# Patient Record
Sex: FEMALE | Race: WHITE | NOT HISPANIC OR LATINO
[De-identification: names, ages, dates, MRNs, and addresses within clinical notes are randomized per-mention and may not be internally consistent; named-entity substitution may affect disease eponyms.]

---

## 2017-01-18 PROBLEM — Z00.00 ENCOUNTER FOR PREVENTIVE HEALTH EXAMINATION: Status: ACTIVE | Noted: 2017-01-18

## 2017-01-30 ENCOUNTER — APPOINTMENT (OUTPATIENT)
Dept: PULMONOLOGY | Facility: CLINIC | Age: 47
End: 2017-01-30

## 2017-01-30 VITALS
HEART RATE: 77 BPM | HEIGHT: 59 IN | BODY MASS INDEX: 55.34 KG/M2 | OXYGEN SATURATION: 95 % | SYSTOLIC BLOOD PRESSURE: 136 MMHG | DIASTOLIC BLOOD PRESSURE: 84 MMHG | WEIGHT: 274.5 LBS

## 2017-01-30 DIAGNOSIS — Z86.79 PERSONAL HISTORY OF OTHER DISEASES OF THE CIRCULATORY SYSTEM: ICD-10-CM

## 2017-02-08 ENCOUNTER — OUTPATIENT (OUTPATIENT)
Dept: OUTPATIENT SERVICES | Facility: HOSPITAL | Age: 47
LOS: 1 days | End: 2017-02-08
Payer: COMMERCIAL

## 2017-02-08 DIAGNOSIS — G47.33 OBSTRUCTIVE SLEEP APNEA (ADULT) (PEDIATRIC): ICD-10-CM

## 2017-02-08 PROCEDURE — 95806 SLEEP STUDY UNATT&RESP EFFT: CPT

## 2017-02-08 PROCEDURE — 95806 SLEEP STUDY UNATT&RESP EFFT: CPT | Mod: 26

## 2017-02-22 ENCOUNTER — APPOINTMENT (OUTPATIENT)
Dept: PULMONOLOGY | Facility: CLINIC | Age: 47
End: 2017-02-22

## 2017-02-22 VITALS
SYSTOLIC BLOOD PRESSURE: 122 MMHG | DIASTOLIC BLOOD PRESSURE: 84 MMHG | HEART RATE: 90 BPM | BODY MASS INDEX: 56.15 KG/M2 | WEIGHT: 278 LBS | RESPIRATION RATE: 16 BRPM | OXYGEN SATURATION: 97 %

## 2017-03-29 ENCOUNTER — APPOINTMENT (OUTPATIENT)
Dept: VASCULAR SURGERY | Facility: CLINIC | Age: 47
End: 2017-03-29

## 2017-03-29 DIAGNOSIS — F17.200 NICOTINE DEPENDENCE, UNSPECIFIED, UNCOMPLICATED: ICD-10-CM

## 2017-04-06 ENCOUNTER — APPOINTMENT (OUTPATIENT)
Dept: VASCULAR SURGERY | Facility: CLINIC | Age: 47
End: 2017-04-06

## 2017-04-06 VITALS
TEMPERATURE: 98.9 F | HEIGHT: 59 IN | RESPIRATION RATE: 16 BRPM | OXYGEN SATURATION: 98 % | BODY MASS INDEX: 57.05 KG/M2 | DIASTOLIC BLOOD PRESSURE: 73 MMHG | WEIGHT: 283 LBS | HEART RATE: 61 BPM | SYSTOLIC BLOOD PRESSURE: 115 MMHG

## 2017-04-06 DIAGNOSIS — Z86.69 PERSONAL HISTORY OF OTHER DISEASES OF THE NERVOUS SYSTEM AND SENSE ORGANS: ICD-10-CM

## 2017-04-06 DIAGNOSIS — Z83.3 FAMILY HISTORY OF DIABETES MELLITUS: ICD-10-CM

## 2017-04-06 DIAGNOSIS — Z82.49 FAMILY HISTORY OF ISCHEMIC HEART DISEASE AND OTHER DISEASES OF THE CIRCULATORY SYSTEM: ICD-10-CM

## 2017-04-06 DIAGNOSIS — Z82.0 FAMILY HISTORY OF EPILEPSY AND OTHER DISEASES OF THE NERVOUS SYSTEM: ICD-10-CM

## 2017-05-10 ENCOUNTER — APPOINTMENT (OUTPATIENT)
Dept: VASCULAR SURGERY | Facility: CLINIC | Age: 47
End: 2017-05-10

## 2018-10-29 ENCOUNTER — APPOINTMENT (OUTPATIENT)
Dept: NEPHROLOGY | Facility: CLINIC | Age: 48
End: 2018-10-29
Payer: MEDICAID

## 2018-10-29 VITALS
BODY MASS INDEX: 54.84 KG/M2 | SYSTOLIC BLOOD PRESSURE: 132 MMHG | WEIGHT: 272 LBS | HEIGHT: 59 IN | OXYGEN SATURATION: 97 % | DIASTOLIC BLOOD PRESSURE: 80 MMHG | HEART RATE: 89 BPM

## 2018-10-29 DIAGNOSIS — E55.9 VITAMIN D DEFICIENCY, UNSPECIFIED: ICD-10-CM

## 2018-10-29 PROCEDURE — 99205 OFFICE O/P NEW HI 60 MIN: CPT | Mod: 25

## 2018-10-29 PROCEDURE — 36415 COLL VENOUS BLD VENIPUNCTURE: CPT

## 2018-10-30 PROBLEM — E55.9 VITAMIN D DEFICIENCY: Status: ACTIVE | Noted: 2018-10-30

## 2018-10-30 LAB
25(OH)D3 SERPL-MCNC: 15.6 NG/ML
ALBUMIN SERPL ELPH-MCNC: 4.1 G/DL
ANION GAP SERPL CALC-SCNC: 13 MMOL/L
APPEARANCE: CLEAR
BACTERIA: NEGATIVE
BASOPHILS # BLD AUTO: 0.01 K/UL
BASOPHILS NFR BLD AUTO: 0.1 %
BILIRUBIN URINE: NEGATIVE
BLOOD URINE: NEGATIVE
BUN SERPL-MCNC: 9 MG/DL
CALCIUM SERPL-MCNC: 9.3 MG/DL
CALCIUM SERPL-MCNC: 9.3 MG/DL
CHLORIDE SERPL-SCNC: 101 MMOL/L
CO2 SERPL-SCNC: 26 MMOL/L
COLOR: YELLOW
CREAT SERPL-MCNC: 0.65 MG/DL
CREAT SPEC-SCNC: 69 MG/DL
CREAT/PROT UR: 0.1 RATIO
EOSINOPHIL # BLD AUTO: 0.17 K/UL
EOSINOPHIL NFR BLD AUTO: 2 %
GLUCOSE QUALITATIVE U: NEGATIVE MG/DL
GLUCOSE SERPL-MCNC: 229 MG/DL
HBA1C MFR BLD HPLC: 8.1 %
HCT VFR BLD CALC: 47.8 %
HGB BLD-MCNC: 14.7 G/DL
IMM GRANULOCYTES NFR BLD AUTO: 0.2 %
KETONES URINE: NEGATIVE
LEUKOCYTE ESTERASE URINE: NEGATIVE
LYMPHOCYTES # BLD AUTO: 2.07 K/UL
LYMPHOCYTES NFR BLD AUTO: 24.5 %
MAN DIFF?: NORMAL
MCHC RBC-ENTMCNC: 29.8 PG
MCHC RBC-ENTMCNC: 30.8 GM/DL
MCV RBC AUTO: 97 FL
MICROSCOPIC-UA: NORMAL
MONOCYTES # BLD AUTO: 0.32 K/UL
MONOCYTES NFR BLD AUTO: 3.8 %
NEUTROPHILS # BLD AUTO: 5.87 K/UL
NEUTROPHILS NFR BLD AUTO: 69.4 %
NITRITE URINE: NEGATIVE
PARATHYROID HORMONE INTACT: 35 PG/ML
PH URINE: 5.5
PHOSPHATE SERPL-MCNC: 3.4 MG/DL
PLATELET # BLD AUTO: 261 K/UL
POTASSIUM SERPL-SCNC: 4.2 MMOL/L
PROT UR-MCNC: 7 MG/DL
PROTEIN URINE: NEGATIVE MG/DL
RBC # BLD: 4.93 M/UL
RBC # FLD: 14.9 %
RED BLOOD CELLS URINE: 1 /HPF
SODIUM SERPL-SCNC: 140 MMOL/L
SPECIFIC GRAVITY URINE: 1.02
SQUAMOUS EPITHELIAL CELLS: 5 /HPF
UROBILINOGEN URINE: NEGATIVE MG/DL
WBC # FLD AUTO: 8.46 K/UL
WHITE BLOOD CELLS URINE: 1 /HPF

## 2018-11-26 ENCOUNTER — APPOINTMENT (OUTPATIENT)
Dept: NEPHROLOGY | Facility: CLINIC | Age: 48
End: 2018-11-26

## 2020-03-02 ENCOUNTER — APPOINTMENT (OUTPATIENT)
Dept: CARDIOLOGY | Facility: CLINIC | Age: 50
End: 2020-03-02

## 2020-03-02 RX ORDER — LANCETS 33 GAUGE
EACH MISCELLANEOUS
Qty: 100 | Refills: 0 | Status: ACTIVE | COMMUNITY
Start: 2020-01-23

## 2020-07-17 ENCOUNTER — APPOINTMENT (OUTPATIENT)
Dept: ORTHOPEDIC SURGERY | Facility: CLINIC | Age: 50
End: 2020-07-17
Payer: MEDICAID

## 2020-07-17 VITALS
BODY MASS INDEX: 53.01 KG/M2 | TEMPERATURE: 98 F | WEIGHT: 270 LBS | SYSTOLIC BLOOD PRESSURE: 156 MMHG | DIASTOLIC BLOOD PRESSURE: 83 MMHG | HEIGHT: 60 IN | HEART RATE: 78 BPM

## 2020-07-17 DIAGNOSIS — Z87.2 PERSONAL HISTORY OF DISEASES OF THE SKIN AND SUBCUTANEOUS TISSUE: ICD-10-CM

## 2020-07-17 DIAGNOSIS — Z78.9 OTHER SPECIFIED HEALTH STATUS: ICD-10-CM

## 2020-07-17 PROCEDURE — 99203 OFFICE O/P NEW LOW 30 MIN: CPT

## 2020-07-17 PROCEDURE — 73560 X-RAY EXAM OF KNEE 1 OR 2: CPT | Mod: 26,RT

## 2020-07-21 NOTE — HISTORY OF PRESENT ILLNESS
[10  (interferes completely)] : the ailment interference is10/10 (interferes completely) [] : No [de-identified] : Darshana comes in today for her right hip.  She states that she fell on her right side a few weeks ago.  She states she went to urgent care, where they took x-rays and there was no fracture. It should be noted that she is an obese female with significant health problems.   She states she  has been on crutches because she was having pain into her right hip/lower extremity.  It should be noted the patient has significant lower extremity cellulitis, which she states she has had history of.  The patient states the onset/injury occurred on 6/29/20.  This injury is not work related or due to an automobile accident.   The patient describes the pain as sharp.  The patient states walking and lying down make the symptoms worse.

## 2020-07-21 NOTE — DISCUSSION/SUMMARY
[de-identified] : The patient presents with right hip trochanteric bursitis.  The patient cannot get a cortisone injection secondary to being a diabetic as well as her cellulitis.  She is advised to go to the ER for her cellulitis for her lower extremities and return to the office for her right hip in a few weeks.  Shewas also advised to use the crutches as needed.

## 2020-07-21 NOTE — PHYSICAL EXAM
[de-identified] : Right hip:\par 	\par Range of motion of her hip is not assessed secondary to being unable to fully lay down on the table secondary to some pain and some sciatic pain as well and her lower extremities being so cellulitic.  No tenderness with internal or external rotation or axial load.  Point tenderness over the greater trochanter with pain with resisted abduction.  Negative Trendelenburg.  No weakness to flexion, extension, abduction or adduction.  No evidence of instability.  No motor or sensory deficits.  2+ DP and PT pulses.  Skin is intact.  No scars, rashes or lesions.  \par  \par Left hip:\par Hip: Range of Motion in Degrees:\par 	                                 Claimant:	   Normal:	\par Flexion (Active) 	                 120 	   120-degrees	\par Flexion (Passive)	                 120	   120-degrees	\par Extension (Active)	                 -30	   -30-degrees	\par Extension (Passive)	 -30	   -30-degrees	\par Abduction (Active)	                 45-50	   08-45-jbdzago	\par Abduction (Passive)	 45-50	   25-00-tanlcsp	\par Adduction (Active)  	 20-30	   94-72-ryqitea	\par Adduction (Passive)	 20-30	   57-33-xhwrjnq	\par Internal Rotation (Active) 	 35	   35-degrees	\par Internal Rotation (Passive)	 35	   35-degrees	\par External Rotation (Active)	 45	   45-degrees	\par External Rotation (Passive)	 45	   45-degrees	\par \par No tenderness with internal or external rotation or axial load.  No tenderness to palpation over the greater trochanter.  Negative Trendelenburg.  No tenderness with resisted abduction.  No weakness to flexion, extension, abduction or adduction.  No evidence of instability.  No motor or sensory deficits.  2+ DP and PT pulses.  Skin is intact.  No scars, rashes or lesions.  \par   [de-identified] : Gait: Ambulating with crutches.  Station: Normal [de-identified] : Appearance: Well-developed, well-nourished female  in no acute distress.  [de-identified] : X-ray examination, two to three view of her right hip, including pelvis, reveals no acute fractures or dislocations.

## 2020-07-21 NOTE — ADDENDUM
[FreeTextEntry1] : This note was written by Abbey Mattson on 07/21/2020 acting as scribe for Chantale Shepard, APRILR/TAYLOR, PA.\par

## 2020-07-29 ENCOUNTER — TRANSCRIPTION ENCOUNTER (OUTPATIENT)
Age: 50
End: 2020-07-29

## 2020-07-30 ENCOUNTER — APPOINTMENT (OUTPATIENT)
Dept: ORTHOPEDIC SURGERY | Facility: CLINIC | Age: 50
End: 2020-07-30
Payer: MEDICAID

## 2020-07-30 VITALS
BODY MASS INDEX: 52.61 KG/M2 | DIASTOLIC BLOOD PRESSURE: 80 MMHG | HEART RATE: 93 BPM | SYSTOLIC BLOOD PRESSURE: 129 MMHG | HEIGHT: 60 IN | WEIGHT: 268 LBS | TEMPERATURE: 98.9 F

## 2020-07-30 PROCEDURE — 99213 OFFICE O/P EST LOW 20 MIN: CPT

## 2020-08-06 NOTE — HISTORY OF PRESENT ILLNESS
[de-identified] : Darshana comes in today for her right hip.  She had her cellulitis taken care of.  She still has significant venous stasis, but she also has trochanteric bursitis.

## 2020-08-06 NOTE — DISCUSSION/SUMMARY
[de-identified] : The patient presents with trochanteric bursitis, right hip.  At this time I recommend she undergo a course of physical therapy.  She will be reassessed in four to six weeks.

## 2020-08-06 NOTE — ADDENDUM
[FreeTextEntry1] : This note was written by Abbey Mattson on 08/04/2020 acting as scribe for Phani Agarwal III, MD

## 2020-08-06 NOTE — PHYSICAL EXAM
[de-identified] : Gait: Slightly antalgic to antalgic gait.  Station: Normal  [de-identified] : Appearance: Well-developed, well-nourished female  in no acute distress.  [de-identified] : Right hip:\par Hip: Range of Motion in Degrees:\par 	                                 Claimant:	          Normal:	\par Flexion (Active) 	                 120 	          120-degrees	\par Flexion (Passive)	                 120	          120-degrees	\par Extension (Active)	                 -30	          -30-degrees	\par Extension (Passive)	 -30	          -30-degrees	\par Abduction (Active)	                45-50	          83-90-hptzwsz	\par Abduction (Passive)	45-50	          96-33-goydkbt	\par Adduction (Active)                	20-30	          58-88-umgsbgu	\par Adduction (Passive)	20-30	          00-76-jaymtvb	\par Internal Rotation (Active) 	35	          35-degrees	\par Internal Rotation (Passive)	35	          35-degrees	\par External Rotation (Active)	45	          45-degrees	\par External Rotation (Passive)	45	          45-degrees	\par \par No tenderness with internal or external rotation or axial load.  Point tenderness over the greater trochanter with pain with resisted abduction.  Negative Trendelenburg.  No weakness to flexion, extension, abduction or adduction.  No evidence of instability.  No motor or sensory deficits.  2+ DP and PT pulses.  Skin is intact.  No scars, rashes or lesions.  \par

## 2020-08-27 ENCOUNTER — APPOINTMENT (OUTPATIENT)
Dept: ORTHOPEDIC SURGERY | Facility: CLINIC | Age: 50
End: 2020-08-27

## 2020-12-27 ENCOUNTER — TRANSCRIPTION ENCOUNTER (OUTPATIENT)
Age: 50
End: 2020-12-27

## 2022-04-06 ENCOUNTER — INPATIENT (INPATIENT)
Facility: HOSPITAL | Age: 52
LOS: 5 days | Discharge: NURSING, REHAB OR LONG-TERM CARE | DRG: 535 | End: 2022-04-12
Attending: STUDENT IN AN ORGANIZED HEALTH CARE EDUCATION/TRAINING PROGRAM | Admitting: STUDENT IN AN ORGANIZED HEALTH CARE EDUCATION/TRAINING PROGRAM
Payer: COMMERCIAL

## 2022-04-06 VITALS
RESPIRATION RATE: 17 BRPM | HEART RATE: 92 BPM | WEIGHT: 250 LBS | SYSTOLIC BLOOD PRESSURE: 124 MMHG | DIASTOLIC BLOOD PRESSURE: 85 MMHG | TEMPERATURE: 98 F | OXYGEN SATURATION: 99 % | HEIGHT: 60 IN

## 2022-04-06 DIAGNOSIS — M25.552 PAIN IN LEFT HIP: ICD-10-CM

## 2022-04-06 LAB
ALBUMIN SERPL ELPH-MCNC: 3.8 G/DL — SIGNIFICANT CHANGE UP (ref 3.3–5.2)
ALP SERPL-CCNC: 113 U/L — SIGNIFICANT CHANGE UP (ref 40–120)
ALT FLD-CCNC: 26 U/L — SIGNIFICANT CHANGE UP
ANION GAP SERPL CALC-SCNC: 11 MMOL/L — SIGNIFICANT CHANGE UP (ref 5–17)
APTT BLD: 33.9 SEC — SIGNIFICANT CHANGE UP (ref 27.5–35.5)
AST SERPL-CCNC: 17 U/L — SIGNIFICANT CHANGE UP
BILIRUB SERPL-MCNC: 0.4 MG/DL — SIGNIFICANT CHANGE UP (ref 0.4–2)
BUN SERPL-MCNC: 8.9 MG/DL — SIGNIFICANT CHANGE UP (ref 8–20)
CALCIUM SERPL-MCNC: 8.6 MG/DL — SIGNIFICANT CHANGE UP (ref 8.6–10.2)
CHLORIDE SERPL-SCNC: 101 MMOL/L — SIGNIFICANT CHANGE UP (ref 98–107)
CO2 SERPL-SCNC: 28 MMOL/L — SIGNIFICANT CHANGE UP (ref 22–29)
CREAT SERPL-MCNC: 0.45 MG/DL — LOW (ref 0.5–1.3)
EGFR: 116 ML/MIN/1.73M2 — SIGNIFICANT CHANGE UP
GLUCOSE SERPL-MCNC: 176 MG/DL — HIGH (ref 70–99)
HCG SERPL-ACNC: <4 MIU/ML — SIGNIFICANT CHANGE UP
HCT VFR BLD CALC: 46.8 % — HIGH (ref 34.5–45)
HGB BLD-MCNC: 14.8 G/DL — SIGNIFICANT CHANGE UP (ref 11.5–15.5)
INR BLD: 1.19 RATIO — HIGH (ref 0.88–1.16)
MCHC RBC-ENTMCNC: 30.1 PG — SIGNIFICANT CHANGE UP (ref 27–34)
MCHC RBC-ENTMCNC: 31.6 GM/DL — LOW (ref 32–36)
MCV RBC AUTO: 95.3 FL — SIGNIFICANT CHANGE UP (ref 80–100)
PLATELET # BLD AUTO: 224 K/UL — SIGNIFICANT CHANGE UP (ref 150–400)
POTASSIUM SERPL-MCNC: 4.3 MMOL/L — SIGNIFICANT CHANGE UP (ref 3.5–5.3)
POTASSIUM SERPL-SCNC: 4.3 MMOL/L — SIGNIFICANT CHANGE UP (ref 3.5–5.3)
PROT SERPL-MCNC: 6.6 G/DL — SIGNIFICANT CHANGE UP (ref 6.6–8.7)
PROTHROM AB SERPL-ACNC: 13.8 SEC — HIGH (ref 10.5–13.4)
RBC # BLD: 4.91 M/UL — SIGNIFICANT CHANGE UP (ref 3.8–5.2)
RBC # FLD: 13.8 % — SIGNIFICANT CHANGE UP (ref 10.3–14.5)
SODIUM SERPL-SCNC: 140 MMOL/L — SIGNIFICANT CHANGE UP (ref 135–145)
WBC # BLD: 11.04 K/UL — HIGH (ref 3.8–10.5)
WBC # FLD AUTO: 11.04 K/UL — HIGH (ref 3.8–10.5)

## 2022-04-06 PROCEDURE — 73502 X-RAY EXAM HIP UNI 2-3 VIEWS: CPT | Mod: 26,LT

## 2022-04-06 PROCEDURE — 72192 CT PELVIS W/O DYE: CPT | Mod: 26,MA

## 2022-04-06 PROCEDURE — 99285 EMERGENCY DEPT VISIT HI MDM: CPT

## 2022-04-06 RX ORDER — OXYCODONE AND ACETAMINOPHEN 5; 325 MG/1; MG/1
2 TABLET ORAL ONCE
Refills: 0 | Status: DISCONTINUED | OUTPATIENT
Start: 2022-04-06 | End: 2022-04-06

## 2022-04-06 RX ORDER — MORPHINE SULFATE 50 MG/1
4 CAPSULE, EXTENDED RELEASE ORAL ONCE
Refills: 0 | Status: DISCONTINUED | OUTPATIENT
Start: 2022-04-06 | End: 2022-04-06

## 2022-04-06 RX ADMIN — OXYCODONE AND ACETAMINOPHEN 2 TABLET(S): 5; 325 TABLET ORAL at 13:17

## 2022-04-06 RX ADMIN — MORPHINE SULFATE 4 MILLIGRAM(S): 50 CAPSULE, EXTENDED RELEASE ORAL at 22:52

## 2022-04-06 RX ADMIN — OXYCODONE AND ACETAMINOPHEN 2 TABLET(S): 5; 325 TABLET ORAL at 12:57

## 2022-04-06 RX ADMIN — MORPHINE SULFATE 4 MILLIGRAM(S): 50 CAPSULE, EXTENDED RELEASE ORAL at 22:22

## 2022-04-06 NOTE — ED PROVIDER NOTE - OBJECTIVE STATEMENT
50 yo female s/p slipped on mud and fall onto left hip ; pt unable to ambulate after fall ; pt denies and head, neck , chest or back pain

## 2022-04-06 NOTE — ED PROVIDER NOTE - PHYSICAL EXAMINATION
Alert, lucid, and in no apparent distress. Pt is normocephalic, atraumatic.  Pupils are equal, round, no dysmetria. External ear without bleeding or mass, no nasal discharge or malodor, lips pink, moist mucous membranes, tongue midline. Neck supple.   Lungs clear to auscultation. Heart regular rate and rhythm, normal S1, S2, no murmurs, gallops, rubs.  Abdomen is soft, nontender, no pulsatile mass, no masses, no distension, no rebound. left hip  pain with rom; tender to  palp anteriorly  Non-focal sensory, 5 out of 5 motor strength, no dysmetria, fluent, goal directed speech.Skin without rash,   Normal mentation, does not appear agitated

## 2022-04-06 NOTE — ED ADULT TRIAGE NOTE - CHIEF COMPLAINT QUOTE
slip and fall in mud, injuring left him, denies hitting head, LOC or blood thinners.  unable to ambulate after fall

## 2022-04-06 NOTE — ED ADULT NURSE REASSESSMENT NOTE - NS ED NURSE REASSESS COMMENT FT1
Patient is alert,oriented x3 breathing non labored, no c/o pain at present. Needs attended. Call bell within reached. Safety maintained.

## 2022-04-06 NOTE — ED PROVIDER NOTE - PROGRESS NOTE DETAILS
pt with continue pain in left hip; xray of hip negative for fx;  will obtain ct of pelvis ; labs pending; Mason DEWITT: ortho consulted. Admitted to medicine.

## 2022-04-07 LAB
A1C WITH ESTIMATED AVERAGE GLUCOSE RESULT: 10.3 % — HIGH (ref 4–5.6)
ALBUMIN SERPL ELPH-MCNC: 3.7 G/DL — SIGNIFICANT CHANGE UP (ref 3.3–5.2)
ALP SERPL-CCNC: 116 U/L — SIGNIFICANT CHANGE UP (ref 40–120)
ALT FLD-CCNC: 25 U/L — SIGNIFICANT CHANGE UP
ANION GAP SERPL CALC-SCNC: 13 MMOL/L — SIGNIFICANT CHANGE UP (ref 5–17)
AST SERPL-CCNC: 15 U/L — SIGNIFICANT CHANGE UP
BASOPHILS # BLD AUTO: 0.03 K/UL — SIGNIFICANT CHANGE UP (ref 0–0.2)
BASOPHILS NFR BLD AUTO: 0.3 % — SIGNIFICANT CHANGE UP (ref 0–2)
BILIRUB SERPL-MCNC: 0.9 MG/DL — SIGNIFICANT CHANGE UP (ref 0.4–2)
BLD GP AB SCN SERPL QL: SIGNIFICANT CHANGE UP
BUN SERPL-MCNC: 8.4 MG/DL — SIGNIFICANT CHANGE UP (ref 8–20)
CALCIUM SERPL-MCNC: 8.8 MG/DL — SIGNIFICANT CHANGE UP (ref 8.6–10.2)
CHLORIDE SERPL-SCNC: 100 MMOL/L — SIGNIFICANT CHANGE UP (ref 98–107)
CHOLEST SERPL-MCNC: 152 MG/DL — SIGNIFICANT CHANGE UP
CO2 SERPL-SCNC: 26 MMOL/L — SIGNIFICANT CHANGE UP (ref 22–29)
CREAT SERPL-MCNC: 0.44 MG/DL — LOW (ref 0.5–1.3)
EGFR: 117 ML/MIN/1.73M2 — SIGNIFICANT CHANGE UP
EOSINOPHIL # BLD AUTO: 0.05 K/UL — SIGNIFICANT CHANGE UP (ref 0–0.5)
EOSINOPHIL NFR BLD AUTO: 0.5 % — SIGNIFICANT CHANGE UP (ref 0–6)
ESTIMATED AVERAGE GLUCOSE: 249 MG/DL — HIGH (ref 68–114)
GLUCOSE BLDC GLUCOMTR-MCNC: 180 MG/DL — HIGH (ref 70–99)
GLUCOSE BLDC GLUCOMTR-MCNC: 190 MG/DL — HIGH (ref 70–99)
GLUCOSE BLDC GLUCOMTR-MCNC: 196 MG/DL — HIGH (ref 70–99)
GLUCOSE BLDC GLUCOMTR-MCNC: 216 MG/DL — HIGH (ref 70–99)
GLUCOSE BLDC GLUCOMTR-MCNC: 229 MG/DL — HIGH (ref 70–99)
GLUCOSE BLDC GLUCOMTR-MCNC: 244 MG/DL — HIGH (ref 70–99)
GLUCOSE SERPL-MCNC: 216 MG/DL — HIGH (ref 70–99)
HCT VFR BLD CALC: 51.3 % — HIGH (ref 34.5–45)
HDLC SERPL-MCNC: 37 MG/DL — LOW
HGB BLD-MCNC: 16 G/DL — HIGH (ref 11.5–15.5)
IMM GRANULOCYTES NFR BLD AUTO: 0.3 % — SIGNIFICANT CHANGE UP (ref 0–1.5)
INR BLD: 1.19 RATIO — HIGH (ref 0.88–1.16)
LIPID PNL WITH DIRECT LDL SERPL: 99 MG/DL — SIGNIFICANT CHANGE UP
LYMPHOCYTES # BLD AUTO: 1.45 K/UL — SIGNIFICANT CHANGE UP (ref 1–3.3)
LYMPHOCYTES # BLD AUTO: 14.4 % — SIGNIFICANT CHANGE UP (ref 13–44)
MCHC RBC-ENTMCNC: 29.8 PG — SIGNIFICANT CHANGE UP (ref 27–34)
MCHC RBC-ENTMCNC: 31.2 GM/DL — LOW (ref 32–36)
MCV RBC AUTO: 95.5 FL — SIGNIFICANT CHANGE UP (ref 80–100)
MONOCYTES # BLD AUTO: 0.67 K/UL — SIGNIFICANT CHANGE UP (ref 0–0.9)
MONOCYTES NFR BLD AUTO: 6.6 % — SIGNIFICANT CHANGE UP (ref 2–14)
NEUTROPHILS # BLD AUTO: 7.87 K/UL — HIGH (ref 1.8–7.4)
NEUTROPHILS NFR BLD AUTO: 77.9 % — HIGH (ref 43–77)
NON HDL CHOLESTEROL: 115 MG/DL — SIGNIFICANT CHANGE UP
PLATELET # BLD AUTO: 209 K/UL — SIGNIFICANT CHANGE UP (ref 150–400)
POTASSIUM SERPL-MCNC: 4.6 MMOL/L — SIGNIFICANT CHANGE UP (ref 3.5–5.3)
POTASSIUM SERPL-SCNC: 4.6 MMOL/L — SIGNIFICANT CHANGE UP (ref 3.5–5.3)
PROT SERPL-MCNC: 6.8 G/DL — SIGNIFICANT CHANGE UP (ref 6.6–8.7)
PROTHROM AB SERPL-ACNC: 13.8 SEC — HIGH (ref 10.5–13.4)
RAPID RVP RESULT: SIGNIFICANT CHANGE UP
RBC # BLD: 5.37 M/UL — HIGH (ref 3.8–5.2)
RBC # FLD: 13.8 % — SIGNIFICANT CHANGE UP (ref 10.3–14.5)
SARS-COV-2 RNA SPEC QL NAA+PROBE: SIGNIFICANT CHANGE UP
SODIUM SERPL-SCNC: 139 MMOL/L — SIGNIFICANT CHANGE UP (ref 135–145)
TRIGL SERPL-MCNC: 81 MG/DL — SIGNIFICANT CHANGE UP
WBC # BLD: 10.1 K/UL — SIGNIFICANT CHANGE UP (ref 3.8–10.5)
WBC # FLD AUTO: 10.1 K/UL — SIGNIFICANT CHANGE UP (ref 3.8–10.5)

## 2022-04-07 PROCEDURE — 99223 1ST HOSP IP/OBS HIGH 75: CPT

## 2022-04-07 RX ORDER — MORPHINE SULFATE 50 MG/1
2 CAPSULE, EXTENDED RELEASE ORAL ONCE
Refills: 0 | Status: DISCONTINUED | OUTPATIENT
Start: 2022-04-07 | End: 2022-04-07

## 2022-04-07 RX ORDER — INSULIN LISPRO 100/ML
3 VIAL (ML) SUBCUTANEOUS
Refills: 0 | Status: DISCONTINUED | OUTPATIENT
Start: 2022-04-07 | End: 2022-04-08

## 2022-04-07 RX ORDER — ENOXAPARIN SODIUM 100 MG/ML
40 INJECTION SUBCUTANEOUS EVERY 24 HOURS
Refills: 0 | Status: DISCONTINUED | OUTPATIENT
Start: 2022-04-07 | End: 2022-04-08

## 2022-04-07 RX ORDER — ONDANSETRON 8 MG/1
4 TABLET, FILM COATED ORAL EVERY 8 HOURS
Refills: 0 | Status: DISCONTINUED | OUTPATIENT
Start: 2022-04-07 | End: 2022-04-12

## 2022-04-07 RX ORDER — LOSARTAN POTASSIUM 100 MG/1
50 TABLET, FILM COATED ORAL DAILY
Refills: 0 | Status: DISCONTINUED | OUTPATIENT
Start: 2022-04-07 | End: 2022-04-09

## 2022-04-07 RX ORDER — SODIUM CHLORIDE 9 MG/ML
1000 INJECTION, SOLUTION INTRAVENOUS
Refills: 0 | Status: DISCONTINUED | OUTPATIENT
Start: 2022-04-07 | End: 2022-04-12

## 2022-04-07 RX ORDER — INSULIN LISPRO 100/ML
VIAL (ML) SUBCUTANEOUS
Refills: 0 | Status: DISCONTINUED | OUTPATIENT
Start: 2022-04-07 | End: 2022-04-12

## 2022-04-07 RX ORDER — DEXTROSE 50 % IN WATER 50 %
25 SYRINGE (ML) INTRAVENOUS ONCE
Refills: 0 | Status: DISCONTINUED | OUTPATIENT
Start: 2022-04-07 | End: 2022-04-12

## 2022-04-07 RX ORDER — GLUCAGON INJECTION, SOLUTION 0.5 MG/.1ML
1 INJECTION, SOLUTION SUBCUTANEOUS ONCE
Refills: 0 | Status: DISCONTINUED | OUTPATIENT
Start: 2022-04-07 | End: 2022-04-12

## 2022-04-07 RX ORDER — ACETAMINOPHEN 500 MG
650 TABLET ORAL EVERY 6 HOURS
Refills: 0 | Status: DISCONTINUED | OUTPATIENT
Start: 2022-04-07 | End: 2022-04-12

## 2022-04-07 RX ORDER — INSULIN GLARGINE 100 [IU]/ML
9 INJECTION, SOLUTION SUBCUTANEOUS EVERY MORNING
Refills: 0 | Status: DISCONTINUED | OUTPATIENT
Start: 2022-04-07 | End: 2022-04-08

## 2022-04-07 RX ORDER — DEXTROSE 50 % IN WATER 50 %
12.5 SYRINGE (ML) INTRAVENOUS ONCE
Refills: 0 | Status: DISCONTINUED | OUTPATIENT
Start: 2022-04-07 | End: 2022-04-12

## 2022-04-07 RX ORDER — DEXTROSE 50 % IN WATER 50 %
15 SYRINGE (ML) INTRAVENOUS ONCE
Refills: 0 | Status: DISCONTINUED | OUTPATIENT
Start: 2022-04-07 | End: 2022-04-12

## 2022-04-07 RX ORDER — NICOTINE POLACRILEX 2 MG
1 GUM BUCCAL DAILY
Refills: 0 | Status: DISCONTINUED | OUTPATIENT
Start: 2022-04-07 | End: 2022-04-12

## 2022-04-07 RX ORDER — OXYCODONE AND ACETAMINOPHEN 5; 325 MG/1; MG/1
1 TABLET ORAL EVERY 6 HOURS
Refills: 0 | Status: DISCONTINUED | OUTPATIENT
Start: 2022-04-07 | End: 2022-04-12

## 2022-04-07 RX ADMIN — OXYCODONE AND ACETAMINOPHEN 1 TABLET(S): 5; 325 TABLET ORAL at 23:33

## 2022-04-07 RX ADMIN — LOSARTAN POTASSIUM 50 MILLIGRAM(S): 100 TABLET, FILM COATED ORAL at 05:17

## 2022-04-07 RX ADMIN — OXYCODONE AND ACETAMINOPHEN 1 TABLET(S): 5; 325 TABLET ORAL at 05:17

## 2022-04-07 RX ADMIN — Medication 3 UNIT(S): at 12:41

## 2022-04-07 RX ADMIN — Medication 1: at 12:41

## 2022-04-07 RX ADMIN — Medication 1 PATCH: at 13:27

## 2022-04-07 RX ADMIN — MORPHINE SULFATE 2 MILLIGRAM(S): 50 CAPSULE, EXTENDED RELEASE ORAL at 13:46

## 2022-04-07 RX ADMIN — Medication 2: at 18:00

## 2022-04-07 RX ADMIN — MORPHINE SULFATE 2 MILLIGRAM(S): 50 CAPSULE, EXTENDED RELEASE ORAL at 13:27

## 2022-04-07 RX ADMIN — INSULIN GLARGINE 9 UNIT(S): 100 INJECTION, SOLUTION SUBCUTANEOUS at 10:14

## 2022-04-07 RX ADMIN — Medication 3 UNIT(S): at 18:01

## 2022-04-07 RX ADMIN — Medication 2: at 08:56

## 2022-04-07 RX ADMIN — Medication 3 UNIT(S): at 08:57

## 2022-04-07 RX ADMIN — OXYCODONE AND ACETAMINOPHEN 1 TABLET(S): 5; 325 TABLET ORAL at 06:19

## 2022-04-07 NOTE — H&P ADULT - NSHPPHYSICALEXAM_GEN_ALL_CORE
Vital Signs Last 24 Hrs  T(C): 36.6 (06 Apr 2022 22:19), Max: 36.8 (06 Apr 2022 16:08)  T(F): 97.8 (06 Apr 2022 22:19), Max: 98.2 (06 Apr 2022 16:08)  HR: 80 (06 Apr 2022 22:19) (80 - 92)  BP: 145/79 (06 Apr 2022 22:19) (122/83 - 145/79)  BP(mean): 106 (06 Apr 2022 22:19) (106 - 106)  RR: 17 (06 Apr 2022 22:19) (17 - 18)  SpO2: 99% (06 Apr 2022 22:19) (99% - 99%)      PHYSICAL EXAM:  GENERAL: NAD, well-developed  HEAD:  Atraumatic, Normocephalic  EYES: EOMI, PERRLA, conjunctiva and sclera clear  NECK: Supple, No JVD  CHEST/LUNG: Clear to auscultation bilaterally; No wheeze  HEART: Regular rate and rhythm; No murmurs, rubs, or gallops  ABDOMEN: Soft, Nontender, Nondistended; Bowel sounds present  EXTREMITIES:  2+ Peripheral Pulses, No clubbing, cyanosis, or edema  LEft hip: ROM restricted, tender to palpation, neurovascular intact  PSYCH: AAOx3  NEUROLOGY: non-focal  SKIN: No rashes or lesions

## 2022-04-07 NOTE — H&P ADULT - HISTORY OF PRESENT ILLNESS
patient is a 52 yo female with PMH  patient is a 50 yo female with PMH DM, HTN, presented to the ED after mechanical fall followed by Left hip pain. She came out of her residence to get door dash delivery but she slipped in the mud and fell on left hip. since then she is having pain in left hip, difficulty ambulating. she is unable to bear weight on left side.

## 2022-04-07 NOTE — H&P ADULT - NSICDXPASTMEDICALHX_GEN_ALL_CORE_FT
Physician Progress Note      Roxie Delcid  CSN #:                  [id number]  :                       1954  ADMIT DATE:       2021 2:22 PM  [address] [place] DATE:  RESPONDING  PROVIDER #:        David Covington MD          QUERY TEXT:    Dear Attending,    Pt admitted with CVA, initial concern for seizure, and has encephalopathy documented. If possible, please document in progress notes and discharge summary further specificity regarding the type of encephalopathy:    The medical record reflects the following:  Risk Factors: CVA  Clinical Indicators: transfer to ED from [facility] for eval of poss seizure  - H&P with documentation of concern for acute cva vs seizure, loaded with keppra, and consult for Neuro  -  Neuro consult initially suspected seizure over stroke with MRI pending  - MRI- Evolution of now subacute left pontine infarction, with a few small new areas of acute infarction in the left ventral aaron and right central aaron. -  PN confirmed acute on subacute pontine CVA  -  PN: Likely acute encephalopathy from CVA, initially was started on Depakote for concern of seizure, now neurology is weaning the medication, to stop tomorrow  Treatment: MRI, Neuro consult, EEG,  mg daily, Plavix 75 mg daily, neuro checks, monitoring      Thank you,    Lay Cespedes, RN  [facility]  [phone]  Options provided:  -- Metabolic encephalopathy  -- Encephalopathy- unspecified  -- Other - I will add my own diagnosis  -- Disagree - Not applicable / Not valid  -- Disagree - Clinically unable to determine / Unknown  -- Refer to Clinical Documentation Reviewer    PROVIDER RESPONSE TEXT:    This patient has metabolic encephalopathy.     Query created by: Ubaldo San on 10/1/2021 9:58 AM      Electronically signed by:  David Covington MD 10/1/2021 1:35 PM PAST MEDICAL HISTORY:  Borderline systolic HTN     Obstructive sleep apnea

## 2022-04-07 NOTE — H&P ADULT - ASSESSMENT
patient is a 50 yo female with PMH DM, HTN, presented to the ED after mechanical fall followed by Left hip pain. She came out of her residence to get door dash delivery but she slipped in the mud and fell on left hip. since then she is having pain in left hip, difficulty ambulating. she is unable to bear weight on left side.    Impression:    # Left greater trochanter fracture     < from: CT Pelvis Bony Only No Cont (04.06.22 @ 20:42) >  Acute, minimally displaced fracture of the left greater trochanter.   Question trace left hip joint effusion/hemarthrosis. Follow-up MRI would   be helpful to assess for possible intra-articular extension.    bedrest  pain control  dvt ppx  resume diet until final plan for OR in made  MRI left hip ordered to rule out fracture/hemarthrosis extension intraarticular  orthopedic following    HTN  resume home meds except hold hctz for now    DM:  CC diet  insulin lantus 9  lispro 3/3/3    Active smoker:  counseling provided    Pre Op clearance:    Patient with >4 MET capacity  Denies any cardiopulmonary condition  EKG with LVH  RCRI class II risk, denies chest pain on exertion or rest or SOB  patient is planned for OR tomorrow, she is optimized for moderate risk procedure      DVT ppx

## 2022-04-07 NOTE — H&P ADULT - NSHPREVIEWOFSYSTEMS_GEN_ALL_CORE
Patient is a 51y Female presenting to the emergency department with a chief complaint of left hip pain s/p slip and fall. Patient reports that she is unable to bear weight on left LE due to pain. Denies acute sensory/motor changes. no other ortho complaints at this time.

## 2022-04-08 LAB
A1C WITH ESTIMATED AVERAGE GLUCOSE RESULT: 10.2 % — HIGH (ref 4–5.6)
ANION GAP SERPL CALC-SCNC: 13 MMOL/L — SIGNIFICANT CHANGE UP (ref 5–17)
BUN SERPL-MCNC: 8.1 MG/DL — SIGNIFICANT CHANGE UP (ref 8–20)
CALCIUM SERPL-MCNC: 8.6 MG/DL — SIGNIFICANT CHANGE UP (ref 8.6–10.2)
CHLORIDE SERPL-SCNC: 100 MMOL/L — SIGNIFICANT CHANGE UP (ref 98–107)
CO2 SERPL-SCNC: 25 MMOL/L — SIGNIFICANT CHANGE UP (ref 22–29)
CREAT SERPL-MCNC: 0.4 MG/DL — LOW (ref 0.5–1.3)
EGFR: 120 ML/MIN/1.73M2 — SIGNIFICANT CHANGE UP
ESTIMATED AVERAGE GLUCOSE: 246 MG/DL — HIGH (ref 68–114)
GLUCOSE BLDC GLUCOMTR-MCNC: 199 MG/DL — HIGH (ref 70–99)
GLUCOSE BLDC GLUCOMTR-MCNC: 212 MG/DL — HIGH (ref 70–99)
GLUCOSE BLDC GLUCOMTR-MCNC: 224 MG/DL — HIGH (ref 70–99)
GLUCOSE SERPL-MCNC: 228 MG/DL — HIGH (ref 70–99)
HCT VFR BLD CALC: 48.3 % — HIGH (ref 34.5–45)
HGB BLD-MCNC: 15.2 G/DL — SIGNIFICANT CHANGE UP (ref 11.5–15.5)
MAGNESIUM SERPL-MCNC: 1.9 MG/DL — SIGNIFICANT CHANGE UP (ref 1.6–2.6)
MCHC RBC-ENTMCNC: 30 PG — SIGNIFICANT CHANGE UP (ref 27–34)
MCHC RBC-ENTMCNC: 31.5 GM/DL — LOW (ref 32–36)
MCV RBC AUTO: 95.5 FL — SIGNIFICANT CHANGE UP (ref 80–100)
PHOSPHATE SERPL-MCNC: 3.8 MG/DL — SIGNIFICANT CHANGE UP (ref 2.4–4.7)
PLATELET # BLD AUTO: 190 K/UL — SIGNIFICANT CHANGE UP (ref 150–400)
POTASSIUM SERPL-MCNC: 4 MMOL/L — SIGNIFICANT CHANGE UP (ref 3.5–5.3)
POTASSIUM SERPL-SCNC: 4 MMOL/L — SIGNIFICANT CHANGE UP (ref 3.5–5.3)
RBC # BLD: 5.06 M/UL — SIGNIFICANT CHANGE UP (ref 3.8–5.2)
RBC # FLD: 13.7 % — SIGNIFICANT CHANGE UP (ref 10.3–14.5)
SODIUM SERPL-SCNC: 138 MMOL/L — SIGNIFICANT CHANGE UP (ref 135–145)
WBC # BLD: 8.83 K/UL — SIGNIFICANT CHANGE UP (ref 3.8–10.5)
WBC # FLD AUTO: 8.83 K/UL — SIGNIFICANT CHANGE UP (ref 3.8–10.5)

## 2022-04-08 PROCEDURE — 99233 SBSQ HOSP IP/OBS HIGH 50: CPT

## 2022-04-08 PROCEDURE — 73630 X-RAY EXAM OF FOOT: CPT | Mod: 26,LT

## 2022-04-08 PROCEDURE — 73610 X-RAY EXAM OF ANKLE: CPT | Mod: 26,LT

## 2022-04-08 RX ORDER — INSULIN LISPRO 100/ML
4 VIAL (ML) SUBCUTANEOUS
Refills: 0 | Status: DISCONTINUED | OUTPATIENT
Start: 2022-04-08 | End: 2022-04-12

## 2022-04-08 RX ORDER — HYDROCHLOROTHIAZIDE 25 MG
25 TABLET ORAL DAILY
Refills: 0 | Status: DISCONTINUED | OUTPATIENT
Start: 2022-04-08 | End: 2022-04-09

## 2022-04-08 RX ORDER — INSULIN GLARGINE 100 [IU]/ML
10 INJECTION, SOLUTION SUBCUTANEOUS EVERY MORNING
Refills: 0 | Status: DISCONTINUED | OUTPATIENT
Start: 2022-04-08 | End: 2022-04-12

## 2022-04-08 RX ADMIN — Medication 2: at 17:09

## 2022-04-08 RX ADMIN — ENOXAPARIN SODIUM 40 MILLIGRAM(S): 100 INJECTION SUBCUTANEOUS at 12:37

## 2022-04-08 RX ADMIN — Medication 1 PATCH: at 08:00

## 2022-04-08 RX ADMIN — OXYCODONE AND ACETAMINOPHEN 1 TABLET(S): 5; 325 TABLET ORAL at 06:32

## 2022-04-08 RX ADMIN — Medication 4 UNIT(S): at 17:09

## 2022-04-08 RX ADMIN — LOSARTAN POTASSIUM 50 MILLIGRAM(S): 100 TABLET, FILM COATED ORAL at 05:38

## 2022-04-08 RX ADMIN — Medication 2: at 12:37

## 2022-04-08 RX ADMIN — Medication 25 MILLIGRAM(S): at 12:37

## 2022-04-08 RX ADMIN — Medication 1 PATCH: at 12:37

## 2022-04-08 RX ADMIN — INSULIN GLARGINE 9 UNIT(S): 100 INJECTION, SOLUTION SUBCUTANEOUS at 10:10

## 2022-04-08 RX ADMIN — Medication 4 UNIT(S): at 12:36

## 2022-04-08 RX ADMIN — Medication 1 PATCH: at 11:00

## 2022-04-08 RX ADMIN — Medication 1: at 08:49

## 2022-04-08 RX ADMIN — OXYCODONE AND ACETAMINOPHEN 1 TABLET(S): 5; 325 TABLET ORAL at 05:38

## 2022-04-08 NOTE — PATIENT PROFILE ADULT - NSTOBACCOQUITATTEMPT_GEN_A_CORE_SD
TeleMedicine Patient Consent    This visit was performed as a virtual video visit using a synchronous, two-way, audio-video telehealth technology platform. Patient identification was verified at the start of the visit, including the patient's telephone number and physical location. I discussed with the patient the nature of our telehealth visits, that:     1. Due to the nature of an audio- video modality, the only components of a physical exam that could be done are the elements supported by direct observation. 2. I would evaluate the patient and recommend diagnostics and treatments based on my assessment. 3. If it was felt that the patient should be evaluated in clinic or an emergency room setting, then they would be directed there. 4. Our sessions are not being recorded and that personal health information is protected. 5. Our team would provide follow up care in person if/when the patient needs it. Patient does agree to proceed with telemedicine consultation. Patient's location: home address in [facility]  Physician  location other address in [facility] other people involved in call mother    HPI:  Patient comes intoday for   Chief Complaint   Patient presents with    Nausea     nausea and upset stomach   . Complains of nausea since having GB surgery last month. States when she takes the zofran it helps with the nausea. Has had chills/sweats at times. No known sick contacts    Prior to Visit Medications    Medication Sig Taking?  Authorizing Provider   lamoTRIgine (LAMICTAL) 25 MG tablet TAKE 1 TABLET BY MOUTH TWICE A DAY Yes TWIN Baires CNP   ondansetron (ZOFRAN-ODT) 4 MG disintegrating tablet Take 1 tablet by mouth every 8 hours as needed for Nausea or Vomiting Yes TWIN Baires CNP   famotidine (PEPCID) 20 MG tablet Take 1 tablet by mouth 2 times daily Yes TWIN Baires CNP   albuterol sulfate  (90 Base) MCG/ACT inhaler Inhale 2 puffs into the lungs every 6 hours as needed for Wheezing or Shortness of Breath Yes TWIN Lynn CNP   cetirizine (ZYRTEC) 10 MG tablet TAKE ONE TABLET BY MOUTH DAILY Yes TWIN Lynn CNP   levETIRAcetam (KEPPRA) 750 MG tablet Take 1 tablet by mouth 2 times daily Yes TWIN Rowland   vitamin D (CHOLECALCIFEROL) 01561 UNIT CAPS Take 1 capsule weekly Yes Doreen Rod MD   calcitRIOL (ROCALTROL) 0.5 MCG capsule Take 1 capsule by mouth 2 times daily Yes Doreen Rod MD   calcium carbonate (CALCIUM 600) 600 MG TABS tablet Take 2 tablet three times a day Yes Doreen Rod MD   sertraline (ZOLOFT) 100 MG tablet TAKE 1 & 1/2 TABLETS BY MOUTH EVERY DAY Yes TWIN Lynn CNP   Guselkumab (TREMFYA SC) Inject into the skin Yes Historical Provider, MD   busPIRone (BUSPAR) 5 MG tablet TAKE ONE TABLET BY MOUTH TWO TIMES A DAY Yes TWIN Weiss CNP   ibuprofen (ADVIL;MOTRIN) 800 MG tablet TAKE ONE TABLET BY MOUTH EVERY 8 HOURS AS NEEDED FOR PAIN. Yes Jacob Iniguez,    clobetasol (TEMOVATE) 0.05 % cream Apply topically 2 times daily Apply topically 2 times daily. Yes TWIN Lynn CNP   Ciclopirox 1 % SHAM USE EVERY OTHER DAY AS A SHAMPOO Yes TWIN Lynn CNP   Clobetasol Propionate Emulsion 0.05 % FOAM APPLY TO RASH ON SCALP TWICE A DAY AS NEEDED Yes Historical Provider, MD         Allergies   Allergen Reactions    Aspirin Other (See Comments)         Review of Systems  Review of Systems   Constitutional: Positive for chills and diaphoresis. Negative for fever. Respiratory: Negative for cough, shortness of breath and wheezing. Cardiovascular: Negative for chest pain and palpitations. Gastrointestinal: Positive for diarrhea and nausea. Negative for abdominal distention, abdominal pain, constipation and vomiting. Neurological: Negative for weakness, light-headedness and headaches. VS:  There were no vitals taken for this visit.     Physical none

## 2022-04-08 NOTE — CHART NOTE - NSCHARTNOTEFT_GEN_A_CORE
Diabetes Note: Aware consult for diabetes education.  Pt with hgba1c 10.2%.  Pt reports good appetite/po intake prior to admission, but states not following a meal plan for diabetes.  Discussed cons cho meal plan using the plate method with emphasis on appropriate CHO/protein portions.  Also reviewed importance of meal/snack timing to maintain euglycemia while on insulin and discussed appropriate beverage choices.  Pt very receptive and verbalized understanding.  Nutrition literature provided.  RD CDCES to remain available.
Examined at bedside. No concerns.     VITALS:   T(C): 36.4 (04-07-22 @ 07:48), Max: 36.8 (04-06-22 @ 16:08)  HR: 93 (04-07-22 @ 07:48) (80 - 95)  BP: 143/88 (04-07-22 @ 07:48) (122/83 - 145/79)  RR: 18 (04-07-22 @ 07:48) (17 - 20)  SpO2: 92% (04-07-22 @ 07:48) (91% - 99%)    GENERAL: NAD, lying in bed comfortably, obese female  HEAD:  Atraumatic, Normocephalic  EYES: EOMI, PERRLA, conjunctiva and sclera clear  ENT: Moist mucous membranes  NECK: Supple, No JVD  CHEST/LUNG: Clear to auscultation bilaterally; No rales, rhonchi, wheezing, or rubs. Unlabored respirations  HEART: Regular rate and rhythm; No murmurs, rubs, or gallops  ABDOMEN: BSx4; Soft, nontender, nondistended  EXTREMITIES:  2+ Peripheral Pulses, brisk capillary refill. No clubbing, cyanosis, or edema  NERVOUS SYSTEM:  A&Ox3, no focal deficits   SKIN: No rashes or lesions  PSYCH: Normal affect, euthymic mood      A&P:  #Hip fracture  -pending MRI pending  -ortho recs

## 2022-04-08 NOTE — ADVANCED PRACTICE NURSE CONSULT - ASSESSMENT
went to see pt in afternoon, pt is a+ox3 co 0 pain. pt states she has diabetes for many years , she was on pills and was switched to insulin about a year ago. she has a glucose meter and she checks bg but is not consistent w it. she would like to get a sensor. pt was advised to discuss it w pcp. basaglar 30 units qhs and short acting she could not recall the name 20 units before meals. she states she is consistent w taking her insulin and does not miss a dose. she is looking for an endocrinologist dr lea's office number was provided. pt was invited to [facility] Diabetes club on april `24, 2022, yearly calendar provided

## 2022-04-08 NOTE — PROGRESS NOTE ADULT - ASSESSMENT
patient is a 52 yo female with PMH DM, HTN, presented to the ED after mechanical fall followed by Left hip pain. She came out of her residence to get door dash delivery but she slipped in the mud and fell on left hip. since then she is having pain in left hip, difficulty ambulating. she is unable to bear weight on left side.    # Left greater trochanter fracture   -CT Pelvis Bony - Acute, minimally displaced fracture of the left greater trochanter. Question trace left hip joint effusion/hemarthrosis. Follow-up MRI would be helpful to assess for possible intra-articular extension.  -skeletal survey to eval for left foot metal plate for MRI compatibility  -MRI pelvis pending  -pain control  -orthopedic following, OR after MRI  -PT/OT    #HTN  -resume home losartan and hctz    #DM:  -A1C 10.2  -diabetes education   -insulin lantus 10 and lispro 4/4/4    #Active smoker:  -counseling provided  -nicotine patch    DVT ppx: Lovenox  Diet: consistent carb  Dispo: pending clinical course patient is a 50 yo female with PMH DM, HTN, presented to the ED after mechanical fall followed by Left hip pain. She came out of her residence to get door dash delivery but she slipped in the mud and fell on left hip. since then she is having pain in left hip, difficulty ambulating. she is unable to bear weight on left side.    # Left greater trochanter fracture   -CT Pelvis Bony - Acute, minimally displaced fracture of the left greater trochanter. Question trace left hip joint effusion/hemarthrosis. Follow-up MRI would be helpful to assess for possible intra-articular extension.  -xray foot/ankle to eval for left foot metal plate for MRI compatibility  -MRI pelvis pending  -pain control  -orthopedic following, OR after MRI  -PT/OT    #HTN  -resume home losartan and hctz    #DM:  -A1C 10.2  -diabetes education   -insulin lantus 10 and lispro 4/4/4    #Active smoker:  -counseling provided  -nicotine patch    DVT ppx: Lovenox  Diet: consistent carb  Dispo: pending clinical course

## 2022-04-08 NOTE — PATIENT PROFILE ADULT - FALL HARM RISK - HARM RISK INTERVENTIONS
Assistance with ambulation/Assistance OOB with selected safe patient handling equipment/Communicate Risk of Fall with Harm to all staff/Discuss with provider need for PT consult/Monitor gait and stability/Reinforce activity limits and safety measures with patient and family/Tailored Fall Risk Interventions/Visual Cue: Yellow wristband and red socks/Bed in lowest position, wheels locked, appropriate side rails in place/Call bell, personal items and telephone in reach/Instruct patient to call for assistance before getting out of bed or chair/Non-slip footwear when patient is out of bed/[place] to call system/Physically safe environment - no spills, clutter or unnecessary equipment/Purposeful Proactive Rounding/Room/bathroom lighting operational, light cord in reach

## 2022-04-08 NOTE — ADVANCED PRACTICE NURSE CONSULT - RECOMMEDATIONS
continue diabetes self maangement education  pls consider increase lantus to 20 units qhs and admelog 10 units before meals

## 2022-04-09 DIAGNOSIS — I50.21 ACUTE SYSTOLIC (CONGESTIVE) HEART FAILURE: ICD-10-CM

## 2022-04-09 LAB
ANION GAP SERPL CALC-SCNC: 12 MMOL/L — SIGNIFICANT CHANGE UP (ref 5–17)
BLD GP AB SCN SERPL QL: SIGNIFICANT CHANGE UP
BUN SERPL-MCNC: 10 MG/DL — SIGNIFICANT CHANGE UP (ref 8–20)
CALCIUM SERPL-MCNC: 8.8 MG/DL — SIGNIFICANT CHANGE UP (ref 8.6–10.2)
CHLORIDE SERPL-SCNC: 95 MMOL/L — LOW (ref 98–107)
CO2 SERPL-SCNC: 26 MMOL/L — SIGNIFICANT CHANGE UP (ref 22–29)
CREAT SERPL-MCNC: 0.49 MG/DL — LOW (ref 0.5–1.3)
EGFR: 114 ML/MIN/1.73M2 — SIGNIFICANT CHANGE UP
GLUCOSE BLDC GLUCOMTR-MCNC: 197 MG/DL — HIGH (ref 70–99)
GLUCOSE BLDC GLUCOMTR-MCNC: 219 MG/DL — HIGH (ref 70–99)
GLUCOSE BLDC GLUCOMTR-MCNC: 238 MG/DL — HIGH (ref 70–99)
GLUCOSE BLDC GLUCOMTR-MCNC: 252 MG/DL — HIGH (ref 70–99)
GLUCOSE SERPL-MCNC: 237 MG/DL — HIGH (ref 70–99)
HCT VFR BLD CALC: 48.1 % — HIGH (ref 34.5–45)
HGB BLD-MCNC: 15.4 G/DL — SIGNIFICANT CHANGE UP (ref 11.5–15.5)
HIV 1 & 2 AB SERPL IA.RAPID: SIGNIFICANT CHANGE UP
MAGNESIUM SERPL-MCNC: 2 MG/DL — SIGNIFICANT CHANGE UP (ref 1.6–2.6)
MCHC RBC-ENTMCNC: 29.9 PG — SIGNIFICANT CHANGE UP (ref 27–34)
MCHC RBC-ENTMCNC: 32 GM/DL — SIGNIFICANT CHANGE UP (ref 32–36)
MCV RBC AUTO: 93.4 FL — SIGNIFICANT CHANGE UP (ref 80–100)
NT-PROBNP SERPL-SCNC: 920 PG/ML — HIGH (ref 0–300)
PHOSPHATE SERPL-MCNC: 4.3 MG/DL — SIGNIFICANT CHANGE UP (ref 2.4–4.7)
PLATELET # BLD AUTO: 181 K/UL — SIGNIFICANT CHANGE UP (ref 150–400)
POTASSIUM SERPL-MCNC: 3.8 MMOL/L — SIGNIFICANT CHANGE UP (ref 3.5–5.3)
POTASSIUM SERPL-SCNC: 3.8 MMOL/L — SIGNIFICANT CHANGE UP (ref 3.5–5.3)
RBC # BLD: 5.15 M/UL — SIGNIFICANT CHANGE UP (ref 3.8–5.2)
RBC # FLD: 13.3 % — SIGNIFICANT CHANGE UP (ref 10.3–14.5)
SODIUM SERPL-SCNC: 133 MMOL/L — LOW (ref 135–145)
WBC # BLD: 9.64 K/UL — SIGNIFICANT CHANGE UP (ref 3.8–10.5)
WBC # FLD AUTO: 9.64 K/UL — SIGNIFICANT CHANGE UP (ref 3.8–10.5)

## 2022-04-09 PROCEDURE — 93306 TTE W/DOPPLER COMPLETE: CPT | Mod: 26

## 2022-04-09 PROCEDURE — 93010 ELECTROCARDIOGRAM REPORT: CPT

## 2022-04-09 PROCEDURE — 99233 SBSQ HOSP IP/OBS HIGH 50: CPT

## 2022-04-09 PROCEDURE — 99231 SBSQ HOSP IP/OBS SF/LOW 25: CPT

## 2022-04-09 PROCEDURE — 99223 1ST HOSP IP/OBS HIGH 75: CPT

## 2022-04-09 PROCEDURE — 71045 X-RAY EXAM CHEST 1 VIEW: CPT | Mod: 26

## 2022-04-09 RX ORDER — FUROSEMIDE 40 MG
40 TABLET ORAL ONCE
Refills: 0 | Status: COMPLETED | OUTPATIENT
Start: 2022-04-09 | End: 2022-04-09

## 2022-04-09 RX ORDER — LOSARTAN POTASSIUM 100 MG/1
100 TABLET, FILM COATED ORAL DAILY
Refills: 0 | Status: DISCONTINUED | OUTPATIENT
Start: 2022-04-09 | End: 2022-04-11

## 2022-04-09 RX ORDER — ENOXAPARIN SODIUM 100 MG/ML
40 INJECTION SUBCUTANEOUS EVERY 24 HOURS
Refills: 0 | Status: DISCONTINUED | OUTPATIENT
Start: 2022-04-09 | End: 2022-04-12

## 2022-04-09 RX ORDER — FUROSEMIDE 40 MG
40 TABLET ORAL DAILY
Refills: 0 | Status: DISCONTINUED | OUTPATIENT
Start: 2022-04-10 | End: 2022-04-11

## 2022-04-09 RX ORDER — CARVEDILOL PHOSPHATE 80 MG/1
3.12 CAPSULE, EXTENDED RELEASE ORAL EVERY 12 HOURS
Refills: 0 | Status: DISCONTINUED | OUTPATIENT
Start: 2022-04-09 | End: 2022-04-12

## 2022-04-09 RX ADMIN — Medication 1: at 12:18

## 2022-04-09 RX ADMIN — Medication 1 PATCH: at 07:20

## 2022-04-09 RX ADMIN — Medication 40 MILLIGRAM(S): at 13:26

## 2022-04-09 RX ADMIN — Medication 2: at 16:52

## 2022-04-09 RX ADMIN — LOSARTAN POTASSIUM 50 MILLIGRAM(S): 100 TABLET, FILM COATED ORAL at 05:06

## 2022-04-09 RX ADMIN — Medication 1 PATCH: at 13:05

## 2022-04-09 RX ADMIN — Medication 2: at 08:30

## 2022-04-09 RX ADMIN — Medication 1 PATCH: at 12:17

## 2022-04-09 RX ADMIN — CARVEDILOL PHOSPHATE 3.12 MILLIGRAM(S): 80 CAPSULE, EXTENDED RELEASE ORAL at 20:28

## 2022-04-09 RX ADMIN — ENOXAPARIN SODIUM 40 MILLIGRAM(S): 100 INJECTION SUBCUTANEOUS at 12:18

## 2022-04-09 RX ADMIN — Medication 25 MILLIGRAM(S): at 05:06

## 2022-04-09 RX ADMIN — Medication 4 UNIT(S): at 12:18

## 2022-04-09 RX ADMIN — Medication 4 UNIT(S): at 16:51

## 2022-04-09 NOTE — PROGRESS NOTE ADULT - ASSESSMENT
patient is a 50 yo female with PMH DM, HTN, presented to the ED after mechanical fall followed by Left hip pain. She came out of her residence to get door dash delivery but she slipped in the mud and fell on left hip. since then she is having pain in left hip, difficulty ambulating. she is unable to bear weight on left side.    # Left greater trochanter fracture   -CT Pelvis Bony - Acute, minimally displaced fracture of the left greater trochanter. Question trace left hip joint effusion/hemarthrosis. Follow-up MRI would be helpful to assess for possible intra-articular extension.  -xray foot/ankle for left foot metal plate for MRI compatibility, pending read  -MRI pelvis once respiratory status improves  -pain control  -orthopedic following, OR after MRI  -PT/OT    #Dypnea  -reports chronic dyspnea, given obesity and DM history will check   -BNP elevated up > 900, CXR read pending  -will trial Lasix pending CXR result   -EKG with some T wave inversion in I and aVL, f/u TTE  -OR clearance pending above test     #HTN  -resume home losartan and hctz    #DM:  -A1C 10.2  -diabetes education   -insulin lantus 10 and lispro 4/4/4    #Active smoker:  -counseling provided  -nicotine patch    DVT ppx: Lovenox  Diet: consistent carb  Dispo: pending clinical course patient is a 50 yo female with PMH DM, HTN, presented to the ED after mechanical fall followed by Left hip pain. She came out of her residence to get door dash delivery but she slipped in the mud and fell on left hip. since then she is having pain in left hip, difficulty ambulating. she is unable to bear weight on left side.    # Left greater trochanter fracture   -CT Pelvis Bony - Acute, minimally displaced fracture of the left greater trochanter. Question trace left hip joint effusion/hemarthrosis. Follow-up MRI would be helpful to assess for possible intra-articular extension.  -xray foot/ankle for left foot metal plate for MRI compatibility, pending read  -MRI pelvis once respiratory status improves  -pain control  -orthopedic following, OR after MRI  -PT/OT    #Dypnea  -reports chronic dyspnea, given obesity and DM history will check   -BNP elevated up > 900, CXR read pending  -will trial Lasix pending CXR result   -EKG with some T wave inversion in I and aVL, f/u TTE  -OR clearance pending above test     #HTN  -increase Losartan 100mg QD  -HCTZ on hold due to hyponatremia     #DM:  -A1C 10.2  -diabetes education   -insulin lantus 10 and lispro 4/4/4    #Active smoker:  -counseling provided  -nicotine patch    DVT ppx: Lovenox  Diet: consistent carb  Dispo: pending clinical course patient is a 52 yo female with PMH DM, HTN, presented to the ED after mechanical fall followed by Left hip pain. She came out of her residence to get door dash delivery but she slipped in the mud and fell on left hip. since then she is having pain in left hip, difficulty ambulating. she is unable to bear weight on left side.    # Left greater trochanter fracture   -CT Pelvis Bony - Acute, minimally displaced fracture of the left greater trochanter. Question trace left hip joint effusion/hemarthrosis. Follow-up MRI would be helpful to assess for possible intra-articular extension.  -xray foot/ankle for left foot metal plate for MRI compatibility, pending read  -MRI pelvis once respiratory status improves  -pain control  -orthopedic following, OR after MRI  -PT/OT    #Dypnea  -reports chronic dyspnea, given obesity and DM history will check   -BNP elevated up > 900, CXR read pending  -Lasix 40mg x1   -EKG with some T wave inversion in I and aVL, f/u TTE  -OR clearance pending above test     #HTN  -increase Losartan 100mg QD  -HCTZ on hold due to hyponatremia     #DM:  -A1C 10.2  -diabetes education   -insulin lantus 10 and lispro 4/4/4    #Active smoker:  -counseling provided  -nicotine patch    DVT ppx: Lovenox  Diet: consistent carb  Dispo: pending clinical course patient is a 52 yo female with PMH DM, HTN, presented to the ED after mechanical fall followed by Left hip pain. She came out of her residence to get door dash delivery but she slipped in the mud and fell on left hip. since then she is having pain in left hip, difficulty ambulating. she is unable to bear weight on left side.    # Left greater trochanter fracture   -CT Pelvis Bony - Acute, minimally displaced fracture of the left greater trochanter. Question trace left hip joint effusion/hemarthrosis. Follow-up MRI would be helpful to assess for possible intra-articular extension.  -xray foot/ankle for left foot metal plate for MRI compatibility, pending read  -MRI pelvis once respiratory status improves  -pain control  -orthopedic following, OR after MRI  -PT/OT    #Dypnea  -reports chronic dyspnea, given obesity and DM history will check   -BNP elevated up > 900, CXR read pending  -Lasix 40mg x1   -EKG with some T wave inversion in I and aVL  -TTE with new cardiomyopathy, cards consulted, red recs    #HTN  -increase Losartan 100mg QD  -HCTZ on hold due to hyponatremia     #DM:  -A1C 10.2  -diabetes education   -insulin lantus 10 and lispro 4/4/4    #Active smoker:  -counseling provided  -nicotine patch    DVT ppx: Lovenox  Diet: consistent carb  Dispo: pending clinical course patient is a 52 yo female with PMH DM, HTN, presented to the ED after mechanical fall followed by Left hip pain. She came out of her residence to get door dash delivery but she slipped in the mud and fell on left hip. since then she is having pain in left hip, difficulty ambulating. she is unable to bear weight on left side.    # Left greater trochanter fracture   -CT Pelvis Bony - Acute, minimally displaced fracture of the left greater trochanter. Question trace left hip joint effusion/hemarthrosis. Follow-up MRI would be helpful to assess for possible intra-articular extension.  -xray foot/ankle for left foot metal plate for MRI compatibility, pending read  -d/w ortho Dr. Cote, will attempt MRI with ativan  -if not tolerate MRI, will opt for conservative management, NWB of left hip with outpt followup  -pain control  -orthopedic following, red recs  -PT/OT    #Dyspnea  -reports chronic dyspnea, given obesity and DM history will check   -BNP elevated up > 900, CXR read pending  -EKG with some T wave inversion in I and aVL  -TTE with new cardiomyopathy, cards consulted, red recs  -Start Lasix 40mg QD to better volume optimization, will need cardiac cath prior to OR clearance, d/w cards   -At this time, patient remains at extremely high risk of cardiac decompensation given underlying cardiomyopathy, if risk of morbidity / mortality outweighs cardiac risk at this time, will defer OR to orthopedics     #HTN  -increase Losartan 100mg QD  -HCTZ on hold due to hyponatremia     #DM:  -A1C 10.2  -diabetes education   -insulin lantus 10 and lispro 4/4/4    #Active smoker:  -counseling provided  -nicotine patch    DVT ppx: Lovenox  Diet: consistent carb  Dispo: pending clinical course patient is a 50 yo female with PMH DM, HTN, presented to the ED after mechanical fall followed by Left hip pain. She came out of her residence to get door dash delivery but she slipped in the mud and fell on left hip. since then she is having pain in left hip, difficulty ambulating. she is unable to bear weight on left side.    # Left greater trochanter fracture   -CT Pelvis Bony - Acute, minimally displaced fracture of the left greater trochanter. Question trace left hip joint effusion/hemarthrosis. Follow-up MRI would be helpful to assess for possible intra-articular extension.  -xray foot/ankle for left foot metal plate for MRI compatibility, pending read  -d/w ortho Dr. Cote, will attempt MRI with ativan  -management pending MRI - surgical vs. conservative  -orthopedic following, red recs  -PT/OT    #Dyspnea  -reports chronic dyspnea, given obesity and DM history will check   -BNP elevated up > 900, CXR read pending  -EKG with some T wave inversion in I and aVL  -TTE with new cardiomyopathy, cards consulted, red recs  -Start Lasix 40mg QD to better volume optimization, will need cardiac cath prior to OR clearance, d/w cards   -At this time, patient remains at extremely high risk of cardiac decompensation given underlying cardiomyopathy, if risk of morbidity / mortality outweighs cardiac risk at this time, will defer OR to orthopedics     #HTN  -increase Losartan 100mg QD  -HCTZ on hold due to hyponatremia     #DM:  -A1C 10.2  -diabetes education   -insulin lantus 10 and lispro 4/4/4    #Active smoker:  -counseling provided  -nicotine patch    DVT ppx: Lovenox  Diet: consistent carb  Dispo: pending clinical course patient is a 50 yo female with PMH DM, HTN, presented to the ED after mechanical fall followed by Left hip pain. She came out of her residence to get door dash delivery but she slipped in the mud and fell on left hip. since then she is having pain in left hip, difficulty ambulating. she is unable to bear weight on left side.    # Left greater trochanter fracture   -CT Pelvis Bony - Acute, minimally displaced fracture of the left greater trochanter. Question trace left hip joint effusion/hemarthrosis. Follow-up MRI would be helpful to assess for possible intra-articular extension.  -xray foot/ankle for left foot metal plate for MRI compatibility, pending read  -attempt MRI with ativan  -management pending MRI - surgical vs. conservative  -orthopedic following, red recs  -PT/OT    #Dyspnea  -reports chronic dyspnea, given obesity and DM history will check   -BNP elevated up > 900, CXR read pending  -EKG with some T wave inversion in I and aVL  -TTE with new cardiomyopathy, cards consulted, red recs  -Start Lasix 40mg QD to better volume optimization, will need cardiac cath prior to OR clearance, d/w cards   -At this time, patient remains at extremely high risk of cardiac decompensation given underlying cardiomyopathy, if risk of morbidity / mortality outweighs cardiac risk at this time, will defer OR to orthopedics     #HTN  -increase Losartan 100mg QD  -HCTZ on hold due to hyponatremia     #DM:  -A1C 10.2  -diabetes education   -insulin lantus 10 and lispro 4/4/4    #Active smoker:  -counseling provided  -nicotine patch    DVT ppx: Lovenox  Diet: consistent carb  Dispo: pending clinical course

## 2022-04-09 NOTE — CONSULT NOTE ADULT - SUBJECTIVE AND OBJECTIVE BOX
Pt Name: ADIEL CURRAN  MRN: [id number]    Patient is a 51y Female presenting to the emergency department with a chief complaint of left hip pain s/p slip and fall. Patient reports that she is unable to bear weight on left LE due to pain. Denies acute sensory/motor changes. no other ortho complaints at this time.       REVIEW OF SYSTEMS  General: Alert, responsive, in NAD  Skin/Breast: No rashes, no pruritis 	  Ophthalmologic: No visual changes. No redness. 	  ENMT:	No discharge. No swelling.  Respiratory and Thorax: No difficulty breathing. No cough.	   Cardiovascular:	No chest pain. No palpitations.  Gastrointestinal:	 No abdominal pain. No diarrhea.   Genitourinary: No dysuria. No bleeding.  Musculoskeletal: SEE HPI.  Neurological: No sensory or motor changes.   ROS is otherwise negative.    PAST MEDICAL & SURGICAL HISTORY:  PAST MEDICAL & SURGICAL HISTORY:  Obstructive sleep apnea    Borderline systolic HTN    Ankle fracture, left  ORIF - 2003    H/O hand surgery  LEFT - 1981    Allergies: No Known Allergies    Medications:     FAMILY HISTORY:  : non-contributory    Social History:     Ambulation: Walking independently [ ] With Cane [ ] With Walker [ ]  Bedbound [ ]                           14.8   11.04 )-----------( 224      ( 06 Apr 2022 17:58 )             46.8       04-06    140  |  101  |  8.9  ----------------------------<  176<H>  4.3   |  28.0  |  0.45<L>    Ca    8.6      06 Apr 2022 17:58    TPro  6.6  /  Alb  3.8  /  TBili  0.4  /  DBili  x   /  AST  17  /  ALT  26  /  AlkPhos  113  04-06      Vital Signs Last 24 Hrs  T(C): 36.6 (06 Apr 2022 22:19), Max: 36.8 (06 Apr 2022 16:08)  T(F): 97.8 (06 Apr 2022 22:19), Max: 98.2 (06 Apr 2022 16:08)  HR: 80 (06 Apr 2022 22:19) (80 - 92)  BP: 145/79 (06 Apr 2022 22:19) (122/83 - 145/79)  BP(mean): 106 (06 Apr 2022 22:19) (106 - 106)  RR: 17 (06 Apr 2022 22:19) (17 - 18)  SpO2: 99% (06 Apr 2022 22:19) (99% - 99%)    Daily Height in cm: 152.4 (06 Apr 2022 12:27)    Daily     PHYSICAL EXAM:  Appearance: Alert, responsive, in no acute distress.    LLE:  Skin: no rash on visible skin. Skin is clean, dry and intact. No bleeding. No abrasions. No ulcerations.  Neurological: Sensation is grossly intact to light touch. +DF/PF. No focal deficits or weaknesses found.  Vascular: 2+ distal pulses. Cap refill < 2 sec. No signs of venous insufficiency or stasis. No extremity ulcerations. No cyanosis.    Imaging Studies:    ACC: [id number] EXAM:  CT PELVIS BONY ONLY                          PROCEDURE DATE:  04/06/2022          INTERPRETATION:  CLINICAL INDICATION: left hip pain.    TECHNIQUE: CT axial images of the pelvis were obtained without   intravenous contrast. Coronal and sagittal reformatted images were also   obtained. 3-D images were obtained from a separate workstation.    CONTRAST/COMPLICATIONS:  IV Contrast: NONE  Complications: None reported at time of study completion    COMPARISON: XR: 4/6/2022 and 6/30/2020. CT: None. MR: None.    FINDINGS: Evaluation of soft tissue is limited without intravenous   contrast.    Bones: Osteopenia. Acute, minimally displaced fracture of the left   greater trochanter. No dislocation.    Bilateral hip osteoarthrosis. Degenerative changes of the visualized   lower lumbar spine, pubic symphysis and sacroiliac joints. Grade 1   anterolisthesis of L4 on L5 and L5 on S1.    Soft tissue: Nonspecific stranding along the anterior pelvic wall soft   tissue. Mild stranding in the left hip soft tissue adjacent to the   fracture. Small fat-containing umbilical hernia. Nonspecific small   bilateral groin soft tissue lymph nodes measuring up to 1.2 cm in short   axis.    Diffuse muscle bulk loss with fatty replacement. Question trace left hip   joint effusion/hemarthrosis.    Additional: Nonspecific bilateral perinephric stranding. Colon   diverticulosis. Atherosclerosis.    IMPRESSION:    Acute, minimally displaced fracture of the left greater trochanter.   Question trace left hip joint effusion/hemarthrosis. Follow-up MRI would   be helpful to assess for possible intra-articular extension.    --- End of Report ---    YOHAN ZAPATA MD; Attending Radiologist  This document has been electronically signed. Apr 6 2022  9:14PM        A/P:  Pt is a  51y Female with left Gt fx with possible IT extension.     PLAN:   * Pain control  * MRI left hip ordered  * bed rest  * case d/w Dr. Cote  * Plan to be finalized pending imaging
                                             [facility]                                              CARDIOLOGY AT [address], [place] [address]                                             Telephone: [phone]. Fax:[phone]                                                       CARDIOLOGY CONSULTATION NOTE                                                                                             History obtained by: Patient and medical record  Community Cardiologist: none   obtained: Yes [  ] No [ x ]  Reason for Consultation: HFrEF   Available out pt records reviewed: Yes [  ] No [ x ]    Chief complaint:    Patient is a 51y old  Female who presents with a chief complaint of Left Hip Fracture (09 Apr 2022 11:52)      HPI:  patient is a 52 yo female with PMH DM, HTN, presented to the ED after mechanical fall followed by Left hip pain. She came out of her residence to get door dash delivery but she slipped in the mud and fell on left hip. since then she is having pain in left hip, difficulty ambulating. she is unable to bear weight on left side.  (07 Apr 2022 03:23)      CARDIAC TESTING   ECHO:  < from: TTE Echo Complete w/ Contrast w/ Doppler (04.09.22 @ 14:04) >  Summary:   1. Technically difficult study.   2. Endocardial visualization was enhanced with intravenous echo contrast.   3. Left ventricular ejection fraction, by visual estimation, is 25 to   30%.   4. Severely decreased global left ventricular systolic function.   5. There is mild concentric left ventricular hypertrophy.   6. Spectral Doppler shows pseudonormal pattern of left ventricular   myocardial filling (Grade II diastolic dysfunction).   7. Trace mitral valve regurgitation.   8. Peak transaortic gradient equals 13.3 mmHg, mean transaortic gradient   equals 6.1 mmHg, the calculated aortic valve area equals 1.56 cm² by the   continuity equation consistent with mild aortic stenosis.   9. Results d/w Dr. Uma Monroy,    < end of copied text >    STRESS:    CATH:     ELECTROPHYSIOLOGY:     PAST MEDICAL HISTORY  Obstructive sleep apnea    Borderline systolic HTN        PAST SURGICAL HISTORY  Ankle fracture, left    H/O hand surgery        SOCIAL HISTORY:  Denies smoking/alcohol/drugs  CIGARETTES:     ALCOHOL:  DRUGS:    FAMILY HISTORY:  FH: leukemia (Aunt)      Family History of Cardiovascular Disease:  Yes [ x ] No [  ]  Coronary Artery Disease in first degree relative: Yes [  ] No [ x ]  Sudden Cardiac Death in First degree relative: Yes [  ] No [ x ]    HOME MEDICATIONS:  ADMELOG SOLOSTAR 100U/ML PEN:  (07 Apr 2022 03:20)  BASAGLAR KWIKPEN 100U/ML PEN:  (07 Apr 2022 03:20)  HYDROCHLOROTHIAZIDE 25MG TAB:  (07 Apr 2022 03:20)  LOSARTAN POTASSIUM 50MG TAB:  (07 Apr 2022 03:20)      CURRENT CARDIAC MEDICATIONS:  carvedilol 3.125 milliGRAM(s) Oral every 12 hours  losartan 100 milliGRAM(s) Oral daily      CURRENT OTHER MEDICATIONS:  acetaminophen     Tablet .. 650 milliGRAM(s) Oral every 6 hours PRN Temp greater or equal to 38C (100.4F), Mild Pain (1 - 3)  LORazepam     Tablet 1 milliGRAM(s) Oral once, Stop order after: 1 Doses  ondansetron Injectable 4 milliGRAM(s) IV Push every 8 hours PRN Nausea and/or Vomiting  oxycodone    5 mG/acetaminophen 325 mG 1 Tablet(s) Oral every 6 hours PRN Moderate Pain (4 - 6)  dextrose 5%. 1000 milliLiter(s) (50 mL/Hr) IV Continuous <Continuous>  dextrose 5%. 1000 milliLiter(s) (100 mL/Hr) IV Continuous <Continuous>  dextrose 50% Injectable 25 Gram(s) IV Push once, Stop order after: 1 Doses  dextrose 50% Injectable 12.5 Gram(s) IV Push once, Stop order after: 1 Doses  dextrose 50% Injectable 25 Gram(s) IV Push once, Stop order after: 1 Doses  dextrose Oral Gel 15 Gram(s) Oral once, Stop order after: 1 Doses PRN Blood Glucose LESS THAN 70 milliGRAM(s)/deciliter  enoxaparin Injectable 40 milliGRAM(s) SubCutaneous every 24 hours  glucagon  Injectable 1 milliGRAM(s) IntraMuscular once, Stop order after: 1 Doses  insulin glargine Injectable (LANTUS) 10 Unit(s) SubCutaneous every morning  insulin lispro (ADMELOG) corrective regimen sliding scale   SubCutaneous three times a day before meals  insulin lispro Injectable (ADMELOG) 4 Unit(s) SubCutaneous three times a day before meals  nicotine -   7 mG/24Hr(s) Patch 1 patch Transdermal daily      ALLERGIES:   No Known Allergies      REVIEW OF SYMPTOMS:   CONSTITUTIONAL: No fever, no chills, no weight loss, no weight gain, no fatigue   ENMT:  No vertigo; No sinus or throat pain  NECK: No pain or stiffness  CARDIOVASCULAR: No chest pain, no dyspnea, no syncope/presyncope, no palpitations, no dizziness, no Orthopnea, no Paroxsymal nocturnal dyspnea  RESPIRATORY: no Shortness of breath, no cough, no wheezing  : No dysuria, no hematuria   GI: No dark color stool, no nausea, no diarrhea, no constipation, no abdominal pain   NEURO: No headache, no slurred speech   MUSCULOSKELETAL: No joint pain or swelling; No muscle, back, or extremity pain  PSYCH: No agitation, no anxiety.    ALL OTHER REVIEW OF SYSTEMS ARE NEGATIVE.    VITAL SIGNS:  T(C): 36.3 (04-09-22 @ 15:52), Max: 36.9 (04-08-22 @ 19:51)  T(F): 97.4 (04-09-22 @ 15:52), Max: 98.5 (04-08-22 @ 19:51)  HR: 69 (04-09-22 @ 15:52) (69 - 89)  BP: 125/75 (04-09-22 @ 15:52) (123/83 - 150/93)  RR: 18 (04-09-22 @ 15:52) (18 - 18)  SpO2: 96% (04-09-22 @ 15:52) (95% - 98%)    INTAKE AND OUTPUT:     04-08 @ 07:01  -  04-09 @ 07:00  --------------------------------------------------------  IN: 0 mL / OUT: 1700 mL / NET: -1700 mL        PHYSICAL EXAM:  Constitutional: Comfortable . No acute distress.   HEENT: Atraumatic and normocephalic , neck is supple . no JVD. No carotid bruit.  CNS: A&Ox3. No focal deficits.   Respiratory: CTAB, unlabored   Cardiovascular: RRR normal s1 s2. No murmur. No rubs or gallop.  Gastrointestinal: Soft, non-tender. +Bowel sounds.   Extremities: 2+ Peripheral Pulses, No clubbing, cyanosis, or edema  Psychiatric: Calm . no agitation.   Skin: Warm and dry, no ulcers on extremities     LABS:  ( 09 Apr 2022 04:06 )  Troponin T  X    ,  CPK  X    , CKMB  X    , <H>                              15.4   9.64  )-----------( 181      ( 09 Apr 2022 04:06 )             48.1     04-09    133<L>  |  95<L>  |  10.0  ----------------------------<  237<H>  3.8   |  26.0  |  0.49<L>    Ca    8.8      09 Apr 2022 04:06  Phos  4.3     04-09  Mg     2.0     04-09      INTERPRETATION OF TELEMETRY: No telemetry     ECG:     < from: 12 Lead ECG (04.09.22 @ 08:58) >    Ventricular Rate 71 BPM    Atrial Rate 71 BPM    P-R Interval 168 ms    QRS Duration 94 ms    Q-T Interval 442 ms    QTC Calculation(Bazett) 480 ms    P Axis 23 degrees    R Axis 2 degrees    T Axis 88 degrees    Diagnosis Line Normal sinus rhythm  Anterior infarct , age undetermined    < end of copied text >  Prior ECG: Yes [  ] No [ x ]    RADIOLOGY & ADDITIONAL STUDIES:    X-ray:    < from: Xray Chest 1 View- PORTABLE-Urgent (Xray Chest 1 View- PORTABLE-Urgent .) (04.09.22 @ 10:30) >  IMPRESSION: No acute cardiopulmonary disease process. Cardiomegaly.    < end of copied text >

## 2022-04-09 NOTE — CONSULT NOTE ADULT - PROBLEM SELECTOR RECOMMENDATION 9
- high suspicion for ischemic cardiomyopathy   - pt smokes 1ppd x 35 years  - morbidly obese, DM, HTN  - c/o angina/anginal equivalents consistent w/ CCS3 stable angina   - now w/ newly reduced EF 25-30%, grade II DD, trace MR, mild AS  - GDMT for HFrEF   - start coreg, increase as tolerated, monitor on telemetry   - continue losartan  - check lipid panel and start lipitor  - volume overloaded continue Lasix IV  - start ASA 81mg daily   - EKG, trops, and heparin infusion if pt develops chest pain  - Diet/lifestyle modifications and medication compliance heavily reinforced   - smoking cessation heavily reinforced   - plan for LHC 4/11/22  - cannot clear patient for ortho surgery given circumstances, discussed w/ ortho, pt needs MRI before surgery and cannot lay flat. Will diurese and optimize with GDMT and do LHC before ortho procedures.

## 2022-04-09 NOTE — CONSULT NOTE ADULT - NS ATTEND AMEND GEN_ALL_CORE FT
Agree with PA note and plan as written, await MRI for definitive management.
Acute HFrEF (heart failure with reduced ejection fraction).   Needs Ischemia SPENCE.   now w/ newly reduced EF 25-30%, grade II DD, trace MR, mild AS  GDMT for HFrEF   start coreg, increase as tolerated, monitor on telemetry   continue losartan  volume overloaded continue Lasix IV  start ASA 81mg daily   EKG, trops, and heparin infusion if pt develops chest pain  plan for LHC 4/11/22  cannot clear patient for ortho surgery given circumstances, discussed w/ ortho, pt needs MRI before surgery and cannot lay flat. Will diurese and optimize with GDMT and do LHC before ortho procedures.

## 2022-04-09 NOTE — CONSULT NOTE ADULT - ASSESSMENT
patient is a 50 yo female with PMH DM, HTN, presented to the ED after mechanical fall followed by Left hip pain. She came out of her residence to get door dash delivery but she slipped in the mud and fell on left hip. since then she is having pain in left hip, difficulty ambulating. she is unable to bear weight on left side. Cardiology consulted for HFrEF seen on echo.

## 2022-04-10 ENCOUNTER — TRANSCRIPTION ENCOUNTER (OUTPATIENT)
Age: 52
End: 2022-04-10

## 2022-04-10 DIAGNOSIS — I50.9 HEART FAILURE, UNSPECIFIED: ICD-10-CM

## 2022-04-10 DIAGNOSIS — Z98.890 OTHER SPECIFIED POSTPROCEDURAL STATES: ICD-10-CM

## 2022-04-10 LAB
ANION GAP SERPL CALC-SCNC: 14 MMOL/L — SIGNIFICANT CHANGE UP (ref 5–17)
BUN SERPL-MCNC: 10.8 MG/DL — SIGNIFICANT CHANGE UP (ref 8–20)
CALCIUM SERPL-MCNC: 9 MG/DL — SIGNIFICANT CHANGE UP (ref 8.6–10.2)
CHLORIDE SERPL-SCNC: 95 MMOL/L — LOW (ref 98–107)
CHOLEST SERPL-MCNC: 159 MG/DL — SIGNIFICANT CHANGE UP
CO2 SERPL-SCNC: 28 MMOL/L — SIGNIFICANT CHANGE UP (ref 22–29)
CREAT SERPL-MCNC: 0.49 MG/DL — LOW (ref 0.5–1.3)
EGFR: 114 ML/MIN/1.73M2 — SIGNIFICANT CHANGE UP
GLUCOSE BLDC GLUCOMTR-MCNC: 210 MG/DL — HIGH (ref 70–99)
GLUCOSE BLDC GLUCOMTR-MCNC: 222 MG/DL — HIGH (ref 70–99)
GLUCOSE BLDC GLUCOMTR-MCNC: 238 MG/DL — HIGH (ref 70–99)
GLUCOSE BLDC GLUCOMTR-MCNC: 246 MG/DL — HIGH (ref 70–99)
GLUCOSE BLDC GLUCOMTR-MCNC: 285 MG/DL — HIGH (ref 70–99)
GLUCOSE SERPL-MCNC: 221 MG/DL — HIGH (ref 70–99)
HCT VFR BLD CALC: 51.2 % — HIGH (ref 34.5–45)
HDLC SERPL-MCNC: 28 MG/DL — LOW
HGB BLD-MCNC: 16.5 G/DL — HIGH (ref 11.5–15.5)
LIPID PNL WITH DIRECT LDL SERPL: 112 MG/DL — HIGH
MAGNESIUM SERPL-MCNC: 1.9 MG/DL — SIGNIFICANT CHANGE UP (ref 1.6–2.6)
MCHC RBC-ENTMCNC: 29.9 PG — SIGNIFICANT CHANGE UP (ref 27–34)
MCHC RBC-ENTMCNC: 32.2 GM/DL — SIGNIFICANT CHANGE UP (ref 32–36)
MCV RBC AUTO: 92.8 FL — SIGNIFICANT CHANGE UP (ref 80–100)
NON HDL CHOLESTEROL: 131 MG/DL — HIGH
PHOSPHATE SERPL-MCNC: 5.4 MG/DL — HIGH (ref 2.4–4.7)
PLATELET # BLD AUTO: 209 K/UL — SIGNIFICANT CHANGE UP (ref 150–400)
POTASSIUM SERPL-MCNC: 4.3 MMOL/L — SIGNIFICANT CHANGE UP (ref 3.5–5.3)
POTASSIUM SERPL-SCNC: 4.3 MMOL/L — SIGNIFICANT CHANGE UP (ref 3.5–5.3)
RBC # BLD: 5.52 M/UL — HIGH (ref 3.8–5.2)
RBC # FLD: 13.4 % — SIGNIFICANT CHANGE UP (ref 10.3–14.5)
SARS-COV-2 RNA SPEC QL NAA+PROBE: SIGNIFICANT CHANGE UP
SODIUM SERPL-SCNC: 137 MMOL/L — SIGNIFICANT CHANGE UP (ref 135–145)
TRIGL SERPL-MCNC: 96 MG/DL — SIGNIFICANT CHANGE UP
WBC # BLD: 8.89 K/UL — SIGNIFICANT CHANGE UP (ref 3.8–10.5)
WBC # FLD AUTO: 8.89 K/UL — SIGNIFICANT CHANGE UP (ref 3.8–10.5)

## 2022-04-10 PROCEDURE — 72195 MRI PELVIS W/O DYE: CPT | Mod: 26

## 2022-04-10 PROCEDURE — 99233 SBSQ HOSP IP/OBS HIGH 50: CPT

## 2022-04-10 RX ORDER — ASPIRIN/CALCIUM CARB/MAGNESIUM 324 MG
81 TABLET ORAL DAILY
Refills: 0 | Status: DISCONTINUED | OUTPATIENT
Start: 2022-04-10 | End: 2022-04-12

## 2022-04-10 RX ADMIN — Medication 4 UNIT(S): at 17:06

## 2022-04-10 RX ADMIN — Medication 1 PATCH: at 19:32

## 2022-04-10 RX ADMIN — Medication 1 PATCH: at 11:46

## 2022-04-10 RX ADMIN — Medication 4 UNIT(S): at 08:54

## 2022-04-10 RX ADMIN — Medication 1 PATCH: at 11:32

## 2022-04-10 RX ADMIN — Medication 81 MILLIGRAM(S): at 11:46

## 2022-04-10 RX ADMIN — ENOXAPARIN SODIUM 40 MILLIGRAM(S): 100 INJECTION SUBCUTANEOUS at 11:46

## 2022-04-10 RX ADMIN — Medication 1 MILLIGRAM(S): at 13:34

## 2022-04-10 RX ADMIN — CARVEDILOL PHOSPHATE 3.12 MILLIGRAM(S): 80 CAPSULE, EXTENDED RELEASE ORAL at 06:01

## 2022-04-10 RX ADMIN — Medication 2: at 17:06

## 2022-04-10 RX ADMIN — INSULIN GLARGINE 10 UNIT(S): 100 INJECTION, SOLUTION SUBCUTANEOUS at 08:53

## 2022-04-10 RX ADMIN — LOSARTAN POTASSIUM 100 MILLIGRAM(S): 100 TABLET, FILM COATED ORAL at 06:02

## 2022-04-10 RX ADMIN — Medication 2: at 08:54

## 2022-04-10 RX ADMIN — Medication 1 PATCH: at 08:00

## 2022-04-10 RX ADMIN — Medication 40 MILLIGRAM(S): at 05:58

## 2022-04-10 RX ADMIN — Medication 2: at 13:10

## 2022-04-10 RX ADMIN — CARVEDILOL PHOSPHATE 3.12 MILLIGRAM(S): 80 CAPSULE, EXTENDED RELEASE ORAL at 17:07

## 2022-04-10 RX ADMIN — Medication 4 UNIT(S): at 13:09

## 2022-04-10 NOTE — DISCHARGE NOTE PROVIDER - CARE PROVIDER_API CALL
Chalino Owens (DO)  Orthopaedic Surgery  [address] [place] [address]  Phone: [phone]  Fax: [phone]  Follow Up Time:    Chlaino Owens (DO)  Orthopaedic Surgery  [address] [place] [address]  Phone: [phone]  Fax: [phone]  Follow Up Time:     Sai Younger)  Cardiovascular Disease; Internal Medicine  [address] [facility], [facility] [address]  [place] [address]  Phone: [phone]  Fax: [phone]  Follow Up Time: 2 weeks

## 2022-04-10 NOTE — DISCHARGE NOTE PROVIDER - NSDCMRMEDTOKEN_GEN_ALL_CORE_FT
ADMELOG SOLOSTAR 100U/ML PEN:   BASAGLAR KWIKPEN 100U/ML PEN:   HYDROCHLOROTHIAZIDE 25MG TAB:   LOSARTAN POTASSIUM 50MG TAB:    acetaminophen 325 mg oral tablet: 2 tab(s) orally every 6 hours, As needed, Temp greater or equal to 38C (100.4F), Mild Pain (1 - 3)  ADMELOG SOLOSTAR 100U/ML PEN:   aspirin 81 mg oral tablet, chewable: 1 tab(s) orally once a day  atorvastatin 40 mg oral tablet: 1 tab(s) orally once a day (at bedtime)  BASAGLAR KWIKPEN 100U/ML PEN:   carvedilol 3.125 mg oral tablet: 1 tab(s) orally every 12 hours  furosemide 40 mg oral tablet: 1 tab(s) orally once a day  nicotine 7 mg/24 hr transdermal film, extended release: 1 patch transdermal once a day  oxycodone-acetaminophen 5 mg-325 mg oral tablet: 1 tab(s) orally every 6 hours, As needed, Moderate Pain (4 - 6) MDD:4  Rolling walker : 1 application buccal once a day   sacubitril-valsartan 24 mg-26 mg oral tablet: 1 tab(s) orally 2 times a day

## 2022-04-10 NOTE — DISCHARGE NOTE PROVIDER - NSDCFUADDINST_GEN_ALL_CORE_FT
Restricted use with no heavy lifting of affected arm for 48 hours.  No submerging the arm in water for 48 hours.  You may start showering today.  Call your doctor for any bleeding, swelling, loss of sensation in the hand or fingers, or fingers turning blue.  If heavy bleeding or large lumps form, hold pressure at the spot and come to the Emergency Room.     [facility] Quits Hotline [phone] please call for complimentary smoking cessation adjuncts

## 2022-04-10 NOTE — PROGRESS NOTE ADULT - ASSESSMENT
patient is a 52 yo female with PMH DM, HTN, presented to the ED after mechanical fall followed by Left hip pain. She came out of her residence to get door dash delivery but she slipped in the mud and fell on left hip. A/w great trochanter fracture, c/b new cardiomyopathy.     # Left greater trochanter fracture   -CT Pelvis Bony - Acute, minimally displaced fracture of the left greater trochanter. Question trace left hip joint effusion/hemarthrosis. Follow-up MRI would be helpful to assess for possible intra-articular extension.  -xray foot/ankle for left foot metal plate for MRI compatibility, pending read  -attempt MRI with ativan  -surgical vs. conservative management pending MRI result  -orthopedic following, red recs  -PT/OT    #Dyspnea  -reports chronic dyspnea, given obesity and DM history will check   -BNP elevated up > 900, CXR read pending  -EKG with some T wave inversion in I and aVL  -TTE with new cardiomyopathy, cards consulted, red recs  -Start Lasix 40mg QD to better volume optimization, will need cardiac cath (plan for 4/44) prior to OR   -At this time, patient remains at extremely high risk of cardiac decompensation given underlying cardiomyopathy, if risk of morbidity / mortality outweighs cardiac risk at this time, will defer OR to orthopedics     #HTN  -increase Losartan 100mg QD  -HCTZ on hold due to hyponatremia     #DM:  -A1C 10.2  -diabetes education   -insulin lantus 10 and lispro 4/4/4    #Active smoker:  -counseling provided  -nicotine patch    DVT ppx: Lovenox  Diet: consistent carb - NPO m n for cardiac cath  Dispo: pending clinical course

## 2022-04-10 NOTE — PROGRESS NOTE ADULT - ASSESSMENT
50 y/o female with PMH DM, HTN, presented to the ED after mechanical fall followed by Left hip pain. She came out of her residence to get door dash delivery but she slipped in the mud and fell on left hip. since then she is having pain in left hip, difficulty ambulating. she is unable to bear weight on left side. Echo with EF 30% DD Mild aortic stenosis

## 2022-04-10 NOTE — DISCHARGE NOTE PROVIDER - NSDCCPTREATMENT_GEN_ALL_CORE_FT
PRINCIPAL PROCEDURE  Procedure: Left heart catheterization  Findings and Treatment: Restricted use with no heavy lifting of affected arm for 48 hours.  No submerging the arm in water for 48 hours.  You may start showering today.  Call your doctor for any bleeding, swelling, loss of sensation in the hand or fingers, or fingers turning blue.  If heavy bleeding or large lumps form, hold pressure at the spot and come to the Emergency Room.

## 2022-04-10 NOTE — DISCHARGE NOTE PROVIDER - NSDCCPCAREPLAN_GEN_ALL_CORE_FT
PRINCIPAL DISCHARGE DIAGNOSIS  Diagnosis: Left hip pain  Assessment and Plan of Treatment: Patient may WBAT with walker for asst. No orthopedic surgery necessary at this time. [facility] xrays and reeval.       PRINCIPAL DISCHARGE DIAGNOSIS  Diagnosis: Left hip pain  Assessment and Plan of Treatment: Patient may WBAT with walker for asst. No orthopedic surgery necessary at this time. Patient will follow up with Dr Mcknight ([facility]) in 3 weeks for xrays and reeval.      SECONDARY DISCHARGE DIAGNOSES  Diagnosis: Acute HFrEF (heart failure with reduced ejection fraction)  Assessment and Plan of Treatment: s/p C NICM, no obstructive CAD.   GDMT: entresto, ASA/BB, high intensity statin/lasix  follow up with Dr. Diaz in 2 weeks   smoking cessation, weight reduction, diabetic management    Diagnosis: Edema due to congestive heart failure  Assessment and Plan of Treatment:      PRINCIPAL DISCHARGE DIAGNOSIS  Diagnosis: Left hip pain  Assessment and Plan of Treatment: Patient may WBAT with walker for asst. No orthopedic surgery necessary at this time. [facility] for xrays and reeval.      SECONDARY DISCHARGE DIAGNOSES  Diagnosis: Edema due to congestive heart failure  Assessment and Plan of Treatment:     Diagnosis: Acute HFrEF (heart failure with reduced ejection fraction)  Assessment and Plan of Treatment: s/p [facility] NICM, no obstructive CAD.   GDMT: entresto, ASA/BB, high intensity statin/lasix    Diagnosis: CHF with cardiomyopathy  Assessment and Plan of Treatment: s/p [facility] NICM, no obstructive CAD.   GDMT: entresto, ASA/BB, high intensity statin/lasix       Diagnosis: Acute HFrEF (heart failure with reduced ejection fraction)  Assessment and Plan of Treatment: s/p [facility] NICM, no obstructive CAD.   GDMT: entresto, ASA/BB, high intensity statin/lasix  follow up with Dr. Diaz in 2 weeks   smoking cessation, weight reduction, diabetic management

## 2022-04-10 NOTE — DISCHARGE NOTE PROVIDER - NSDCFUADDAPPT_GEN_ALL_CORE_FT
Patient may WBAT with walker for asst. No orthopedic surgery necessary at this time. Patient will follow up with Dr Mcknight ([facility]) in 3 weeks for xrays and reeval.

## 2022-04-10 NOTE — DISCHARGE NOTE PROVIDER - CARE PROVIDERS DIRECT ADDRESSES
,DirectAddress_Unknown ,DirectAddress_Unknown,[email or web address]

## 2022-04-10 NOTE — DISCHARGE NOTE PROVIDER - HOSPITAL COURSE
patient is a 50 yo female with PMH DM, HTN, presented to the ED after mechanical fall followed by Left hip pain. She came out of her residence to get door dash delivery but she slipped in the mud and fell on left hip. A/w great trochanter fracture, c/b new cardiomyopathy.   Seen by ortho team and cardiology. NO surgical interventions for trochanter fracture. Recommended PT at home.   Had cardiac evaluation including TTE and LHC. Found to have EF 25-30 and non obstructing cardiomyopathy. Started on medical management for cardiomyopathy.   Smoking cessation counseling provided. Nicotine patch ordered during hospitalization.   Pt is also with uncontrolled type 2 DM. Medications adjusted.   She is stable for discharge home and to follow with ortho and cardiology as outpatient.            1.  Left greater trochanter fracture   MRI done and results reviewed   Seen by ortho, no surgical intervention, recommended to follow up as outpatient   Seen by PT: PT home       2. Acute heart failure with reduced ejection fracture Dyspnea  TTE : EF 25-30%   c/w Furosemide 40 mg IPO daily and carvedilol 3.25 mg po twice daily and Entresto started on 4/12     s/p  LHC: non obstructive cardiomyopathy        3. HTN - BP controlled   c/w Losartan 10o mg po daily       4. Uncontrolled type 2 MD   -A1C 10.2, BG controlled   -diabetes education   -insulin lantus 10 and lispro 4/4/4    5. Active smoker:  -counseling provided  c/w nicotine patch

## 2022-04-10 NOTE — DISCHARGE NOTE PROVIDER - PROVIDER TOKENS
PROVIDER:[TOKEN:[[id number]]] PROVIDER:[TOKEN:[[id number]]],PROVIDER:[TOKEN:[[id number]],FOLLOWUP:[2 weeks]]

## 2022-04-11 LAB
ANION GAP SERPL CALC-SCNC: 13 MMOL/L — SIGNIFICANT CHANGE UP (ref 5–17)
BUN SERPL-MCNC: 15.4 MG/DL — SIGNIFICANT CHANGE UP (ref 8–20)
CALCIUM SERPL-MCNC: 9.2 MG/DL — SIGNIFICANT CHANGE UP (ref 8.6–10.2)
CHLORIDE SERPL-SCNC: 97 MMOL/L — LOW (ref 98–107)
CO2 SERPL-SCNC: 28 MMOL/L — SIGNIFICANT CHANGE UP (ref 22–29)
CREAT SERPL-MCNC: 0.47 MG/DL — LOW (ref 0.5–1.3)
EGFR: 115 ML/MIN/1.73M2 — SIGNIFICANT CHANGE UP
GLUCOSE BLDC GLUCOMTR-MCNC: 178 MG/DL — HIGH (ref 70–99)
GLUCOSE BLDC GLUCOMTR-MCNC: 192 MG/DL — HIGH (ref 70–99)
GLUCOSE BLDC GLUCOMTR-MCNC: 200 MG/DL — HIGH (ref 70–99)
GLUCOSE BLDC GLUCOMTR-MCNC: 260 MG/DL — HIGH (ref 70–99)
GLUCOSE SERPL-MCNC: 219 MG/DL — HIGH (ref 70–99)
HCT VFR BLD CALC: 52 % — HIGH (ref 34.5–45)
HGB BLD-MCNC: 16.4 G/DL — HIGH (ref 11.5–15.5)
MAGNESIUM SERPL-MCNC: 1.9 MG/DL — SIGNIFICANT CHANGE UP (ref 1.8–2.6)
MCHC RBC-ENTMCNC: 30 PG — SIGNIFICANT CHANGE UP (ref 27–34)
MCHC RBC-ENTMCNC: 31.5 GM/DL — LOW (ref 32–36)
MCV RBC AUTO: 95.1 FL — SIGNIFICANT CHANGE UP (ref 80–100)
PHOSPHATE SERPL-MCNC: 4.9 MG/DL — HIGH (ref 2.4–4.7)
PLATELET # BLD AUTO: 222 K/UL — SIGNIFICANT CHANGE UP (ref 150–400)
POTASSIUM SERPL-MCNC: 4.3 MMOL/L — SIGNIFICANT CHANGE UP (ref 3.5–5.3)
POTASSIUM SERPL-SCNC: 4.3 MMOL/L — SIGNIFICANT CHANGE UP (ref 3.5–5.3)
RBC # BLD: 5.47 M/UL — HIGH (ref 3.8–5.2)
RBC # FLD: 13.4 % — SIGNIFICANT CHANGE UP (ref 10.3–14.5)
SODIUM SERPL-SCNC: 138 MMOL/L — SIGNIFICANT CHANGE UP (ref 135–145)
WBC # BLD: 9.84 K/UL — SIGNIFICANT CHANGE UP (ref 3.8–10.5)
WBC # FLD AUTO: 9.84 K/UL — SIGNIFICANT CHANGE UP (ref 3.8–10.5)

## 2022-04-11 PROCEDURE — 99233 SBSQ HOSP IP/OBS HIGH 50: CPT

## 2022-04-11 PROCEDURE — 99232 SBSQ HOSP IP/OBS MODERATE 35: CPT

## 2022-04-11 PROCEDURE — 93458 L HRT ARTERY/VENTRICLE ANGIO: CPT | Mod: 26

## 2022-04-11 PROCEDURE — 99152 MOD SED SAME PHYS/QHP 5/>YRS: CPT

## 2022-04-11 RX ORDER — ATORVASTATIN CALCIUM 80 MG/1
40 TABLET, FILM COATED ORAL AT BEDTIME
Refills: 0 | Status: DISCONTINUED | OUTPATIENT
Start: 2022-04-11 | End: 2022-04-12

## 2022-04-11 RX ORDER — FUROSEMIDE 40 MG
40 TABLET ORAL DAILY
Refills: 0 | Status: DISCONTINUED | OUTPATIENT
Start: 2022-04-12 | End: 2022-04-12

## 2022-04-11 RX ORDER — SACUBITRIL AND VALSARTAN 24; 26 MG/1; MG/1
1 TABLET, FILM COATED ORAL
Refills: 0 | Status: DISCONTINUED | OUTPATIENT
Start: 2022-04-11 | End: 2022-04-12

## 2022-04-11 RX ADMIN — Medication 1 PATCH: at 11:35

## 2022-04-11 RX ADMIN — INSULIN GLARGINE 10 UNIT(S): 100 INJECTION, SOLUTION SUBCUTANEOUS at 07:29

## 2022-04-11 RX ADMIN — LOSARTAN POTASSIUM 100 MILLIGRAM(S): 100 TABLET, FILM COATED ORAL at 07:27

## 2022-04-11 RX ADMIN — ENOXAPARIN SODIUM 40 MILLIGRAM(S): 100 INJECTION SUBCUTANEOUS at 22:07

## 2022-04-11 RX ADMIN — Medication 1: at 07:35

## 2022-04-11 RX ADMIN — Medication 1: at 17:50

## 2022-04-11 RX ADMIN — OXYCODONE AND ACETAMINOPHEN 1 TABLET(S): 5; 325 TABLET ORAL at 18:11

## 2022-04-11 RX ADMIN — Medication 1 PATCH: at 19:00

## 2022-04-11 RX ADMIN — ATORVASTATIN CALCIUM 40 MILLIGRAM(S): 80 TABLET, FILM COATED ORAL at 22:07

## 2022-04-11 RX ADMIN — CARVEDILOL PHOSPHATE 3.12 MILLIGRAM(S): 80 CAPSULE, EXTENDED RELEASE ORAL at 07:27

## 2022-04-11 RX ADMIN — CARVEDILOL PHOSPHATE 3.12 MILLIGRAM(S): 80 CAPSULE, EXTENDED RELEASE ORAL at 18:03

## 2022-04-11 RX ADMIN — Medication 4 UNIT(S): at 17:50

## 2022-04-11 RX ADMIN — Medication 1: at 11:36

## 2022-04-11 RX ADMIN — Medication 81 MILLIGRAM(S): at 11:35

## 2022-04-11 RX ADMIN — SACUBITRIL AND VALSARTAN 1 TABLET(S): 24; 26 TABLET, FILM COATED ORAL at 22:07

## 2022-04-11 RX ADMIN — Medication 40 MILLIGRAM(S): at 07:27

## 2022-04-11 NOTE — PROGRESS NOTE ADULT - NS ATTEND AMEND GEN_ALL_CORE FT
s/p LHC , NO obstructive CAD, NICM  discontinue Losartan, start Entresto 24/26mg BID titrate as BP allows, first dose 2100  continue ASA 81mg daily  continue coreg 3.125mg BID   started Lipitor 40mg QHS  switch to PO Lasix
Awaiting MRI to decide on surgery or not.  CHF with cardiomyopathy: Appeared volume overloaded  Left heart cath tomorrow  discussed with patient   Discussed low na diet  off diuretics  c/w arb and coreg

## 2022-04-11 NOTE — PHYSICAL THERAPY INITIAL EVALUATION ADULT - ACTIVE RANGE OF MOTION EXAMINATION, REHAB EVAL
Gorssly 3/5 in bilateral LE/bilateral upper extremity Active ROM was WFL (within functional limits)/bilateral  lower extremity Active ROM was WFL (within functional limits)

## 2022-04-11 NOTE — PHYSICAL THERAPY INITIAL EVALUATION ADULT - GAIT TRAINING, PT EVAL
Time Frame: 2-3    days   Goal: Modified Independent with RW x 250 feet / Stairs: pt will navigate 3 stairs Independently

## 2022-04-11 NOTE — PHYSICAL THERAPY INITIAL EVALUATION ADULT - ADDITIONAL COMMENTS
Pt lives in a house with  3 steps to enter with 2  rails and 0 stairs inside with no rails.  Pt owns medical equipment: none   Pt lives with: Daughter and Father in law  Someone is always available to provide assist.

## 2022-04-11 NOTE — PROGRESS NOTE ADULT - ASSESSMENT
patient is a 50 yo female with PMH DM, HTN, presented to the ED after mechanical fall followed by Left hip pain. She came out of her residence to get door dash delivery but she slipped in the mud and fell on left hip. A/w great trochanter fracture, c/b new cardiomyopathy.     1.  Left greater trochanter fracture   MRI done and results reviewed   Seen by ortho, no surgical intervention, recommended to follow up as outpatient   Seen by PT: PT home       2. Acute heart failure with reduced ejection fracture Dyspnea  TTE : EF 25-30%   c/w Furosemide 40 mg IV daily and carvedilol 3.25 mg po twice daily   Monitor daily weight and I & O   Plan for [facility] today        3. HTN - BP controlled   c/w Losartan 10o mg po daily       4. Uncontrolled type 2 MD   -A1C 10.2, BG controlled   -diabetes education   -insulin lantus 10 and lispro 4/4/4    5. Active smoker:  -counseling provided  c/w nicotine patch    DVT ppx: Lovenox  Diet: consistent carb - NPO m n for cardiac cath  Dispo: pending clinical course

## 2022-04-11 NOTE — PROGRESS NOTE ADULT - ASSESSMENT
52 yo female with PMH DM, HTN, presented to the ED after mechanical fall followed by Left hip pain. She came out of her residence to get door dash delivery but she slipped in the mud and fell on left hip. A/w great trochanter fracture, c/b new HFrEF 25-30% with multiple risk factors for ICM including active smoker, uncontrolled DM, hypertension, hld, and obesity.    1. BHANDARI/Acute HFrEF highly suspicious for ICM given multiple risk factors  TTE : EF 25-30%   -continue ASA/ARB/BB, start Lipitor 40mg QHS  -I/O's appears euvolemic, would transition to PO Lasix pending LVEDP  -defer pre procedure IVF given reduced EF   Plan for LHC today      2.  Left greater trochanter fracture   MRI done and results reviewed   -Seen by ortho, no surgical intervention, recommended to follow up as outpatient    3. HTN - BP controlled   -continue BB/ARB   -would follow up creatinine post cath, transition to Entresto prior to discharge    4. Uncontrolled type 2 MD   -A1C 10.2  -diabetic education, ? endo referral, weight loss, and dietary discretion     5. Active smoker:  -smoking cessation  -[facility] Quits hotline info provided     Will discuss with Dr. Milner post cath findings   52 yo female with PMH DM, HTN, presented to the ED after mechanical fall followed by Left hip pain. She came out of her residence to get door dash delivery but she slipped in the mud and fell on left hip. A/w great trochanter fracture, c/b new HFrEF 25-30% with multiple risk factors for ICM including active smoker, uncontrolled DM, hypertension, hld, and obesity.    1. BHANDARI/Acute HFrEF highly suspicious for ICM given multiple risk factors  TTE : EF 25-30%   -continue ASA/ARB/BB, start Lipitor 40mg QHS  -I/O's appears euvolemic, would transition to PO Lasix pending LVEDP  -defer pre procedure IVF given reduced EF   -LHC today  ASA IV  Mall II  Creat 0.47  BRA 2.0%     2.  Left greater trochanter fracture   MRI done and results reviewed   -Seen by ortho, no surgical intervention, recommended to follow up as outpatient    3. HTN - BP controlled   -continue BB/ARB   -would follow up creatinine post cath, transition to Entresto prior to discharge    4. Uncontrolled type 2 MD   -A1C 10.2  -diabetic education, ? endo referral, weight loss, and dietary discretion     5. Active smoker:  -smoking cessation  -[place] Quits hotline info provided     Will discuss with Dr. Milner post cath findings   52 yo female with PMH DM, HTN, presented to the ED after mechanical fall followed by Left hip pain. She came out of her residence to get door dash delivery but she slipped in the mud and fell on left hip. A/w great trochanter fracture, c/b new HFrEF 25-30% with multiple risk factors for ICM including active smoker, uncontrolled DM, hypertension, hld, and obesity.    1. BHANDARI/Acute HFrEF multiple risk factors for ICM given multiple risk factors  TTE : EF 25-30%   -continue ASA/ARB/BB, start Lipitor 40mg QHS  -I/O's appears euvolemic, would transition to PO Lasix pending LVEDP  -defer pre procedure IVF given reduced EF   -[facility] today  ASA IV  Mall II  Creat 0.47  BRA 2.0%     2.  Left greater trochanter fracture   MRI done and results reviewed   -Seen by ortho, no surgical intervention, recommended to follow up as outpatient    3. HTN - BP controlled   -continue BB/ARB   -would follow up creatinine post cath, transition to Entresto prior to discharge    4. Uncontrolled type 2 MD   -A1C 10.2  -diabetic education, ? endo referral, weight loss, and dietary discretion     5. Active smoker:  -smoking cessation  -[facility] Quits hotline info provided                                               POST CATH ADDENDUM   Now s/p LHC via RRA with Dr. Rand, tolerated procedure well. Pt arrived to recovery in NAD and HDS, RRA access site stable, no bleed/hematoma, distal pulse +, neurovascular intact    Intraprocedurally: Pt rec'd IV sedation, Heparin 3,000 units IV, and Omnipaque 52ml  Findings: Diagnostic, NO obstructive CAD, NICM    Plan:  -Formal cath report pending  -Post procedure management/monitoring per protocol  -Access site precautions  -Radial compression band removal at 1715, OOB 1815, then may return to IP room   -Bedrest x 2 hours post procedure  -discontinue Losartan, start Entresto 24/26mg BID titrate as BP allows, first dose 2100  -continue ASA 81mg daily  -continue coreg 3.125mg BID   -started Lipitor 40mg QHS  -switch to PO Lasix   -Educated regarding post procedure management and care  -Discussed the importance of lifestyle modifications including dietary discretions, medication adherence, weight loss, glucose control, and smoking cessation  -Follow up OP with cardiologist: Dr. Sai Diaz     Above plan discussed with patient, RN, Dr. Rand/Dr. Diaz/Dr. Rogel

## 2022-04-12 ENCOUNTER — TRANSCRIPTION ENCOUNTER (OUTPATIENT)
Age: 52
End: 2022-04-12

## 2022-04-12 VITALS
DIASTOLIC BLOOD PRESSURE: 62 MMHG | RESPIRATION RATE: 18 BRPM | TEMPERATURE: 98 F | OXYGEN SATURATION: 94 % | SYSTOLIC BLOOD PRESSURE: 116 MMHG | HEART RATE: 74 BPM

## 2022-04-12 DIAGNOSIS — I42.8 OTHER CARDIOMYOPATHIES: ICD-10-CM

## 2022-04-12 LAB
ANION GAP SERPL CALC-SCNC: 14 MMOL/L — SIGNIFICANT CHANGE UP (ref 5–17)
BASOPHILS # BLD AUTO: 0.02 K/UL — SIGNIFICANT CHANGE UP (ref 0–0.2)
BASOPHILS NFR BLD AUTO: 0.2 % — SIGNIFICANT CHANGE UP (ref 0–2)
BUN SERPL-MCNC: 17.3 MG/DL — SIGNIFICANT CHANGE UP (ref 8–20)
CALCIUM SERPL-MCNC: 8.9 MG/DL — SIGNIFICANT CHANGE UP (ref 8.6–10.2)
CHLORIDE SERPL-SCNC: 96 MMOL/L — LOW (ref 98–107)
CO2 SERPL-SCNC: 27 MMOL/L — SIGNIFICANT CHANGE UP (ref 22–29)
CREAT SERPL-MCNC: 0.38 MG/DL — LOW (ref 0.5–1.3)
EGFR: 121 ML/MIN/1.73M2 — SIGNIFICANT CHANGE UP
EOSINOPHIL # BLD AUTO: 0.13 K/UL — SIGNIFICANT CHANGE UP (ref 0–0.5)
EOSINOPHIL NFR BLD AUTO: 1.4 % — SIGNIFICANT CHANGE UP (ref 0–6)
GLUCOSE BLDC GLUCOMTR-MCNC: 219 MG/DL — HIGH (ref 70–99)
GLUCOSE BLDC GLUCOMTR-MCNC: 223 MG/DL — HIGH (ref 70–99)
GLUCOSE SERPL-MCNC: 240 MG/DL — HIGH (ref 70–99)
HCT VFR BLD CALC: 52 % — HIGH (ref 34.5–45)
HGB BLD-MCNC: 16.4 G/DL — HIGH (ref 11.5–15.5)
IMM GRANULOCYTES NFR BLD AUTO: 0.5 % — SIGNIFICANT CHANGE UP (ref 0–1.5)
LYMPHOCYTES # BLD AUTO: 2.7 K/UL — SIGNIFICANT CHANGE UP (ref 1–3.3)
LYMPHOCYTES # BLD AUTO: 28.1 % — SIGNIFICANT CHANGE UP (ref 13–44)
MAGNESIUM SERPL-MCNC: 2.1 MG/DL — SIGNIFICANT CHANGE UP (ref 1.6–2.6)
MCHC RBC-ENTMCNC: 29.7 PG — SIGNIFICANT CHANGE UP (ref 27–34)
MCHC RBC-ENTMCNC: 31.5 GM/DL — LOW (ref 32–36)
MCV RBC AUTO: 94.2 FL — SIGNIFICANT CHANGE UP (ref 80–100)
MONOCYTES # BLD AUTO: 0.76 K/UL — SIGNIFICANT CHANGE UP (ref 0–0.9)
MONOCYTES NFR BLD AUTO: 7.9 % — SIGNIFICANT CHANGE UP (ref 2–14)
NEUTROPHILS # BLD AUTO: 5.94 K/UL — SIGNIFICANT CHANGE UP (ref 1.8–7.4)
NEUTROPHILS NFR BLD AUTO: 61.9 % — SIGNIFICANT CHANGE UP (ref 43–77)
PLATELET # BLD AUTO: 227 K/UL — SIGNIFICANT CHANGE UP (ref 150–400)
POTASSIUM SERPL-MCNC: 4.2 MMOL/L — SIGNIFICANT CHANGE UP (ref 3.5–5.3)
POTASSIUM SERPL-SCNC: 4.2 MMOL/L — SIGNIFICANT CHANGE UP (ref 3.5–5.3)
RBC # BLD: 5.52 M/UL — HIGH (ref 3.8–5.2)
RBC # FLD: 13.4 % — SIGNIFICANT CHANGE UP (ref 10.3–14.5)
SODIUM SERPL-SCNC: 137 MMOL/L — SIGNIFICANT CHANGE UP (ref 135–145)
WBC # BLD: 9.6 K/UL — SIGNIFICANT CHANGE UP (ref 3.8–10.5)
WBC # FLD AUTO: 9.6 K/UL — SIGNIFICANT CHANGE UP (ref 3.8–10.5)

## 2022-04-12 PROCEDURE — 84702 CHORIONIC GONADOTROPIN TEST: CPT

## 2022-04-12 PROCEDURE — 99285 EMERGENCY DEPT VISIT HI MDM: CPT | Mod: 25

## 2022-04-12 PROCEDURE — 99239 HOSP IP/OBS DSCHRG MGMT >30: CPT

## 2022-04-12 PROCEDURE — U0003: CPT

## 2022-04-12 PROCEDURE — 0225U NFCT DS DNA&RNA 21 SARSCOV2: CPT

## 2022-04-12 PROCEDURE — 83036 HEMOGLOBIN GLYCOSYLATED A1C: CPT

## 2022-04-12 PROCEDURE — 85027 COMPLETE CBC AUTOMATED: CPT

## 2022-04-12 PROCEDURE — 82962 GLUCOSE BLOOD TEST: CPT

## 2022-04-12 PROCEDURE — 86850 RBC ANTIBODY SCREEN: CPT

## 2022-04-12 PROCEDURE — C1769: CPT

## 2022-04-12 PROCEDURE — 71045 X-RAY EXAM CHEST 1 VIEW: CPT

## 2022-04-12 PROCEDURE — C1894: CPT

## 2022-04-12 PROCEDURE — 80048 BASIC METABOLIC PNL TOTAL CA: CPT

## 2022-04-12 PROCEDURE — 85025 COMPLETE CBC W/AUTO DIFF WBC: CPT

## 2022-04-12 PROCEDURE — 86900 BLOOD TYPING SEROLOGIC ABO: CPT

## 2022-04-12 PROCEDURE — 83880 ASSAY OF NATRIURETIC PEPTIDE: CPT

## 2022-04-12 PROCEDURE — 72192 CT PELVIS W/O DYE: CPT | Mod: MA

## 2022-04-12 PROCEDURE — 36415 COLL VENOUS BLD VENIPUNCTURE: CPT

## 2022-04-12 PROCEDURE — 83735 ASSAY OF MAGNESIUM: CPT

## 2022-04-12 PROCEDURE — 93458 L HRT ARTERY/VENTRICLE ANGIO: CPT

## 2022-04-12 PROCEDURE — 73610 X-RAY EXAM OF ANKLE: CPT

## 2022-04-12 PROCEDURE — U0005: CPT

## 2022-04-12 PROCEDURE — 80061 LIPID PANEL: CPT

## 2022-04-12 PROCEDURE — 85730 THROMBOPLASTIN TIME PARTIAL: CPT

## 2022-04-12 PROCEDURE — 73502 X-RAY EXAM HIP UNI 2-3 VIEWS: CPT

## 2022-04-12 PROCEDURE — C1887: CPT

## 2022-04-12 PROCEDURE — 99153 MOD SED SAME PHYS/QHP EA: CPT

## 2022-04-12 PROCEDURE — 84100 ASSAY OF PHOSPHORUS: CPT

## 2022-04-12 PROCEDURE — 72195 MRI PELVIS W/O DYE: CPT

## 2022-04-12 PROCEDURE — 80053 COMPREHEN METABOLIC PANEL: CPT

## 2022-04-12 PROCEDURE — 72170 X-RAY EXAM OF PELVIS: CPT

## 2022-04-12 PROCEDURE — 86901 BLOOD TYPING SEROLOGIC RH(D): CPT

## 2022-04-12 PROCEDURE — 73630 X-RAY EXAM OF FOOT: CPT

## 2022-04-12 PROCEDURE — 93005 ELECTROCARDIOGRAM TRACING: CPT

## 2022-04-12 PROCEDURE — 99152 MOD SED SAME PHYS/QHP 5/>YRS: CPT

## 2022-04-12 PROCEDURE — 86703 HIV-1/HIV-2 1 RESULT ANTBDY: CPT

## 2022-04-12 PROCEDURE — 85610 PROTHROMBIN TIME: CPT

## 2022-04-12 PROCEDURE — C8929: CPT

## 2022-04-12 RX ORDER — SACUBITRIL AND VALSARTAN 24; 26 MG/1; MG/1
1 TABLET, FILM COATED ORAL
Qty: 60 | Refills: 0
Start: 2022-04-12 | End: 2022-05-11

## 2022-04-12 RX ORDER — CARVEDILOL PHOSPHATE 80 MG/1
1 CAPSULE, EXTENDED RELEASE ORAL
Qty: 60 | Refills: 0
Start: 2022-04-12 | End: 2022-05-11

## 2022-04-12 RX ORDER — NICOTINE POLACRILEX 2 MG
1 GUM BUCCAL
Qty: 30 | Refills: 0
Start: 2022-04-12 | End: 2022-05-11

## 2022-04-12 RX ORDER — LOSARTAN POTASSIUM 100 MG/1
0 TABLET, FILM COATED ORAL
Qty: 0 | Refills: 0 | DISCHARGE

## 2022-04-12 RX ORDER — FUROSEMIDE 40 MG
1 TABLET ORAL
Qty: 0 | Refills: 0 | DISCHARGE
Start: 2022-04-12 | End: 2022-05-11

## 2022-04-12 RX ORDER — HYDROCHLOROTHIAZIDE 25 MG
0 TABLET ORAL
Qty: 0 | Refills: 0 | DISCHARGE

## 2022-04-12 RX ORDER — ATORVASTATIN CALCIUM 80 MG/1
1 TABLET, FILM COATED ORAL
Qty: 30 | Refills: 0
Start: 2022-04-12 | End: 2022-05-11

## 2022-04-12 RX ORDER — OXYCODONE AND ACETAMINOPHEN 5; 325 MG/1; MG/1
1 TABLET ORAL
Qty: 12 | Refills: 0
Start: 2022-04-12 | End: 2022-04-14

## 2022-04-12 RX ORDER — FUROSEMIDE 40 MG
1 TABLET ORAL
Qty: 30 | Refills: 0
Start: 2022-04-12 | End: 2022-05-11

## 2022-04-12 RX ORDER — ACETAMINOPHEN 500 MG
2 TABLET ORAL
Qty: 0 | Refills: 0 | DISCHARGE
Start: 2022-04-12

## 2022-04-12 RX ORDER — ASPIRIN/CALCIUM CARB/MAGNESIUM 324 MG
1 TABLET ORAL
Qty: 30 | Refills: 0
Start: 2022-04-12 | End: 2022-05-11

## 2022-04-12 RX ADMIN — Medication 2: at 07:46

## 2022-04-12 RX ADMIN — INSULIN GLARGINE 10 UNIT(S): 100 INJECTION, SOLUTION SUBCUTANEOUS at 07:57

## 2022-04-12 RX ADMIN — Medication 2: at 11:35

## 2022-04-12 RX ADMIN — SACUBITRIL AND VALSARTAN 1 TABLET(S): 24; 26 TABLET, FILM COATED ORAL at 11:34

## 2022-04-12 RX ADMIN — Medication 40 MILLIGRAM(S): at 06:04

## 2022-04-12 RX ADMIN — CARVEDILOL PHOSPHATE 3.12 MILLIGRAM(S): 80 CAPSULE, EXTENDED RELEASE ORAL at 06:05

## 2022-04-12 RX ADMIN — Medication 4 UNIT(S): at 07:46

## 2022-04-12 RX ADMIN — Medication 81 MILLIGRAM(S): at 11:34

## 2022-04-12 RX ADMIN — ENOXAPARIN SODIUM 40 MILLIGRAM(S): 100 INJECTION SUBCUTANEOUS at 11:34

## 2022-04-12 RX ADMIN — Medication 4 UNIT(S): at 11:35

## 2022-04-12 RX ADMIN — Medication 1 PATCH: at 11:34

## 2022-04-12 NOTE — PROGRESS NOTE ADULT - PROBLEM SELECTOR PLAN 1
- s/p LHC showing non obstructive CAD, NICM   - pt had death in her family 1 week before discharge, likely stress induced  - cannot r/o amyloidosis, outpatient workup   - GDMT continue ASA, coreg, lipitor, entresto, PO lasix  - follow up echo outpatient  - patient not a candidate for cardiac rehab secondary to: no intervention  - Diet/lifestyle modifications and medication compliance heavily reinforced   - smoking cessation reinforced  - Patient educated about wrist site care, signs and symptoms of complication post procedure, and plan of care moving forward. Patient verbalized understanding with teach-back.   -  Patient to follow up with heart failure specialist and Dr. Diaz within 1-2 weeks
Awaiting MRI to decide on surgery or not

## 2022-04-12 NOTE — DISCHARGE NOTE NURSING/CASE MANAGEMENT/SOCIAL WORK - NSDCPEFALRISK_GEN_ALL_CORE
For information on Fall & Injury Prevention, visit: [email or web address] OR  [email or web address] OR  https://www.cdc.gov/steadi/patient.html

## 2022-04-12 NOTE — PROGRESS NOTE ADULT - ASSESSMENT
50 yo female with PMH DM, HTN, presented to the ED after mechanical fall followed by Left hip pain. She came out of her residence to get door dash delivery but she slipped in the mud and fell on left hip. A/w great trochanter fracture, c/b new HFrEF 25-30% with multiple risk factors for ICM including active smoker, uncontrolled DM, hypertension, hld, and obesity.    1. BHANDARI/Acute HFrEF multiple risk factors for ICM given multiple risk factors  - TTE : EF 25-30%   -continue ASA/ARB/BB, start Lipitor 40mg QHS  -I/O's appears euvolemic, would transition to PO Lasix pending LVEDP  -defer pre procedure IVF given reduced EF   -[facility] today  ASA IV  Mall II  Creat 0.47  BRA 2.0%     2.  Left greater trochanter fracture   MRI done and results reviewed   -Seen by ortho, no surgical intervention, recommended to follow up as outpatient    3. HTN - BP controlled   -continue BB/ARB   -would follow up creatinine post cath, transition to Entresto prior to discharge    4. Uncontrolled type 2 MD   -A1C 10.2  -diabetic education, ? endo referral, weight loss, and dietary discretion     5. Active smoker:  -smoking cessation  -[facility] Quits hotline info provided                                               POST CATH ADDENDUM   Now s/p LHC via RRA with Dr. Rand, tolerated procedure well. Pt arrived to recovery in NAD and HDS, RRA access site stable, no bleed/hematoma, distal pulse +, neurovascular intact    Intraprocedurally: Pt rec'd IV sedation, Heparin 3,000 units IV, and Omnipaque 52ml  Findings: Diagnostic, NO obstructive CAD, NICM    Plan:  -Formal cath report pending  -Post procedure management/monitoring per protocol  -Access site precautions  -Radial compression band removal at 1715, OOB 1815, then may return to IP room   -Bedrest x 2 hours post procedure  -discontinue Losartan, start Entresto 24/26mg BID titrate as BP allows, first dose 2100  -continue ASA 81mg daily  -continue coreg 3.125mg BID   -started Lipitor 40mg QHS  -switch to PO Lasix   -Educated regarding post procedure management and care  -Discussed the importance of lifestyle modifications including dietary discretions, medication adherence, weight loss, glucose control, and smoking cessation  -Follow up OP with cardiologist: Dr. Sai Diaz     Above plan discussed with patient, RN, Dr. Rand/Dr. Diaz/Dr. Rogel  50 yo female with PMH DM, HTN, presented to the ED after mechanical fall followed by Left hip pain. She came out of her residence to get door dash delivery but she slipped in the mud and fell on left hip. A/w great trochanter fracture, c/b new HFrEF 25-30% with multiple risk factors for ICM including active smoker, uncontrolled DM, hypertension, hld, and obesity.

## 2022-04-12 NOTE — DISCHARGE NOTE NURSING/CASE MANAGEMENT/SOCIAL WORK - NSDCFUADDAPPT_GEN_ALL_CORE_FT
Patient may WBAT with walker for asst. No orthopedic surgery necessary at this time. Patient will follow up with Dr Mcknight ([facility]) in 3 weeks for xrays and reeval.

## 2022-04-12 NOTE — PROGRESS NOTE ADULT - PROVIDER SPECIALTY LIST ADULT
Hospitalist
Orthopedics
Hospitalist
Hospitalist
Orthopedics
Orthopedics
Cardiology
Hospitalist
Orthopedics
Cardiology
Cardiology

## 2022-04-12 NOTE — PROGRESS NOTE ADULT - SUBJECTIVE AND OBJECTIVE BOX
[place] [facility]                                                               Office: [address] [address]                                                              Telephone: [phone]. Fax:[phone]                                                                             PROGRESS NOTE  Reason for follow up: new HFrEF 25-30%  Overnight: No new events.   Update: for [facility] today     Review of symptoms:   Cardiac:  No chest pain. No dyspnea. No palpitations.  Respiratory:no cough. No dyspnea  Gastrointestinal: No diarrhea. No abdominal pain. No bleeding.   Neuro: No focal neuro complaints.      Vitals:  T(C): 36.3 (22 @ 14:16), Max: 36.6 (22 @ 04:09)  HR: 72 (22 @ 14:16) (69 - 76)  BP: 110/59 (22 @ 14:16) (105/69 - 131/84)  RR: 22 (22 @ 14:16) (16 - 22)  SpO2: 95% (22 @ 14:16) (92% - 95%)    I&O's Summary    10 Apr 2022 07:01  -  2022 07:00  --------------------------------------------------------  IN: 0 mL / OUT: 350 mL / NET: -350 mL        PHYSICAL EXAM:  Appearance: Comfortable. No acute distress  HEENT:  Atraumatic. Normocephalic.  Normal oral mucosa, PERRL, Neck is supple. No carotid bruit.   Neurologic: A & O x 3, no focal deficits. EOMI.  Cardiovascular: Normal S1 S2, No murmur, rubs/gallops. No JVD, No edema  Respiratory: Lungs clear to auscultation, unlabored  Gastrointestinal:  Obese, Soft, Non-tender, + BS  Lower Extremities: non pitting edema, jae discoloration of B/L LE   Psychiatry: mildly anxious. Mood & affect appropriate  Skin: No rashes/ ecchymoses/cyanosis/ulcers visualized on the face, hands or feet.      CURRENT MEDICATIONS:  carvedilol 3.125 milliGRAM(s) Oral every 12 hours  furosemide   Injectable 40 milliGRAM(s) IV Push daily  losartan 100 milliGRAM(s) Oral daily    dextrose 50% Injectable  dextrose 50% Injectable  dextrose 50% Injectable  glucagon  Injectable  insulin glargine Injectable (LANTUS)  insulin lispro (ADMELOG) corrective regimen sliding scale  insulin lispro Injectable (ADMELOG)  aspirin  chewable  dextrose 5%.  dextrose 5%.  enoxaparin Injectable      DIAGNOSTIC TESTING:  [ x] Echocardiogram:   < from: TTE Echo Complete w/ Contrast w/ Doppler (22 @ 14:04) >  ACC: [id number] EXAM:  ECHO TTE WITH CON COMP W DOPP                          PROCEDURE DATE:  2022          INTERPRETATION:  TRANSTHORACIC ECHOCARDIOGRAM REPORT        Patient Name:   ADIEL CURRAN Patient Location: Inpatient  MedicalRec #:  [id number]               Accession #:      [id number]  Account #:                             Height:           59.8 in 152.0 cm  YOB: 1970             Weight:           249.1 lb 113.00   kg  Patient Age:    51 years BSA:              2.05 m²  Patient Gender: F                      BP:               138/84 mmHg      Date of Exam:        2022 2:04:15 PM  Sonographer:         Nicholas Muniz Jr  Referring Physician: Uma Monroy    Procedure:     2D Echo/Doppler/Color Doppler Complete.  Indications:   Dyspnea, unspecified - R06.00  Diagnosis:     Dyspnea, unspecified - R06.00; Cardiomyopathy, unspecified   -                 I42.9  Study Details: Technically difficult study. Study quality was adversely   affected                 due to body habitus.        2D AND M-MODE MEASUREMENTS (normal ranges within parentheses):  Left                 Normal   Aorta/Left            Normal  Ventricle:                    Atrium:  IVSd (2D):    1.19  (0.7-1.1) Aortic Root    2.05  (2.4-3.7)                 cm             (2D):           cm  LVPWd (2D):   1.29  (0.7-1.1) Left Atrium    4.81  (1.9-4.0)                 cm             (2D):           cm  LVIDd (2D):   5.78  (3.4-5.7) LA Volume      42.0                 cm             Index         ml/m²  LVIDs (2D):   5.21            Right Ventricle:                 cm             TAPSE:           2.30 cm  LV FS (2D):   9.9 %  (>25%)  Relative Wall 0.45   (<0.42)  Thickness    LV DIASTOLIC FUNCTION:  MV Peak E: 0.85m/s E/e' Ratio: 19.90  MV Peak A: 0.67 m/s Decel Time: 123 msec  E/A Ratio: 1.27    SPECTRAL DOPPLER ANALYSIS (where applicable):  Mitral Valve:  MV P1/2 Time: 35.67 msec  MV Area, PHT: 6.17 cm²    Aortic Valve: AoV Max Eb: 1.82 m/s AoV Peak P.3 mmHg AoV Mean P.1 mmHg    LVOT Vmax: 0.77 m/s LVOT VTI: 0.137 m LVOT Diameter: 2.10 cm    AoV Area, Vmax: 1.47 cm² AoV Area, VTI: 1.56 cm² AoV Area, Vmn: 1.36 cm²  Ao VTI: 0.304  Tricuspid Valve and PA/RV Systolic Pressure: TR Max Velocity: 2.74 m/s RA   Pressure: 3 mmHg RVSP/PASP: 33.0 mmHg      PHYSICIAN INTERPRETATION:  Left Ventricle: Endocardial visualization was enhanced with intravenous   echo contrast. The left ventricular internal cavity size is mildly   increased.  Global LV systolic function was severely decreased. Left ventricular   ejection fraction, by visual estimation, is 25 to 30%. Spectral Doppler   shows pseudonormal pattern of left ventricular myocardial filling (Grade   II diastolic dysfunction).  Right Ventricle: Normal right ventricular size and function. TV S' 0.1   m/s.  Left Atrium: Moderately enlarged left atrium.  Right Atrium: Mildly enlarged right atrium.  Pericardium: There is no evidence of pericardial effusion.  Mitral Valve: Thickening and calcification of the anterior and posterior   mitral valve leaflets. Trace mitral valve regurgitation is seen.  Tricuspid Valve: Trivial tricuspid regurgitation is visualized.  Aortic Valve: Peak transaortic gradient equals 13.3 mmHg, mean   transaortic gradient equals 6.1 mmHg, the calculated aortic valve area   equals 1.56 cm² by the continuity equation consistent with mild aortic   stenosis. No evidence of aortic valve regurgitation is seen.  Pulmonic Valve: No indication of pulmonic valve regurgitation.  Aorta: The aortic root is normal in size and structure.  Pulmonary Artery: The pulmonary artery is not well seen.  Venous: The inferior vena cava was normal sized, with respiratory size   variation greater than 50%.      Summary:   1. Technically difficult study.   2. Endocardial visualization was enhanced with intravenous echo contrast.   3. Left ventricular ejection fraction, by visual estimation, is 25 to   30%.   4. Severely decreased global left ventricular systolic function.   5. There is mild concentric left ventricular hypertrophy.   6. Spectral Doppler shows pseudonormal pattern of left ventricular   myocardial filling (Grade II diastolic dysfunction).   7. Trace mitral valve regurgitation.   8. Peak transaortic gradient equals 13.3 mmHg, mean transaortic gradient   equals 6.1 mmHg, the calculated aortic valve area equals 1.56 cm² by the   continuity equation consistent with mild aortic stenosis.   9. Results d/w Charissa Hall MD Electronically signed on 2022 at 3:42:01 PM      OTHER: 	      LABS:	 	  CARDIAC MARKERS ( 2022 04:06 )  x     / x     / x     / x     / x      p-BNP 2022 04:06: 920 pg/mL                          16.4   9.84  )-----------( 222      ( 2022 05:52 )             52.0     04-11    138  |  97<L>  |  15.4  ----------------------------<  219<H>  4.3   |  28.0  |  0.47<L>    Ca    9.2      2022 05:52  Phos  4.9     04-11  Mg     1.9     -11      proBNP: Serum Pro-Brain Natriuretic Peptide: 920 pg/mL ( @ 04:06)    Lipid Profile: Date: 04-10 @ 06:43  Total cholesterol 159; Direct LDL: --; HDL: 28; Triglycerides:96  Date:  @ 04:21  Total cholesterol 152; Direct LDL: --; HDL: 37; Triglycerides:81    HgA1c: 10.2        TELEMETRY: SR  EC2022 ST Left axis/LVH       
Patient seen and eval at bedside. Patient currently walking from commode to chair with walker placing all her weight as she states on her right LE. Dr. Monroy in room as well. Discussed patient is to go for MRI with ativan today to further eval left hip fracture.    PE: NAD, alert awake  Left LE: +log roll, unable to SLR, skin intact no open wounds  +venous insufficiency darkening lower extremity  EHL/TA/GS/FHL intact, Gross SILT s/s/DP/SP/tib distrib  DP pulse 1+, calf soft. NT              A/P: 50 yo F with left hip GT fx with poss extension into IT region  ·	pending MRI - recommend pain medication and anxiolytic  ·	Poss OR dependent on MRI results  ·	Will need med clearance and cardiac clearance if + MRI  ·	Pain control  ·	DVT propx  ·	Cont care as per primary team   
Patient is a 51y old  Female who presents with a chief complaint of Left Hip Fracture (10 Apr 2022 16:50)      INTERVAL HPI/OVERNIGHT EVENTS: seen and examined. NO complains. Awaiting to have [facility].   LE edema improving     MEDICATIONS  (STANDING):  aspirin  chewable 81 milliGRAM(s) Oral daily  carvedilol 3.125 milliGRAM(s) Oral every 12 hours  dextrose 5%. 1000 milliLiter(s) (100 mL/Hr) IV Continuous <Continuous>  dextrose 5%. 1000 milliLiter(s) (50 mL/Hr) IV Continuous <Continuous>  dextrose 50% Injectable 25 Gram(s) IV Push once  dextrose 50% Injectable 12.5 Gram(s) IV Push once  dextrose 50% Injectable 25 Gram(s) IV Push once  enoxaparin Injectable 40 milliGRAM(s) SubCutaneous every 24 hours  furosemide   Injectable 40 milliGRAM(s) IV Push daily  glucagon  Injectable 1 milliGRAM(s) IntraMuscular once  insulin glargine Injectable (LANTUS) 10 Unit(s) SubCutaneous every morning  insulin lispro (ADMELOG) corrective regimen sliding scale   SubCutaneous three times a day before meals  insulin lispro Injectable (ADMELOG) 4 Unit(s) SubCutaneous three times a day before meals  losartan 100 milliGRAM(s) Oral daily  nicotine -   7 mG/24Hr(s) Patch 1 patch Transdermal daily    MEDICATIONS  (PRN):  acetaminophen     Tablet .. 650 milliGRAM(s) Oral every 6 hours PRN Temp greater or equal to 38C (100.4F), Mild Pain (1 - 3)  dextrose Oral Gel 15 Gram(s) Oral once PRN Blood Glucose LESS THAN 70 milliGRAM(s)/deciliter  ondansetron Injectable 4 milliGRAM(s) IV Push every 8 hours PRN Nausea and/or Vomiting  oxycodone    5 mG/acetaminophen 325 mG 1 Tablet(s) Oral every 6 hours PRN Moderate Pain (4 - 6)      Allergies    No Known Allergies    Intolerances        REVIEW OF SYSTEMS:  CONSTITUTIONAL: No fever, weight loss, or fatigue  RESPIRATORY: No cough, wheezing, chills or hemoptysis; No shortness of breath  CARDIOVASCULAR: No chest pain, palpitations, dizziness, or leg swelling  GASTROINTESTINAL: No abdominal or epigastric pain. No nausea, vomiting, or hematemesis; No diarrhea or constipation. No melena or hematochezia.  NEUROLOGICAL: No headaches, memory loss, loss of strength, numbness, or tremors  MUSCULOSKELETAL: No joint pain or swelling; No muscle, back, or extremity pain      Vital Signs Last 24 Hrs  T(C): 36.6 (11 Apr 2022 10:02), Max: 36.6 (11 Apr 2022 04:09)  T(F): 97.9 (11 Apr 2022 10:02), Max: 97.9 (11 Apr 2022 10:02)  HR: 69 (11 Apr 2022 10:02) (69 - 76)  BP: 105/69 (11 Apr 2022 10:02) (105/69 - 131/84)  BP(mean): --  RR: 16 (11 Apr 2022 10:02) (16 - 18)  SpO2: 94% (11 Apr 2022 10:02) (92% - 94%)    PHYSICAL EXAM:  GENERAL: NAD, obese lady, sitting on the bed.   HEAD:  Atraumatic, Normocephalic  EYES: EOMI, PERRLA, conjunctiva and sclera clear  NECK: Supple, No JVD, Normal thyroid  NERVOUS SYSTEM:  Alert & Oriented X3, No gross focal deficits  CHEST/LUNG: Clear to percussion bilaterally; No rales, rhonchi, wheezing, or rubs  HEART: Regular rate and rhythm; No murmurs, rubs, or gallops  ABDOMEN: Soft, Nontender, Nondistended; Bowel sounds present, obese abdomen   EXTREMITIES:  b/l Lymphedema with superficial small blisters   SKIN: No rashes or lesions    LABS:                        16.4   9.84  )-----------( 222      ( 11 Apr 2022 05:52 )             52.0     04-11    138  |  97<L>  |  15.4  ----------------------------<  219<H>  4.3   |  28.0  |  0.47<L>    Ca    9.2      11 Apr 2022 05:52  Phos  4.9     04-11  Mg     1.9     04-11          CAPILLARY BLOOD GLUCOSE      POCT Blood Glucose.: 192 mg/dL (11 Apr 2022 11:33)  POCT Blood Glucose.: 200 mg/dL (11 Apr 2022 07:26)  POCT Blood Glucose.: 238 mg/dL (10 Apr 2022 22:17)  POCT Blood Glucose.: 210 mg/dL (10 Apr 2022 17:04)  POCT Blood Glucose.: 246 mg/dL (10 Apr 2022 13:08)      RADIOLOGY & ADDITIONAL TESTS:    Imaging Personally Reviewed:  [ ] YES  [ ] NO    Consultant(s) Notes Reviewed:  [ ] YES  [ ] NO    Care Discussed with Consultants/Other Providers [ ] YES  [ ] NO    Plan of Care discussed with Housestaff [ ]YES [ ] NO
                                                         [facility]                                                         CARDIOLOGY AT [facility]                                                                  [address] [address]                                                         Telephone: [phone]. Fax:[phone]                                                                             PROGRESS NOTE    Reason for follow up: Follow up on Abnormal echo  Update: Awaiting MRI of hip      Review of symptoms:   Cardiac:  No chest pain. No dyspnea. No palpitations.  Respiratory: no cough. No dyspnea  Gastrointestinal: No diarrhea. No abdominal pain. No bleeding.   Neuro: No focal neuro complaints.      Vitals:  T(C): 36.7 (04-10-22 @ 04:14), Max: 36.7 (04-09-22 @ 23:12)  HR: 69 (04-10-22 @ 04:14) (62 - 83)  BP: 126/80 (04-10-22 @ 04:14) (122/78 - 129/70)  RR: 18 (04-10-22 @ 04:14) (18 - 18)  SpO2: 94% (04-10-22 @ 04:14) (90% - 96%)  Wt(kg): --  I&O's Summary    09 Apr 2022 07:01  -  10 Apr 2022 07:00  --------------------------------------------------------  IN: 0 mL / OUT: 800 mL / NET: -800 mL  Weight (kg): 113.4 (04-06 @ 12:27)      PHYSICAL EXAM:  Appearance: Comfortable. No acute distress  HEENT:  Atraumatic. Normocephalic.  Normal oral mucosa  Neurologic: A & O x 3, no gross focal deficits.  Cardiovascular: RRR S1 S2 regular   Respiratory: Lungs clear to auscultation, unlabored   Gastrointestinal:  Soft, Non-tender, + BS  Lower Extremities: No edema  No calf tenderness  Psychiatry: Patient is calm. No agitation  Skin: warm and dry.      CURRENT MEDICATIONS:  MEDICATIONS  (STANDING):  aspirin  chewable 81 milliGRAM(s) Oral daily  carvedilol 3.125 milliGRAM(s) Oral every 12 hours  enoxaparin Injectable 40 milliGRAM(s) SubCutaneous every 24 hours  furosemide   Injectable 40 milliGRAM(s) IV Push daily  glucagon  Injectable 1 milliGRAM(s) IntraMuscular once  insulin glargine Injectable (LANTUS) 10 Unit(s) SubCutaneous every morning  insulin lispro (ADMELOG) corrective regimen sliding scale   SubCutaneous three times a day before meals  insulin lispro Injectable (ADMELOG) 4 Unit(s) SubCutaneous three times a day before meals  LORazepam     Tablet 1 milliGRAM(s) Oral once  losartan 100 milliGRAM(s) Oral daily  nicotine -   7 mG/24Hr(s) Patch 1 patch Transdermal daily    LABS:	 	  CARDIAC MARKERS ( 09 Apr 2022 04:06 )  x     / x     / x     / x     / x      p-BNP 09 Apr 2022 04:06: 920 pg/mL                          16.5   8.89  )-----------( 209      ( 10 Apr 2022 06:43 )             51.2     04-10    137  |  95<L>  |  10.8  ----------------------------<  221<H>  4.3   |  28.0  |  0.49<L>    Ca    9.0      10 Apr 2022 06:43  Phos  5.4     04-10  Mg     1.9     04-10      proBNP: Serum Pro-Brain Natriuretic Peptide: 920 pg/mL (04-09 @ 04:06)    Lipid Profile: Date: 04-10 @ 06:43  Total cholesterol 159; Direct LDL: --; HDL: 28; Triglycerides:96  Date: 04-07 @ 04:21  Total cholesterol 152; Direct LDL: --; HDL: 37; Triglycerides:81    TELEMETRY: Nsr  sinus aditya      < from: TTE Echo Complete w/ Contrast w/ Doppler (04.09.22 @ 14:04) >   1. Technically difficult study.   2. Endocardial visualization was enhanced with intravenous echo contrast.   3. Left ventricular ejection fraction, by visual estimation, is 25 to       30%.   4. Severely decreased global left ventricular systolic function.   5. There is mild concentric left ventricular hypertrophy.   6. Spectral Doppler shows pseudonormal pattern of left ventricular   myocardial filling (Grade II diastolic dysfunction).   7. Trace mitral valve regurgitation.   8. Peak transaortic gradient equals 13.3 mmHg, mean transaortic gradient   equals 6.1 mmHg, the calculated aortic valve area equals 1.56 cm² by the   continuity equation consistent with mild aortic stenosis.   9. Results d/w Dr. Uma Monroy,        
Ortho PA note:    Reviewed MRI with Dr Mcknight; there is NO extension of fracture into the IT region, therefore NO orthopedic surgical intervention required.  Patient may WBAT with assist of walker  Follow up with Dr Mcknight as out patient in 3 weeks  Ortho will sing off. Please reconsult if needed 
Patient seen and eval at bedside. Patient states she was up and got to the chair and portapotty yesterday but was able to keep weight off her left leg. Patient states she has claustrophobia and pain which is why she was unable to tolerate the MRI yesterday.    Vital Signs Last 24 Hrs  T(C): 36.7 (09 Apr 2022 04:58), Max: 36.9 (08 Apr 2022 19:51)  T(F): 98.1 (09 Apr 2022 04:58), Max: 98.5 (08 Apr 2022 19:51)  HR: 87 (09 Apr 2022 04:58) (78 - 96)  BP: 150/93 (09 Apr 2022 04:58) (135/88 - 155/98)  RR: 18 (09 Apr 2022 04:58) (18 - 20)  SpO2: 98% (09 Apr 2022 04:58) (93% - 98%)    PE: NAD, alert awake  Left LE: +log roll, unable to SLR, skin intact no open wounds  +venous insufficiency darkening lower extremity  EHL/TA/GS/FHL intact, Gross SILT s/s/DP/SP/tib distrib  DP pulse 1+, calf soft. NT                          15.4   9.64  )-----------( 181      ( 09 Apr 2022 04:06 )             48.1     04-09    133<L>  |  95<L>  |  10.0  ----------------------------<  237<H>  3.8   |  26.0  |  0.49<L>    A/P: 52 yo F with left hip GT fx with poss extension into IT region  pending MRI - recommend pain medication and anxiolytic  Poss OR dependent on MRI results  Will need med clearance  Keep NPO for now  Pain control  DVT propx  Cont care as per primary team     
Patient was seen and examined at approximately 8:45am    ORTHO-TRAUMA SERVICE      Pt Name: ADIEL CURRAN    MRN: [id number]    Patient is a 51y Female being followed for left hip pain s/p slip and fall. Patient reports that she is unable to bear weight on left LE due to pain. Awaiting MRI.  Denies acute sensory/motor changes. no other ortho complaints at this time.         PAST MEDICAL & SURGICAL HISTORY:  PAST MEDICAL & SURGICAL HISTORY:  Obstructive sleep apnea    Borderline systolic HTN    Ankle fracture, left  ORIF - 2003    H/O hand surgery  LEFT - 1981        Allergies: No Known Allergies      Medications: acetaminophen     Tablet .. 650 milliGRAM(s) Oral every 6 hours PRN  dextrose 5%. 1000 milliLiter(s) IV Continuous <Continuous>  dextrose 5%. 1000 milliLiter(s) IV Continuous <Continuous>  dextrose 50% Injectable 25 Gram(s) IV Push once  dextrose 50% Injectable 12.5 Gram(s) IV Push once  dextrose 50% Injectable 25 Gram(s) IV Push once  dextrose Oral Gel 15 Gram(s) Oral once PRN  enoxaparin Injectable 40 milliGRAM(s) SubCutaneous every 24 hours  glucagon  Injectable 1 milliGRAM(s) IntraMuscular once  insulin glargine Injectable (LANTUS) 9 Unit(s) SubCutaneous every morning  insulin lispro (ADMELOG) corrective regimen sliding scale   SubCutaneous three times a day before meals  insulin lispro Injectable (ADMELOG) 3 Unit(s) SubCutaneous three times a day before meals  losartan 50 milliGRAM(s) Oral daily  morphine  - Injectable 2 milliGRAM(s) IV Push once  nicotine -   7 mG/24Hr(s) Patch 1 patch Transdermal daily  ondansetron Injectable 4 milliGRAM(s) IV Push every 8 hours PRN  oxycodone    5 mG/acetaminophen 325 mG 1 Tablet(s) Oral every 6 hours PRN                          16.0   10.10 )-----------( 209      ( 07 Apr 2022 04:21 )             51.3     04-07    139  |  100  |  8.4  ----------------------------<  216<H>  4.6   |  26.0  |  0.44<L>    Ca    8.8      07 Apr 2022 04:21    TPro  6.8  /  Alb  3.7  /  TBili  0.9  /  DBili  x   /  AST  15  /  ALT  25  /  AlkPhos  116  04-07      PHYSICAL EXAM:    Vital Signs Last 24 Hrs  T(C): 36.4 (07 Apr 2022 11:52), Max: 36.8 (06 Apr 2022 16:08)  T(F): 97.6 (07 Apr 2022 11:52), Max: 98.2 (06 Apr 2022 16:08)  HR: 94 (07 Apr 2022 11:52) (80 - 95)  BP: 163/92 (07 Apr 2022 11:52) (122/83 - 163/92)  BP(mean): 106 (06 Apr 2022 22:19) (106 - 106)  RR: 20 (07 Apr 2022 11:52) (17 - 20)  SpO2: 92% (07 Apr 2022 11:52) (91% - 99%)  Daily     Daily     Appearance: Alert, responsive, in no acute distress.    Neurological: Sensation is grossly intact to light touch.  No focal deficits or weaknesses found.    Skin: no rash on visible skin. Skin is clean, dry and intact. No bleeding. No abrasions. No ulcerations.    Vascular: 2+ distal pulses. Cap refill < 2 sec. No signs of venous   insufficiency   or stasis. No extremity ulcerations. No cyanosis.    Musculoskeletal:         Left Lower Extremity: +Dorsi plantar flexion, EHL ,FHL intact. Compartments soft and compressible         IMAGING:   < from: CT Pelvis Bony Only No Cont (04.06.22 @ 20:42) >  ACC: [id number] EXAM:  CT PELVIS BONY ONLY                          PROCEDURE DATE:  04/06/2022          INTERPRETATION:  CLINICAL INDICATION: left hip pain.    TECHNIQUE: CT axial images of the pelvis were obtained without   intravenous contrast. Coronal and sagittal reformatted images were also   obtained. 3-D images were obtained from a separate workstation.    CONTRAST/COMPLICATIONS:  IV Contrast: NONE  Complications: None reported at time of study completion    COMPARISON: XR: 4/6/2022 and 6/30/2020. CT: None. MR: None.    FINDINGS: Evaluation of soft tissue is limited without intravenous   contrast.    Bones: Osteopenia. Acute, minimally displaced fracture of the left   greater trochanter. No dislocation.    Bilateral hip osteoarthrosis. Degenerative changes of the visualized   lower lumbar spine, pubic symphysis and sacroiliac joints. Grade 1   anterolisthesis of L4 on L5 and L5 on S1.    Soft tissue: Nonspecific stranding along the anterior pelvic wall soft   tissue. Mild stranding in the left hip soft tissue adjacent to the   fracture. Small fat-containing umbilical hernia. Nonspecific small   bilateral groin soft tissue lymph nodes measuring up to 1.2 cm in short   axis.    Diffuse muscle bulk loss with fatty replacement. Question trace left hip   joint effusion/hemarthrosis.    Additional: Nonspecific bilateral perinephric stranding. Colon   diverticulosis. Atherosclerosis.    IMPRESSION:    Acute, minimally displaced fracture of the left greater trochanter.   Question trace left hip joint effusion/hemarthrosis. Follow-up MRI would   be helpful to assess for possible intra-articular extension.    --- End of Report ---      YOHAN ZAPATA MD; Attending Radiologist  This document has been electronically signed. Apr 6 2022  9:14PM       A/P:  Pt is a  51y Female with left Gt fx with possible IT extension.     PLAN:   * Pain control  * MRI left hip ordered  * bed rest  * Plan to be finalized pending imaging
Uma Monroy M.D.    Patient is a 51y old  Female who presents with a chief complaint of Left Hip Fracture (09 Apr 2022 08:29)      SUBJECTIVE / OVERNIGHT EVENTS: notified by ortho teams that patient did not tolerate MRI yesterday due to anxiety and claustrophobia and some SOB with lying flat.     reports chronic orthopnea which patient attributed to her obesity, able to walk 1 blocks and climb about 8-10 stairs before getting SOB. Active smoker.     MEDICATIONS  (STANDING):  dextrose 5%. 1000 milliLiter(s) (50 mL/Hr) IV Continuous <Continuous>  dextrose 5%. 1000 milliLiter(s) (100 mL/Hr) IV Continuous <Continuous>  dextrose 50% Injectable 25 Gram(s) IV Push once  dextrose 50% Injectable 12.5 Gram(s) IV Push once  dextrose 50% Injectable 25 Gram(s) IV Push once  enoxaparin Injectable 40 milliGRAM(s) SubCutaneous every 24 hours  glucagon  Injectable 1 milliGRAM(s) IntraMuscular once  insulin glargine Injectable (LANTUS) 10 Unit(s) SubCutaneous every morning  insulin lispro (ADMELOG) corrective regimen sliding scale   SubCutaneous three times a day before meals  insulin lispro Injectable (ADMELOG) 4 Unit(s) SubCutaneous three times a day before meals  LORazepam     Tablet 1 milliGRAM(s) Oral once  losartan 100 milliGRAM(s) Oral daily  nicotine -   7 mG/24Hr(s) Patch 1 patch Transdermal daily    MEDICATIONS  (PRN):  acetaminophen     Tablet .. 650 milliGRAM(s) Oral every 6 hours PRN Temp greater or equal to 38C (100.4F), Mild Pain (1 - 3)  dextrose Oral Gel 15 Gram(s) Oral once PRN Blood Glucose LESS THAN 70 milliGRAM(s)/deciliter  ondansetron Injectable 4 milliGRAM(s) IV Push every 8 hours PRN Nausea and/or Vomiting  oxycodone    5 mG/acetaminophen 325 mG 1 Tablet(s) Oral every 6 hours PRN Moderate Pain (4 - 6)      I&O's Summary    08 Apr 2022 07:01  -  09 Apr 2022 07:00  --------------------------------------------------------  IN: 0 mL / OUT: 1700 mL / NET: -1700 mL        PHYSICAL EXAM:  Vital Signs Last 24 Hrs  T(C): 36.4 (09 Apr 2022 10:37), Max: 36.9 (08 Apr 2022 19:51)  T(F): 97.6 (09 Apr 2022 10:37), Max: 98.5 (08 Apr 2022 19:51)  HR: 71 (09 Apr 2022 10:37) (71 - 89)  BP: 138/84 (09 Apr 2022 10:37) (135/88 - 150/93)  BP(mean): --  RR: 18 (09 Apr 2022 10:37) (18 - 19)  SpO2: 95% (09 Apr 2022 10:37) (93% - 98%)    GENERAL: NAD, lying in bed comfortably, obese female  HEAD:  Atraumatic, Normocephalic  EYES: EOMI, PERRLA, conjunctiva and sclera clear  ENT: Moist mucous membranes  NECK: Supple, No JVD  CHEST/LUNG: Clear to auscultation bilaterally; No rales, rhonchi, wheezing, or rubs. Unlabored respirations  HEART: Regular rate and rhythm; No murmurs, rubs, or gallops  ABDOMEN: BSx4; Soft, nontender, nondistended  EXTREMITIES:  2+ Peripheral Pulses, brisk capillary refill. No edema  NERVOUS SYSTEM:  A&Ox3, no focal deficits   SKIN: Chronic venous stasis changes   PSYCH: Normal affect, euthymic mood    LABS:                        15.4   9.64  )-----------( 181      ( 09 Apr 2022 04:06 )             48.1     04-09    133<L>  |  95<L>  |  10.0  ----------------------------<  237<H>  3.8   |  26.0  |  0.49<L>    Ca    8.8      09 Apr 2022 04:06  Phos  4.3     04-09  Mg     2.0     04-09                CAPILLARY BLOOD GLUCOSE      POCT Blood Glucose.: 219 mg/dL (09 Apr 2022 08:24)  POCT Blood Glucose.: 212 mg/dL (08 Apr 2022 17:09)  POCT Blood Glucose.: 224 mg/dL (08 Apr 2022 12:35)      RADIOLOGY & ADDITIONAL TESTS:  Results Reviewed:   Imaging Personally Reviewed:  Electrocardiogram Personally Reviewed:
Uma Monroy M.D.    Patient is a 51y old  Female who presents with a chief complaint of Left Hip Fracture (07 Apr 2022 13:02)      SUBJECTIVE / OVERNIGHT EVENTS: frustrated that the MRI is still not done.     Patient denies chest pain, SOB, abd pain, N/V, fever, chills, dysuria or any other complaints. All remainder ROS negative.     MEDICATIONS  (STANDING):  dextrose 5%. 1000 milliLiter(s) (50 mL/Hr) IV Continuous <Continuous>  dextrose 5%. 1000 milliLiter(s) (100 mL/Hr) IV Continuous <Continuous>  dextrose 50% Injectable 25 Gram(s) IV Push once  dextrose 50% Injectable 12.5 Gram(s) IV Push once  dextrose 50% Injectable 25 Gram(s) IV Push once  enoxaparin Injectable 40 milliGRAM(s) SubCutaneous every 24 hours  glucagon  Injectable 1 milliGRAM(s) IntraMuscular once  insulin glargine Injectable (LANTUS) 9 Unit(s) SubCutaneous every morning  insulin lispro (ADMELOG) corrective regimen sliding scale   SubCutaneous three times a day before meals  insulin lispro Injectable (ADMELOG) 3 Unit(s) SubCutaneous three times a day before meals  losartan 50 milliGRAM(s) Oral daily  nicotine -   7 mG/24Hr(s) Patch 1 patch Transdermal daily    MEDICATIONS  (PRN):  acetaminophen     Tablet .. 650 milliGRAM(s) Oral every 6 hours PRN Temp greater or equal to 38C (100.4F), Mild Pain (1 - 3)  dextrose Oral Gel 15 Gram(s) Oral once PRN Blood Glucose LESS THAN 70 milliGRAM(s)/deciliter  ondansetron Injectable 4 milliGRAM(s) IV Push every 8 hours PRN Nausea and/or Vomiting  oxycodone    5 mG/acetaminophen 325 mG 1 Tablet(s) Oral every 6 hours PRN Moderate Pain (4 - 6)      I&O's Summary    07 Apr 2022 07:01  -  08 Apr 2022 07:00  --------------------------------------------------------  IN: 0 mL / OUT: 500 mL / NET: -500 mL        PHYSICAL EXAM:  Vital Signs Last 24 Hrs  T(C): 36.5 (08 Apr 2022 04:41), Max: 36.6 (07 Apr 2022 15:47)  T(F): 97.7 (08 Apr 2022 04:41), Max: 97.8 (07 Apr 2022 15:47)  HR: 74 (08 Apr 2022 04:41) (74 - 94)  BP: 158/79 (08 Apr 2022 04:41) (154/92 - 171/89)  BP(mean): --  RR: 18 (08 Apr 2022 04:41) (18 - 20)  SpO2: 92% (08 Apr 2022 04:41) (90% - 98%)    GENERAL: NAD, lying in bed comfortably, obese female  HEAD:  Atraumatic, Normocephalic  EYES: EOMI, PERRLA, conjunctiva and sclera clear  ENT: Moist mucous membranes  NECK: Supple, No JVD  CHEST/LUNG: Clear to auscultation bilaterally; No rales, rhonchi, wheezing, or rubs. Unlabored respirations  HEART: Regular rate and rhythm; No murmurs, rubs, or gallops  ABDOMEN: BSx4; Soft, nontender, nondistended  EXTREMITIES:  2+ Peripheral Pulses, brisk capillary refill. No clubbing, cyanosis, or edema  NERVOUS SYSTEM:  A&Ox3, no focal deficits   SKIN: No rashes or lesions  PSYCH: Normal affect, euthymic mood    LABS:                        15.2   8.83  )-----------( 190      ( 08 Apr 2022 05:45 )             48.3     04-08    138  |  100  |  8.1  ----------------------------<  228<H>  4.0   |  25.0  |  0.40<L>    Ca    8.6      08 Apr 2022 05:45  Phos  3.8     04-08  Mg     1.9     04-08    TPro  6.8  /  Alb  3.7  /  TBili  0.9  /  DBili  x   /  AST  15  /  ALT  25  /  AlkPhos  116  04-07    PT/INR - ( 07 Apr 2022 04:21 )   PT: 13.8 sec;   INR: 1.19 ratio         PTT - ( 06 Apr 2022 22:41 )  PTT:33.9 sec          CAPILLARY BLOOD GLUCOSE      POCT Blood Glucose.: 199 mg/dL (08 Apr 2022 07:57)  POCT Blood Glucose.: 229 mg/dL (07 Apr 2022 22:20)  POCT Blood Glucose.: 244 mg/dL (07 Apr 2022 17:56)  POCT Blood Glucose.: 190 mg/dL (07 Apr 2022 16:43)  POCT Blood Glucose.: 180 mg/dL (07 Apr 2022 12:08)      RADIOLOGY & ADDITIONAL TESTS:  Results Reviewed:   Imaging Personally Reviewed:  Electrocardiogram Personally Reviewed:
Uma Monroy M.D.    Patient is a 51y old  Female who presents with a chief complaint of Left Hip Fracture (09 Apr 2022 17:36)      SUBJECTIVE / OVERNIGHT EVENTS: breathing improved.     Patient denies chest pain, SOB, abd pain, N/V, fever, chills, dysuria or any other complaints. All remainder ROS negative.     MEDICATIONS  (STANDING):  aspirin  chewable 81 milliGRAM(s) Oral daily  carvedilol 3.125 milliGRAM(s) Oral every 12 hours  dextrose 5%. 1000 milliLiter(s) (50 mL/Hr) IV Continuous <Continuous>  dextrose 5%. 1000 milliLiter(s) (100 mL/Hr) IV Continuous <Continuous>  dextrose 50% Injectable 25 Gram(s) IV Push once  dextrose 50% Injectable 12.5 Gram(s) IV Push once  dextrose 50% Injectable 25 Gram(s) IV Push once  enoxaparin Injectable 40 milliGRAM(s) SubCutaneous every 24 hours  furosemide   Injectable 40 milliGRAM(s) IV Push daily  glucagon  Injectable 1 milliGRAM(s) IntraMuscular once  insulin glargine Injectable (LANTUS) 10 Unit(s) SubCutaneous every morning  insulin lispro (ADMELOG) corrective regimen sliding scale   SubCutaneous three times a day before meals  insulin lispro Injectable (ADMELOG) 4 Unit(s) SubCutaneous three times a day before meals  LORazepam     Tablet 1 milliGRAM(s) Oral once  losartan 100 milliGRAM(s) Oral daily  nicotine -   7 mG/24Hr(s) Patch 1 patch Transdermal daily    MEDICATIONS  (PRN):  acetaminophen     Tablet .. 650 milliGRAM(s) Oral every 6 hours PRN Temp greater or equal to 38C (100.4F), Mild Pain (1 - 3)  dextrose Oral Gel 15 Gram(s) Oral once PRN Blood Glucose LESS THAN 70 milliGRAM(s)/deciliter  ondansetron Injectable 4 milliGRAM(s) IV Push every 8 hours PRN Nausea and/or Vomiting  oxycodone    5 mG/acetaminophen 325 mG 1 Tablet(s) Oral every 6 hours PRN Moderate Pain (4 - 6)      I&O's Summary    09 Apr 2022 07:01  -  10 Apr 2022 07:00  --------------------------------------------------------  IN: 0 mL / OUT: 800 mL / NET: -800 mL        PHYSICAL EXAM:  Vital Signs Last 24 Hrs  T(C): 36.7 (10 Apr 2022 04:14), Max: 36.7 (09 Apr 2022 23:12)  T(F): 98 (10 Apr 2022 04:14), Max: 98 (09 Apr 2022 23:12)  HR: 69 (10 Apr 2022 04:14) (62 - 83)  BP: 126/80 (10 Apr 2022 04:14) (122/78 - 138/84)  BP(mean): --  RR: 18 (10 Apr 2022 04:14) (18 - 18)  SpO2: 94% (10 Apr 2022 04:14) (90% - 96%)    GENERAL: NAD, obese female sitting up in chair  HEAD:  Atraumatic, Normocephalic  EYES: EOMI, PERRLA, conjunctiva and sclera clear  ENT: Moist mucous membranes  NECK: Supple, No JVD  CHEST/LUNG: Clear to auscultation bilaterally; No rales, rhonchi, wheezing, or rubs. Unlabored respirations  HEART: Regular rate and rhythm; No murmurs, rubs, or gallops  ABDOMEN: BSx4; Soft, nontender, nondistended  EXTREMITIES:  2+ Peripheral Pulses, brisk capillary refill. No edema  NERVOUS SYSTEM:  A&Ox3, no focal deficits   SKIN: Chronic venous stasis changes   PSYCH: Normal affect, euthymic mood    LABS:                        16.5   8.89  )-----------( 209      ( 10 Apr 2022 06:43 )             51.2     04-10    137  |  95<L>  |  10.8  ----------------------------<  221<H>  4.3   |  28.0  |  0.49<L>    Ca    9.0      10 Apr 2022 06:43  Phos  5.4     04-10  Mg     1.9     04-10                CAPILLARY BLOOD GLUCOSE      POCT Blood Glucose.: 252 mg/dL (09 Apr 2022 21:40)  POCT Blood Glucose.: 238 mg/dL (09 Apr 2022 16:38)  POCT Blood Glucose.: 197 mg/dL (09 Apr 2022 12:11)  POCT Blood Glucose.: 219 mg/dL (09 Apr 2022 08:24)      RADIOLOGY & ADDITIONAL TESTS:  Results Reviewed:   Imaging Personally Reviewed:  Electrocardiogram Personally Reviewed:
                                                         [facility]                                                         CARDIOLOGY AT [facility]                                                                  [address], [place] [address]                                                         Telephone: [phone]. Fax:[phone]                                                                             PROGRESS NOTE    Reason for follow up: post cath   Update: NICM       Review of symptoms:   Cardiac:  No chest pain. No dyspnea. No palpitations.  Respiratory: no cough. No dyspnea  Gastrointestinal: No diarrhea. No abdominal pain. No bleeding.   Neuro: No focal neuro complaints.    Vitals:  T(C): 36.4 (04-12-22 @ 10:10), Max: 36.8 (04-12-22 @ 04:47)  HR: 65 (04-12-22 @ 10:10) (61 - 77)  BP: 104/67 (04-12-22 @ 10:10) (104/67 - 147/91)  RR: 14 (04-12-22 @ 10:10) (14 - 25)  SpO2: 97% (04-12-22 @ 10:10) (91% - 97%)  Wt(kg): --  I&O's Summary        PHYSICAL EXAM:  Appearance: Comfortable. No acute distress  HEENT:  Atraumatic. Normocephalic.  Normal oral mucosa  Neurologic: A & O x 3, no gross focal deficits.  Cardiovascular: RRR S1 S2, No murmur, no rubs/gallops. No JVD  Respiratory: Lungs clear to auscultation, unlabored   Gastrointestinal:  Soft, Non-tender, + BS  Lower Extremities: 2+ Peripheral Pulses, No clubbing, cyanosis, or edema  Psychiatry: Patient is calm. No agitation.   Skin: warm and dry.  CATH SITE: Right wrist benign s/p cath.  No bleeding, no ecchymosis, no hematoma. Extremity Warm to touch, with palpable distal pulses, and brisk capillary refill.     CURRENT CARDIAC MEDICATIONS:  carvedilol 3.125 milliGRAM(s) Oral every 12 hours  furosemide    Tablet 40 milliGRAM(s) Oral daily  sacubitril 24 mG/valsartan 26 mG 1 Tablet(s) Oral two times a day      CURRENT OTHER MEDICATIONS:  acetaminophen     Tablet .. 650 milliGRAM(s) Oral every 6 hours PRN Temp greater or equal to 38C (100.4F), Mild Pain (1 - 3)  ondansetron Injectable 4 milliGRAM(s) IV Push every 8 hours PRN Nausea and/or Vomiting  oxycodone    5 mG/acetaminophen 325 mG 1 Tablet(s) Oral every 6 hours PRN Moderate Pain (4 - 6)  atorvastatin 40 milliGRAM(s) Oral at bedtime  dextrose 50% Injectable 25 Gram(s) IV Push once, Stop order after: 1 Doses  dextrose 50% Injectable 12.5 Gram(s) IV Push once, Stop order after: 1 Doses  dextrose 50% Injectable 25 Gram(s) IV Push once, Stop order after: 1 Doses  dextrose Oral Gel 15 Gram(s) Oral once, Stop order after: 1 Doses PRN Blood Glucose LESS THAN 70 milliGRAM(s)/deciliter  glucagon  Injectable 1 milliGRAM(s) IntraMuscular once, Stop order after: 1 Doses  insulin glargine Injectable (LANTUS) 10 Unit(s) SubCutaneous every morning  insulin lispro (ADMELOG) corrective regimen sliding scale   SubCutaneous three times a day before meals  insulin lispro Injectable (ADMELOG) 4 Unit(s) SubCutaneous three times a day before meals  aspirin  chewable 81 milliGRAM(s) Oral daily  dextrose 5%. 1000 milliLiter(s) (100 mL/Hr) IV Continuous <Continuous>  dextrose 5%. 1000 milliLiter(s) (50 mL/Hr) IV Continuous <Continuous>  enoxaparin Injectable 40 milliGRAM(s) SubCutaneous every 24 hours      LABS:	 	  ( 09 Apr 2022 04:06 )  Troponin T  X    ,  CPK  X    , CKMB  X    , <H>                              16.4   9.60  )-----------( 227      ( 12 Apr 2022 05:55 )             52.0     04-12    137  |  96<L>  |  17.3  ----------------------------<  240<H>  4.2   |  27.0  |  0.38<L>    Ca    8.9      12 Apr 2022 05:55  Phos  4.9     04-11  Mg     2.1     04-12      PT/INR/PTT ( 07 Apr 2022 04:21 )                       :                       :      13.8         :       X                     .        .                   .              .           .       1.19        .                                       Lipid Profile: Date: 04-10 @ 06:43  Total cholesterol 159; Direct LDL: --; HDL: 28; Triglycerides:96  Date: 04-07 @ 04:21  Total cholesterol 152; Direct LDL: --; HDL: 37; Triglycerides:81

## 2022-04-12 NOTE — DISCHARGE NOTE NURSING/CASE MANAGEMENT/SOCIAL WORK - PATIENT PORTAL LINK FT
You can access the FollowMyHealth Patient Portal offered by  by registering at the following website: [email or web address]. By joining [facility]’s FollowMyHealth portal, you will also be able to view your health information using other applications (apps) compatible with our system.

## 2022-04-12 NOTE — PROGRESS NOTE ADULT - REASON FOR ADMISSION
Left Hip Fracture

## 2022-04-12 NOTE — PROGRESS NOTE ADULT - NSPROGADDITIONALINFOA_GEN_ALL_CORE
- no further inpatient cardiac workup or recommendations  - will sign off.   - stable for DC from cardiology standpoint w/ close outpatient follow up.

## 2022-04-12 NOTE — PROGRESS NOTE ADULT - PROBLEM SELECTOR PLAN 2
Appeared volume overloaded  Left heart cath tomorrow  discussed with patient   Discussed low na diet  off diuretics  c/w arb and coreg  Discussed low na diet  c/w coreg, losartan and lasix        CARDIAC MEDS  aspirin  chewable 81 milliGRAM(s) Oral daily  carvedilol 3.125 milliGRAM(s) Oral every 12 hours  enoxaparin Injectable 40 milliGRAM(s) SubCutaneous every 24 hours  furosemide   Injectable 40 milliGRAM(s) IV Push daily  losartan 100 milliGRAM(s) Oral daily
- as above

## 2022-04-13 ENCOUNTER — NON-APPOINTMENT (OUTPATIENT)
Age: 52
End: 2022-04-13

## 2022-04-25 ENCOUNTER — APPOINTMENT (OUTPATIENT)
Dept: CARDIOLOGY | Facility: CLINIC | Age: 52
End: 2022-04-25
Payer: MEDICAID

## 2022-04-25 ENCOUNTER — NON-APPOINTMENT (OUTPATIENT)
Age: 52
End: 2022-04-25

## 2022-04-25 VITALS
WEIGHT: 240 LBS | HEART RATE: 59 BPM | TEMPERATURE: 97.9 F | SYSTOLIC BLOOD PRESSURE: 134 MMHG | DIASTOLIC BLOOD PRESSURE: 70 MMHG | HEIGHT: 60 IN | BODY MASS INDEX: 47.12 KG/M2 | OXYGEN SATURATION: 97 %

## 2022-04-25 PROCEDURE — 93000 ELECTROCARDIOGRAM COMPLETE: CPT

## 2022-04-25 PROCEDURE — 99214 OFFICE O/P EST MOD 30 MIN: CPT

## 2022-04-25 RX ORDER — LOSARTAN POTASSIUM 50 MG/1
50 TABLET, FILM COATED ORAL TWICE DAILY
Qty: 180 | Refills: 1 | Status: DISCONTINUED | COMMUNITY
End: 2022-04-25

## 2022-04-25 RX ORDER — METFORMIN HYDROCHLORIDE 850 MG/1
850 TABLET, COATED ORAL
Qty: 60 | Refills: 0 | Status: DISCONTINUED | COMMUNITY
Start: 2020-01-24 | End: 2022-04-25

## 2022-04-25 RX ORDER — HYDROCHLOROTHIAZIDE 25 MG/1
25 TABLET ORAL EVERY OTHER DAY
Refills: 0 | Status: DISCONTINUED | COMMUNITY
End: 2022-04-25

## 2022-04-25 RX ORDER — METFORMIN HYDROCHLORIDE 1000 MG/1
1000 TABLET, EXTENDED RELEASE ORAL
Refills: 0 | Status: DISCONTINUED | COMMUNITY
End: 2022-04-25

## 2022-04-25 NOTE — PHYSICAL EXAM
[Well Developed] : well developed [Well Nourished] : well nourished [No Acute Distress] : no acute distress [Normal Conjunctiva] : normal conjunctiva [Normal Venous Pressure] : normal venous pressure [No Carotid Bruit] : no carotid bruit [Normal S1, S2] : normal S1, S2 [No Murmur] : no murmur [No Rub] : no rub [No Gallop] : no gallop [Clear Lung Fields] : clear lung fields [Good Air Entry] : good air entry [No Respiratory Distress] : no respiratory distress  [Soft] : abdomen soft [Non Tender] : non-tender [No Masses/organomegaly] : no masses/organomegaly [Normal Bowel Sounds] : normal bowel sounds [Normal Gait] : normal gait [No Cyanosis] : no cyanosis [No Clubbing] : no clubbing [No Varicosities] : no varicosities [Edema ___] : edema [unfilled] [Venous stasis] : venous stasis [No Rash] : no rash [No Skin Lesions] : no skin lesions [Moves all extremities] : moves all extremities [No Focal Deficits] : no focal deficits [Normal Speech] : normal speech [Alert and Oriented] : alert and oriented [Normal memory] : normal memory

## 2022-04-25 NOTE — CARDIOLOGY SUMMARY
[de-identified] : 4/25/2022\par Sinus  Bradycardia HR 59 bpm\par Voltage criteria for LVH  (R(I)+S(III) exceeds 2.50 mV)  -Voltage criteria w/o ST/T abnormality may be normal. \par  -Poor R-wave progression -nonspecific -consider old anterior infarct. \par  -  Nonspecific T-abnormality. \par ABNORMAL \par

## 2022-04-25 NOTE — HISTORY OF PRESENT ILLNESS
[FreeTextEntry1] : HOSPITAL DISCHARGE FU\par NICM, CHRONIC SYSTOLIC HEART FAILURE\par \par [facility] Discharge Date	12-Apr-2022 \par [facility] Admission Date	06-Apr-2022 21:46 \par Reason for Admission	Left Hip Fracture \par \par 51F hx  DM, HTN, smoker presented to the [facility]  ED after mechanical fall followed by Left hip pain noted to have  great trochanter fracture. While in hospitl she had  Cardiology evaluation and SPENCE revealed new onset NICM with LVEF 25-30, Heart FAilure, LHC was done showed noremal coronaries. Orho evaluated and no surgical interventions for trochanter fracture done. Recommended PT at home. \par \par s/p LHC 4/11/022 subsequent to CM and NYHA Class II HF recently diagnosed\par LVEF 20% by TTE, Normal Coronaries, Elevated LVEDP\par TTE 4/9/2022: LV cavity size mildly enlarged LVID d 5.78 cm , LVEF 25-30%.  DD 2. Mild Aortic stenosis PG 13.3, MG 6.1 mmHg JULIAN 1.56 cm2. Mild cLVH. \par \par Currently walks with walker. Denied dyspnea, orthopnea, pnd., palpitation, syncope, near syncope.\par Admits BL LE Edema.\par Chronic stasis dermatitis,.\par Compliant with meds.\par \par CURRENT CARDIO MEDS\par aspirin 81 mg daily\par atorvastatin 40 mg odaily\par carvedilol 3.125 mg twice daily\par furosemide 40 mg daliy] \par nicotine 7 mg/24 hr transdermal film, extended release: 1 patch transdermal once a day \par sacubitril-valsartan 24 mg-26 mg twice daily\par \par \par FUNCTIONAL CAPACITY\par Est <4.0 METS. Uses walker to walk \par \par \par \par

## 2022-05-10 ENCOUNTER — APPOINTMENT (OUTPATIENT)
Dept: HEART FAILURE | Facility: CLINIC | Age: 52
End: 2022-05-10
Payer: MEDICAID

## 2022-05-10 VITALS
BODY MASS INDEX: 48.18 KG/M2 | OXYGEN SATURATION: 96 % | HEIGHT: 59 IN | DIASTOLIC BLOOD PRESSURE: 64 MMHG | HEART RATE: 73 BPM | TEMPERATURE: 97.8 F | WEIGHT: 239 LBS | SYSTOLIC BLOOD PRESSURE: 114 MMHG

## 2022-05-10 DIAGNOSIS — M70.61 TROCHANTERIC BURSITIS, RIGHT HIP: ICD-10-CM

## 2022-05-10 PROCEDURE — 99406 BEHAV CHNG SMOKING 3-10 MIN: CPT

## 2022-05-10 PROCEDURE — 99215 OFFICE O/P EST HI 40 MIN: CPT | Mod: 25

## 2022-05-10 RX ORDER — ERGOCALCIFEROL 1.25 MG/1
1.25 MG CAPSULE, LIQUID FILLED ORAL
Qty: 13 | Refills: 3 | Status: DISCONTINUED | COMMUNITY
Start: 2018-10-30 | End: 2022-05-10

## 2022-05-10 NOTE — COUNSELING
[Yes] : Risk of tobacco use and health benefits of smoking cessation discussed: Yes [Cessation strategies including cessation program discussed] : Cessation strategies including cessation program discussed [No] : Not willing to quit smoking [FreeTextEntry1] : 3

## 2022-05-10 NOTE — DISCUSSION/SUMMARY
[FreeTextEntry1] : \par 52 y/o with h/o recently diagnosed systolic HF ACC/AHA stage C, uncontrolled DM (A1c 10.2), HTN (dx’ed >10y ago), GERHARD not on CPAP, active tobacco use and obesity BMI 48.8 who was referred for HF care. She reports HF symptoms 1-2 weeks prior to her fall. The etiology of her CMP is unclear. She does have h/o HTN which wasn't controlled. Clinically looks hypervolemic, warm extremities. TTE revealed LVEF 25-30%, mild cLVH and diastolic dysfunction. LHC ruled out CAD. She needs diuresis and GDMT optimization as well as risk factors modification. \par Plan as above.

## 2022-05-10 NOTE — PHYSICAL EXAM
[Elevated JVD ____cm] : elevated JVD ~Vcm [Normal] : soft, non-tender, no masses/organomegaly, normal bowel sounds [Alert and Oriented] : alert and oriented [de-identified] : +1-2 Le edema. Chronic venous stasis changes.  [de-identified] : No rash

## 2022-05-10 NOTE — CARDIOLOGY SUMMARY
[de-identified] : 4/25: SR. SB. PRWP. LVH. NS T wave changes.  [de-identified] : 4/9/22 TTE LVEF 25-30% LVIDd 5.78cm LVOT VTI 13.7cm, grade II,  RVSP 33mHg (RAP 3mmHg) [de-identified] : 4/11/22 LHC: normal coronaries. LVEDP 26

## 2022-05-17 ENCOUNTER — APPOINTMENT (OUTPATIENT)
Dept: HEART FAILURE | Facility: CLINIC | Age: 52
End: 2022-05-17
Payer: MEDICAID

## 2022-05-17 VITALS
DIASTOLIC BLOOD PRESSURE: 68 MMHG | HEART RATE: 82 BPM | HEIGHT: 59 IN | BODY MASS INDEX: 48.18 KG/M2 | WEIGHT: 239 LBS | OXYGEN SATURATION: 95 % | SYSTOLIC BLOOD PRESSURE: 122 MMHG | TEMPERATURE: 97.9 F

## 2022-05-17 DIAGNOSIS — E11.21 TYPE 2 DIABETES MELLITUS WITH DIABETIC NEPHROPATHY: ICD-10-CM

## 2022-05-17 PROCEDURE — 99214 OFFICE O/P EST MOD 30 MIN: CPT

## 2022-05-17 RX ORDER — ASPIRIN 81 MG/1
81 TABLET, CHEWABLE ORAL DAILY
Refills: 0 | Status: DISCONTINUED | COMMUNITY
End: 2022-05-17

## 2022-05-17 RX ORDER — OXYCODONE HYDROCHLORIDE AND ACETAMINOPHEN 5; 325 MG/1; MG/1
5-325 TABLET ORAL
Refills: 0 | Status: DISCONTINUED | COMMUNITY
End: 2022-05-17

## 2022-05-17 NOTE — CARDIOLOGY SUMMARY
[de-identified] : \par 4/25/22: SR. SB. PRWP. LVH. NS T wave changes.  [de-identified] : \par 4/9/22 TTE LVEF 25-30% LVIDd 5.78cm LVOT VTI 13.7cm, grade II,  RVSP 33mHg (RAP 3mmHg) [de-identified] : \par 4/11/22 LHC: normal coronaries. LVEDP 26

## 2022-05-17 NOTE — PHYSICAL EXAM
[Well Developed] : well developed [No Acute Distress] : no acute distress [Obese] : obese [Normal Conjunctiva] : normal conjunctiva [Elevated JVD ____cm] : elevated JVD ~Vcm [Normal S1, S2] : normal S1, S2 [No Murmur] : no murmur [Clear Lung Fields] : clear lung fields [No Respiratory Distress] : no respiratory distress  [Soft] : abdomen soft [Moves all extremities] : moves all extremities [Alert and Oriented] : alert and oriented [de-identified] : EOMs intact [de-identified] : RRR [de-identified] : ambulates with crutch [de-identified] : +1 B/L LE edema. Chronic venous stasis changes.  [de-identified] : erythema lower extremities

## 2022-05-17 NOTE — COUNSELING
[Yes] : Risk of tobacco use and health benefits of smoking cessation discussed: Yes [Cessation strategies including cessation program discussed] : Cessation strategies including cessation program discussed [Use of nicotine replacement therapies and other medications discussed] : Use of nicotine replacement therapies and other medications discussed [No] : Not willing to quit smoking

## 2022-05-17 NOTE — ASSESSMENT
[FreeTextEntry1] : # Chronic systolic HF ACC/AHA stage C, NYHA II-III\par NICMP LVEF 25-30% - ?HTNve CMP\par Clinically hypervolemic, warm extremities\par Refused cMRI\par GDMT: Start Spironolactone 25mg daily. Continue Coreg 3.125mg BID and Entresto 24-26mg BID. Plan to add SGLT2i and increase ARNi as tolerated.\par Repeat BMP and pBNP one week after initiating MRA (prescription provided).\par Diuretics: Continue Lasix 40mg BID\par Device: Should reassess LVEF after 3 mo GDMT\par HF education provided including lifestyle changes (low Na diet, fluid restriction, increase physical activity), current clinical condition, natural progression and prognosis.\par C-rehab once cleared by ortho\par Needs risk factors modification\par Close follow up\par \par # HTN\par Controlled\par \par # DM2 (A1c 9.7%)\par PCP managing\par Will benefit from SGLT2i \par \par # GERHARD\par Untreated\par Discussed importance of treating CPAP\par \par # Obesity\par Healthy lifestyle changes encouraged as well as weight loss\par \par # Active tobacco use\par Reports smoking 0.5-1 PPD\par Discuss tobacco cessation program and risks associated to tobacco use

## 2022-05-17 NOTE — HISTORY OF PRESENT ILLNESS
[FreeTextEntry1] : 52 y/o with h/o recently diagnosed systolic HF ACC/AHA stage C, uncontrolled DM (A1c 10.2), HTN (dx’ed >10y ago), GERHARD not on CPAP, active tobacco use and obesity BMI 48.8 who was referred for HF care. \par \par Mrs. Reyes was recently admitted to [facility] from 4/16 through 4/12 after she presented s/p mechanical fall (slipped on mud). She was found to have a left trochanter fx. As part of her work up she underwent a TTE that showed newly diagnosed LV dysfunction. Upon further interrogation, she reports BHANDARI x 2 weeks. She also refers chronic LE edema which she attributed to standing up. She underwent LHC which revealed normal coronaries with elevated LVEDP.  She denied any h/o covid infection or recent cold like symptoms. She was started on GDMT and discharged home. \par \par Today, she reports her LE is somewhat improved however she has not lost any weight since increasing Lasix to 40mg BID last week. She does not check her weight regularly at home. She has been elevating her legs more often. She denies overt HF symptoms including CP, SOB, dizziness/LH and orthopnea. She is not very active due to her injury. Unfortunately, she is still smoking tobacco (0.5-1 PPD). \par

## 2022-05-17 NOTE — DISCUSSION/SUMMARY
[With Me] : with me [___ Month(s)] : in [unfilled] month(s) [Patient] : the patient [Risks] : risks [Benefits] : benefits [FreeTextEntry1] : \par 52 y/o with h/o recently diagnosed systolic HF ACC/AHA stage C, uncontrolled DM (A1c 9.7), HTN (dx’ed >10y ago), GERHARD not on CPAP, active tobacco use and obesity BMI 48.8 who was referred for HF care. \par \par TTE revealed LVEF 25-30%, mild cLVH and diastolic dysfunction. LHC ruled out CAD.\par \par She reports HF symptoms 1-2 weeks prior to her fall. The etiology of her CMP is unclear. She does have h/o HTN which wasn't controlled. Clinically appears mildly hypervolemic with warm extremities. She needs GDMT optimization as well as aggressive risk factors modification. \par Plan as above.

## 2022-05-17 NOTE — REASON FOR VISIT
[Cardiac Failure] : cardiac failure [Other: _____] : [unfilled] [FreeTextEntry3] : Sai Younger MD [FreeTextEntry1] : \par \par Cards: Sai Younger MD\par \par HF: Opal Villalta MD

## 2022-05-23 ENCOUNTER — APPOINTMENT (OUTPATIENT)
Dept: CARDIOLOGY | Facility: CLINIC | Age: 52
End: 2022-05-23
Payer: MEDICAID

## 2022-05-23 VITALS
DIASTOLIC BLOOD PRESSURE: 55 MMHG | TEMPERATURE: 98.1 F | HEART RATE: 89 BPM | WEIGHT: 241 LBS | BODY MASS INDEX: 48.59 KG/M2 | OXYGEN SATURATION: 97 % | SYSTOLIC BLOOD PRESSURE: 100 MMHG | HEIGHT: 59 IN

## 2022-05-23 PROCEDURE — 99214 OFFICE O/P EST MOD 30 MIN: CPT

## 2022-06-27 ENCOUNTER — APPOINTMENT (OUTPATIENT)
Dept: CARDIOLOGY | Facility: CLINIC | Age: 52
End: 2022-06-27

## 2022-06-27 VITALS
TEMPERATURE: 98.6 F | WEIGHT: 236 LBS | DIASTOLIC BLOOD PRESSURE: 74 MMHG | OXYGEN SATURATION: 96 % | HEART RATE: 102 BPM | BODY MASS INDEX: 47.58 KG/M2 | HEIGHT: 59 IN | SYSTOLIC BLOOD PRESSURE: 134 MMHG

## 2022-06-27 VITALS
BODY MASS INDEX: 47.58 KG/M2 | RESPIRATION RATE: 16 BRPM | HEART RATE: 102 BPM | SYSTOLIC BLOOD PRESSURE: 124 MMHG | OXYGEN SATURATION: 96 % | HEIGHT: 59 IN | WEIGHT: 236 LBS | TEMPERATURE: 98.6 F | DIASTOLIC BLOOD PRESSURE: 74 MMHG

## 2022-06-27 PROCEDURE — 99214 OFFICE O/P EST MOD 30 MIN: CPT

## 2022-06-27 NOTE — CARDIOLOGY SUMMARY
[de-identified] : 4/25/2022\par Sinus  Bradycardia HR 59 bpm\par Voltage criteria for LVH  (R(I)+S(III) exceeds 2.50 mV)  -Voltage criteria w/o ST/T abnormality may be normal. \par  -Poor R-wave progression -nonspecific -consider old anterior infarct. \par  -  Nonspecific T-abnormality. \par ABNORMAL \par

## 2022-06-27 NOTE — HISTORY OF PRESENT ILLNESS
[FreeTextEntry1] : HOSPITAL DISCHARGE FUNICM, CHRONIC SYSTOLIC HEART FAILURE\par \par New onset NICM with LVEF 25-30  Heart FAilure diagonsies 4/2022\par s/p LHC 4/11/022 subsequent to CM and NYHA Class II HF recently diagnosed\par LVEF 20% by TTE, Normal Coronaries, Elevated LVEDP\par TTE 4/9/2022: LV cavity size mildly enlarged LVID d 5.78 cm , LVEF 25-30%.  DD 2. Mild Aortic stenosis PG 13.3, MG 6.1 mmHg JULIAN 1.56 cm2. Mild cLVH. \par \par Pt agrees with Cardiac MRI but won't do it without sedation, discussed with [facility] radiology, will order cMRI with sedation, pt aware she will have to go through PAT's, and wait list is 6-8 weeks : pt would like to proceed\par Pt was  to have labs done this week, BMP/Mg were ordered \par \par GERHARD, untreated: pulm evaluation, pt was provided with the number to call for an appt at prior visit. This should be done prior to scheduling her CMRI.\par \par LE edema, continue Lasix, elevate legs, F/U with Vascular\par \par Active smoker, smoking cessation discussed, pt not ready tp quit , have the patch when ready\par \par Pt hasn;t done the CMRI, Pulmonary evaluation for GERHARD and the blood  tests. She states she is now in a shelter and she swill the the studies and follow ups. She also wants a form to be filled our for a "[facility] department of  Physcial assessment for determination of employability."\par \par \par CURRENT CARDIO MEDS\par aspirin 81 mg daily\par atorvastatin 40 mg odaily\par carvedilol 3.125 mg twice daily\par furosemide 40 mg twice daily\par sacubitril-valsartan 24 mg-26 mg twice daily\par \par \par \par \par FUNCTIONAL CAPACITY\par Est <4.0 METS. Uses walker to walk \par \par \par \par

## 2022-06-29 ENCOUNTER — APPOINTMENT (OUTPATIENT)
Dept: HEART FAILURE | Facility: CLINIC | Age: 52
End: 2022-06-29

## 2022-06-29 VITALS
WEIGHT: 235.2 LBS | HEART RATE: 80 BPM | HEIGHT: 59 IN | SYSTOLIC BLOOD PRESSURE: 136 MMHG | BODY MASS INDEX: 47.41 KG/M2 | TEMPERATURE: 98 F | OXYGEN SATURATION: 96 % | RESPIRATION RATE: 14 BRPM | DIASTOLIC BLOOD PRESSURE: 78 MMHG

## 2022-06-29 PROCEDURE — 99214 OFFICE O/P EST MOD 30 MIN: CPT

## 2022-06-29 RX ORDER — ACETAMINOPHEN 325 MG/1
325 TABLET ORAL
Refills: 0 | Status: DISCONTINUED | COMMUNITY
End: 2022-06-29

## 2022-06-30 NOTE — ASSESSMENT
[FreeTextEntry1] : \par # Chronic systolic HF \par -ACC/AHA stage C, NYHA II-III\par -NICMP LVEF 25-30% - ?HTNve CMP\par -Clinically close to euvolemia, lukewarm extremities\par -GDMT: -Continue Entresto 24-26 mg BID\par              -Increase Carvedilol to 6.25 mg BID\par              -Continue Spironolactone 25mg daily\par -Labs: Repeat labs today (prescription provided). Will call patients with results and up-titrate ARNi. Eventual SGLT2i\par -Diuretics: Continue furosemide 40mg BID\par -Device: Will repeat TTE 3 months after maximum tolerated GDMT. If LVEF remains </= 35% will refer to EP for primary prevention ICD\par -HF education provided including lifestyle changes (low Na diet, fluid restriction, increase physical activity), current clinical condition, natural progression and prognosis\par -Would benefit from cardiac rehab once cleared by ortho\par -Reiterated importance of lifestyle risk factor modifications such as diabetes management/glucose control, GERHARD management, and importance of smoking cessation\par \par # HTN\par -Appears controlled on above regimen\par -Follows with Dr. Younger\par \par # DM2 (A1c 9.7%)\par -PCP managing, remains on insulin\par -Will benefit from SGLT2i \par \par # GERHARD\par -Reports did not tolerate CPAP\par -Has f/u with Pulmonologist 7/27\par \par # Obesity\par -Healthy lifestyle changes encouraged as well as weight loss\par \par # Active tobacco use\par -Reports smoking 0.5-1 PPD\par -Discuss tobacco cessation program and risks associated to tobacco use\par \par Follow up in 2 weeks with Aida BURTON for further GDMT optimization.

## 2022-06-30 NOTE — HISTORY OF PRESENT ILLNESS
[FreeTextEntry1] : 50 y/o with h/o recently diagnosed systolic HF ACC/AHA stage C, uncontrolled DM (A1c 10.2), HTN (dx’ed >10y ago), GERHARD not on CPAP, active tobacco use and obesity BMI 48.8 who was referred for HF care. \par \par Mrs. Reyes was recently admitted to [facility] from 4/6 through 4/12 after she presented s/p mechanical fall (slipped on mud). She was found to have a left trochanter fx. As part of her work up she underwent a TTE that showed newly diagnosed LV dysfunction. Upon further interrogation, she reports BHANDARI x 2 weeks. She also refers chronic LE edema which she attributed to standing up. She underwent LHC which revealed normal coronaries with elevated LVEDP.  She denied any h/o covid infection or recent cold like symptoms. She was started on GDMT and discharged home. \par \par Today she reports feeling well overall and denies overt HF symptoms. She is still walking with a crutch and healing from her left trochanter fx. She recently moved and currently lives in a shelter. She reports her LE swelling has improved. She does not weigh herself daily. She endorses orthopnea that has been ongoing and she continues to use 3 pillows at night. She is getting blood work done today. Unfortunately, she is still smoking tobacco (0.5-1 PPD). She adheres to her medication regimen.\par \par She specifically denies CP, palpitations, SOB, dizziness/LH, presyncope/syncope. She is not very active due to her injury. She has not been hospitalized or to the ED in the interim.

## 2022-06-30 NOTE — DISCUSSION/SUMMARY
[Patient] : the patient [Risks] : risks [Benefits] : benefits [___ Week(s)] : in [unfilled] week(s) [With ___] : with [unfilled] [FreeTextEntry1] : \par 50 y/o with h/o recently diagnosed HFrEF (LVEF 25-30% and DD), NICMP, uncontrolled DM (A1c 9.7), HTN (dx’ed >10y ago), GERHARD not on CPAP, active tobacco use and obesity BMI 48.8. She is ACC/AHA Stage C, NYHA class II-III symptoms. The etiology of her CMP is unclear, possibly in the setting of uncontrolled HTN. She is close to euvolemia today with lukewarm extremities. She needs repeat blood work and further GDMT optimization. Additionally she was educated regarding the need for aggressive risk factors modifications such as DM control, GERHARD management and smoking cessation.\par Plan as above.

## 2022-06-30 NOTE — PHYSICAL EXAM
[No Acute Distress] : no acute distress [Obese] : obese [Normal Conjunctiva] : normal conjunctiva [Normal S1, S2] : normal S1, S2 [No Murmur] : no murmur [Clear Lung Fields] : clear lung fields [No Respiratory Distress] : no respiratory distress  [Soft] : abdomen soft [Moves all extremities] : moves all extremities [Alert and Oriented] : alert and oriented [Elevated JVD ____cm] : elevated JVD ~Vcm [Normal Radial B/L] : normal radial B/L [de-identified] : RRR [de-identified] : ambulates with crutch [de-identified] : trace bilateral LE swelling. Chronic venous stasis changes.  [de-identified] : erythema lower extremities

## 2022-06-30 NOTE — REASON FOR VISIT
[Cardiac Failure] : cardiac failure [Other: _____] : [unfilled] [FreeTextEntry3] : Sai Younger MD [FreeTextEntry1] : \par Cardiologist: Sai Younger MD\par \par HF Cardiologist: Opal Villalta MD\par \par PCP: Dr. Jasmine

## 2022-06-30 NOTE — CARDIOLOGY SUMMARY
[de-identified] : \par 4/25/22: SR. SB. PRWP. LVH. NS T wave changes.  [de-identified] : \par 4/9/22 TTE: LVEF 25-30% LVIDd 5.78cm LVOT VTI 13.7cm, grade II,  RVSP 33mHg (RAP 3mmHg) [de-identified] : \par 4/11/22 LHC: normal coronaries. LVEDP 26

## 2022-07-13 ENCOUNTER — APPOINTMENT (OUTPATIENT)
Dept: CARDIOLOGY | Facility: CLINIC | Age: 52
End: 2022-07-13

## 2022-07-13 VITALS
SYSTOLIC BLOOD PRESSURE: 100 MMHG | HEIGHT: 59 IN | TEMPERATURE: 99.1 F | OXYGEN SATURATION: 95 % | BODY MASS INDEX: 48.99 KG/M2 | WEIGHT: 243 LBS | DIASTOLIC BLOOD PRESSURE: 62 MMHG | HEART RATE: 93 BPM

## 2022-07-13 PROCEDURE — 99214 OFFICE O/P EST MOD 30 MIN: CPT

## 2022-07-13 NOTE — HISTORY OF PRESENT ILLNESS
[FreeTextEntry1] : 50 y/o with h/o recently diagnosed systolic HF ACC/AHA stage C, uncontrolled DM (A1c 10.2), HTN (dx’ed >10y ago), GERHARD not on CPAP, active tobacco use and obesity BMI 48.8 who was referred for HF care. \par \par Mrs. Reyes was recently admitted to [facility] from 4/6 through 4/12 after she presented s/p mechanical fall (slipped on mud). She was found to have a left trochanter fx. As part of her work up she underwent a TTE that showed newly diagnosed LV dysfunction. Upon further interrogation, she reported BHANDARI x 2 weeks. She also endroses chronic LE edema which she attributed to standing up. She underwent LHC which revealed normal coronaries with elevated LVEDP.  She denied any h/o covid infection or recent cold like symptoms. She was started on GDMT and discharged home. \par \par Today she reports for follow up. She reports improvement in energy and LE swelling. She denies overt HF symptoms. She has mild BHANDARI that she reports has improved. She endorses orthopnea that has been ongoing and she continues to use 3 pillows at night. She is still walking with a crutch and healing from her left trochanter fx. She recently moved and currently lives in a shelter. She does not weigh herself daily. Unfortunately, she is still smoking tobacco (0.5-1 PPD). She adheres to her medication regimen.\par \par She specifically denies CP, palpitations, SOB at rest, dizziness/LH, presyncope/syncope. She is not very active due to her injury. She has not been hospitalized or to the ED in the interim.

## 2022-07-13 NOTE — ASSESSMENT
[FreeTextEntry1] : \par # Chronic systolic HF \par -ACC/AHA stage C, NYHA II-III\par -NICMP LVEF 25-30% - ?HTNve CMP\par -Clinically close to euvolemia, lukewarm extremities\par -GDMT: -Continue Entresto 24-26 mg BID\par              -Continue Carvedilol to 6.25 mg BID\par              -Continue Spironolactone 25mg daily\par              -Start Jardiance 10mg daily\par -Labs: Pt recently obtained labs 7/8/22 and brought a copy of her results which are stable. Will scan into chart. \par -Diuretics: Decrease furosemide to 40mg daily in the setting of starting SLGT2i\par -Device: Will repeat TTE 3 months after maximum tolerated GDMT. If LVEF remains </= 35% will refer to EP for primary prevention ICD\par -HF education provided including lifestyle changes (low Na diet, fluid restriction, increase physical activity), current clinical condition, natural progression and prognosis\par -Would benefit from cardiac rehab once cleared by ortho\par -Reiterated importance of lifestyle risk factor modifications such as diabetes management/glucose control, GERHARD management, and importance of smoking cessation\par \par # HTN\par -Appears controlled on above regimen\par -Follows with Dr. Younger\par \par # DM2 (A1c 10.4% on 6/30/22)\par -PCP managing, remains on insulin\par -SGLT2i as above\par \par # GERHARD\par -Reports did not tolerate CPAP\par -Has f/u with Pulmonologist 7/27\par \par # Obesity\par -Healthy lifestyle changes encouraged as well as weight loss\par \par # Active tobacco use\par -Reports smoking 0.5-1 PPD\par -Discussed risks associated to tobacco use and the importance of absolute tobacco cessation\par \par Follow up in 2 weeks with Aida BURTON for further GDMT optimization.

## 2022-07-13 NOTE — DISCUSSION/SUMMARY
[Patient] : the patient [Risks] : risks [Benefits] : benefits [___ Week(s)] : in [unfilled] week(s) [With ___] : with [unfilled] [FreeTextEntry1] : \par 50 y/o with h/o recently diagnosed HFrEF (LVEF 25-30% and DD), NICMP, uncontrolled DM (A1c 9.7), HTN (dx’ed >10y ago), GERHARD not on CPAP, active tobacco use and obesity BMI 48.8. She is ACC/AHA Stage C, NYHA class II-III symptoms. The etiology of her CMP is unclear, possibly in the setting of uncontrolled HTN. She is close to euvolemia today with lukewarm extremities. We will optimize GDMT and start Jardiance 10mg daily. We will empirically reduce furosemide to 40mg daily w/ initiation of SGLT2i. Will require close follow up. If BP stable at next visit would benefit from up-titration of Entresto. Additionally she was educated regarding the need for aggressive risk factors modifications such as DM control, GERHARD management and smoking cessation.\par Plan as above.

## 2022-07-13 NOTE — PHYSICAL EXAM
[No Acute Distress] : no acute distress [Obese] : obese [Normal Conjunctiva] : normal conjunctiva [Normal S1, S2] : normal S1, S2 [No Murmur] : no murmur [Clear Lung Fields] : clear lung fields [No Respiratory Distress] : no respiratory distress  [Soft] : abdomen soft [Normal Radial B/L] : normal radial B/L [Moves all extremities] : moves all extremities [Alert and Oriented] : alert and oriented [No Focal Deficits] : no focal deficits [de-identified] : difficult to assess due to body habitus, JVP does not appear elevated [de-identified] : RRR [de-identified] : ambulates with crutch [de-identified] : no edema, chronic venous stasis changes.  [de-identified] : erythema lower extremities

## 2022-07-13 NOTE — CARDIOLOGY SUMMARY
[de-identified] : \par 4/25/22: SR. SB. PRWP. LVH. NS T wave changes.  [de-identified] : \par 4/9/22 TTE: LVEF 25-30% LVIDd 5.78cm LVOT VTI 13.7cm, grade II,  RVSP 33mHg (RAP 3mmHg) [de-identified] : \par 4/11/22 LHC: normal coronaries. LVEDP 26

## 2022-07-20 NOTE — ED ADULT TRIAGE NOTE - DIRECT TO ROOM CARE INITIATED:
06-Apr-2022 12:28 Fluconazole Pregnancy And Lactation Text: This medication is Pregnancy Category C and it isn't know if it is safe during pregnancy. It is also excreted in breast milk.

## 2022-07-25 ENCOUNTER — NON-APPOINTMENT (OUTPATIENT)
Age: 52
End: 2022-07-25

## 2022-07-27 ENCOUNTER — APPOINTMENT (OUTPATIENT)
Dept: PULMONOLOGY | Facility: CLINIC | Age: 52
End: 2022-07-27

## 2022-07-27 ENCOUNTER — APPOINTMENT (OUTPATIENT)
Dept: HEART FAILURE | Facility: CLINIC | Age: 52
End: 2022-07-27

## 2022-07-27 VITALS
OXYGEN SATURATION: 96 % | BODY MASS INDEX: 47.78 KG/M2 | HEART RATE: 81 BPM | DIASTOLIC BLOOD PRESSURE: 78 MMHG | TEMPERATURE: 98.1 F | HEIGHT: 59 IN | WEIGHT: 237 LBS | SYSTOLIC BLOOD PRESSURE: 138 MMHG

## 2022-07-27 VITALS
SYSTOLIC BLOOD PRESSURE: 146 MMHG | WEIGHT: 234 LBS | DIASTOLIC BLOOD PRESSURE: 60 MMHG | HEART RATE: 88 BPM | OXYGEN SATURATION: 96 % | HEIGHT: 59 IN | RESPIRATION RATE: 16 BRPM | BODY MASS INDEX: 47.17 KG/M2

## 2022-07-27 PROCEDURE — 99204 OFFICE O/P NEW MOD 45 MIN: CPT | Mod: 25

## 2022-07-27 PROCEDURE — G0296 VISIT TO DETERM LDCT ELIG: CPT

## 2022-07-27 PROCEDURE — 99214 OFFICE O/P EST MOD 30 MIN: CPT

## 2022-07-27 PROCEDURE — 99406 BEHAV CHNG SMOKING 3-10 MIN: CPT

## 2022-07-27 RX ORDER — DOXYCYCLINE HYCLATE 100 MG/1
100 CAPSULE ORAL
Qty: 20 | Refills: 0 | Status: DISCONTINUED | COMMUNITY
Start: 2022-02-17

## 2022-07-27 RX ORDER — ASPIRIN 81 MG/1
81 TABLET, COATED ORAL
Qty: 30 | Refills: 0 | Status: DISCONTINUED | COMMUNITY
Start: 2022-04-12

## 2022-07-27 RX ORDER — CARVEDILOL 3.12 MG/1
3.12 TABLET, FILM COATED ORAL
Qty: 60 | Refills: 0 | Status: DISCONTINUED | COMMUNITY
Start: 2022-06-27

## 2022-07-27 NOTE — PHYSICAL EXAM
[Normal Conjunctiva] : the conjunctiva exhibited no abnormalities [Low Lying Soft Palate] : low lying soft palate [Enlarged Base of the Tongue] : enlargement of the base of the tongue [II] : II [Neck Appearance] : the appearance of the neck was normal [Jugular Venous Distention Increased] : there was no jugular-venous distention [Thyroid Diffuse Enlargement] : the thyroid was not enlarged [Heart Sounds] : normal S1 and S2 [Arterial Pulses Normal] : the arterial pulses were normal [Edema] : no peripheral edema present [Respiration, Rhythm And Depth] : normal respiratory rhythm and effort [Auscultation Breath Sounds / Voice Sounds] : lungs were clear to auscultation bilaterally [Lungs Percussion] : the lungs were normal to percussion [Bowel Sounds] : normal bowel sounds [Abdomen Soft] : soft [Abdomen Tenderness] : non-tender [Abnormal Walk] : normal gait [Nail Clubbing] : no clubbing of the fingernails [Cyanosis, Localized] : no localized cyanosis [No Focal Deficits] : no focal deficits [Oriented To Time, Place, And Person] : oriented to person, place, and time [Impaired Insight] : insight and judgment were intact [Affect] : the affect was normal [Memory Recent] : recent memory was not impaired [Skin Turgor] : normal skin turgor [] : no rash [FreeTextEntry1] : obese

## 2022-07-27 NOTE — DISCUSSION/SUMMARY
[FreeTextEntry1] : Morbid obesity \par Known Severe GERHARD\par NICM - may now have Cheyne Baird Respiration / Central sleep apnea as well\par Nicotine Dependence / Lung cancer risk group\par Possible COPD\par \par Plan\par PSG then titration\par LDCT lung cancer screening\par FU with PFT\par Smoke cessation as able

## 2022-07-27 NOTE — HISTORY OF PRESENT ILLNESS
[Snoring] : snoring [Witnessed Apneas] : witnessed sleep apnea [Frequent Nocturnal Awakening] : frequent nocturnal awakening [Daytime Somnolence] : daytime somnolence [ESS: ___] : ESS score [unfilled] [Unintentional Sleep while Active] : unintentional sleep while active [Unintentional Sleep While Inactive] : unintentional sleep while inactive [Awakes Unrefreshed] : awakening unrefreshed [FreeTextEntry1] : 2/22/2017\par \par Morbid obesity\par Smoker\par Known sleep apnea\par Prior cpap failure\par \par 7/27/22\par As above\par HFrEF (EF=25-30%)\par Serum Bicarb mildly elevated \par >30 pack year smoker\par BHANDARI\par Obesity \par GERHARD - past CPAP failure  \par

## 2022-07-27 NOTE — CONSULT LETTER
[Dear  ___] : Dear  [unfilled], [Consult Letter:] : I had the pleasure of evaluating your patient, [unfilled]. [Please see my note below.] : Please see my note below. [Consult Closing:] : Thank you very much for allowing me to participate in the care of this patient.  If you have any questions, please do not hesitate to contact me. [Sincerely,] : Sincerely, [DrBeatriz  ___] : Dr. VAUGHN

## 2022-07-28 NOTE — ASSESSMENT
[FreeTextEntry1] : \par # Chronic systolic HF \par -ACC/AHA stage C, NYHA II-III\par -NICMP LVEF 25-30% - ?HTNve CMP\par -Clinically close to euvolemia, lukewarm extremities\par -GDMT: -Increase Entresto 49-51 mg BID\par              -Continue Carvedilol to 6.25 mg BID\par              -Continue Spironolactone 25mg daily\par              -Restart Jardiance 10mg daily (she finished the one week sample and is picking up prescription from pharmacy today)\par -Labs: Repeat labs in 7-10 days (prescription provided)\par -Diuretics: Decrease furosemide to 40mg daily in the setting of starting SLGT2i and increasing Entresto\par -Device: Will repeat TTE 3 months after maximum tolerated GDMT. If LVEF remains </= 35% will refer to EP for primary prevention ICD\par -HF education provided including lifestyle changes (low Na diet, fluid restriction, increase physical activity), current clinical condition, natural progression and prognosis\par -Would benefit from cardiac rehab once cleared by ortho\par -Reiterated importance of lifestyle risk factor modifications such as diabetes management/glucose control, GERHARD management, and importance of smoking cessation\par \par # HTN\par -Continue regimen as above\par -Follows with Dr. Younger\par \par # DM2 (A1c 10.4% on 6/30/22)\par -PCP managing, remains on insulin\par -SGLT2i as above\par \par # GERHARD\par -Reports did not tolerate CPAP\par -Had follow up with Dr. Gonzalez today with plans for repeat PFT, sleep study, and CT chest\par \par # Obesity\par -Healthy lifestyle changes encouraged as well as weight loss\par \par # Active tobacco use\par -Reports smoking 0.5-1 PPD\par -Discussed risks associated to tobacco use and the importance of absolute tobacco cessation\par \par Follow up in 2 months with Aida BURTON. Will alternate visits with Primary Cardiologist.

## 2022-07-28 NOTE — DISCUSSION/SUMMARY
[Patient] : the patient [Risks] : risks [Benefits] : benefits [FreeTextEntry1] : \par 50 y/o with h/o recently diagnosed HFrEF (LVEF 25-30% and DD), NICMP, uncontrolled DM (A1c 9.7), HTN (dx’ed >10y ago), GERHARD not on CPAP, active tobacco use and obesity BMI 48.8. She is ACC/AHA Stage C, NYHA class II-III symptoms. The etiology of her CMP is unclear, possibly in the setting of uncontrolled HTN. She is close to euvolemia today with lukewarm extremities. We will continue GDMT optimization as above. We will empirically reduce furosemide to 40mg daily w/ initiation of SGLT2i and increase of Entresto. Will require close follow up. Additionally she was educated regarding the need for aggressive risk factors modifications such as DM control, GERHARD management and smoking cessation.\par Plan as above.

## 2022-07-28 NOTE — REASON FOR VISIT
[Cardiac Failure] : cardiac failure [FreeTextEntry3] : Sai Younger MD [FreeTextEntry1] : \par Cardiologist: Sia Younger MD\par \par HF Cardiologist: Opal Villalta MD\par \par PCP: Dr. Jasmine

## 2022-07-28 NOTE — PHYSICAL EXAM
[No Acute Distress] : no acute distress [Obese] : obese [Normal Conjunctiva] : normal conjunctiva [Normal S1, S2] : normal S1, S2 [No Murmur] : no murmur [Clear Lung Fields] : clear lung fields [No Respiratory Distress] : no respiratory distress  [Soft] : abdomen soft [Normal Radial B/L] : normal radial B/L [Moves all extremities] : moves all extremities [No Focal Deficits] : no focal deficits [Alert and Oriented] : alert and oriented [de-identified] : difficult to assess due to body habitus, JVP does not appear elevated [de-identified] : RRR [de-identified] : ambulates with crutch [de-identified] : no edema, chronic venous stasis changes [de-identified] : erythema lower extremities, lukewarm

## 2022-07-28 NOTE — HISTORY OF PRESENT ILLNESS
[FreeTextEntry1] : 50 y/o with h/o recently diagnosed systolic HF ACC/AHA stage C, uncontrolled DM (A1c 10.2), HTN (dx’ed >10y ago), GERHARD not on CPAP, active tobacco use and obesity BMI 48.8 who was referred for HF care. \par \par Mrs. Reyes was admitted to [facility] from 4/6 through 4/12 after she presented s/p mechanical fall (slipped on mud). She was found to have a left trochanter fx. As part of her work up she underwent a TTE that showed newly diagnosed LV dysfunction. Upon further interrogation, she reported BHANDARI x 2 weeks. She also endorses chronic LE edema which she attributed to standing up. She underwent LHC which revealed normal coronaries with elevated LVEDP.  She denied any h/o covid infection or recent cold like symptoms. She was started on GDMT and discharged home. \par \par Today she reports for follow up. She reports improvement in LE swelling and continues to have mild BHANDARI. Her Jardiance was not approved until recently and she is going to  the prescription today. She is still walking with a crutch and healing from her left trochanter fx. She endorses orthopnea that has been ongoing and she continues to use 3 pillows at night. She recently moved and currently lives in a shelter. She does not weigh herself daily. Unfortunately, she is still smoking tobacco (0.5-1 PPD). She had a follow up with Pulmonary today.\par \par She specifically denies CP, palpitations, SOB at rest, dizziness/LH, presyncope/syncope. She is not very active due to her injury. She has not been hospitalized or to the ED in the interim.

## 2022-07-28 NOTE — CARDIOLOGY SUMMARY
[de-identified] : \par 4/25/22: SR. SB. PRWP. LVH. NS T wave changes.  [de-identified] : \par 4/9/22 TTE: LVEF 25-30% LVIDd 5.78cm LVOT VTI 13.7cm, grade II,  RVSP 33mHg (RAP 3mmHg) [de-identified] : \par 4/11/22 LHC: normal coronaries. LVEDP 26

## 2022-08-15 ENCOUNTER — APPOINTMENT (OUTPATIENT)
Dept: CARDIOLOGY | Facility: CLINIC | Age: 52
End: 2022-08-15

## 2022-08-15 ENCOUNTER — NON-APPOINTMENT (OUTPATIENT)
Age: 52
End: 2022-08-15

## 2022-08-15 VITALS
HEIGHT: 59 IN | TEMPERATURE: 98.9 F | HEART RATE: 69 BPM | WEIGHT: 230 LBS | OXYGEN SATURATION: 99 % | DIASTOLIC BLOOD PRESSURE: 60 MMHG | BODY MASS INDEX: 46.37 KG/M2 | SYSTOLIC BLOOD PRESSURE: 102 MMHG

## 2022-08-15 LAB
ANION GAP SERPL CALC-SCNC: 13 MMOL/L
BUN SERPL-MCNC: 8 MG/DL
CALCIUM SERPL-MCNC: 9.8 MG/DL
CHLORIDE SERPL-SCNC: 100 MMOL/L
CO2 SERPL-SCNC: 26 MMOL/L
CREAT SERPL-MCNC: 0.5 MG/DL
EGFR: 113 ML/MIN/1.73M2
GLUCOSE SERPL-MCNC: 168 MG/DL
POTASSIUM SERPL-SCNC: 4.9 MMOL/L
SODIUM SERPL-SCNC: 139 MMOL/L

## 2022-08-15 PROCEDURE — 93000 ELECTROCARDIOGRAM COMPLETE: CPT

## 2022-08-15 PROCEDURE — 99214 OFFICE O/P EST MOD 30 MIN: CPT | Mod: 25

## 2022-08-15 RX ORDER — PHENYLEPHRINE HCL 10 MG
7 TABLET ORAL
Refills: 0 | Status: DISCONTINUED | COMMUNITY
End: 2022-08-15

## 2022-08-25 ENCOUNTER — APPOINTMENT (OUTPATIENT)
Dept: PULMONOLOGY | Facility: CLINIC | Age: 52
End: 2022-08-25

## 2022-08-29 ENCOUNTER — APPOINTMENT (OUTPATIENT)
Dept: CARDIOLOGY | Facility: CLINIC | Age: 52
End: 2022-08-29

## 2022-08-29 PROCEDURE — 93970 EXTREMITY STUDY: CPT

## 2022-08-29 PROCEDURE — 93923 UPR/LXTR ART STDY 3+ LVLS: CPT

## 2022-08-30 ENCOUNTER — APPOINTMENT (OUTPATIENT)
Dept: CARDIOLOGY | Facility: CLINIC | Age: 52
End: 2022-08-30

## 2022-08-30 ENCOUNTER — NON-APPOINTMENT (OUTPATIENT)
Age: 52
End: 2022-08-30

## 2022-08-30 PROCEDURE — 93925 LOWER EXTREMITY STUDY: CPT

## 2022-08-30 PROCEDURE — 93306 TTE W/DOPPLER COMPLETE: CPT

## 2022-08-31 ENCOUNTER — NON-APPOINTMENT (OUTPATIENT)
Age: 52
End: 2022-08-31

## 2022-09-01 ENCOUNTER — NON-APPOINTMENT (OUTPATIENT)
Age: 52
End: 2022-09-01

## 2022-09-01 DIAGNOSIS — I73.9 PERIPHERAL VASCULAR DISEASE, UNSPECIFIED: ICD-10-CM

## 2022-09-28 ENCOUNTER — APPOINTMENT (OUTPATIENT)
Dept: HEART FAILURE | Facility: CLINIC | Age: 52
End: 2022-09-28

## 2022-09-28 VITALS
DIASTOLIC BLOOD PRESSURE: 68 MMHG | SYSTOLIC BLOOD PRESSURE: 104 MMHG | WEIGHT: 228 LBS | BODY MASS INDEX: 45.96 KG/M2 | HEART RATE: 96 BPM | TEMPERATURE: 97.8 F | OXYGEN SATURATION: 95 % | HEIGHT: 59 IN

## 2022-09-28 DIAGNOSIS — Z87.898 PERSONAL HISTORY OF OTHER SPECIFIED CONDITIONS: ICD-10-CM

## 2022-09-28 DIAGNOSIS — R60.0 LOCALIZED EDEMA: ICD-10-CM

## 2022-09-28 PROCEDURE — 99214 OFFICE O/P EST MOD 30 MIN: CPT

## 2022-09-29 NOTE — ASSESSMENT
[FreeTextEntry1] : \par # Chronic systolic HF\par -ACC/AHA stage C, NYHA II-III\par -NICMP LVEF 25-30% - ?HTNve CMP\par -Clinically euvolemic, lukewarm extremities\par -GDMT: Continue Entresto 49-51 mg BID, Carvedilol 6.25 mg BID, Spironolactone 25mg daily and Jardiance 10mg daily. Would not uptitrate ARNi in view of low normal BP.\par -Diuretics: Furosemide 40mg daily\par -Device: Will plan for repeat TTE.  If LVEF remains </= 35% will refer to EP for primary prevention ICD.\par -HF education provided including lifestyle changes (low Na diet, fluid restriction, increase physical activity), current clinical condition, natural progression and prognosis\par -Would benefit from cardiac rehab once cleared by ortho\par -Reiterated importance of lifestyle risk factor modifications such as diabetes management/glucose control, GERHARD management, and importance of smoking cessation\par \par # HTN\par -Controlled\par -Follows with Dr. Younger\par \par # DM2 (A1c 10.4% on 6/30/22)\par -PCP managing, remains on insulin\par -SGLT2i as above\par \par # GERHARD\par -Reports she previously did not tolerate CPAP\par -Reiterated importance of f/u with Pulmonologist Dr. Gonzalez for PFT, sleep study, and CT chest\par \par # Obesity\par -BMI 46 from 48.8\par -Weight loss encouraged\par \par # Active tobacco use\par -Reports smoking 0.5-1 PPD\par -Educated regarding the risks associated to tobacco use and the importance of absolute tobacco cessation\par

## 2022-09-29 NOTE — REASON FOR VISIT
[Cardiac Failure] : cardiac failure [FreeTextEntry3] : Sai Younger MD [FreeTextEntry1] : \par Cardiologist: Sai Younger MD\par HF Cardiologist: Opal Villalta MD\par PCP: Dr. Jasmine

## 2022-09-29 NOTE — HISTORY OF PRESENT ILLNESS
[FreeTextEntry1] : 52 y/o with h/o recently diagnosed systolic HF ACC/AHA stage C, uncontrolled DM (A1c 10.2), HTN (dx’ed >10y ago), GERHARD not on CPAP, active tobacco use and obesity BMI 48.8 who was referred for HF care. \par \par Mrs. Reyes was admitted to [facility] from 4/6 through 4/12 after she presented s/p mechanical fall (slipped on mud). She was found to have a left trochanter fx. As part of her work up she underwent a TTE that showed newly diagnosed LV dysfunction. Upon further interrogation, she reported BHANDARI x 2 weeks. She also endorses chronic LE edema which she attributed to standing up. She underwent LHC which revealed normal coronaries with elevated LVEDP.  She denied any h/o covid infection or recent cold like symptoms. She was started on GDMT and discharged home. \par \par Today she presents for scheduled follow-up. Since her last visit she was found to have left femoral artery stenosis on arterial duplex and was referred for peripheral angiogram. \par She reports feeling well from a HF perspective and denies any overt HF symptoms. \par She reports improvement in LE swelling and her weight has remained relatively stable. \par She reports compliance with medications. She has not followed up with Pulmonary. She states she feels as though her sleep apnea is better. She continues to smoke cigarettes (0.5-1 PPD). \par \par She specifically denies CP, palpitations, SOB at rest, dizziness/LH, presyncope/syncope. She has not been hospitalized or to the ED in the interim.

## 2022-09-29 NOTE — DISCUSSION/SUMMARY
[Patient] : the patient [Risks] : risks [Benefits] : benefits [___ Month(s)] : in [unfilled] month(s) [With ___] : with [unfilled] [FreeTextEntry1] : \par 50 y/o with ACC/AHA Stage C HFrEF (LVEF 25-30% and DD), NICMP, uncontrolled DM (A1c 9.7), HTN (dx’ed >10y ago), GERHARD not on CPAP, active tobacco use and obesity (BMI 46).\par The etiology of her CMP is unclear, possibly in the setting of uncontrolled HTN. \par She is euvolemic on exam with lukewarm extremities and is tolerating good GDMT. \par Will need repeat TTE to eval LVEF. \par She is also awaiting peripheral angiogram. \par She was educated regarding the need for aggressive risk factor modifications including absolute smoking cessation, DM control, GERHARD management and weight loss. Unfortunately she has some barriers to care including living and transportation arrangements.\par \par The above problem/s and plan were previously established and followed according to physician's outline. The attending physician was present in the office suite at time of visit.\par

## 2022-09-29 NOTE — PHYSICAL EXAM
[Well Developed] : well developed [No Acute Distress] : no acute distress [Obese] : obese [Normal Conjunctiva] : normal conjunctiva [Normal S1, S2] : normal S1, S2 [No Murmur] : no murmur [Clear Lung Fields] : clear lung fields [Good Air Entry] : good air entry [No Respiratory Distress] : no respiratory distress  [Soft] : abdomen soft [No Cyanosis] : no cyanosis [Moves all extremities] : moves all extremities [Alert and Oriented] : alert and oriented [de-identified] : difficult to assess due to body habitus, JVP does not appear elevated [de-identified] : RRR [de-identified] : ambulates with crutch [de-identified] : no edema, chronic venous stasis changes noted bilaterally [de-identified] : stella

## 2022-09-29 NOTE — CARDIOLOGY SUMMARY
[de-identified] : \par 4/25/22: SR. SB. PRWP. LVH. NS T wave changes.  [de-identified] : \par 4/9/22 TTE: LVEF 25-30% LVIDd 5.78cm LVOT VTI 13.7cm, grade II,  RVSP 33mHg (RAP 3mmHg) [de-identified] : \par 4/11/22 LHC: normal coronaries. LVEDP 26

## 2022-10-14 ENCOUNTER — APPOINTMENT (OUTPATIENT)
Dept: CARDIOLOGY | Facility: CLINIC | Age: 52
End: 2022-10-14

## 2022-10-14 ENCOUNTER — MED ADMIN CHARGE (OUTPATIENT)
Age: 52
End: 2022-10-14

## 2022-10-14 PROCEDURE — 93306 TTE W/DOPPLER COMPLETE: CPT

## 2022-10-14 RX ADMIN — PERFLUTREN MG/ML: 6.52 INJECTION, SUSPENSION INTRAVENOUS at 00:00

## 2022-10-17 RX ORDER — PERFLUTREN 6.52 MG/ML
6.52 INJECTION, SUSPENSION INTRAVENOUS
Qty: 2 | Refills: 0 | Status: COMPLETED | OUTPATIENT
Start: 2022-10-14

## 2022-10-25 ENCOUNTER — NON-APPOINTMENT (OUTPATIENT)
Age: 52
End: 2022-10-25

## 2022-11-21 ENCOUNTER — APPOINTMENT (OUTPATIENT)
Dept: CARDIOLOGY | Facility: CLINIC | Age: 52
End: 2022-11-21

## 2022-11-21 ENCOUNTER — NON-APPOINTMENT (OUTPATIENT)
Age: 52
End: 2022-11-21

## 2022-11-21 VITALS
HEART RATE: 95 BPM | TEMPERATURE: 98.1 F | HEIGHT: 59 IN | SYSTOLIC BLOOD PRESSURE: 98 MMHG | OXYGEN SATURATION: 96 % | WEIGHT: 223 LBS | BODY MASS INDEX: 44.96 KG/M2 | DIASTOLIC BLOOD PRESSURE: 60 MMHG

## 2022-11-21 PROCEDURE — 93000 ELECTROCARDIOGRAM COMPLETE: CPT

## 2022-11-21 PROCEDURE — 99214 OFFICE O/P EST MOD 30 MIN: CPT | Mod: 25

## 2022-11-21 NOTE — HISTORY OF PRESENT ILLNESS
[FreeTextEntry1] : NICM,  ACC/AHA STAGE C HFrEF (LVEF IMPROVED from 25-30% to 52% by TTE 10/23/2022.\par A) NICM with LVEF 25-30 and DD  Heart FAilure diagnosed 4/2022\par B) s/p LHC 4/11/022 subsequent to CM and NYHA Class II HF recently diagnosed LVEF 20% by TTE, Normal Coronaries, Elevated LVEDP\par C) TTE 4/9/2022: LV cavity size mildly enlarged LVID d 5.78 cm , LVEF 25-30%.  DD 2. Mild Aortic stenosis PG 13.3, MG 6.1 mmHg JULIAN 1.56 cm2. Mild cLVH. \par D) Pt agrees with Cardiac MRI but won't do it without sedation, discussed with [facility] radiology, will order cMRI with sedation, pt aware she will have to go through PAT's, and wait list is 6-8 weeks \par E) Pt hasn’'t done the CMRI,  Pulmonary evaluation for GERHARD and the blood  tests as of 6/27/2022. She states she is now in a shelter and she swill the the studies and follow ups. She also wants a form to be filled our for a "[facility] department of  Physcial assessment for determination of employability."\par F) Followed at Heart Failure 9/28/2022: ACC/AHA Stage C HFrEF (LVEF 25-30% and DD), The etiology of her CMP is unclear, possibly in the setting of uncontrolled HTN. She is euvolemic on exam with lukewarm extremities and is tolerating good GDMT. Will need repeat TTE to eval LVEF. She is also awaiting peripheral angiogram. \par G) TTE 10/23/19089 LVEF Biplane 52%. DD 1. Mild cLVH. \par \par \par GERHARD\par Pulmonary evaluation was recommended. Pt was provided with the number to call for an appt at prior visit 6/27/2022. This should be done prior to scheduling her CMRI. THis is undone yet/ d/w pt. she stated she christine do so. I went to  and I spoke to my  Edilia to kindly assist patient in scheduling. \par \par \par BL LE EDEMA\par A) Admits baseline BHANDARI which she states it has been improved. Admitd intermittent claudication. \par B) Denies LE edema, continue Lasix, elevate legs, F/U with Vascular\par \par \par PAD\par  BL LE AD: Abnormal results. Pt needs to peripheral angiogram for bilateral lower extremity arteries. \par d/w pt once again. She is to schedule peripheral angiogram\par \par \par ROS\par Denied dizziness, rithopnea, PND, Chest pain, bleeding, syncope, near syncope.\par CURRENT CARDIO MEDS\par aspirin 81 mg daily\par atorvastatin 40 mg odaily\par carvedilol 6.25 mg twice daily\par furosemide 40 mg twice daily\par sacubitril - valsartan 49-51 mg twice daily\par Jardiance 10 mg daily\par \par \par FUNCTIONAL CAPACITY\par Est <4.0 METS. Uses walker to walk \par \par

## 2022-11-21 NOTE — CARDIOLOGY SUMMARY
[de-identified] : 11/21/2022\par Sinus  Rhythm \par -  Nonspecific T-abnormality. \par ABNORMAL \par \par 4/25/2022\par Sinus  Bradycardia HR 59 bpm\par Voltage criteria for LVH  (R(I)+S(III) exceeds 2.50 mV)  -Voltage criteria w/o ST/T abnormality may be normal. \par  -Poor R-wave progression -nonspecific -consider old anterior infarct. \par  -  Nonspecific T-abnormality. \par ABNORMAL \par

## 2022-11-27 ENCOUNTER — INPATIENT (INPATIENT)
Facility: HOSPITAL | Age: 52
LOS: 3 days | Discharge: HOME | DRG: 603 | End: 2022-12-01
Attending: GENERAL ACUTE CARE HOSPITAL | Admitting: STUDENT IN AN ORGANIZED HEALTH CARE EDUCATION/TRAINING PROGRAM
Payer: COMMERCIAL

## 2022-11-27 VITALS
WEIGHT: 223.11 LBS | HEIGHT: 59 IN | RESPIRATION RATE: 18 BRPM | OXYGEN SATURATION: 99 % | SYSTOLIC BLOOD PRESSURE: 126 MMHG | TEMPERATURE: 98 F | HEART RATE: 100 BPM | DIASTOLIC BLOOD PRESSURE: 73 MMHG

## 2022-11-27 DIAGNOSIS — L03.90 CELLULITIS, UNSPECIFIED: ICD-10-CM

## 2022-11-27 LAB
ALBUMIN SERPL ELPH-MCNC: 3.9 G/DL — SIGNIFICANT CHANGE UP (ref 3.3–5.2)
ALP SERPL-CCNC: 110 U/L — SIGNIFICANT CHANGE UP (ref 40–120)
ALT FLD-CCNC: 18 U/L — SIGNIFICANT CHANGE UP
ANION GAP SERPL CALC-SCNC: 13 MMOL/L — SIGNIFICANT CHANGE UP (ref 5–17)
APTT BLD: 38.9 SEC — HIGH (ref 27.5–35.5)
AST SERPL-CCNC: 18 U/L — SIGNIFICANT CHANGE UP
BASE EXCESS BLDV CALC-SCNC: 5.6 MMOL/L — HIGH (ref -2–3)
BASOPHILS # BLD AUTO: 0.05 K/UL — SIGNIFICANT CHANGE UP (ref 0–0.2)
BASOPHILS NFR BLD AUTO: 0.5 % — SIGNIFICANT CHANGE UP (ref 0–2)
BILIRUB SERPL-MCNC: 0.5 MG/DL — SIGNIFICANT CHANGE UP (ref 0.4–2)
BUN SERPL-MCNC: 12.8 MG/DL — SIGNIFICANT CHANGE UP (ref 8–20)
CA-I SERPL-SCNC: 1.02 MMOL/L — LOW (ref 1.15–1.33)
CALCIUM SERPL-MCNC: 9.7 MG/DL — SIGNIFICANT CHANGE UP (ref 8.4–10.5)
CHLORIDE BLDV-SCNC: 98 MMOL/L — SIGNIFICANT CHANGE UP (ref 96–108)
CHLORIDE SERPL-SCNC: 94 MMOL/L — LOW (ref 96–108)
CO2 SERPL-SCNC: 26 MMOL/L — SIGNIFICANT CHANGE UP (ref 22–29)
CREAT SERPL-MCNC: 0.56 MG/DL — SIGNIFICANT CHANGE UP (ref 0.5–1.3)
EGFR: 110 ML/MIN/1.73M2 — SIGNIFICANT CHANGE UP
EOSINOPHIL # BLD AUTO: 0.13 K/UL — SIGNIFICANT CHANGE UP (ref 0–0.5)
EOSINOPHIL NFR BLD AUTO: 1.3 % — SIGNIFICANT CHANGE UP (ref 0–6)
FLUAV AG NPH QL: SIGNIFICANT CHANGE UP
FLUBV AG NPH QL: SIGNIFICANT CHANGE UP
GAS PNL BLDV: 128 MMOL/L — LOW (ref 136–145)
GAS PNL BLDV: SIGNIFICANT CHANGE UP
GLUCOSE BLDC GLUCOMTR-MCNC: 239 MG/DL — HIGH (ref 70–99)
GLUCOSE BLDV-MCNC: 262 MG/DL — HIGH (ref 70–99)
GLUCOSE SERPL-MCNC: 240 MG/DL — HIGH (ref 70–99)
HCO3 BLDV-SCNC: 30 MMOL/L — HIGH (ref 22–29)
HCT VFR BLD CALC: 48.7 % — HIGH (ref 34.5–45)
HCT VFR BLDA CALC: 50 % — SIGNIFICANT CHANGE UP
HGB BLD CALC-MCNC: 16.8 G/DL — HIGH (ref 11.7–16.1)
HGB BLD-MCNC: 16.9 G/DL — HIGH (ref 11.5–15.5)
IMM GRANULOCYTES NFR BLD AUTO: 0.4 % — SIGNIFICANT CHANGE UP (ref 0–0.9)
INR BLD: 1.17 RATIO — HIGH (ref 0.88–1.16)
LACTATE BLDV-MCNC: 1.4 MMOL/L — SIGNIFICANT CHANGE UP (ref 0.5–2)
LYMPHOCYTES # BLD AUTO: 2.06 K/UL — SIGNIFICANT CHANGE UP (ref 1–3.3)
LYMPHOCYTES # BLD AUTO: 20 % — SIGNIFICANT CHANGE UP (ref 13–44)
MCHC RBC-ENTMCNC: 31 PG — SIGNIFICANT CHANGE UP (ref 27–34)
MCHC RBC-ENTMCNC: 34.7 GM/DL — SIGNIFICANT CHANGE UP (ref 32–36)
MCV RBC AUTO: 89.4 FL — SIGNIFICANT CHANGE UP (ref 80–100)
MONOCYTES # BLD AUTO: 0.94 K/UL — HIGH (ref 0–0.9)
MONOCYTES NFR BLD AUTO: 9.1 % — SIGNIFICANT CHANGE UP (ref 2–14)
NEUTROPHILS # BLD AUTO: 7.09 K/UL — SIGNIFICANT CHANGE UP (ref 1.8–7.4)
NEUTROPHILS NFR BLD AUTO: 68.7 % — SIGNIFICANT CHANGE UP (ref 43–77)
PCO2 BLDV: 45 MMHG — HIGH (ref 39–42)
PH BLDV: 7.43 — SIGNIFICANT CHANGE UP (ref 7.32–7.43)
PLATELET # BLD AUTO: 197 K/UL — SIGNIFICANT CHANGE UP (ref 150–400)
PO2 BLDV: 58 MMHG — HIGH (ref 25–45)
POTASSIUM BLDV-SCNC: 4.1 MMOL/L — SIGNIFICANT CHANGE UP (ref 3.5–5.1)
POTASSIUM SERPL-MCNC: 4.3 MMOL/L — SIGNIFICANT CHANGE UP (ref 3.5–5.3)
POTASSIUM SERPL-SCNC: 4.3 MMOL/L — SIGNIFICANT CHANGE UP (ref 3.5–5.3)
PROT SERPL-MCNC: 7.8 G/DL — SIGNIFICANT CHANGE UP (ref 6.6–8.7)
PROTHROM AB SERPL-ACNC: 13.6 SEC — HIGH (ref 10.5–13.4)
RBC # BLD: 5.45 M/UL — HIGH (ref 3.8–5.2)
RBC # FLD: 13.2 % — SIGNIFICANT CHANGE UP (ref 10.3–14.5)
RSV RNA NPH QL NAA+NON-PROBE: SIGNIFICANT CHANGE UP
SAO2 % BLDV: 93.4 % — SIGNIFICANT CHANGE UP
SARS-COV-2 RNA SPEC QL NAA+PROBE: SIGNIFICANT CHANGE UP
SODIUM SERPL-SCNC: 133 MMOL/L — LOW (ref 135–145)
WBC # BLD: 10.31 K/UL — SIGNIFICANT CHANGE UP (ref 3.8–10.5)
WBC # FLD AUTO: 10.31 K/UL — SIGNIFICANT CHANGE UP (ref 3.8–10.5)

## 2022-11-27 PROCEDURE — 99285 EMERGENCY DEPT VISIT HI MDM: CPT

## 2022-11-27 PROCEDURE — 99223 1ST HOSP IP/OBS HIGH 75: CPT

## 2022-11-27 PROCEDURE — 73590 X-RAY EXAM OF LOWER LEG: CPT | Mod: 26,LT

## 2022-11-27 PROCEDURE — 93971 EXTREMITY STUDY: CPT | Mod: 26,LT

## 2022-11-27 RX ORDER — SODIUM CHLORIDE 9 MG/ML
1000 INJECTION, SOLUTION INTRAVENOUS
Refills: 0 | Status: DISCONTINUED | OUTPATIENT
Start: 2022-11-27 | End: 2022-12-01

## 2022-11-27 RX ORDER — DEXTROSE 50 % IN WATER 50 %
12.5 SYRINGE (ML) INTRAVENOUS ONCE
Refills: 0 | Status: DISCONTINUED | OUTPATIENT
Start: 2022-11-27 | End: 2022-12-01

## 2022-11-27 RX ORDER — INSULIN GLARGINE 100 [IU]/ML
0 INJECTION, SOLUTION SUBCUTANEOUS
Qty: 0 | Refills: 0 | DISCHARGE

## 2022-11-27 RX ORDER — NICOTINE POLACRILEX 2 MG
1 GUM BUCCAL DAILY
Refills: 0 | Status: DISCONTINUED | OUTPATIENT
Start: 2022-11-27 | End: 2022-12-01

## 2022-11-27 RX ORDER — GLUCAGON INJECTION, SOLUTION 0.5 MG/.1ML
1 INJECTION, SOLUTION SUBCUTANEOUS ONCE
Refills: 0 | Status: DISCONTINUED | OUTPATIENT
Start: 2022-11-27 | End: 2022-12-01

## 2022-11-27 RX ORDER — FUROSEMIDE 40 MG
40 TABLET ORAL
Refills: 0 | Status: DISCONTINUED | OUTPATIENT
Start: 2022-11-27 | End: 2022-12-01

## 2022-11-27 RX ORDER — PIPERACILLIN AND TAZOBACTAM 4; .5 G/20ML; G/20ML
3.38 INJECTION, POWDER, LYOPHILIZED, FOR SOLUTION INTRAVENOUS ONCE
Refills: 0 | Status: COMPLETED | OUTPATIENT
Start: 2022-11-27 | End: 2022-11-27

## 2022-11-27 RX ORDER — INSULIN GLARGINE 100 [IU]/ML
30 INJECTION, SOLUTION SUBCUTANEOUS AT BEDTIME
Refills: 0 | Status: DISCONTINUED | OUTPATIENT
Start: 2022-11-28 | End: 2022-12-01

## 2022-11-27 RX ORDER — LANOLIN ALCOHOL/MO/W.PET/CERES
3 CREAM (GRAM) TOPICAL AT BEDTIME
Refills: 0 | Status: DISCONTINUED | OUTPATIENT
Start: 2022-11-27 | End: 2022-12-01

## 2022-11-27 RX ORDER — INSULIN GLARGINE 100 [IU]/ML
30 INJECTION, SOLUTION SUBCUTANEOUS ONCE
Refills: 0 | Status: COMPLETED | OUTPATIENT
Start: 2022-11-27 | End: 2022-11-27

## 2022-11-27 RX ORDER — PIPERACILLIN AND TAZOBACTAM 4; .5 G/20ML; G/20ML
3.38 INJECTION, POWDER, LYOPHILIZED, FOR SOLUTION INTRAVENOUS EVERY 8 HOURS
Refills: 0 | Status: DISCONTINUED | OUTPATIENT
Start: 2022-11-27 | End: 2022-11-29

## 2022-11-27 RX ORDER — INSULIN LISPRO 100/ML
VIAL (ML) SUBCUTANEOUS
Refills: 0 | Status: DISCONTINUED | OUTPATIENT
Start: 2022-11-27 | End: 2022-12-01

## 2022-11-27 RX ORDER — VANCOMYCIN HCL 1 G
VIAL (EA) INTRAVENOUS
Refills: 0 | Status: DISCONTINUED | OUTPATIENT
Start: 2022-11-27 | End: 2022-11-27

## 2022-11-27 RX ORDER — DEXTROSE 50 % IN WATER 50 %
15 SYRINGE (ML) INTRAVENOUS ONCE
Refills: 0 | Status: DISCONTINUED | OUTPATIENT
Start: 2022-11-27 | End: 2022-12-01

## 2022-11-27 RX ORDER — INSULIN LISPRO 100/ML
0 VIAL (ML) SUBCUTANEOUS
Qty: 0 | Refills: 0 | DISCHARGE

## 2022-11-27 RX ORDER — INSULIN LISPRO 100/ML
20 VIAL (ML) SUBCUTANEOUS
Refills: 0 | Status: DISCONTINUED | OUTPATIENT
Start: 2022-11-27 | End: 2022-12-01

## 2022-11-27 RX ORDER — ONDANSETRON 8 MG/1
4 TABLET, FILM COATED ORAL EVERY 8 HOURS
Refills: 0 | Status: DISCONTINUED | OUTPATIENT
Start: 2022-11-27 | End: 2022-12-01

## 2022-11-27 RX ORDER — ACETAMINOPHEN 500 MG
650 TABLET ORAL EVERY 6 HOURS
Refills: 0 | Status: DISCONTINUED | OUTPATIENT
Start: 2022-11-27 | End: 2022-12-01

## 2022-11-27 RX ORDER — ATORVASTATIN CALCIUM 80 MG/1
40 TABLET, FILM COATED ORAL AT BEDTIME
Refills: 0 | Status: DISCONTINUED | OUTPATIENT
Start: 2022-11-27 | End: 2022-12-01

## 2022-11-27 RX ORDER — SPIRONOLACTONE 25 MG/1
25 TABLET, FILM COATED ORAL DAILY
Refills: 0 | Status: DISCONTINUED | OUTPATIENT
Start: 2022-11-27 | End: 2022-12-01

## 2022-11-27 RX ORDER — CARVEDILOL PHOSPHATE 80 MG/1
6.25 CAPSULE, EXTENDED RELEASE ORAL EVERY 12 HOURS
Refills: 0 | Status: DISCONTINUED | OUTPATIENT
Start: 2022-11-27 | End: 2022-12-01

## 2022-11-27 RX ORDER — DEXTROSE 50 % IN WATER 50 %
25 SYRINGE (ML) INTRAVENOUS ONCE
Refills: 0 | Status: DISCONTINUED | OUTPATIENT
Start: 2022-11-27 | End: 2022-12-01

## 2022-11-27 RX ORDER — SACUBITRIL AND VALSARTAN 24; 26 MG/1; MG/1
1 TABLET, FILM COATED ORAL
Refills: 0 | Status: DISCONTINUED | OUTPATIENT
Start: 2022-11-27 | End: 2022-12-01

## 2022-11-27 RX ORDER — VANCOMYCIN HCL 1 G
1000 VIAL (EA) INTRAVENOUS EVERY 12 HOURS
Refills: 0 | Status: DISCONTINUED | OUTPATIENT
Start: 2022-11-27 | End: 2022-11-29

## 2022-11-27 RX ORDER — ENOXAPARIN SODIUM 100 MG/ML
40 INJECTION SUBCUTANEOUS EVERY 24 HOURS
Refills: 0 | Status: DISCONTINUED | OUTPATIENT
Start: 2022-11-27 | End: 2022-12-01

## 2022-11-27 RX ORDER — ASPIRIN/CALCIUM CARB/MAGNESIUM 324 MG
81 TABLET ORAL DAILY
Refills: 0 | Status: DISCONTINUED | OUTPATIENT
Start: 2022-11-27 | End: 2022-12-01

## 2022-11-27 RX ADMIN — PIPERACILLIN AND TAZOBACTAM 200 GRAM(S): 4; .5 INJECTION, POWDER, LYOPHILIZED, FOR SOLUTION INTRAVENOUS at 20:50

## 2022-11-27 RX ADMIN — INSULIN GLARGINE 30 UNIT(S): 100 INJECTION, SOLUTION SUBCUTANEOUS at 23:18

## 2022-11-27 NOTE — ED STATDOCS - NS_ ATTENDINGSCRIBEDETAILS _ED_A_ED_FT
I, Deann Bazan, performed the initial face to face bedside interview with this patient regarding history of present illness, review of symptoms and relevant past medical, social and family history.  I completed an independent physical examination.  The history, relevant review of systems, past medical and surgical history, medical decision making, and physical examination was documented by the scribe in my presence and I attest to the accuracy of the documentation.

## 2022-11-27 NOTE — ED STATDOCS - ATTENDING APP SHARED VISIT CONTRIBUTION OF CARE
I, Deann Bazan, performed a face to face bedside interview with this patient regarding history of present illness, review of symptoms and relevant past medical, social and family history.  I completed an independent physical examination. Medical decision making, follow-up on ordered tests (ie labs, radiologic studies) and re-evaluation of the patient's status has been communicated to the ACP.  Disposition of the patient will be based on test outcome and response to ED interventions.

## 2022-11-27 NOTE — H&P ADULT - NSICDXPASTMEDICALHX_GEN_ALL_CORE_FT
PAST MEDICAL HISTORY:  Acute systolic congestive heart failure     Borderline systolic HTN     DM2 (diabetes mellitus, type 2)     HLD (hyperlipidemia)     HTN (hypertension)     Obstructive sleep apnea

## 2022-11-27 NOTE — ED ADULT NURSE NOTE - OBJECTIVE STATEMENT
pt A&Ox3 states she had dry skin and crack on the back of her ankle a few days ago and she put lotion on pushed it off. pt states the last few days she hasn't spiked a fever but has had chills and has felt very hot. pts left leg noted to be red and warm to the touch, pt states she has a hx of cellulitis with noted residual to right leg. pt updated on plan of care transported arrive to bring pt to US and zosyn will be given when return to Emergency Department.

## 2022-11-27 NOTE — H&P ADULT - NSHPPHYSICALEXAM_GEN_ALL_CORE
Vital Signs Last 24 Hrs  T(C): 36.4 (27 Nov 2022 19:10), Max: 36.4 (27 Nov 2022 19:10)  T(F): 97.5 (27 Nov 2022 19:10), Max: 97.5 (27 Nov 2022 19:10)  HR: 100 (27 Nov 2022 19:10) (100 - 100)  BP: 126/73 (27 Nov 2022 19:10) (126/73 - 126/73)  BP(mean): --  RR: 18 (27 Nov 2022 19:10) (18 - 18)  SpO2: 99% (27 Nov 2022 19:10) (99% - 99%)    Parameters below as of 27 Nov 2022 19:10  Patient On (Oxygen Delivery Method): room air    Constitutional: Well-appearing, NAD, VSS  Head: NC/AT  Eyes: PERRL, EOMI, anicteric sclera, conjunctiva WNL  ENT: Normal Pharynx, MMM, No tonsillar exudate/erythema  Neck: Supple, Non-tender  Chest: Non-tender, no rashes  Cardio: RRR, s1/s2, no appreciable murmurs/rubs/gallops  Resp: BS CTA bilaterally, no wheezing/rhonchi/rales  Abd: Soft, Non-tender, Non-distended, no rebound/guarding/rigidity  : not examined  Rectal: not examined  MSK: moving all extremities, no motor weakness, full ROM x4  Ext: palpable distal pulses, good capillary refill, +mild bl le edema  Psych: appropriate, cooperative  Neuro: CN II-XII grossly intact, no focal deficits  Skin: Warm/Dry. +chronic venous stasis changes bilaterally with LLE superimposed erythema/ttp up to mid thigh +superficial cracking/xerosis posterior heel otherwise no obvious ulcer

## 2022-11-27 NOTE — ED STATDOCS - OBJECTIVE STATEMENT
52 y/o female with PMHx of diabetes and cellulitis presents to ED after a foot injury. Pt Injured her left foot a couple days ago and noticed redness yesterday. Pt now states that the injury is infected. Pt is also being tested for low blood flow to the heart. 50 y/o female with PMHx of diabetes and cellulitis presents to ED after a foot injury. Pt Injured her left foot a couple days ago and noticed redness yesterday. Pt now states that the injury is infected. erythema extended to knee tomororw. +generalized weakness and chills no fever. has had cellulitis in the past. 50 y/o female with PMHx of diabetes and cellulitis presents to ED after a foot injury. Pt Injured her left foot a couple days ago and noticed redness yesterday. Pt now states that the injury is infected. erythema extended to knee today. +generalized weakness and chills no fever. has had cellulitis in the past.

## 2022-11-27 NOTE — ED ADULT TRIAGE NOTE - CHIEF COMPLAINT QUOTE
pt a+ox3, reports left leg infection. states she had crack on left heel about 4 days ago. reports sudden onset of chills and worsening lower leg pain 2 days ago. hx of diabetes.

## 2022-11-27 NOTE — ED STATDOCS - NS ED ROS FT
Constitutional: no fever, no chills  Eyes: no vision changes  ENT: no nasal congestion, no sore throat  CV: no chest pain  Resp: no cough, no shortness of breath  GI: no abdominal pain, no vomiting, no diarrhea  : no dysuria  MSK: (+) left leg injury/infection  Skin: redness to the left calf  Neuro: no headache, no focal weakness, no paresthesias Constitutional: no fever, no chills  Eyes: no vision changes  ENT: no nasal congestion, no sore throat  CV: no chest pain  Resp: no cough, no shortness of breath  GI: no abdominal pain, no vomiting, no diarrhea  : no dysuria  MSK: (+) left leg injury/infection  Skin: +redness to the left calf  Neuro: no headache, no focal weakness, no paresthesias

## 2022-11-27 NOTE — ED STATDOCS - PHYSICAL EXAMINATION
Gen: NAD, AOx3  Head: NCAT  HEENT: EOMI, oral mucosa moist, normal conjunctiva, neck supple  Lung: no respiratory distress  CV:  Normal perfusion  MSK: left leg chronic dermatitis with circumferential erythema extending to the knee. tenderness to the calf. streaking to the medial thigh.   Neuro: No focal neurologic deficits  Skin: See msk  Psych: normal affect

## 2022-11-27 NOTE — H&P ADULT - ASSESSMENT
ASSESSMENT:  51F with PMHX CHFrEF, HTN, HLD, PAD, Tobacco Abuse, IDDM2 presents to [facility] ER c/o LLE pain/redness admitted with LLE Cellulitis and DM2 with uncontrolled hyperglycemia.    PLAN:  LLE Cellulitis  -LE Venous Doppler Negative  -XR LLE -> Pending  -Lactate Negative  -BCX x2 pending  -COVID/RSV/Flu negative  -Continue Vanco/Zosyn for empiric coverage with diabetic infection  -Check MRSA PCR if negative dc Vanco  -Avoid IVFB/IVF with significant CHF history and stable BP with negative Lactate  -VTE PPX LMWH    CHFrEF, HTN, HLD  -Prior TTE LVEF 25-30%  -Continue Lasix 40mg BID  -Entresto 49/51 BID  -Spironolactone 25mg q24  -ASA 81mg q24  -Atorvastatin 40mg q24  -Avoid IVF. Continue diuresis.    IDDM2 with Hyperglycemia  -Uncontrolled BS 250s  -Lantus 30 units qHS  -Ademelog 20 units TID ACHS  -ISS/Accuchecks/A1C/ADA Diet    Tobacco Abuse  - cessation. Nicotine patch PRN.

## 2022-11-27 NOTE — ED STATDOCS - PROGRESS NOTE DETAILS
PRAMOD Johnson: Patient evaluated by intake physician. HPI/ROS/PE as noted above. Will follow up plan per intake physician and continue to assess patient.

## 2022-11-27 NOTE — H&P ADULT - HISTORY OF PRESENT ILLNESS
51F with PMHX CHFrEF, HTN, HLD, PAD, Tobacco Abuse, IDDM2 presents to [facility] ER c/o LLE pain/redness. +Reported minor trauma to foot recently. No obvious open ulceration/injury but possible superficial injury on posterior aspect. Cellulitis has rapidly spread up patient's leg now up to mid thigh. +Generalized weakness. No F/C. No other complaints. SOB much better after being dx with CHF and continuing current medications. Found to be have uncontrolled hyperglycemia otherwise no other issues.     ROS Negative unless mentioned.

## 2022-11-28 LAB
A1C WITH ESTIMATED AVERAGE GLUCOSE RESULT: 9.2 % — HIGH (ref 4–5.6)
ALBUMIN SERPL ELPH-MCNC: 3.5 G/DL — SIGNIFICANT CHANGE UP (ref 3.3–5.2)
ALP SERPL-CCNC: 91 U/L — SIGNIFICANT CHANGE UP (ref 40–120)
ALT FLD-CCNC: 16 U/L — SIGNIFICANT CHANGE UP
ANION GAP SERPL CALC-SCNC: 14 MMOL/L — SIGNIFICANT CHANGE UP (ref 5–17)
AST SERPL-CCNC: 13 U/L — SIGNIFICANT CHANGE UP
BASOPHILS # BLD AUTO: 0.02 K/UL — SIGNIFICANT CHANGE UP (ref 0–0.2)
BASOPHILS NFR BLD AUTO: 0.2 % — SIGNIFICANT CHANGE UP (ref 0–2)
BILIRUB SERPL-MCNC: 0.4 MG/DL — SIGNIFICANT CHANGE UP (ref 0.4–2)
BUN SERPL-MCNC: 9.1 MG/DL — SIGNIFICANT CHANGE UP (ref 8–20)
CALCIUM SERPL-MCNC: 8.7 MG/DL — SIGNIFICANT CHANGE UP (ref 8.4–10.5)
CHLORIDE SERPL-SCNC: 96 MMOL/L — SIGNIFICANT CHANGE UP (ref 96–108)
CO2 SERPL-SCNC: 25 MMOL/L — SIGNIFICANT CHANGE UP (ref 22–29)
CREAT SERPL-MCNC: 0.38 MG/DL — LOW (ref 0.5–1.3)
CRP SERPL-MCNC: 70 MG/L — HIGH
EGFR: 121 ML/MIN/1.73M2 — SIGNIFICANT CHANGE UP
EOSINOPHIL # BLD AUTO: 0.15 K/UL — SIGNIFICANT CHANGE UP (ref 0–0.5)
EOSINOPHIL NFR BLD AUTO: 1.8 % — SIGNIFICANT CHANGE UP (ref 0–6)
ERYTHROCYTE [SEDIMENTATION RATE] IN BLOOD: 25 MM/HR — HIGH (ref 0–20)
ESTIMATED AVERAGE GLUCOSE: 217 MG/DL — HIGH (ref 68–114)
GLUCOSE BLDC GLUCOMTR-MCNC: 134 MG/DL — HIGH (ref 70–99)
GLUCOSE BLDC GLUCOMTR-MCNC: 139 MG/DL — HIGH (ref 70–99)
GLUCOSE BLDC GLUCOMTR-MCNC: 159 MG/DL — HIGH (ref 70–99)
GLUCOSE BLDC GLUCOMTR-MCNC: 191 MG/DL — HIGH (ref 70–99)
GLUCOSE SERPL-MCNC: 209 MG/DL — HIGH (ref 70–99)
HCT VFR BLD CALC: 46.6 % — HIGH (ref 34.5–45)
HGB BLD-MCNC: 15.4 G/DL — SIGNIFICANT CHANGE UP (ref 11.5–15.5)
IMM GRANULOCYTES NFR BLD AUTO: 0.5 % — SIGNIFICANT CHANGE UP (ref 0–0.9)
LYMPHOCYTES # BLD AUTO: 2.03 K/UL — SIGNIFICANT CHANGE UP (ref 1–3.3)
LYMPHOCYTES # BLD AUTO: 24.6 % — SIGNIFICANT CHANGE UP (ref 13–44)
MCHC RBC-ENTMCNC: 30 PG — SIGNIFICANT CHANGE UP (ref 27–34)
MCHC RBC-ENTMCNC: 33 GM/DL — SIGNIFICANT CHANGE UP (ref 32–36)
MCV RBC AUTO: 90.7 FL — SIGNIFICANT CHANGE UP (ref 80–100)
MONOCYTES # BLD AUTO: 0.88 K/UL — SIGNIFICANT CHANGE UP (ref 0–0.9)
MONOCYTES NFR BLD AUTO: 10.7 % — SIGNIFICANT CHANGE UP (ref 2–14)
NEUTROPHILS # BLD AUTO: 5.14 K/UL — SIGNIFICANT CHANGE UP (ref 1.8–7.4)
NEUTROPHILS NFR BLD AUTO: 62.2 % — SIGNIFICANT CHANGE UP (ref 43–77)
PLATELET # BLD AUTO: 190 K/UL — SIGNIFICANT CHANGE UP (ref 150–400)
POTASSIUM SERPL-MCNC: 3.8 MMOL/L — SIGNIFICANT CHANGE UP (ref 3.5–5.3)
POTASSIUM SERPL-SCNC: 3.8 MMOL/L — SIGNIFICANT CHANGE UP (ref 3.5–5.3)
PROCALCITONIN SERPL-MCNC: 0.34 NG/ML — HIGH (ref 0.02–0.1)
PROT SERPL-MCNC: 6.8 G/DL — SIGNIFICANT CHANGE UP (ref 6.6–8.7)
RBC # BLD: 5.14 M/UL — SIGNIFICANT CHANGE UP (ref 3.8–5.2)
RBC # FLD: 13.2 % — SIGNIFICANT CHANGE UP (ref 10.3–14.5)
SODIUM SERPL-SCNC: 135 MMOL/L — SIGNIFICANT CHANGE UP (ref 135–145)
WBC # BLD: 8.26 K/UL — SIGNIFICANT CHANGE UP (ref 3.8–10.5)
WBC # FLD AUTO: 8.26 K/UL — SIGNIFICANT CHANGE UP (ref 3.8–10.5)

## 2022-11-28 PROCEDURE — 99223 1ST HOSP IP/OBS HIGH 75: CPT

## 2022-11-28 PROCEDURE — 99233 SBSQ HOSP IP/OBS HIGH 50: CPT

## 2022-11-28 RX ORDER — INFLUENZA VIRUS VACCINE 15; 15; 15; 15 UG/.5ML; UG/.5ML; UG/.5ML; UG/.5ML
0.5 SUSPENSION INTRAMUSCULAR ONCE
Refills: 0 | Status: DISCONTINUED | OUTPATIENT
Start: 2022-11-28 | End: 2022-12-01

## 2022-11-28 RX ADMIN — Medication 40 MILLIGRAM(S): at 15:02

## 2022-11-28 RX ADMIN — INSULIN GLARGINE 30 UNIT(S): 100 INJECTION, SOLUTION SUBCUTANEOUS at 21:11

## 2022-11-28 RX ADMIN — SACUBITRIL AND VALSARTAN 1 TABLET(S): 24; 26 TABLET, FILM COATED ORAL at 18:22

## 2022-11-28 RX ADMIN — PIPERACILLIN AND TAZOBACTAM 25 GRAM(S): 4; .5 INJECTION, POWDER, LYOPHILIZED, FOR SOLUTION INTRAVENOUS at 05:59

## 2022-11-28 RX ADMIN — Medication 250 MILLIGRAM(S): at 12:01

## 2022-11-28 RX ADMIN — Medication 20 UNIT(S): at 08:23

## 2022-11-28 RX ADMIN — SPIRONOLACTONE 25 MILLIGRAM(S): 25 TABLET, FILM COATED ORAL at 06:02

## 2022-11-28 RX ADMIN — CARVEDILOL PHOSPHATE 6.25 MILLIGRAM(S): 80 CAPSULE, EXTENDED RELEASE ORAL at 18:22

## 2022-11-28 RX ADMIN — Medication 20 UNIT(S): at 12:40

## 2022-11-28 RX ADMIN — Medication 250 MILLIGRAM(S): at 00:14

## 2022-11-28 RX ADMIN — Medication 81 MILLIGRAM(S): at 12:01

## 2022-11-28 RX ADMIN — Medication 1: at 21:14

## 2022-11-28 RX ADMIN — PIPERACILLIN AND TAZOBACTAM 25 GRAM(S): 4; .5 INJECTION, POWDER, LYOPHILIZED, FOR SOLUTION INTRAVENOUS at 15:03

## 2022-11-28 RX ADMIN — Medication 20 UNIT(S): at 18:55

## 2022-11-28 RX ADMIN — ENOXAPARIN SODIUM 40 MILLIGRAM(S): 100 INJECTION SUBCUTANEOUS at 06:01

## 2022-11-28 RX ADMIN — Medication 40 MILLIGRAM(S): at 06:02

## 2022-11-28 RX ADMIN — Medication 1: at 08:24

## 2022-11-28 RX ADMIN — ATORVASTATIN CALCIUM 40 MILLIGRAM(S): 80 TABLET, FILM COATED ORAL at 21:05

## 2022-11-28 RX ADMIN — SACUBITRIL AND VALSARTAN 1 TABLET(S): 24; 26 TABLET, FILM COATED ORAL at 06:02

## 2022-11-28 RX ADMIN — PIPERACILLIN AND TAZOBACTAM 25 GRAM(S): 4; .5 INJECTION, POWDER, LYOPHILIZED, FOR SOLUTION INTRAVENOUS at 21:19

## 2022-11-28 RX ADMIN — CARVEDILOL PHOSPHATE 6.25 MILLIGRAM(S): 80 CAPSULE, EXTENDED RELEASE ORAL at 06:02

## 2022-11-28 NOTE — PATIENT PROFILE ADULT - FALL HARM RISK - HARM RISK INTERVENTIONS
Assistance with ambulation/Assistance OOB with selected safe patient handling equipment/Communicate Risk of Fall with Harm to all staff/Discuss with provider need for PT consult/Monitor gait and stability/Reinforce activity limits and safety measures with patient and family/Tailored Fall Risk Interventions/Visual Cue: Yellow wristband and red socks/Bed in lowest position, wheels locked, appropriate side rails in place/Call bell, personal items and telephone in reach/Instruct patient to call for assistance before getting out of bed or chair/Non-slip footwear when patient is out of bed/[place] to call system/Physically safe environment - no spills, clutter or unnecessary equipment/Purposeful Proactive Rounding/Room/bathroom lighting operational, light cord in reach

## 2022-11-28 NOTE — CONSULT NOTE ADULT - SUBJECTIVE AND OBJECTIVE BOX
[facility]  INFECTIOUS DISEASES at [place] and [place]  =====================================================                               Orlando Elizalde MD*    Monisha Kelly MD*                             Nicholas Maxwell MD*     Gerri Salter MD*            Diplomates American Board of Internal Medicine & Infectious Diseases                * [place] Office - Appt - Tel  [phone] Fax [phone]                * [place] Office - Appt - Tel [phone] Fax [phone]                                  Hospital Consult line:  [phone]  =====================================================      N-[id number]  ADIEL CURRAN        CC: Patient is a 51y old  Female who presents with a chief complaint of LLE Cellulitis (28 Nov 2022 11:39)      51y  Female     HPI:  51F with PMHX CHFrEF, HTN, HLD, PAD, Tobacco Abuse, IDDM2 presents to [facility] ER c/o LLE pain/redness. Recently noted a crack on her left heel, but did not pay much attention to it besides mild pain. Presented to [facility] on 11/27 with redness and pain in left leg. She did have associated chills. Denies fever. Reports similar cellulitis episodes in the past.     Currently living in a shelter with her daughter. Remainder ROS neg.     No leukocytosis or fever on admission. Started on piperacillin-tazobactam and vancomycin.   ______________________________________________________  PAST MEDICAL & SURGICAL HISTORY:  Obstructive sleep apnea    Borderline systolic HTN    Acute systolic congestive heart failure    HTN (hypertension)    HLD (hyperlipidemia)    DM2 (diabetes mellitus, type 2)    Ankle fracture, left  ORIF - 2003    H/O hand surgery  LEFT - 1981    Social History:  Tobacco Abuse. No ETOH Abuse. No illicit drugs  Living in a shelter     FAMILY HISTORY:  FH: leukemia (Aunt)    ______________________________________________________  Allergies    No Known Allergies    Intolerances    ______________________________________________________  MEDICATIONS:  Antibiotics:  piperacillin/tazobactam IVPB.. 3.375 Gram(s) IV Intermittent every 8 hours  vancomycin  IVPB 1000 milliGRAM(s) IV Intermittent every 12 hours    Other medications:  aspirin  chewable 81 milliGRAM(s) Oral daily  atorvastatin 40 milliGRAM(s) Oral at bedtime  carvedilol 6.25 milliGRAM(s) Oral every 12 hours  dextrose 5%. 1000 milliLiter(s) IV Continuous <Continuous>  dextrose 5%. 1000 milliLiter(s) IV Continuous <Continuous>  dextrose 50% Injectable 25 Gram(s) IV Push once  dextrose 50% Injectable 12.5 Gram(s) IV Push once  dextrose 50% Injectable 25 Gram(s) IV Push once  enoxaparin Injectable 40 milliGRAM(s) SubCutaneous every 24 hours  furosemide    Tablet 40 milliGRAM(s) Oral two times a day  glucagon  Injectable 1 milliGRAM(s) IntraMuscular once  influenza   Vaccine 0.5 milliLiter(s) IntraMuscular once  insulin glargine Injectable (LANTUS) 30 Unit(s) SubCutaneous at bedtime  insulin lispro (ADMELOG) corrective regimen sliding scale   SubCutaneous Before meals and at bedtime  insulin lispro Injectable (ADMELOG) 20 Unit(s) SubCutaneous three times a day before meals  sacubitril 49 mG/valsartan 51 mG 1 Tablet(s) Oral two times a day  spironolactone 25 milliGRAM(s) Oral daily    ______________________________________________________  REVIEW OF SYSTEMS:  CONSTITUTIONAL: +chills   HEENT:  No diplopia or blurred vision.  No earache, sore throat or runny nose.  CARDIOVASCULAR:  No chest pain  RESPIRATORY:  No cough, shortness of breath  GASTROINTESTINAL:  No nausea, vomiting, abdominal pain or diarrhea.  GENITOURINARY:  No dysuria, frequency or urgency. No blood in urine  MUSCULOSKELETAL:  no joint aches, no muscle pain  SKIN:  as per HPI   NEUROLOGIC:  No headaches, seizures  PSYCHIATRIC:  No disorder of thought or mood.  ENDOCRINE:  No heat or cold intolerance  HEMATOLOGICAL:  No easy bruising or bleeding.     _____________________________________________________  PHYSICAL EXAM:  GEN: Obese, awake, alert, oriented x 3. Lying comfortable in bed, in no acute distress   HEENT: normocephalic and atraumatic. PERRL.  Anicteric sclerae. Moist mucous membranes. No mucosal lesions. No nasal discharge.   NECK: Supple. No palpable neck masses or LN  LUNGS: eupneic, CTA B/L, no adventitious sounds  HEART: RRR, no m/r/g  ABDOMEN: Soft, NT, ND,  +BS.    :  no Koehler catheter  EXTREMITIES: well perfused  MSK: No joint deformity or swelling  LYMPH: no lymphadenopathy   NEUROLOGIC: Grossly no focal deficits   PSYCHIATRIC: Appropriate affect and mood.  SKIN: Chronic b/l LE skin changes. LLE - superimposed to chronic changes there is erythema and warmth up to midcalf; crack on left heel w/o purulence or fluctuance.   LINES: PIV     ______________________________________________________  Height (cm): 149.9 (11-27 @ 19:10)  Weight (kg): 101.2 (11-27 @ 19:10)  BMI (kg/m2): 45 (11-27 @ 19:10)  BSA (m2): 1.93 (11-27 @ 19:10)    Vitals:  T(F): 97.7 (28 Nov 2022 04:25), Max: 98.5 (28 Nov 2022 02:36)  HR: 73 (28 Nov 2022 04:25)  BP: 116/73 (28 Nov 2022 04:25)  RR: 18 (28 Nov 2022 04:25)  SpO2: 96% (28 Nov 2022 04:25) (95% - 99%)  temp max in last 48H T(F): , Max: 98.5 (11-28-22 @ 02:36)    Current Antibiotics:  piperacillin/tazobactam IVPB.. 3.375 Gram(s) IV Intermittent every 8 hours  vancomycin  IVPB 1000 milliGRAM(s) IV Intermittent every 12 hours    Other medications:  aspirin  chewable 81 milliGRAM(s) Oral daily  atorvastatin 40 milliGRAM(s) Oral at bedtime  carvedilol 6.25 milliGRAM(s) Oral every 12 hours  dextrose 5%. 1000 milliLiter(s) IV Continuous <Continuous>  dextrose 5%. 1000 milliLiter(s) IV Continuous <Continuous>  dextrose 50% Injectable 25 Gram(s) IV Push once  dextrose 50% Injectable 12.5 Gram(s) IV Push once  dextrose 50% Injectable 25 Gram(s) IV Push once  enoxaparin Injectable 40 milliGRAM(s) SubCutaneous every 24 hours  furosemide    Tablet 40 milliGRAM(s) Oral two times a day  glucagon  Injectable 1 milliGRAM(s) IntraMuscular once  influenza   Vaccine 0.5 milliLiter(s) IntraMuscular once  insulin glargine Injectable (LANTUS) 30 Unit(s) SubCutaneous at bedtime  insulin lispro (ADMELOG) corrective regimen sliding scale   SubCutaneous Before meals and at bedtime  insulin lispro Injectable (ADMELOG) 20 Unit(s) SubCutaneous three times a day before meals  sacubitril 49 mG/valsartan 51 mG 1 Tablet(s) Oral two times a day  spironolactone 25 milliGRAM(s) Oral daily                            15.4   8.26  )-----------( 190      ( 28 Nov 2022 08:45 )             46.6     11-28    135  |  96  |  9.1  ----------------------------<  209<H>  3.8   |  25.0  |  0.38<L>    Ca    8.7      28 Nov 2022 08:45    TPro  6.8  /  Alb  3.5  /  TBili  0.4  /  DBili  x   /  AST  13  /  ALT  16  /  AlkPhos  91  11-28    RECENT CULTURES:      WBC Count: 8.26 K/uL (11-28-22 @ 08:45)  WBC Count: 10.31 K/uL (11-27-22 @ 19:49)    Creatinine, Serum: 0.38 mg/dL (11-28-22 @ 08:45)  Creatinine, Serum: 0.56 mg/dL (11-27-22 @ 19:49)    C-Reactive Protein, Serum: 70 mg/L (11-28-22 @ 08:45)    Sedimentation Rate, Erythrocyte: 25 mm/hr (11-28-22 @ 08:45)    Procalcitonin, Serum: 0.34 ng/mL (11-28-22 @ 08:45)     SARS-CoV-2 Result: NotDete (11-27-22 @ 19:49)    ______________________________________________________  RADIOLOGY  < from: Xray Tibia + Fibula 2 Views, Left (11.27.22 @ 21:51) >  INTERPRETATION:  Radiographs of the LEFT tibia and fibula    CLINICAL INFORMATION: Lower extremity cellulitis TECHNIQUE:  Frontal and   lateral views of the tibia and fibula were obtained.    FINDINGS:  No prior studies are available for review.  Indwelling  2 screw fixation of healed medial malleolus fracture and   plate screw fixation of healed distal fibular diaphyseal fracture.  Remaining osseous structures of the tibia and fibula are intact.    Mild lower extremity diffuse soft tissue swelling without subcutaneous   air or radiopaque foreign body.    IMPRESSION:   No acute radiographic osseous pathology..    < end of copied text >

## 2022-11-28 NOTE — CONSULT NOTE ADULT - ASSESSMENT
51F with history of CHFrEF, HTN, HLD, PAD, Tobacco Abuse, IDDM2 presents to [facility] ER c/o LLE pain/redness. Recently noted a crack on her left heel, but did not pay much attention to it besides mild pain. Presented to [facility] on 11/27 with redness and pain in left leg, with associated chills. Denies fever. Reports similar cellulitis episodes in the past. A1C 9.2    Impression:  LLE cellulitis  Uncontrolled DM   Obesity     Plan:  - Change piperacillin-tazobactam to cefazolin 2 g IV q8h   - Continue vanco for now  - Monitor vanco trough; dosing adjustment as per pharmacy protocol   - Monitor area of erythema   - Check MRSA nasal swab   - Follow up BCX  - No leukocytosis or fever   - Needs tighter gluc control     Will follow

## 2022-11-29 LAB
GLUCOSE BLDC GLUCOMTR-MCNC: 121 MG/DL — HIGH (ref 70–99)
GLUCOSE BLDC GLUCOMTR-MCNC: 126 MG/DL — HIGH (ref 70–99)
GLUCOSE BLDC GLUCOMTR-MCNC: 127 MG/DL — HIGH (ref 70–99)
GLUCOSE BLDC GLUCOMTR-MCNC: 174 MG/DL — HIGH (ref 70–99)
GLUCOSE BLDC GLUCOMTR-MCNC: 176 MG/DL — HIGH (ref 70–99)
MRSA PCR RESULT.: SIGNIFICANT CHANGE UP
S AUREUS DNA NOSE QL NAA+PROBE: SIGNIFICANT CHANGE UP
VANCOMYCIN TROUGH SERPL-MCNC: 8.8 UG/ML — LOW (ref 10–20)

## 2022-11-29 PROCEDURE — 99232 SBSQ HOSP IP/OBS MODERATE 35: CPT

## 2022-11-29 PROCEDURE — 99233 SBSQ HOSP IP/OBS HIGH 50: CPT

## 2022-11-29 RX ORDER — CEFAZOLIN SODIUM 1 G
VIAL (EA) INJECTION
Refills: 0 | Status: DISCONTINUED | OUTPATIENT
Start: 2022-11-29 | End: 2022-12-01

## 2022-11-29 RX ORDER — CEFAZOLIN SODIUM 1 G
2000 VIAL (EA) INJECTION EVERY 8 HOURS
Refills: 0 | Status: DISCONTINUED | OUTPATIENT
Start: 2022-11-29 | End: 2022-12-01

## 2022-11-29 RX ORDER — CEFAZOLIN SODIUM 1 G
2000 VIAL (EA) INJECTION ONCE
Refills: 0 | Status: DISCONTINUED | OUTPATIENT
Start: 2022-11-29 | End: 2022-11-29

## 2022-11-29 RX ORDER — CEFAZOLIN SODIUM 1 G
2000 VIAL (EA) INJECTION ONCE
Refills: 0 | Status: COMPLETED | OUTPATIENT
Start: 2022-11-29 | End: 2022-11-29

## 2022-11-29 RX ADMIN — Medication 40 MILLIGRAM(S): at 13:11

## 2022-11-29 RX ADMIN — SPIRONOLACTONE 25 MILLIGRAM(S): 25 TABLET, FILM COATED ORAL at 05:50

## 2022-11-29 RX ADMIN — INSULIN GLARGINE 30 UNIT(S): 100 INJECTION, SOLUTION SUBCUTANEOUS at 21:33

## 2022-11-29 RX ADMIN — Medication 20 UNIT(S): at 08:48

## 2022-11-29 RX ADMIN — Medication 20 UNIT(S): at 17:54

## 2022-11-29 RX ADMIN — PIPERACILLIN AND TAZOBACTAM 25 GRAM(S): 4; .5 INJECTION, POWDER, LYOPHILIZED, FOR SOLUTION INTRAVENOUS at 09:10

## 2022-11-29 RX ADMIN — Medication 250 MILLIGRAM(S): at 12:59

## 2022-11-29 RX ADMIN — Medication 100 MILLIGRAM(S): at 17:55

## 2022-11-29 RX ADMIN — Medication 40 MILLIGRAM(S): at 05:49

## 2022-11-29 RX ADMIN — Medication 100 MILLIGRAM(S): at 21:36

## 2022-11-29 RX ADMIN — CARVEDILOL PHOSPHATE 6.25 MILLIGRAM(S): 80 CAPSULE, EXTENDED RELEASE ORAL at 17:55

## 2022-11-29 RX ADMIN — Medication 250 MILLIGRAM(S): at 02:43

## 2022-11-29 RX ADMIN — CARVEDILOL PHOSPHATE 6.25 MILLIGRAM(S): 80 CAPSULE, EXTENDED RELEASE ORAL at 06:28

## 2022-11-29 RX ADMIN — Medication 1: at 08:49

## 2022-11-29 RX ADMIN — Medication 81 MILLIGRAM(S): at 13:11

## 2022-11-29 RX ADMIN — SACUBITRIL AND VALSARTAN 1 TABLET(S): 24; 26 TABLET, FILM COATED ORAL at 05:50

## 2022-11-29 RX ADMIN — ENOXAPARIN SODIUM 40 MILLIGRAM(S): 100 INJECTION SUBCUTANEOUS at 05:49

## 2022-11-29 RX ADMIN — ATORVASTATIN CALCIUM 40 MILLIGRAM(S): 80 TABLET, FILM COATED ORAL at 21:34

## 2022-11-29 RX ADMIN — Medication 20 UNIT(S): at 12:58

## 2022-11-29 RX ADMIN — Medication 1: at 21:34

## 2022-11-29 RX ADMIN — SACUBITRIL AND VALSARTAN 1 TABLET(S): 24; 26 TABLET, FILM COATED ORAL at 17:55

## 2022-11-29 NOTE — PHARMACOTHERAPY INTERVENTION NOTE - NSPHARMCOMMASP
ASP - Duration of therapy DISPLAY PLAN FREE TEXT DISPLAY PLAN FREE TEXT DISPLAY PLAN FREE TEXT DISPLAY PLAN FREE TEXT DISPLAY PLAN FREE TEXT DISPLAY PLAN FREE TEXT DISPLAY PLAN FREE TEXT

## 2022-11-30 ENCOUNTER — TRANSCRIPTION ENCOUNTER (OUTPATIENT)
Age: 52
End: 2022-11-30

## 2022-11-30 LAB
ANION GAP SERPL CALC-SCNC: 10 MMOL/L — SIGNIFICANT CHANGE UP (ref 5–17)
BASOPHILS # BLD AUTO: 0.03 K/UL — SIGNIFICANT CHANGE UP (ref 0–0.2)
BASOPHILS NFR BLD AUTO: 0.3 % — SIGNIFICANT CHANGE UP (ref 0–2)
BUN SERPL-MCNC: 11.1 MG/DL — SIGNIFICANT CHANGE UP (ref 8–20)
CALCIUM SERPL-MCNC: 8.6 MG/DL — SIGNIFICANT CHANGE UP (ref 8.4–10.5)
CHLORIDE SERPL-SCNC: 103 MMOL/L — SIGNIFICANT CHANGE UP (ref 96–108)
CO2 SERPL-SCNC: 26 MMOL/L — SIGNIFICANT CHANGE UP (ref 22–29)
CREAT SERPL-MCNC: 0.32 MG/DL — LOW (ref 0.5–1.3)
EGFR: 126 ML/MIN/1.73M2 — SIGNIFICANT CHANGE UP
EOSINOPHIL # BLD AUTO: 0.24 K/UL — SIGNIFICANT CHANGE UP (ref 0–0.5)
EOSINOPHIL NFR BLD AUTO: 2.6 % — SIGNIFICANT CHANGE UP (ref 0–6)
GLUCOSE BLDC GLUCOMTR-MCNC: 120 MG/DL — HIGH (ref 70–99)
GLUCOSE BLDC GLUCOMTR-MCNC: 139 MG/DL — HIGH (ref 70–99)
GLUCOSE BLDC GLUCOMTR-MCNC: 163 MG/DL — HIGH (ref 70–99)
GLUCOSE BLDC GLUCOMTR-MCNC: 181 MG/DL — HIGH (ref 70–99)
GLUCOSE SERPL-MCNC: 193 MG/DL — HIGH (ref 70–99)
HCT VFR BLD CALC: 43.2 % — SIGNIFICANT CHANGE UP (ref 34.5–45)
HGB BLD-MCNC: 14.3 G/DL — SIGNIFICANT CHANGE UP (ref 11.5–15.5)
IMM GRANULOCYTES NFR BLD AUTO: 0.2 % — SIGNIFICANT CHANGE UP (ref 0–0.9)
LYMPHOCYTES # BLD AUTO: 2.76 K/UL — SIGNIFICANT CHANGE UP (ref 1–3.3)
LYMPHOCYTES # BLD AUTO: 30.1 % — SIGNIFICANT CHANGE UP (ref 13–44)
MAGNESIUM SERPL-MCNC: 2 MG/DL — SIGNIFICANT CHANGE UP (ref 1.6–2.6)
MCHC RBC-ENTMCNC: 30 PG — SIGNIFICANT CHANGE UP (ref 27–34)
MCHC RBC-ENTMCNC: 33.1 GM/DL — SIGNIFICANT CHANGE UP (ref 32–36)
MCV RBC AUTO: 90.6 FL — SIGNIFICANT CHANGE UP (ref 80–100)
MONOCYTES # BLD AUTO: 0.9 K/UL — SIGNIFICANT CHANGE UP (ref 0–0.9)
MONOCYTES NFR BLD AUTO: 9.8 % — SIGNIFICANT CHANGE UP (ref 2–14)
NEUTROPHILS # BLD AUTO: 5.21 K/UL — SIGNIFICANT CHANGE UP (ref 1.8–7.4)
NEUTROPHILS NFR BLD AUTO: 57 % — SIGNIFICANT CHANGE UP (ref 43–77)
PHOSPHATE SERPL-MCNC: 3.6 MG/DL — SIGNIFICANT CHANGE UP (ref 2.4–4.7)
PLATELET # BLD AUTO: 193 K/UL — SIGNIFICANT CHANGE UP (ref 150–400)
POTASSIUM SERPL-MCNC: 3.8 MMOL/L — SIGNIFICANT CHANGE UP (ref 3.5–5.3)
POTASSIUM SERPL-SCNC: 3.8 MMOL/L — SIGNIFICANT CHANGE UP (ref 3.5–5.3)
RBC # BLD: 4.77 M/UL — SIGNIFICANT CHANGE UP (ref 3.8–5.2)
RBC # FLD: 13.2 % — SIGNIFICANT CHANGE UP (ref 10.3–14.5)
SODIUM SERPL-SCNC: 139 MMOL/L — SIGNIFICANT CHANGE UP (ref 135–145)
WBC # BLD: 9.16 K/UL — SIGNIFICANT CHANGE UP (ref 3.8–10.5)
WBC # FLD AUTO: 9.16 K/UL — SIGNIFICANT CHANGE UP (ref 3.8–10.5)

## 2022-11-30 PROCEDURE — 99231 SBSQ HOSP IP/OBS SF/LOW 25: CPT

## 2022-11-30 PROCEDURE — 99232 SBSQ HOSP IP/OBS MODERATE 35: CPT

## 2022-11-30 RX ADMIN — Medication 81 MILLIGRAM(S): at 14:07

## 2022-11-30 RX ADMIN — Medication 100 MILLIGRAM(S): at 21:28

## 2022-11-30 RX ADMIN — Medication 1: at 21:33

## 2022-11-30 RX ADMIN — Medication 100 MILLIGRAM(S): at 14:28

## 2022-11-30 RX ADMIN — CARVEDILOL PHOSPHATE 6.25 MILLIGRAM(S): 80 CAPSULE, EXTENDED RELEASE ORAL at 05:24

## 2022-11-30 RX ADMIN — SACUBITRIL AND VALSARTAN 1 TABLET(S): 24; 26 TABLET, FILM COATED ORAL at 05:23

## 2022-11-30 RX ADMIN — CARVEDILOL PHOSPHATE 6.25 MILLIGRAM(S): 80 CAPSULE, EXTENDED RELEASE ORAL at 17:14

## 2022-11-30 RX ADMIN — ENOXAPARIN SODIUM 40 MILLIGRAM(S): 100 INJECTION SUBCUTANEOUS at 05:24

## 2022-11-30 RX ADMIN — Medication 20 UNIT(S): at 12:30

## 2022-11-30 RX ADMIN — INSULIN GLARGINE 30 UNIT(S): 100 INJECTION, SOLUTION SUBCUTANEOUS at 21:33

## 2022-11-30 RX ADMIN — Medication 20 UNIT(S): at 17:45

## 2022-11-30 RX ADMIN — Medication 40 MILLIGRAM(S): at 05:23

## 2022-11-30 RX ADMIN — Medication 1: at 12:30

## 2022-11-30 RX ADMIN — Medication 40 MILLIGRAM(S): at 14:08

## 2022-11-30 RX ADMIN — Medication 100 MILLIGRAM(S): at 05:22

## 2022-11-30 RX ADMIN — ATORVASTATIN CALCIUM 40 MILLIGRAM(S): 80 TABLET, FILM COATED ORAL at 21:33

## 2022-11-30 RX ADMIN — SPIRONOLACTONE 25 MILLIGRAM(S): 25 TABLET, FILM COATED ORAL at 05:24

## 2022-11-30 RX ADMIN — SACUBITRIL AND VALSARTAN 1 TABLET(S): 24; 26 TABLET, FILM COATED ORAL at 17:14

## 2022-11-30 RX ADMIN — Medication 20 UNIT(S): at 08:49

## 2022-11-30 NOTE — DISCHARGE NOTE PROVIDER - NSDCMRMEDTOKEN_GEN_ALL_CORE_FT
ADMELOG SOLOSTAR 100U/ML PEN: 20 unit(s) injectable 3 times a day  aspirin 81 mg oral tablet, chewable: 1 tab(s) orally once a day  atorvastatin 40 mg oral tablet: 1 tab(s) orally once a day (at bedtime)  BASAGLAR KWIKPEN 100U/ML PEN: 30 unit(s) subcutaneous once a day (at bedtime)  cefadroxil 1000 mg oral tablet: 1 tab(s) orally every 12 hours   Coreg 6.25 mg oral tablet: 1 tab(s) orally 2 times a day  furosemide 40 mg oral tablet: 1 tab(s) orally 2 times a day  Jardiance 10 mg oral tablet: 1 tab(s) orally once a day (in the morning)  sacubitril-valsartan 49 mg-51 mg oral tablet: 1 tab(s) orally 2 times a day  spironolactone 25 mg oral tablet: 1 tab(s) orally once a day

## 2022-11-30 NOTE — DISCHARGE NOTE PROVIDER - NSDCCPCAREPLAN_GEN_ALL_CORE_FT
PRINCIPAL DISCHARGE DIAGNOSIS  Diagnosis: Cellulitis  Assessment and Plan of Treatment: Complete antibiotic course.  Follow up with primary doctor.      SECONDARY DISCHARGE DIAGNOSES  Diagnosis: Chronic systolic heart failure  Assessment and Plan of Treatment: Continue current medications as prescribed.  Follow up with primary doctor and cardiology.     PRINCIPAL DISCHARGE DIAGNOSIS  Diagnosis: Cellulitis  Assessment and Plan of Treatment: Complete antibiotic course.  Follow up with primary doctor.      SECONDARY DISCHARGE DIAGNOSES  Diagnosis: Chronic systolic heart failure  Assessment and Plan of Treatment: Continue current medications as prescribed.  Follow up with primary doctor and cardiology.    Diagnosis: DM (diabetes mellitus)  Assessment and Plan of Treatment: Continue current medications as prescribed.  Follow up with primary doctor.     PRINCIPAL DISCHARGE DIAGNOSIS  Diagnosis: Cellulitis  Assessment and Plan of Treatment: Complete antibiotic course.  Follow up with primary doctor and ID within 1 week  Continue outpatient follow up with vascular surgery.      SECONDARY DISCHARGE DIAGNOSES  Diagnosis: Chronic systolic heart failure  Assessment and Plan of Treatment: Continue current medications as prescribed.  Adhere to fluid and sodium restriction.  Follow up with primary doctor and cardiology.    Diagnosis: DM (diabetes mellitus)  Assessment and Plan of Treatment: Continue current medications as prescribed.  Follow up with primary doctor.

## 2022-11-30 NOTE — PROGRESS NOTE ADULT - SUBJECTIVE AND OBJECTIVE BOX
seen for cellulitis    no acute complaints/events  ros negative      MEDICATIONS  (STANDING):  aspirin  chewable 81 milliGRAM(s) Oral daily  atorvastatin 40 milliGRAM(s) Oral at bedtime  carvedilol 6.25 milliGRAM(s) Oral every 12 hours  dextrose 5%. 1000 milliLiter(s) (100 mL/Hr) IV Continuous <Continuous>  dextrose 5%. 1000 milliLiter(s) (50 mL/Hr) IV Continuous <Continuous>  dextrose 50% Injectable 25 Gram(s) IV Push once  dextrose 50% Injectable 12.5 Gram(s) IV Push once  dextrose 50% Injectable 25 Gram(s) IV Push once  enoxaparin Injectable 40 milliGRAM(s) SubCutaneous every 24 hours  furosemide    Tablet 40 milliGRAM(s) Oral two times a day  glucagon  Injectable 1 milliGRAM(s) IntraMuscular once  influenza   Vaccine 0.5 milliLiter(s) IntraMuscular once  insulin glargine Injectable (LANTUS) 30 Unit(s) SubCutaneous at bedtime  insulin lispro (ADMELOG) corrective regimen sliding scale   SubCutaneous Before meals and at bedtime  insulin lispro Injectable (ADMELOG) 20 Unit(s) SubCutaneous three times a day before meals  piperacillin/tazobactam IVPB.. 3.375 Gram(s) IV Intermittent every 8 hours  sacubitril 49 mG/valsartan 51 mG 1 Tablet(s) Oral two times a day  spironolactone 25 milliGRAM(s) Oral daily  vancomycin  IVPB 1000 milliGRAM(s) IV Intermittent every 12 hours    MEDICATIONS  (PRN):  acetaminophen     Tablet .. 650 milliGRAM(s) Oral every 6 hours PRN Temp greater or equal to 38C (100.4F), Mild Pain (1 - 3)  aluminum hydroxide/magnesium hydroxide/simethicone Suspension 30 milliLiter(s) Oral every 4 hours PRN Dyspepsia  dextrose Oral Gel 15 Gram(s) Oral once PRN Blood Glucose LESS THAN 70 milliGRAM(s)/deciliter  melatonin 3 milliGRAM(s) Oral at bedtime PRN Insomnia  nicotine - 21 mG/24Hr(s) Patch 1 Patch Transdermal daily PRN Nicotine WD  ondansetron Injectable 4 milliGRAM(s) IV Push every 8 hours PRN Nausea and/or Vomiting      Allergies    No Known Allergies      Vital Signs Last 24 Hrs  T(C): 36.5 (28 Nov 2022 04:25), Max: 36.9 (28 Nov 2022 02:36)  T(F): 97.7 (28 Nov 2022 04:25), Max: 98.5 (28 Nov 2022 02:36)  HR: 73 (28 Nov 2022 04:25) (73 - 100)  BP: 116/73 (28 Nov 2022 04:25) (95/67 - 126/73)  BP(mean): --  RR: 18 (28 Nov 2022 04:25) (18 - 18)  SpO2: 96% (28 Nov 2022 04:25) (95% - 99%)    Parameters below as of 28 Nov 2022 04:25  Patient On (Oxygen Delivery Method): room air        PHYSICAL EXAM:    GENERAL: NAD  CHEST/LUNG: Clear to ausculation bilaterally  HEART: Regular rate and rhythm; S1 S2  ABDOMEN: Soft, Bowel sounds present  EXTREMITIES:  no pitting edema RLE.  LLE lower leg erythema/swelling and chronic stasis dermatitis  NERVOUS SYSTEM:  Alert & Oriented X3, Motor Strength 5/5 B/L upper and lower extremities  PSYCH: normal mood, appropriate response.    LABS:                        15.4   8.26  )-----------( 190      ( 28 Nov 2022 08:45 )             46.6     11-28    135  |  96  |  9.1  ----------------------------<  209<H>  3.8   |  25.0  |  0.38<L>    Ca    8.7      28 Nov 2022 08:45    TPro  6.8  /  Alb  3.5  /  TBili  0.4  /  DBili  x   /  AST  13  /  ALT  16  /  AlkPhos  91  11-28    PT/INR - ( 27 Nov 2022 19:49 )   PT: 13.6 sec;   INR: 1.17 ratio         PTT - ( 27 Nov 2022 19:49 )  PTT:38.9 sec      CAPILLARY BLOOD GLUCOSE      POCT Blood Glucose.: 191 mg/dL (28 Nov 2022 08:14)  POCT Blood Glucose.: 239 mg/dL (27 Nov 2022 23:16)        RADIOLOGY & ADDITIONAL TESTS:  
CHIEF COMPLAINT/INTERVAL HISTORY:    Patient is a 51y old  Female who presents with a chief complaint of LLE Cellulitis (29 Nov 2022 10:30)    SUBJECTIVE & OBJECTIVE: Pt seen and examined at bedside. No overnight events. Reports LLE pain and swelling have improved. Able to ambulate. Continue IV abx x 24 hours per ID.    ROS: No chest pain, palpitations, SOB, light headedness, dizziness, headache, nausea/vomiting, fevers/chills, abdominal pain, dysuria or increased urinary frequency.    ICU Vital Signs Last 24 Hrs  T(C): 36.4 (29 Nov 2022 12:32), Max: 36.5 (28 Nov 2022 23:28)  T(F): 97.5 (29 Nov 2022 12:32), Max: 97.7 (28 Nov 2022 23:28)  HR: 59 (29 Nov 2022 12:32) (59 - 75)  BP: 104/61 (29 Nov 2022 12:32) (104/61 - 129/74)  RR: 18 (29 Nov 2022 12:32) (18 - 18)  SpO2: 95% (29 Nov 2022 12:32) (94% - 96%)    O2 Parameters below as of 29 Nov 2022 12:32  Patient On (Oxygen Delivery Method): room air    MEDICATIONS  (STANDING):  aspirin  chewable 81 milliGRAM(s) Oral daily  atorvastatin 40 milliGRAM(s) Oral at bedtime  carvedilol 6.25 milliGRAM(s) Oral every 12 hours  ceFAZolin   IVPB 2000 milliGRAM(s) IV Intermittent once  dextrose 5%. 1000 milliLiter(s) (50 mL/Hr) IV Continuous <Continuous>  dextrose 5%. 1000 milliLiter(s) (100 mL/Hr) IV Continuous <Continuous>  dextrose 50% Injectable 25 Gram(s) IV Push once  dextrose 50% Injectable 12.5 Gram(s) IV Push once  dextrose 50% Injectable 25 Gram(s) IV Push once  enoxaparin Injectable 40 milliGRAM(s) SubCutaneous every 24 hours  furosemide    Tablet 40 milliGRAM(s) Oral two times a day  glucagon  Injectable 1 milliGRAM(s) IntraMuscular once  influenza   Vaccine 0.5 milliLiter(s) IntraMuscular once  insulin glargine Injectable (LANTUS) 30 Unit(s) SubCutaneous at bedtime  insulin lispro (ADMELOG) corrective regimen sliding scale   SubCutaneous Before meals and at bedtime  insulin lispro Injectable (ADMELOG) 20 Unit(s) SubCutaneous three times a day before meals  sacubitril 49 mG/valsartan 51 mG 1 Tablet(s) Oral two times a day  spironolactone 25 milliGRAM(s) Oral daily    MEDICATIONS  (PRN):  acetaminophen     Tablet .. 650 milliGRAM(s) Oral every 6 hours PRN Temp greater or equal to 38C (100.4F), Mild Pain (1 - 3)  aluminum hydroxide/magnesium hydroxide/simethicone Suspension 30 milliLiter(s) Oral every 4 hours PRN Dyspepsia  dextrose Oral Gel 15 Gram(s) Oral once PRN Blood Glucose LESS THAN 70 milliGRAM(s)/deciliter  melatonin 3 milliGRAM(s) Oral at bedtime PRN Insomnia  nicotine - 21 mG/24Hr(s) Patch 1 Patch Transdermal daily PRN Nicotine WD  ondansetron Injectable 4 milliGRAM(s) IV Push every 8 hours PRN Nausea and/or Vomiting      LABS:                        15.4   8.26  )-----------( 190      ( 28 Nov 2022 08:45 )             46.6     11-28    135  |  96  |  9.1  ----------------------------<  209<H>  3.8   |  25.0  |  0.38<L>    Ca    8.7      28 Nov 2022 08:45    TPro  6.8  /  Alb  3.5  /  TBili  0.4  /  DBili  x   /  AST  13  /  ALT  16  /  AlkPhos  91  11-28    PT/INR - ( 27 Nov 2022 19:49 )   PT: 13.6 sec;   INR: 1.17 ratio         PTT - ( 27 Nov 2022 19:49 )  PTT:38.9 sec      CAPILLARY BLOOD GLUCOSE      POCT Blood Glucose.: 126 mg/dL (29 Nov 2022 13:04)  POCT Blood Glucose.: 127 mg/dL (29 Nov 2022 11:51)  POCT Blood Glucose.: 176 mg/dL (29 Nov 2022 08:24)  POCT Blood Glucose.: 159 mg/dL (28 Nov 2022 21:10)  POCT Blood Glucose.: 139 mg/dL (28 Nov 2022 18:53)    PHYSICAL EXAM:    GENERAL: elderly female, sitting in chair, NAD  HEAD:  Atraumatic, Normocephalic  EYES: EOMI, PERRLA, conjunctiva and sclera clear  ENMT: Moist mucous membranes  NECK: Supple   NERVOUS SYSTEM:  Alert & Oriented X3, Motor Strength 5/5 B/L upper and lower extremities   CHEST/LUNG: Clear to auscultation bilaterally; No rales, rhonchi, wheezing, or rubs  HEART: Regular rate and rhythm; + S1/S2  ABDOMEN: Soft, Nontender, Nondistended; Bowel sounds present  EXTREMITIES:  LLE erythema, edema and venous changes, RLE venous stasis
[facility]  INFECTIOUS DISEASES at [place] and [place]  ===============================================================                               Orlando Elizalde MD*     Monisha Kelly MD*                         Nicholas Maxwell MD*       Gerri Salter MD*            Diplomates American Board of Internal Medicine & Infectious Diseases                * [place] Office - Appt - Tel  [phone] Fax [phone]                * [place] Office - Appt - Tel [phone] Fax [phone]                                  Hospital Consult line:  [phone]  ==============================================================    ADIEL CURRAN [id number]    Follow up: LLE cellulitis    No acute events  VSS  Discharge planning in progress     I have personally reviewed the labs and data; pertinent labs and data are listed in this note; please see below.     _______________________________________________________________  REVIEW OF SYSTEMS  Feeling well. Left leg returning to normal. No fever or chills. Eager to go home. Remainder ROS neg  ________________________________________________________________  Allergies:  No Known Allergies    ________________________________________________________________  PHYSICAL EXAM  GEN: NAD, sitting up in bed. Obese   LUNGS: unlabored breathing   :No Koehler catheter  PSYCHIATRIC: Appropriate affect and mood  SKIN: b/l chronic skin changes. LLE with much improved erythema and warmth; no lesions besides superficial crack in left heel.   LINES: PIV  ________________________________________________________________  Vitals:  T(F): 97.4 (30 Nov 2022 05:21), Max: 97.6 (29 Nov 2022 16:18)  HR: 69 (30 Nov 2022 05:21)  BP: 138/83 (30 Nov 2022 05:21)  RR: 18 (30 Nov 2022 05:21)  SpO2: 94% (30 Nov 2022 05:21) (94% - 100%)  temp max in last 48H T(F): , Max: 97.7 (11-28-22 @ 23:28)    Current Antibiotics:  ceFAZolin   IVPB      ceFAZolin   IVPB 2000 milliGRAM(s) IV Intermittent every 8 hours    Other medications:  aspirin  chewable 81 milliGRAM(s) Oral daily  atorvastatin 40 milliGRAM(s) Oral at bedtime  carvedilol 6.25 milliGRAM(s) Oral every 12 hours  dextrose 5%. 1000 milliLiter(s) IV Continuous <Continuous>  dextrose 5%. 1000 milliLiter(s) IV Continuous <Continuous>  dextrose 50% Injectable 25 Gram(s) IV Push once  dextrose 50% Injectable 12.5 Gram(s) IV Push once  dextrose 50% Injectable 25 Gram(s) IV Push once  enoxaparin Injectable 40 milliGRAM(s) SubCutaneous every 24 hours  furosemide    Tablet 40 milliGRAM(s) Oral two times a day  glucagon  Injectable 1 milliGRAM(s) IntraMuscular once  influenza   Vaccine 0.5 milliLiter(s) IntraMuscular once  insulin glargine Injectable (LANTUS) 30 Unit(s) SubCutaneous at bedtime  insulin lispro (ADMELOG) corrective regimen sliding scale   SubCutaneous Before meals and at bedtime  insulin lispro Injectable (ADMELOG) 20 Unit(s) SubCutaneous three times a day before meals  sacubitril 49 mG/valsartan 51 mG 1 Tablet(s) Oral two times a day  spironolactone 25 milliGRAM(s) Oral daily                            14.3   9.16  )-----------( 193      ( 30 Nov 2022 04:52 )             43.2     11-30    139  |  103  |  11.1  ----------------------------<  193<H>  3.8   |  26.0  |  0.32<L>    Ca    8.6      30 Nov 2022 04:52  Phos  3.6     11-30  Mg     2.0     11-30    RECENT CULTURES:  11-27 @ 19:50 .Blood Blood-Peripheral     No growth to date.    11-27 @ 19:45 .Blood Blood-Peripheral     No growth to date.    WBC Count: 9.16 K/uL (11-30-22 @ 04:52)  WBC Count: 8.26 K/uL (11-28-22 @ 08:45)  WBC Count: 10.31 K/uL (11-27-22 @ 19:49)    Creatinine, Serum: 0.32 mg/dL (11-30-22 @ 04:52)  Creatinine, Serum: 0.38 mg/dL (11-28-22 @ 08:45)  Creatinine, Serum: 0.56 mg/dL (11-27-22 @ 19:49)    C-Reactive Protein, Serum: 70 mg/L (11-28-22 @ 08:45)    Sedimentation Rate, Erythrocyte: 25 mm/hr (11-28-22 @ 08:45)    Procalcitonin, Serum: 0.34 ng/mL (11-28-22 @ 08:45)     SARS-CoV-2 Result: NotDetec (11-27-22 @ 19:49)    ________________________________________________________________  RADIOLOGY  < from: Xray Tibia + Fibula 2 Views, Left (11.27.22 @ 21:51) >  FINDINGS:  No prior studies are available for review.  Indwelling  2 screw fixation of healed medial malleolus fracture and   plate screw fixation of healed distal fibular diaphyseal fracture.  Remaining osseous structures of the tibia and fibula are intact.    Mild lower extremity diffuse soft tissue swelling without subcutaneous   air or radiopaque foreign body.    IMPRESSION:   No acute radiographic osseous pathology..    < end of copied text >    
CC: LLE Cellulitis (30 Nov 2022 10:52)    HPI:  51F with PMHX CHFrEF, HTN, HLD, PAD, Tobacco Abuse, IDDM2 presents to [facility] ER c/o LLE pain/redness.     INTERVAL HPI/OVERNIGHT EVENTS: Patient seen and examined sitting up in the chair.  Patient reports feeling better. Patient denies any headache, dizziness, SOB, CP, abdominal pain, nausea, vomiting, dysuria.  Other ROS reviewed and are negative.    Vital Signs Last 24 Hrs  T(C): 36.4 (30 Nov 2022 14:00), Max: 36.4 (29 Nov 2022 16:18)  T(F): 97.5 (30 Nov 2022 14:00), Max: 97.6 (29 Nov 2022 16:18)  HR: 61 (30 Nov 2022 14:00) (57 - 69)  BP: 132/75 (30 Nov 2022 14:00) (102/68 - 138/83)  BP(mean): --  RR: 18 (30 Nov 2022 14:00) (18 - 18)  SpO2: 98% (30 Nov 2022 14:00) (94% - 100%)    Parameters below as of 30 Nov 2022 14:00  Patient On (Oxygen Delivery Method): room air      I&O's Detail    29 Nov 2022 07:01  -  30 Nov 2022 07:00  --------------------------------------------------------  IN:    IV PiggyBack: 250 mL    Oral Fluid: 950 mL  Total IN: 1200 mL    OUT:  Total OUT: 0 mL    Total NET: 1200 mL      PHYSICAL EXAM:  GENERAL: NAD  HEAD:  Atraumatic, Normocephalic  NECK: Supple, No JVD, Normal thyroid  NERVOUS SYSTEM:  Alert & Oriented X3, Good concentration; Motor Strength 5/5 B/L upper and lower extremities  CHEST/LUNG: Clear to auscultation bilaterally  HEART: Regular rate and rhythm; No murmurs, rubs, or gallops  ABDOMEN: Soft, Nontender, Nondistended; Bowel sounds present  EXTREMITIES:  2+ Peripheral Pulses, erythema and tenderness improved LLE; chronic venous stasis in bilateral LE                                14.3   9.16  )-----------( 193      ( 30 Nov 2022 04:52 )             43.2     30 Nov 2022 04:52    139    |  103    |  11.1   ----------------------------<  193    3.8     |  26.0   |  0.32     Ca    8.6        30 Nov 2022 04:52  Phos  3.6       30 Nov 2022 04:52  Mg     2.0       30 Nov 2022 04:52        CAPILLARY BLOOD GLUCOSE  POCT Blood Glucose.: 163 mg/dL (30 Nov 2022 11:48)  POCT Blood Glucose.: 139 mg/dL (30 Nov 2022 08:13)  POCT Blood Glucose.: 174 mg/dL (29 Nov 2022 21:32)  POCT Blood Glucose.: 121 mg/dL (29 Nov 2022 17:36)          MEDICATIONS  (STANDING):  aspirin  chewable 81 milliGRAM(s) Oral daily  atorvastatin 40 milliGRAM(s) Oral at bedtime  carvedilol 6.25 milliGRAM(s) Oral every 12 hours  ceFAZolin   IVPB      ceFAZolin   IVPB 2000 milliGRAM(s) IV Intermittent every 8 hours  dextrose 5%. 1000 milliLiter(s) (100 mL/Hr) IV Continuous <Continuous>  dextrose 5%. 1000 milliLiter(s) (50 mL/Hr) IV Continuous <Continuous>  dextrose 50% Injectable 25 Gram(s) IV Push once  dextrose 50% Injectable 12.5 Gram(s) IV Push once  dextrose 50% Injectable 25 Gram(s) IV Push once  enoxaparin Injectable 40 milliGRAM(s) SubCutaneous every 24 hours  furosemide    Tablet 40 milliGRAM(s) Oral two times a day  glucagon  Injectable 1 milliGRAM(s) IntraMuscular once  influenza   Vaccine 0.5 milliLiter(s) IntraMuscular once  insulin glargine Injectable (LANTUS) 30 Unit(s) SubCutaneous at bedtime  insulin lispro (ADMELOG) corrective regimen sliding scale   SubCutaneous Before meals and at bedtime  insulin lispro Injectable (ADMELOG) 20 Unit(s) SubCutaneous three times a day before meals  sacubitril 49 mG/valsartan 51 mG 1 Tablet(s) Oral two times a day  spironolactone 25 milliGRAM(s) Oral daily    MEDICATIONS  (PRN):  acetaminophen     Tablet .. 650 milliGRAM(s) Oral every 6 hours PRN Temp greater or equal to 38C (100.4F), Mild Pain (1 - 3)  aluminum hydroxide/magnesium hydroxide/simethicone Suspension 30 milliLiter(s) Oral every 4 hours PRN Dyspepsia  dextrose Oral Gel 15 Gram(s) Oral once PRN Blood Glucose LESS THAN 70 milliGRAM(s)/deciliter  melatonin 3 milliGRAM(s) Oral at bedtime PRN Insomnia  nicotine - 21 mG/24Hr(s) Patch 1 Patch Transdermal daily PRN Nicotine WD  ondansetron Injectable 4 milliGRAM(s) IV Push every 8 hours PRN Nausea and/or Vomiting        
[facility]  INFECTIOUS DISEASES at [place] and [place]  ===============================================================                               Orlando Elizalde MD*     Monisha Kelly MD*                         Nicholas Maxwell MD*       Gerri Salter MD*            Diplomates American Board of Internal Medicine & Infectious Diseases                * [place] Office - Appt - Tel  [phone] Fax [phone]                * [place] Office - Appt - Tel [phone] Fax [phone]                                  Hospital Consult line:  [phone]  ==============================================================    ADIEL CURRAN [id number]    Follow up: LLE cellulitis     No acute events  VSS    I have personally reviewed the labs and data; pertinent labs and data are listed in this note; please see below.     _______________________________________________________________  REVIEW OF SYSTEMS  Feeling well. Tolerating abx w/o noticeable side effects. No fever, chills, N, V or diarrhea.   ________________________________________________________________  Allergies:  No Known Allergies    ________________________________________________________________  PHYSICAL EXAM  GEN: NAD, lying in bed. Obese  HEENT:  Moist mucous membranes. No mucosal lesions.   LUNGS: unlabored breathing   :  No Koehler catheter  NEUROLOGIC: Grossly no focal deficits   PSYCHIATRIC: Appropriate affect and mood  SKIN: chronic skin changes in b/l LEs. LLE with superimposed erythema and warmth improved since yesterday   LINES: PIV  ________________________________________________________________  Vitals:  T(F): 97.5 (29 Nov 2022 12:32), Max: 97.7 (28 Nov 2022 23:28)  HR: 59 (29 Nov 2022 12:32)  BP: 104/61 (29 Nov 2022 12:32)  RR: 18 (29 Nov 2022 12:32)  SpO2: 95% (29 Nov 2022 12:32) (94% - 98%)  temp max in last 48H T(F): , Max: 98.5 (11-28-22 @ 02:36)    Current Antibiotics:  piperacillin/tazobactam IVPB.. 3.375 Gram(s) IV Intermittent every 8 hours  vancomycin  IVPB 1000 milliGRAM(s) IV Intermittent every 12 hours    Other medications:  aspirin  chewable 81 milliGRAM(s) Oral daily  atorvastatin 40 milliGRAM(s) Oral at bedtime  carvedilol 6.25 milliGRAM(s) Oral every 12 hours  dextrose 5%. 1000 milliLiter(s) IV Continuous <Continuous>  dextrose 5%. 1000 milliLiter(s) IV Continuous <Continuous>  dextrose 50% Injectable 25 Gram(s) IV Push once  dextrose 50% Injectable 12.5 Gram(s) IV Push once  dextrose 50% Injectable 25 Gram(s) IV Push once  enoxaparin Injectable 40 milliGRAM(s) SubCutaneous every 24 hours  furosemide    Tablet 40 milliGRAM(s) Oral two times a day  glucagon  Injectable 1 milliGRAM(s) IntraMuscular once  influenza   Vaccine 0.5 milliLiter(s) IntraMuscular once  insulin glargine Injectable (LANTUS) 30 Unit(s) SubCutaneous at bedtime  insulin lispro (ADMELOG) corrective regimen sliding scale   SubCutaneous Before meals and at bedtime  insulin lispro Injectable (ADMELOG) 20 Unit(s) SubCutaneous three times a day before meals  sacubitril 49 mG/valsartan 51 mG 1 Tablet(s) Oral two times a day  spironolactone 25 milliGRAM(s) Oral daily                            15.4   8.26  )-----------( 190      ( 28 Nov 2022 08:45 )             46.6     11-28    135  |  96  |  9.1  ----------------------------<  209<H>  3.8   |  25.0  |  0.38<L>    Ca    8.7      28 Nov 2022 08:45    TPro  6.8  /  Alb  3.5  /  TBili  0.4  /  DBili  x   /  AST  13  /  ALT  16  /  AlkPhos  91  11-28    RECENT CULTURES:  MRSA/MSSA PCR (11.29.22 @ 04:45)    MRSA PCR Result.: Eduard    11-27 @ 19:50 .Blood Blood-Peripheral     No growth to date.    11-27 @ 19:45 .Blood Blood-Peripheral     No growth to date.    WBC Count: 8.26 K/uL (11-28-22 @ 08:45)  WBC Count: 10.31 K/uL (11-27-22 @ 19:49)    Creatinine, Serum: 0.38 mg/dL (11-28-22 @ 08:45)  Creatinine, Serum: 0.56 mg/dL (11-27-22 @ 19:49)    C-Reactive Protein, Serum: 70 mg/L (11-28-22 @ 08:45)  Sedimentation Rate, Erythrocyte: 25 mm/hr (11-28-22 @ 08:45)    Procalcitonin, Serum: 0.34 ng/mL (11-28-22 @ 08:45)     SARS-CoV-2 Result: Tim (11-27-22 @ 19:49)    ________________________________________________________________  RADIOLOGY  < from: Xray Tibia + Fibula 2 Views, Left (11.27.22 @ 21:51) >  CLINICAL INFORMATION: Lower extremity cellulitis TECHNIQUE:  Frontal and   lateral views of the tibia and fibula were obtained.    FINDINGS:  No prior studies are available for review.  Indwelling  2 screw fixation of healed medial malleolus fracture and   plate screw fixation of healed distal fibular diaphyseal fracture.  Remaining osseous structures of the tibia and fibula are intact.    Mild lower extremity diffuse soft tissue swelling without subcutaneous   air or radiopaque foreign body.    IMPRESSION:   No acute radiographic osseous pathology..    < end of copied text >

## 2022-11-30 NOTE — DISCHARGE NOTE PROVIDER - NSDCFUSCHEDAPPT_GEN_ALL_CORE_FT
Sai Younger Physician [facility]  CARDIOLOGY [address] [facility]  Scheduled Appointment: 01/23/2023

## 2022-11-30 NOTE — PROGRESS NOTE ADULT - NS ATTEND AMEND GEN_ALL_CORE FT
I have seen and examined the patient and agree with the above assessment and plan. No overnight events. LLE significantly improved; switch to PO abx and medically stable for discharge but unable to leave to shelter today. SW on board for placement.

## 2022-11-30 NOTE — DISCHARGE NOTE PROVIDER - HOSPITAL COURSE
Patient is a 51 year old with PMH of CHFrEF, HTN, HLD, PAD, Tobacco Abuse, DM2 presents to [facility] ER c/o LLE pain/redness admitted with LLE Cellulitis and DM2 with uncontrolled hyperglycemia.  LE dopplers negative.  Blood cultures negative.  Patient treated with IV antibiotics.  Patient followed by ID.  Patient transitioned to PO antibiotics to complete the course.  A1c 9.  Patient continued on insulin.  Patient improved and stable for discharge to shelter.    Vital Signs Last 24 Hrs  T(C): 36.3 (30 Nov 2022 05:21), Max: 36.4 (29 Nov 2022 12:32)  T(F): 97.4 (30 Nov 2022 05:21), Max: 97.6 (29 Nov 2022 16:18)  HR: 69 (30 Nov 2022 05:21) (57 - 69)  BP: 138/83 (30 Nov 2022 05:21) (104/61 - 138/83)  BP(mean): --  RR: 18 (30 Nov 2022 05:21) (18 - 18)  SpO2: 94% (30 Nov 2022 05:21) (94% - 100%)    Parameters below as of 30 Nov 2022 05:21  Patient On (Oxygen Delivery Method): room air    PHYSICAL EXAM:  GENERAL: NAD  HEAD:  Atraumatic, Normocephalic  NECK: Supple, No JVD, Normal thyroid  NERVOUS SYSTEM:  Alert & Oriented X3, Good concentration; Motor Strength 5/5 B/L upper and lower extremities  CHEST/LUNG: Clear to auscultation bilaterally  HEART: Regular rate and rhythm; No murmurs, rubs, or gallops  ABDOMEN: Soft, Nontender, Nondistended; Bowel sounds present  EXTREMITIES:  2+ Peripheral Pulses, erythema and tenderness improved LLE; chronic venous stasis in bilateral LE             Patient is a 51 year old with PMH of CHFrEF, HTN, HLD, PAD, Tobacco Abuse, DM2 presents to [facility] ER c/o LLE pain/redness admitted with LLE Cellulitis and DM2 with uncontrolled hyperglycemia.  LE dopplers negative.  Blood cultures negative.  Patient treated with IV antibiotics.  Patient followed by ID.  Patient transitioned to PO antibiotics to complete the course.  A1c 9.  Patient continued on insulin.  Patient improved and stable for discharge to shelter.    PHYSICAL EXAM:  GENERAL: NAD  HEAD:  Atraumatic, Normocephalic  NECK: Supple, No JVD, Normal thyroid  NERVOUS SYSTEM:  Alert & Oriented X3, Good concentration; Motor Strength 5/5 B/L upper and lower extremities  CHEST/LUNG: Clear to auscultation bilaterally  HEART: Regular rate and rhythm; No murmurs, rubs, or gallops  ABDOMEN: Soft, Nontender, Nondistended; Bowel sounds present  EXTREMITIES:  2+ Peripheral Pulses, erythema and tenderness improved LLE; chronic venous stasis in bilateral LE

## 2022-11-30 NOTE — DISCHARGE NOTE PROVIDER - CARE PROVIDER_API CALL
Primary doctor,   Phone: (   )    -  Fax: (   )    -  Follow Up Time:     Sai Younger)  Cardiovascular Disease; Internal Medicine  [address], [place] [address]  Phone: [phone]  Fax: [phone]  Follow Up Time:

## 2022-11-30 NOTE — DISCHARGE NOTE PROVIDER - PROVIDER TOKENS
FREE:[LAST:[Primary doctor],PHONE:[(   )    -],FAX:[(   )    -]],PROVIDER:[TOKEN:[[id number]]]

## 2022-11-30 NOTE — DISCHARGE NOTE PROVIDER - ATTENDING DISCHARGE PHYSICAL EXAMINATION:
PHYSICAL EXAM:    GENERAL: elderly female, sitting in chair, NAD  HEAD:  Atraumatic, Normocephalic  EYES: EOMI, PERRLA, conjunctiva and sclera clear  ENMT: Moist mucous membranes  NECK: Supple   NERVOUS SYSTEM:  Alert & Oriented X3, Motor Strength 5/5 B/L upper and lower extremities   CHEST/LUNG: Clear to auscultation bilaterally; No rales, rhonchi, wheezing, or rubs  HEART: Regular rate and rhythm; + S1/S2  ABDOMEN: Soft, Nontender, Nondistended; Bowel sounds present  EXTREMITIES:  LLE erythema, edema and venous changes, RLE venous stasis

## 2022-11-30 NOTE — PROGRESS NOTE ADULT - ASSESSMENT
51F with history of CHFrEF, HTN, HLD, PAD, Tobacco Abuse, IDDM2 presents to [facility] ER c/o LLE pain/redness. Recently noted a crack on her left heel, but did not pay much attention to it besides mild pain. Presented to [facility] on 11/27 with redness and pain in left leg, with associated chills. Denies fever. Reports similar cellulitis episodes in the past. A1C 9.2    Impression:  LLE cellulitis  Uncontrolled DM   Obesity     Plan:  - Not colonized with MRSA (nasal swab neg)  - Discontinue vancomycin  - Change piperacillin-tazobactam to cefazolin 2 gm IV q8h  - Anticipate d/c on oral cefadroxil 1 gm PO q12h    - Continue to monitor area of erythema   - BCX NGTD  - No leukocytosis or fever     D/w hospitalist team and patient     Will follow 
Patient is a 51 year old with PMH of CHFrEF, HTN, HLD, PAD, Tobacco Abuse, DM2 presents to [facility] ER c/o LLE pain/redness admitted with LLE Cellulitis and DM2 with uncontrolled hyperglycemia.    LLE Cellulitis; history of PVD  -LE Venous Doppler Negative  -Lactate Negative  -BCX x2 negative  -Continue Cefazolin   -Transition to PO abx on dc  -continue aspirin and statin for PVD  -ID recs appreciated    Chronic HFrEF   -appears euvolemic   -Prior TTE LVEF 25-30%  -Continue Lasix, aldactone, entresto  -strict I/os, daily weights    DM2    -sugars controlled  -HbA1c 9  -continue Lantus 30 units qHS and Ademelog 20 units TID ACHS  -ISS   -ADA diet    Tobacco Abuse  - cessation  -Nicotine patch PRN    HLD  -continue statin    DVT ppx - Lovenox SC    Dispo - Discharge to shelter in 24 hours with PO abx.  
51F with PMHX CHFrEF, HTN, HLD, PAD, Tobacco Abuse, DM2 presents to [facility] ER c/o LLE pain/redness admitted with LLE Cellulitis and DM2 with uncontrolled hyperglycemia.    LLE Cellulitis  -LE Venous Doppler Negative  -Lactate Negative  -BCX x2 pending  -COVID/RSV/Flu negative  -Continue Vanco/Zosyn for empiric coverage with diabetic infection  -Avoid IVFB/IVF with significant CHF history and stable BP with negative Lactate  -VTE PPX LMWH  ID consulted     CHFrEF, HTN, HLD  -Prior TTE LVEF 25-30%  -Continue Lasix 40mg BID  -Entresto 49/51 BID  -Spironolactone 25mg q24  -ASA 81mg q24  -Atorvastatin 40mg q24  -Avoid IVF. Continue diuresis.    IDDM2 with Hyperglycemia  -Uncontrolled BS 250s  -Lantus 30 units qHS  -Ademelog 20 units TID ACHS  -ISS/Accuchecks/A1C 9  diabetic education    Tobacco Abuse  - cessation. Nicotine patch PRN.    dc pending improvement, negative blood cultures  
51F with history of CHFrEF, HTN, HLD, PAD, Tobacco Abuse, IDDM2 presents to [facility] ER c/o LLE pain/redness. Recently noted a crack on her left heel, but did not pay much attention to it besides mild pain. Presented to [facility] on 11/27 with redness and pain in left leg, with associated chills. Denies fever. Reports similar cellulitis episodes in the past. A1C 9.2    Impression:  LLE cellulitis  Uncontrolled DM   Obesity     Plan:  - OK to discharge on cefadroxil 1 gm PO q12h for 10 days   - Not colonized with MRSA (nasal swab neg)  - BCX NGTD  - No leukocytosis or fever   - Clinically stable for discharge     D/w CLAYTON and patient     Will follow 
Patient is a 51 year old with PMH of CHFrEF, HTN, HLD, PAD, Tobacco Abuse, DM2 presents to [facility] ER c/o LLE pain/redness admitted with LLE Cellulitis and DM2 with uncontrolled hyperglycemia.    LLE Cellulitis; history of PVD  -LE Venous Doppler Negative  -Lactate Negative  -BCX x2 negative  -Switch to Cefazolin today  -Transition to PO abx in 24 hours  -continue aspirin and statin for PVD  -ID recs appreciated    Chronic HFrEF   -appears euvolemic   -Prior TTE LVEF 25-30%  -Continue Lasix, aldactone, entresto  -strict I/os, daily weights    DM2    -sugars controlled  -HbA1c 9  -continue Lantus 30 units qHS and Ademelog 20 units TID ACHS  -ISS   -ADA diet    Tobacco Abuse  - cessation  -Nicotine patch PRN    HLD  -continue statin    DVT ppx - Lovenox SC    Dispo - Continue IV abx. Discharge to shelter in 24 hours.

## 2022-12-01 ENCOUNTER — TRANSCRIPTION ENCOUNTER (OUTPATIENT)
Age: 52
End: 2022-12-01

## 2022-12-01 VITALS
RESPIRATION RATE: 18 BRPM | HEART RATE: 64 BPM | TEMPERATURE: 97 F | OXYGEN SATURATION: 98 % | SYSTOLIC BLOOD PRESSURE: 112 MMHG | DIASTOLIC BLOOD PRESSURE: 72 MMHG

## 2022-12-01 LAB — GLUCOSE BLDC GLUCOMTR-MCNC: 177 MG/DL — HIGH (ref 70–99)

## 2022-12-01 PROCEDURE — 87641 MR-STAPH DNA AMP PROBE: CPT

## 2022-12-01 PROCEDURE — 85730 THROMBOPLASTIN TIME PARTIAL: CPT

## 2022-12-01 PROCEDURE — 85025 COMPLETE CBC W/AUTO DIFF WBC: CPT

## 2022-12-01 PROCEDURE — 80202 ASSAY OF VANCOMYCIN: CPT

## 2022-12-01 PROCEDURE — 99285 EMERGENCY DEPT VISIT HI MDM: CPT

## 2022-12-01 PROCEDURE — 87640 STAPH A DNA AMP PROBE: CPT

## 2022-12-01 PROCEDURE — 85014 HEMATOCRIT: CPT

## 2022-12-01 PROCEDURE — 83605 ASSAY OF LACTIC ACID: CPT

## 2022-12-01 PROCEDURE — 82803 BLOOD GASES ANY COMBINATION: CPT

## 2022-12-01 PROCEDURE — 83036 HEMOGLOBIN GLYCOSYLATED A1C: CPT

## 2022-12-01 PROCEDURE — 84100 ASSAY OF PHOSPHORUS: CPT

## 2022-12-01 PROCEDURE — 85652 RBC SED RATE AUTOMATED: CPT

## 2022-12-01 PROCEDURE — 99239 HOSP IP/OBS DSCHRG MGMT >30: CPT

## 2022-12-01 PROCEDURE — 86140 C-REACTIVE PROTEIN: CPT

## 2022-12-01 PROCEDURE — 83735 ASSAY OF MAGNESIUM: CPT

## 2022-12-01 PROCEDURE — 36415 COLL VENOUS BLD VENIPUNCTURE: CPT

## 2022-12-01 PROCEDURE — 84132 ASSAY OF SERUM POTASSIUM: CPT

## 2022-12-01 PROCEDURE — 82330 ASSAY OF CALCIUM: CPT

## 2022-12-01 PROCEDURE — 82947 ASSAY GLUCOSE BLOOD QUANT: CPT

## 2022-12-01 PROCEDURE — 93971 EXTREMITY STUDY: CPT

## 2022-12-01 PROCEDURE — 87637 SARSCOV2&INF A&B&RSV AMP PRB: CPT

## 2022-12-01 PROCEDURE — 80048 BASIC METABOLIC PNL TOTAL CA: CPT

## 2022-12-01 PROCEDURE — 80053 COMPREHEN METABOLIC PANEL: CPT

## 2022-12-01 PROCEDURE — 73590 X-RAY EXAM OF LOWER LEG: CPT

## 2022-12-01 PROCEDURE — 85018 HEMOGLOBIN: CPT

## 2022-12-01 PROCEDURE — 84295 ASSAY OF SERUM SODIUM: CPT

## 2022-12-01 PROCEDURE — 96374 THER/PROPH/DIAG INJ IV PUSH: CPT

## 2022-12-01 PROCEDURE — 87040 BLOOD CULTURE FOR BACTERIA: CPT

## 2022-12-01 PROCEDURE — 82435 ASSAY OF BLOOD CHLORIDE: CPT

## 2022-12-01 PROCEDURE — 82962 GLUCOSE BLOOD TEST: CPT

## 2022-12-01 PROCEDURE — 85610 PROTHROMBIN TIME: CPT

## 2022-12-01 PROCEDURE — 84145 PROCALCITONIN (PCT): CPT

## 2022-12-01 RX ADMIN — ENOXAPARIN SODIUM 40 MILLIGRAM(S): 100 INJECTION SUBCUTANEOUS at 05:13

## 2022-12-01 RX ADMIN — Medication 1: at 09:08

## 2022-12-01 RX ADMIN — SPIRONOLACTONE 25 MILLIGRAM(S): 25 TABLET, FILM COATED ORAL at 05:12

## 2022-12-01 RX ADMIN — Medication 40 MILLIGRAM(S): at 05:12

## 2022-12-01 RX ADMIN — SACUBITRIL AND VALSARTAN 1 TABLET(S): 24; 26 TABLET, FILM COATED ORAL at 05:15

## 2022-12-01 RX ADMIN — Medication 100 MILLIGRAM(S): at 05:13

## 2022-12-01 RX ADMIN — CARVEDILOL PHOSPHATE 6.25 MILLIGRAM(S): 80 CAPSULE, EXTENDED RELEASE ORAL at 05:12

## 2022-12-01 RX ADMIN — Medication 20 UNIT(S): at 09:07

## 2022-12-01 RX ADMIN — Medication 81 MILLIGRAM(S): at 11:11

## 2022-12-01 NOTE — DISCHARGE NOTE NURSING/CASE MANAGEMENT/SOCIAL WORK - NSDCPEFALRISK_GEN_ALL_CORE
For information on Fall & Injury Prevention, visit: [email or web address] OR  [email or web address] OR  https://www.cdc.gov/steadi/patient.html

## 2022-12-01 NOTE — DISCHARGE NOTE NURSING/CASE MANAGEMENT/SOCIAL WORK - PATIENT PORTAL LINK FT
You can access the FollowMyHealth Patient Portal offered by [facility] by registering at the following website: [email or web address]. By joining [employer]’s FollowMyHealth portal, you will also be able to view your health information using other applications (apps) compatible with our system.

## 2022-12-03 LAB
CULTURE RESULTS: SIGNIFICANT CHANGE UP
CULTURE RESULTS: SIGNIFICANT CHANGE UP
SPECIMEN SOURCE: SIGNIFICANT CHANGE UP
SPECIMEN SOURCE: SIGNIFICANT CHANGE UP

## 2022-12-28 ENCOUNTER — APPOINTMENT (OUTPATIENT)
Dept: HEART FAILURE | Facility: CLINIC | Age: 52
End: 2022-12-28

## 2023-01-23 ENCOUNTER — APPOINTMENT (OUTPATIENT)
Dept: CARDIOLOGY | Facility: CLINIC | Age: 53
End: 2023-01-23
Payer: MEDICAID

## 2023-01-23 VITALS
OXYGEN SATURATION: 94 % | TEMPERATURE: 98.2 F | WEIGHT: 224.38 LBS | HEIGHT: 59 IN | SYSTOLIC BLOOD PRESSURE: 102 MMHG | HEART RATE: 82 BPM | DIASTOLIC BLOOD PRESSURE: 76 MMHG | BODY MASS INDEX: 45.24 KG/M2

## 2023-01-23 PROCEDURE — 99214 OFFICE O/P EST MOD 30 MIN: CPT

## 2023-01-23 NOTE — CARDIOLOGY SUMMARY
[de-identified] : 11/21/2022\par Sinus  Rhythm \par -  Nonspecific T-abnormality. \par ABNORMAL \par \par 4/25/2022\par Sinus  Bradycardia HR 59 bpm\par Voltage criteria for LVH  (R(I)+S(III) exceeds 2.50 mV)  -Voltage criteria w/o ST/T abnormality may be normal. \par  -Poor R-wave progression -nonspecific -consider old anterior infarct. \par  -  Nonspecific T-abnormality. \par ABNORMAL \par

## 2023-01-23 NOTE — HISTORY OF PRESENT ILLNESS
[FreeTextEntry1] : NICM,  ACC/AHA STAGE C HFrEF (LVEF IMPROVED from 25-30% to 52% by TTE 10/23/2022.\par A) NICM with LVEF 25-30 and DD  Heart FAilure diagnosed 4/2022\par B) s/p LHC 4/11/022 subsequent to CM and NYHA Class II HF recently diagnosed LVEF 20% by TTE, Normal Coronaries, Elevated LVEDP\par C) TTE 4/9/2022: LV cavity size mildly enlarged LVID d 5.78 cm , LVEF 25-30%.  DD 2. Mild Aortic stenosis PG 13.3, MG 6.1 mmHg JULIAN 1.56 cm2. Mild cLVH. \par D) Pt agrees with Cardiac MRI but won't do it without sedation, discussed with [facility] radiology, will order cMRI with sedation, pt aware she will have to go through PAT's, and wait list is 6-8 weeks \par E) Pt hasn't done the CMRI,  Pulmonary evaluation for GERHARD and the blood  tests as of 6/27/2022. She states she is now in a shelter and she swill the the studies and follow ups. She also wants a form to be filled our for a "[facility] department of  Physcial assessment for determination of employability."\par F) Followed at Heart Failure 9/28/2022: ACC/AHA Stage C HFrEF (LVEF 25-30% and DD), The etiology of her CMP is unclear, possibly in the setting of uncontrolled HTN. She is euvolemic on exam with lukewarm extremities and is tolerating good GDMT. Will need repeat TTE to eval LVEF. She is also awaiting peripheral angiogram. \par G) TTE 10/23/82303 LVEF Biplane 52%. DD 1. Mild cLVH. \par \par \par GERHARD\par Pulmonary evaluation was recommended. Pt was provided with the number to call for an appt at prior visit 6/27/2022. This should be done prior to scheduling her CMRI. THis is undone yet/ d/w pt. she stated she christine do so. I went to  and I spoke to my  Edilia to kindly assist patient in scheduling. \par \par \par BL LE EDEMA\par A) Admits baseline BHANDARI which she states it has been improved. Admit intermittent claudication. \par B) Denies LE edema, continue Lasix, elevate legs, F/U with Vascular\par \par \par PAD\par BL LE AD: Abnormal results. Pt needs to peripheral angiogram for bilateral lower extremity arteries. \par d/w pt once again. She is to schedule peripheral angiogram. Pt already scheduled the angiogram 2/2/20223.\par \par \par ROS\par Denied dizziness, orthopnea, PND, Chest pain, discomfort, palpitation, bleeding, syncope, near syncope.\par \par CURRENT CARDIO MEDS\par aspirin 81 mg daily\par atorvastatin 40 mg odaily\par carvedilol 6.25 mg twice daily\par furosemide 40 mg twice daily\par sacubitril - valsartan 49-51 mg twice daily\par Jardiance 10 mg daily\par \par \par FUNCTIONAL CAPACITY\par Est <4.0 METS. Uses cane/walker to walk \par \par

## 2023-01-23 NOTE — PHYSICAL EXAM
[Well Developed] : well developed [No Acute Distress] : no acute distress [Well Nourished] : well nourished [Normal Conjunctiva] : normal conjunctiva [Normal Venous Pressure] : normal venous pressure [No Carotid Bruit] : no carotid bruit [Normal S1, S2] : normal S1, S2 [No Murmur] : no murmur [No Rub] : no rub [No Gallop] : no gallop [Clear Lung Fields] : clear lung fields [Good Air Entry] : good air entry [No Respiratory Distress] : no respiratory distress  [Soft] : abdomen soft [Non Tender] : non-tender [No Masses/organomegaly] : no masses/organomegaly [Normal Bowel Sounds] : normal bowel sounds [Normal Gait] : normal gait [No Cyanosis] : no cyanosis [No Clubbing] : no clubbing [No Varicosities] : no varicosities [Edema ___] : edema [unfilled] [Venous stasis] : venous stasis [No Rash] : no rash [No Skin Lesions] : no skin lesions [Moves all extremities] : moves all extremities [No Focal Deficits] : no focal deficits [Normal Speech] : normal speech [Alert and Oriented] : alert and oriented [Normal memory] : normal memory

## 2023-01-24 PROBLEM — I10 ESSENTIAL (PRIMARY) HYPERTENSION: Chronic | Status: ACTIVE | Noted: 2022-11-27

## 2023-01-24 PROBLEM — E11.9 TYPE 2 DIABETES MELLITUS WITHOUT COMPLICATIONS: Chronic | Status: ACTIVE | Noted: 2022-11-27

## 2023-01-24 PROBLEM — E78.5 HYPERLIPIDEMIA, UNSPECIFIED: Chronic | Status: ACTIVE | Noted: 2022-11-27

## 2023-01-24 PROBLEM — I50.21 ACUTE SYSTOLIC (CONGESTIVE) HEART FAILURE: Chronic | Status: ACTIVE | Noted: 2022-11-27

## 2023-01-31 ENCOUNTER — APPOINTMENT (OUTPATIENT)
Dept: HEART FAILURE | Facility: CLINIC | Age: 53
End: 2023-01-31
Payer: MEDICAID

## 2023-01-31 VITALS
OXYGEN SATURATION: 99 % | TEMPERATURE: 97.7 F | HEART RATE: 82 BPM | HEIGHT: 59 IN | WEIGHT: 223.5 LBS | BODY MASS INDEX: 45.06 KG/M2 | SYSTOLIC BLOOD PRESSURE: 102 MMHG | DIASTOLIC BLOOD PRESSURE: 62 MMHG

## 2023-01-31 PROCEDURE — 99214 OFFICE O/P EST MOD 30 MIN: CPT

## 2023-01-31 RX ORDER — PEN NEEDLE, DIABETIC 32 GX 1/4"
32G X 6 MM NEEDLE, DISPOSABLE MISCELLANEOUS
Qty: 100 | Refills: 0 | Status: DISCONTINUED | COMMUNITY
Start: 2022-04-28 | End: 2023-01-31

## 2023-01-31 RX ORDER — INSULIN LISPRO 100 U/ML
100 INJECTION, SOLUTION SUBCUTANEOUS 3 TIMES DAILY
Refills: 0 | Status: ACTIVE | COMMUNITY

## 2023-01-31 RX ORDER — ISOPROPYL ALCOHOL 0.75 G/1
SWAB TOPICAL
Qty: 100 | Refills: 0 | Status: DISCONTINUED | COMMUNITY
Start: 2022-04-25 | End: 2023-01-31

## 2023-01-31 RX ORDER — DICLOFENAC SODIUM 1% 10 MG/G
1 GEL TOPICAL
Qty: 200 | Refills: 0 | Status: DISCONTINUED | COMMUNITY
Start: 2022-05-12 | End: 2023-01-31

## 2023-01-31 RX ORDER — BLOOD SUGAR DIAGNOSTIC
STRIP MISCELLANEOUS
Qty: 100 | Refills: 0 | Status: DISCONTINUED | COMMUNITY
Start: 2022-05-09 | End: 2023-01-31

## 2023-01-31 NOTE — REASON FOR VISIT
[Cardiac Failure] : cardiac failure [FreeTextEntry3] : Sai Younger MD [FreeTextEntry1] : \par Cardiologist: Sai Younger MD\par HF Cardiologist: Opal Villalta MD\par

## 2023-01-31 NOTE — DISCUSSION/SUMMARY
[Patient] : the patient [Risks] : risks [Benefits] : benefits [___ Month(s)] : in [unfilled] month(s) [With ___] : with [unfilled] [FreeTextEntry1] : \par 51 y/o with ACC/AHA Stage C HFrEF (LVEF 25-30% improved to ~50%), NICMP, uncontrolled DM (A1c 9%), HTN (dx’ed >10y ago), GERHARD not on CPAP, active tobacco use and obesity (BMI 45).\par The etiology of her CMP is unclear, possibly in the setting of uncontrolled HTN. \par Repeat TTE showed improvement in LVEF to 52%, mild LVH and no significant VHD. \par Clinically she appears well compensated from a HF perspective. Tolerating good GDMT.\par Scheduled for peripheral angiogram this week.\par We again discussed the need for aggressive risk factor modifications including absolute smoking cessation, DM control, GERHARD management and weight loss. Unfortunately she has some barriers to care including living and transportation arrangements.\par Plan as above.\par

## 2023-01-31 NOTE — ASSESSMENT
[FreeTextEntry1] : \par # Chronic systolic HF (LVEF 25-30% improved to ~50%)\par -ACC/AHA stage C, NYHA II-III\par -NICMP, ?HTNve CMP\par -Clinically euvolemic, lukewarm extremities\par -GDMT: Continue Entresto 49-51 mg BID, Carvedilol 6.25 mg BID, Spironolactone 25mg daily and Jardiance 10mg daily. \par -Diuretics: Furosemide 40mg BID\par -Device: Not indicated as LVEF improved to ~50%.\par -HF education provided including lifestyle changes (low Na diet, fluid restriction, increase physical activity), current clinical condition, natural progression and prognosis.\par -Would benefit from cardiac rehab once cleared by ortho\par -Reiterated importance of lifestyle risk factor modifications such as diabetes management/glucose control, GERHARD management, and importance of smoking cessation.\par \par # HTN\par -Controlled\par -Follows with Dr. Younger\par \par # DM2 (A1c 10.4% now 9%)\par -PCP managing, remains on insulin\par -SGLT2i as above\par \par # GERHARD\par -Reports she previously did not tolerate CPAP\par -Reiterated importance of f/u with Pulmonologist Dr. Gonzalez for PFT, sleep study, and CT chest\par \par # Obesity\par -BMI 45 from 48.8\par -Weight loss encouraged\par \par # Active tobacco use\par -Reports smoking 0.5-1 PPD\par -Educated regarding the risks associated to tobacco use and the importance of absolute tobacco cessation.\par

## 2023-01-31 NOTE — HISTORY OF PRESENT ILLNESS
[FreeTextEntry1] : 51 y/o with h/o recently diagnosed systolic HF ACC/AHA stage C, uncontrolled DM (A1c 10.2), HTN (dx’ed >10y ago), GERHARD not on CPAP, active tobacco use and obesity BMI 48.8 who was referred for HF care. \par \par Mrs. Reyes was admitted to [facility] from 4/6 through 4/12 after she presented s/p mechanical fall (slipped on mud). She was found to have a left trochanter fx. As part of her work up she underwent a TTE that showed newly diagnosed LV dysfunction. Upon further interrogation, she reported BHANDARI x 2 weeks. She also endorses chronic LE edema which she attributed to standing up. She underwent LHC which revealed normal coronaries with elevated LVEDP.  She denied any h/o covid infection or recent cold like symptoms. She was started on GDMT and discharged home. \par \par Today she presents for scheduled follow-up. She reports feeling well overall and denies overt HF symptoms. She was hospitalized back in December for LLE cellulitis which was treated with IVAB. She has followed-up with Dr. Younger and is scheduled to undergo peripheral angiogram this Thursday. \par She specifically denies CP, palpitations, SOB, dizziness/LH, edema, orthopnea and PND.  \par She reports compliance with medications. She has not followed up with Pulmonary.

## 2023-01-31 NOTE — CARDIOLOGY SUMMARY
[de-identified] : \par 11/21/22: SR, PRWP, NS T wave changes.\par \par 4/25/22: SR. SB. PRWP. LVH. NS T wave changes.  [de-identified] : \par 10/14/22 TTE: LVE 52%, LVIDd 3.97cm, mild LVH (grade I DD), normal RV size/function.\par \par 4/9/22 TTE: LVEF 25-30% LVIDd 5.78cm LVOT VTI 13.7cm, grade II,  RVSP 33mHg (RAP 3mmHg). [de-identified] : \par 4/11/22 LHC: normal coronaries. LVEDP 26

## 2023-01-31 NOTE — PHYSICAL EXAM
[Well Developed] : well developed [No Acute Distress] : no acute distress [Obese] : obese [Normal Conjunctiva] : normal conjunctiva [Normal S1, S2] : normal S1, S2 [Clear Lung Fields] : clear lung fields [No Respiratory Distress] : no respiratory distress  [Soft] : abdomen soft [No Edema] : no edema [No Cyanosis] : no cyanosis [Moves all extremities] : moves all extremities [Alert and Oriented] : alert and oriented [Good Air Entry] : good air entry [de-identified] : difficult to assess due to body habitus, JVP does not appear elevated [de-identified] : RRR, distant heart sounds [de-identified] : ambulates w/ crutch [de-identified] : chronic venous stasis changes noted bilaterally [de-identified] : stella

## 2023-02-10 ENCOUNTER — RX RENEWAL (OUTPATIENT)
Age: 53
End: 2023-02-10

## 2023-02-21 ENCOUNTER — NON-APPOINTMENT (OUTPATIENT)
Age: 53
End: 2023-02-21

## 2023-02-23 ENCOUNTER — TRANSCRIPTION ENCOUNTER (OUTPATIENT)
Age: 53
End: 2023-02-23

## 2023-02-23 ENCOUNTER — OUTPATIENT (OUTPATIENT)
Dept: OUTPATIENT SERVICES | Facility: HOSPITAL | Age: 53
LOS: 1 days | Discharge: HOME | End: 2023-02-23
Payer: COMMERCIAL

## 2023-02-23 VITALS
RESPIRATION RATE: 16 BRPM | SYSTOLIC BLOOD PRESSURE: 166 MMHG | OXYGEN SATURATION: 95 % | HEART RATE: 72 BPM | DIASTOLIC BLOOD PRESSURE: 82 MMHG

## 2023-02-23 VITALS
RESPIRATION RATE: 16 BRPM | TEMPERATURE: 98 F | DIASTOLIC BLOOD PRESSURE: 72 MMHG | OXYGEN SATURATION: 97 % | HEART RATE: 80 BPM | SYSTOLIC BLOOD PRESSURE: 146 MMHG

## 2023-02-23 DIAGNOSIS — I10 ESSENTIAL (PRIMARY) HYPERTENSION: ICD-10-CM

## 2023-02-23 DIAGNOSIS — I50.22 CHRONIC SYSTOLIC (CONGESTIVE) HEART FAILURE: ICD-10-CM

## 2023-02-23 DIAGNOSIS — E11.9 TYPE 2 DIABETES MELLITUS WITHOUT COMPLICATIONS: ICD-10-CM

## 2023-02-23 DIAGNOSIS — I73.9 PERIPHERAL VASCULAR DISEASE, UNSPECIFIED: ICD-10-CM

## 2023-02-23 DIAGNOSIS — E78.5 HYPERLIPIDEMIA, UNSPECIFIED: ICD-10-CM

## 2023-02-23 DIAGNOSIS — I42.8 OTHER CARDIOMYOPATHIES: ICD-10-CM

## 2023-02-23 LAB
ANION GAP SERPL CALC-SCNC: 10 MMOL/L — SIGNIFICANT CHANGE UP (ref 5–17)
BASOPHILS # BLD AUTO: 0.04 K/UL — SIGNIFICANT CHANGE UP (ref 0–0.2)
BASOPHILS NFR BLD AUTO: 0.4 % — SIGNIFICANT CHANGE UP (ref 0–2)
BUN SERPL-MCNC: 7 MG/DL — LOW (ref 8–20)
CALCIUM SERPL-MCNC: 9.1 MG/DL — SIGNIFICANT CHANGE UP (ref 8.4–10.5)
CHLORIDE SERPL-SCNC: 106 MMOL/L — SIGNIFICANT CHANGE UP (ref 96–108)
CO2 SERPL-SCNC: 24 MMOL/L — SIGNIFICANT CHANGE UP (ref 22–29)
CREAT SERPL-MCNC: 0.4 MG/DL — LOW (ref 0.5–1.3)
EGFR: 119 ML/MIN/1.73M2 — SIGNIFICANT CHANGE UP
EOSINOPHIL # BLD AUTO: 0.32 K/UL — SIGNIFICANT CHANGE UP (ref 0–0.5)
EOSINOPHIL NFR BLD AUTO: 3.4 % — SIGNIFICANT CHANGE UP (ref 0–6)
GLUCOSE SERPL-MCNC: 165 MG/DL — HIGH (ref 70–99)
HCG SERPL-ACNC: <4 MIU/ML — SIGNIFICANT CHANGE UP
HCT VFR BLD CALC: 47.9 % — HIGH (ref 34.5–45)
HGB BLD-MCNC: 15.8 G/DL — HIGH (ref 11.5–15.5)
IMM GRANULOCYTES NFR BLD AUTO: 0.3 % — SIGNIFICANT CHANGE UP (ref 0–0.9)
LYMPHOCYTES # BLD AUTO: 2.46 K/UL — SIGNIFICANT CHANGE UP (ref 1–3.3)
LYMPHOCYTES # BLD AUTO: 26.3 % — SIGNIFICANT CHANGE UP (ref 13–44)
MCHC RBC-ENTMCNC: 30 PG — SIGNIFICANT CHANGE UP (ref 27–34)
MCHC RBC-ENTMCNC: 33 GM/DL — SIGNIFICANT CHANGE UP (ref 32–36)
MCV RBC AUTO: 90.9 FL — SIGNIFICANT CHANGE UP (ref 80–100)
MONOCYTES # BLD AUTO: 0.67 K/UL — SIGNIFICANT CHANGE UP (ref 0–0.9)
MONOCYTES NFR BLD AUTO: 7.2 % — SIGNIFICANT CHANGE UP (ref 2–14)
NEUTROPHILS # BLD AUTO: 5.84 K/UL — SIGNIFICANT CHANGE UP (ref 1.8–7.4)
NEUTROPHILS NFR BLD AUTO: 62.4 % — SIGNIFICANT CHANGE UP (ref 43–77)
PLATELET # BLD AUTO: 242 K/UL — SIGNIFICANT CHANGE UP (ref 150–400)
POTASSIUM SERPL-MCNC: 4.6 MMOL/L — SIGNIFICANT CHANGE UP (ref 3.5–5.3)
POTASSIUM SERPL-SCNC: 4.6 MMOL/L — SIGNIFICANT CHANGE UP (ref 3.5–5.3)
RBC # BLD: 5.27 M/UL — HIGH (ref 3.8–5.2)
RBC # FLD: 14.4 % — SIGNIFICANT CHANGE UP (ref 10.3–14.5)
SODIUM SERPL-SCNC: 140 MMOL/L — SIGNIFICANT CHANGE UP (ref 135–145)
WBC # BLD: 9.36 K/UL — SIGNIFICANT CHANGE UP (ref 3.8–10.5)
WBC # FLD AUTO: 9.36 K/UL — SIGNIFICANT CHANGE UP (ref 3.8–10.5)

## 2023-02-23 PROCEDURE — C1876: CPT

## 2023-02-23 PROCEDURE — 85025 COMPLETE CBC W/AUTO DIFF WBC: CPT

## 2023-02-23 PROCEDURE — 84702 CHORIONIC GONADOTROPIN TEST: CPT

## 2023-02-23 PROCEDURE — C1769: CPT

## 2023-02-23 PROCEDURE — C1894: CPT

## 2023-02-23 PROCEDURE — 80048 BASIC METABOLIC PNL TOTAL CA: CPT

## 2023-02-23 PROCEDURE — C1725: CPT

## 2023-02-23 PROCEDURE — 75716 ARTERY X-RAYS ARMS/LEGS: CPT

## 2023-02-23 PROCEDURE — 93010 ELECTROCARDIOGRAM REPORT: CPT

## 2023-02-23 PROCEDURE — C1887: CPT

## 2023-02-23 PROCEDURE — 93005 ELECTROCARDIOGRAM TRACING: CPT

## 2023-02-23 PROCEDURE — 36415 COLL VENOUS BLD VENIPUNCTURE: CPT

## 2023-02-23 PROCEDURE — 37226: CPT

## 2023-02-23 PROCEDURE — 75716 ARTERY X-RAYS ARMS/LEGS: CPT | Mod: 26,59

## 2023-02-23 PROCEDURE — 99152 MOD SED SAME PHYS/QHP 5/>YRS: CPT

## 2023-02-23 RX ORDER — ASPIRIN/CALCIUM CARB/MAGNESIUM 324 MG
81 TABLET ORAL ONCE
Refills: 0 | Status: COMPLETED | OUTPATIENT
Start: 2023-02-23 | End: 2023-02-23

## 2023-02-23 RX ORDER — INSULIN GLARGINE 100 [IU]/ML
0 INJECTION, SOLUTION SUBCUTANEOUS
Qty: 0 | Refills: 0 | DISCHARGE

## 2023-02-23 RX ORDER — SODIUM CHLORIDE 9 MG/ML
250 INJECTION INTRAMUSCULAR; INTRAVENOUS; SUBCUTANEOUS ONCE
Refills: 0 | Status: COMPLETED | OUTPATIENT
Start: 2023-02-23 | End: 2023-02-23

## 2023-02-23 RX ORDER — INSULIN LISPRO 100/ML
20 VIAL (ML) SUBCUTANEOUS
Qty: 0 | Refills: 0 | DISCHARGE

## 2023-02-23 RX ORDER — CARVEDILOL PHOSPHATE 80 MG/1
6.25 CAPSULE, EXTENDED RELEASE ORAL ONCE
Refills: 0 | Status: COMPLETED | OUTPATIENT
Start: 2023-02-23 | End: 2023-02-23

## 2023-02-23 RX ORDER — INSULIN LISPRO 100/ML
0 VIAL (ML) SUBCUTANEOUS
Qty: 0 | Refills: 0 | DISCHARGE

## 2023-02-23 RX ORDER — CLOPIDOGREL BISULFATE 75 MG/1
1 TABLET, FILM COATED ORAL
Qty: 90 | Refills: 0
Start: 2023-02-23

## 2023-02-23 RX ORDER — INSULIN GLARGINE 100 [IU]/ML
30 INJECTION, SOLUTION SUBCUTANEOUS
Qty: 0 | Refills: 0 | DISCHARGE

## 2023-02-23 RX ORDER — SPIRONOLACTONE 25 MG/1
1 TABLET, FILM COATED ORAL
Qty: 0 | Refills: 0 | DISCHARGE

## 2023-02-23 RX ORDER — CHLORHEXIDINE GLUCONATE 213 G/1000ML
1 SOLUTION TOPICAL ONCE
Refills: 0 | Status: DISCONTINUED | OUTPATIENT
Start: 2023-02-23 | End: 2023-03-09

## 2023-02-23 RX ORDER — SACUBITRIL AND VALSARTAN 24; 26 MG/1; MG/1
1 TABLET, FILM COATED ORAL ONCE
Refills: 0 | Status: COMPLETED | OUTPATIENT
Start: 2023-02-23 | End: 2023-02-23

## 2023-02-23 RX ADMIN — SODIUM CHLORIDE 250 MILLILITER(S): 9 INJECTION INTRAMUSCULAR; INTRAVENOUS; SUBCUTANEOUS at 16:20

## 2023-02-23 RX ADMIN — SODIUM CHLORIDE 250 MILLILITER(S): 9 INJECTION INTRAMUSCULAR; INTRAVENOUS; SUBCUTANEOUS at 12:30

## 2023-02-23 RX ADMIN — CARVEDILOL PHOSPHATE 6.25 MILLIGRAM(S): 80 CAPSULE, EXTENDED RELEASE ORAL at 16:16

## 2023-02-23 RX ADMIN — Medication 81 MILLIGRAM(S): at 12:30

## 2023-02-23 RX ADMIN — SACUBITRIL AND VALSARTAN 1 TABLET(S): 24; 26 TABLET, FILM COATED ORAL at 16:16

## 2023-02-23 NOTE — H&P PST ADULT - ASSESSMENT
Risk Assessments:  ASA:  Mallampati:  GFR:   Cr:  BRA:      Impression:    Plan:    -plan for *** via ***  -patient seen and examined  -confirmed appropriate NPO duration  -ECG and Labs reviewed  -Aspirin 81mg po pre-cath  -Normal Saline 0.9%  250ml/hr IV: pre procedure MADINA ppx   -procedure discussed with patient; risks and benefits explained, questions answered  -consent obtained by attending    Risk Assessments:  ASA: 3  Mallampati: 2   GFR:  119  Cr:  0.40  BRA: 1.8%      Impression: 52 year old female current smoker w/worsening claudication and abnormal TONI's: right 0.64/left 0.66. She now presents for bilateral peripheral angiogram with possible balloon angioplasty or stent placement         Plan:    -plan for bilat peripheral angio   -patient seen and examined  -confirmed appropriate NPO duration  -ECG and Labs reviewed  -Aspirin 81mg po pre-cath  -Normal Saline 0.9%  250ml/hr IV: pre procedure MADINA ppx   -procedure discussed with patient; risks and benefits explained, questions answered  -consent obtained by attending    Risk Assessments:  ASA: 3  Mallampati: 2   GFR:  119  Cr:  0.40  BRA: 1.8%      Impression: 52 year old female current smoker w/worsening claudication and abnormal TONI's: right 0.64/left 0.66. She now presents for bilateral peripheral angiogram with possible balloon angioplasty or stent placement         Plan:    -plan for bilat peripheral angio   -patient seen and examined  -confirmed appropriate NPO duration  -ECG and Labs reviewed  -Aspirin 81mg po pre-cath  -Normal Saline 0.9%  250ml/hr IV: pre procedure MADINA ppx   -procedure discussed with patient; risks and benefits explained, questions answered  -consent obtained by attending     POST CATH ADDENDUM     Now s/p peripheral angiogram via RFA with Dr. Rand, tolerated procedure well. Pt arrived to recovery in NAD and HDS, access site stable, no bleed/hematoma, distal pulse +  Intraprocedurally: Pt rec'd IV sedation, heparin 9000 units, Plavix 600mg oral loading dose, and visipaque 160ml  Findings: Left mSFA 95% treated with bare metal stent INNOVA [id number]. She will need to return for staged PVI of right mSFA90% occlusion    Plan:  -Formal cath report pending  -Post procedure management/monitoring per protocol  -Access site precautions  -check ACT at 1715, remove right femoral sheath if <180, then may raise HOB one hour post hemostasis, OOB 1930 if remains HDS and no bleeding complications   -Pending clinical course, hopeful discharge by 8pm   -NS 0.9% 250ml/hr x 1 bolus: post procedure MADINA ppx   -Repeat ECG if any clinical indication or change on tele  -Continue current medical therapy  -Dual anti platelet therapy with aspirin/plavix will send Plavix to [facility]   -Cont BB, Entresto, Statin , home medications   -Educated regarding strict adherence with DAPT   -Educated regarding post procedure management and care  -Discussed the importance of RF modification  -Cardiac rehab info provided/referral and communication to cardiac rehab completed  -F/U outpt in 1-2 weeks with Cardiologist Dr. Diaz

## 2023-02-23 NOTE — DISCHARGE NOTE NURSING/CASE MANAGEMENT/SOCIAL WORK - PATIENT PORTAL LINK FT
You can access the FollowMyHealth Patient Portal offered by [facility] by registering at the following website: [email or web address]. By joining [employer]’s FollowMyHealth portal, you will also be able to view your health information using other applications (apps) compatible with our system.

## 2023-02-23 NOTE — PROCEDURE NOTE - ADDITIONAL PROCEDURE DETAILS
Removed 6Fr sheath removed from right femoral artery, manual compression held x 18minutes. Site soft, palpable, no hematoma or bleeding.

## 2023-02-23 NOTE — DISCHARGE NOTE PROVIDER - NSDCCPTREATMENT_GEN_ALL_CORE_FT
PRINCIPAL PROCEDURE  Procedure: Angiogram, with bilateral lower extremity runoff  Findings and Treatment: No heavy lifting, driving, sex, tub baths, swimming, or any activity that submerges the lower half of the body in water for 48 hours.  Limited walking and stairs for 48 hours.    Change the bandaid after 24 hours and every 24 hours after that.  Keep the puncture site dry and covered with a bandaid until a scab forms.    Observe the site frequently.  If bleeding or a large lump (the size of a golf ball or bigger) occurs lie flat, apply continuous direct pressure just above the puncture site for at least 10 minutes, and notify your physician immediately.  If the bleeding cannot be controlled, call 911 immediately for assistance.  Notify your physician of pain, swelling or any drainage.    Notify your physician immediately if coldness, numbness, discoloration or pain in your foot occurs.

## 2023-02-23 NOTE — ASU PATIENT PROFILE, ADULT - FALL HARM RISK - UNIVERSAL INTERVENTIONS
Bed in lowest position, wheels locked, appropriate side rails in place/Call bell, personal items and telephone in reach/Instruct patient to call for assistance before getting out of bed or chair/Non-slip footwear when patient is out of bed/[place] to call system/Physically safe environment - no spills, clutter or unnecessary equipment/Purposeful Proactive Rounding/Room/bathroom lighting operational, light cord in reach

## 2023-02-23 NOTE — DISCHARGE NOTE PROVIDER - NSDCFUADDINST_GEN_ALL_CORE_FT
No heavy lifting, driving, sex, tub baths, swimming, or any activity that submerges the lower half of the body in water for 48 hours.  Limited walking and stairs for 48 hours.    Change the bandaid after 24 hours and every 24 hours after that.  Keep the puncture site dry and covered with a bandaid until a scab forms.    Observe the site frequently.  If bleeding or a large lump (the size of a golf ball or bigger) occurs lie flat, apply continuous direct pressure just above the puncture site for at least 10 minutes, and notify your physician immediately.  If the bleeding cannot be controlled, call 911 immediately for assistance.  Notify your physician of pain, swelling or any drainage.    Notify your physician immediately if coldness, numbness, discoloration or pain in your foot occurs.

## 2023-02-23 NOTE — DISCHARGE NOTE PROVIDER - HOSPITAL COURSE
Brief Hospital Course: This is a 52 year old female with PMH of NICM, HFimpEF(was 25-30% 4/9/2022 improved 52% 10/23/2022) etiology was uncertain [facility] normal possible uncontrolled HTN, GERHARD, LE edema, and PAD. She was supposed to have a CMRI but became homeless and testing has been delayed. She c/o worsening claudication and abnormal TONI's: right 0.64/left 0.66. She now presents for bilateral peripheral angiogram with possible balloon angioplasty or stent placement   Now s/p peripheral angiogram via RFA with Dr. Rand, tolerated procedure well. Pt arrived to recovery in NAD and HDS, access site stable, no bleed/hematoma, distal pulse +  Intraprocedurally: Pt rec'd IV sedation, heparin 9000 units, Plavix 600mg oral loading dose, and visipaque 160ml  Findings: Left mSFA 95% treated with bare metal stent INNOVA [id number]. She will need to return for staged PVI of right mSFA90% occlusion    Plan:  -Formal cath report pending  -Post procedure management/monitoring per protocol  -Access site precautions  -check ACT at 1715, remove right femoral sheath if <180, then may raise HOB one hour post hemostasis, OOB 1930 if remains HDS and no bleeding complications   -Pending clinical course, hopeful discharge by 8pm   -NS 0.9% 250ml/hr x 1 bolus: post procedure MADINA ppx   -Repeat ECG if any clinical indication or change on tele  -Continue current medical therapy  -Dual anti platelet therapy with aspirin/plavix will send Plavix to [facility]   -Cont BB, Entresto, Statin , home medications   -Educated regarding strict adherence with DAPT   -Educated regarding post procedure management and care  -Discussed the importance of RF modification  -Cardiac rehab info provided/referral and communication to cardiac rehab completed  -F/U outpt in 1-2 weeks with Cardiologist Dr. Diaz      At the time of discharge patient was hemodynamically stable and amenable to all terms of discharge. The patient has received verbal instructions from myself regarding discharge plans.     Length of Discharge: 45MIN    Patient is medically stable and cleared for discharge to ____ with outpatient follow up.

## 2023-02-23 NOTE — DISCHARGE NOTE PROVIDER - NSDCFUSCHEDAPPT_GEN_ALL_CORE_FT
Sai Younger  [facility]  CARDIOLOGY [address] [facility]  Scheduled Appointment: 04/24/2023    Opal Villalta  [facility]  HEARTFAIL [address]  Scheduled Appointment: 05/23/2023

## 2023-02-23 NOTE — DISCHARGE NOTE PROVIDER - NSDCCPCAREPLAN_GEN_ALL_CORE_FT
PRINCIPAL DISCHARGE DIAGNOSIS  Diagnosis: PAD (peripheral artery disease)  Assessment and Plan of Treatment: You had a lower extremity angiogram which revealed 95% blockage of the left mid Superior Femoral Artery which was treated with successful deployment of a bare metal stent. There was also a blockage on the right mid SFA which will need an intervention. Please follow up with Dr. Diaz in the office 1-2 weeks  -It is IMPORTANT to take Aspirin 81mg and Plavix 75mg daily without interruption to prevent clot formation inside stent

## 2023-02-23 NOTE — DISCHARGE NOTE PROVIDER - NSDCMRMEDTOKEN_GEN_ALL_CORE_FT
Admelog 100 units/mL injectable solution: 20 unit(s) injectable 3 times a day  aspirin 81 mg oral tablet, chewable: 1 tab(s) orally once a day  atorvastatin 40 mg oral tablet: 1 tab(s) orally once a day (at bedtime)  Basaglar KwikPen 100 units/mL subcutaneous solution: 30 unit(s) subcutaneous once a day (at bedtime)  clopidogrel 75 mg oral tablet: 1 tab(s) orally once a day   Coreg 6.25 mg oral tablet: 1 tab(s) orally 2 times a day  furosemide 40 mg oral tablet: 1 tab(s) orally 2 times a day  Jardiance 10 mg oral tablet: 1 tab(s) orally once a day (in the morning)  sacubitril-valsartan 49 mg-51 mg oral tablet: 1 tab(s) orally 2 times a day  spironolactone 25 mg oral tablet: 1 tab(s) orally once a day

## 2023-02-23 NOTE — DISCHARGE NOTE NURSING/CASE MANAGEMENT/SOCIAL WORK - NSDCPEFALRISK_GEN_ALL_CORE
For information on Fall & Injury Prevention, visit: [email or web address] OR  [email or web address] OR  https://www.cdc.gov/steadi/patient.html

## 2023-02-23 NOTE — H&P PST ADULT - HISTORY OF PRESENT ILLNESS
Narrative:  This is a 52 year old female with PMH of NICM, HFimpEF(was 25-30% 4/9/2022 improved 52% 10/23/2022) etiology was uncertain [facility] normal possible uncontrolled HTN, GERHARD, LE edema, and PAD. She was supposed to have a CMRI but became homeless and testing has been delayed. She c/o worsening claudication and abnormal TONI's: right 0.64/left 0.66. She now presents for bilateral peripheral angiogram with possible balloon angioplasty or stent placement       August Classification:   Class I: Asymptomatic   Class II a: Mild claudication  Walking > 200 meters (2.5 blocks)   Class IIb: Moderate to Severe claudication Walking < 200 meters (2.5 blocks)   Class III: Rest Pain   Classd IV: Ulceration or gangrene    Sandy Classification:   Class 0: Asymptomatic   Class 1: mild claudication   Class 2: moderate claudication   Class 3: severe claudication   Class 4: rest pain   Class 5: minor tissue loss   Class 6: Severe tissue loss     Associated Risk Factors:     Age:    DM: N/A    Smoking history: N/A    Hyperlipidemia: N/A    Family history of PAD: N/A     Known Athlerosclerotic disease(coronary, carotid, subclavian, renal, mesenteric, AAA):    Antiplatelets/Anticoagulants:        Plavix:         Cilostazol:        pentoxifylinne:        NOAC:     Vascular testing:     TONI(Normal/Borderline/Abnormal/Noncompressible):       Noncompressable: > 1.4       Normal: 1.0 to 1.4       Borderline: 0.91 to 0.99       Abnormal: < 0.91    Arterial Dopplers:    CTA/MRi:    Social History:        Marital:         Tobacco:        ETOH:        Caffeine:     ROS:  CONSTITUTIONAL: No weakness, fevers or chills  EYES/ENT: No visual changes;  No vertigo or throat pain   NECK: No pain or stiffness  RESPIRATORY: No cough, wheezing, hemoptysis; No shortness of breath  CARDIOVASCULAR: No chest pain or palpitations  GASTROINTESTINAL: No abdominal or epigastric pain. No nausea, vomiting, or hematemesis; No diarrhea or constipation. No melena or hematochezia.  GENITOURINARY: No dysuria, frequency or hematuria  NEUROLOGICAL: No numbness or weakness  SKIN: No itching, burning, rashes, or lesions   All other review of systems is negative unless indicated above    PHYSICAL EXAM:    Vital Signs Last 24 Hrs  T(C): --  T(F): --  HR: --  BP: --  BP(mean): --  RR: --  SpO2: --        GENERAL: Pt lying comfortably, NAD.  ENMT: PERRL, +EOMI.  NECK: soft, Supple, No JVD,   CHEST/LUNG: Clear to auscultation bilaterally; No wheezing.  HEART: S1S2+, Regular rate and rhythm; No murmurs.  ABDOMEN: Soft, Nontender, Nondistended; Bowel sounds present.  MUSCULOSKELETAL: Normal range of motion.  SKIN: No rashes or lesions.  NEURO: AAOX3, no focal deficits, no motor r sensory loss.  PSYCH: normal mood.  VASCULAR:   Radial +2 R/+2 L  Femoral +2 R/+2 L  PT +2 R/+2 L  DP +2 R/+2 L    EKG:    Narrative:  This is a 52 year old female with PMH of NICM, HFimpEF(was 25-30% 4/9/2022 improved 52% 10/23/2022) etiology was uncertain [facility] normal possible uncontrolled HTN, GERHARD, LE edema, and PAD. She was supposed to have a CMRI but became homeless and testing has been delayed. She c/o worsening claudication and abnormal TONI's: right 0.64/left 0.66. She now presents for bilateral peripheral angiogram with possible balloon angioplasty or stent placement       August Classification:   Class I: Asymptomatic   Class II a: Mild claudication  Walking > 200 meters (2.5 blocks)   Class IIb: Moderate to Severe claudication Walking < 200 meters (2.5 blocks)   Class III: Rest Pain   Classd IV: Ulceration or gangrene      Associated Risk Factors:     Age: 52    DM: Yes    Smoking history: Yes     Hyperlipidemia: Yes     Family history of PAD: N/A     Known Athlerosclerotic disease(coronary, carotid, subclavian, renal, mesenteric, AAA):no     Antiplatelets/Anticoagulants:        Plavix:         Cilostazol:        pentoxifylinne:        NOAC:     Vascular testing:     TONI(Normal/Borderline/Abnormal/Noncompressible):       Noncompressable: > 1.4       Normal: 1.0 to 1.4       Borderline: 0.91 to 0.99       Abnormal: < 0.91 right 0.64 left 0.66    Arterial Dopplers:    CTA/MRi:    Social History:        Marital:   currently homeless living in shelter        Tobacco: 1/2 pk daily x 35y       ETOH: no        Caffeine: 1 daily     ROS:  CONSTITUTIONAL: No weakness, fevers or chills  EYES/ENT: No visual changes;  No vertigo or throat pain   NECK: No pain or stiffness  RESPIRATORY: No cough, wheezing, hemoptysis; No shortness of breath  CARDIOVASCULAR: No chest pain or palpitations  GASTROINTESTINAL: No abdominal or epigastric pain. No nausea, vomiting, or hematemesis; No diarrhea or constipation. No melena or hematochezia.  GENITOURINARY: No dysuria, frequency or hematuria  NEUROLOGICAL: No numbness or weakness  SKIN: No itching, burning, rashes, or lesions   All other review of systems is negative unless indicated above    PHYSICAL EXAM:            GENERAL: Pt lying comfortably, NAD.  ENMT: PERRL, +EOMI.  NECK: soft, Supple, No JVD,   CHEST/LUNG: Clear to auscultation bilaterally; No wheezing.  HEART: S1S2+, Regular rate and rhythm; No murmurs.  ABDOMEN: Soft, Nontender, Nondistended; Bowel sounds present.  MUSCULOSKELETAL: Normal range of motion.  SKIN: No rashes or lesions.  NEURO: AAOX3, no focal deficits, no motor r sensory loss.  PSYCH: normal mood.  VASCULAR:   Radial +2 R/+2 L  Femoral +2 R/+2 L  PT +2 R/+2 L  DP +2 R/+2 L    EKG:    Narrative:  This is a 52 year old female with PMH of NICM, HFimpEF(was 25-30% 2022 improved 52% 10/23/2022) etiology was uncertain [facility] normal possible uncontrolled HTN, GERHARD, LE edema, and PAD. She was supposed to have a CMRI but became homeless and testing has been delayed. She c/o worsening claudication and abnormal TONI's: right 0.64/left 0.66. She now presents for bilateral peripheral angiogram with possible balloon angioplasty or stent placement       August Classification:   Class I: Asymptomatic   Class II a: Mild claudication  Walking > 200 meters (2.5 blocks)   Class IIb: Moderate to Severe claudication Walking < 200 meters (2.5 blocks)   Class III: Rest Pain   Classd IV: Ulceration or gangrene      Associated Risk Factors:     Age: 52    DM: Yes    Smoking history: Yes     Hyperlipidemia: Yes     Family history of PAD: N/A     Known Athlerosclerotic disease(coronary, carotid, subclavian, renal, mesenteric, AAA):no     Antiplatelets/Anticoagulants:        Plavix:         Cilostazol:        pentoxifylinne:        NOAC:     Vascular testing:     TONI(Normal/Borderline/Abnormal/Noncompressible):       Noncompressable: > 1.4       Normal: 1.0 to 1.4       Borderline: 0.91 to 0.99       Abnormal: < 0.91 right 0.64 left 0.66    Arterial Dopplers:    CTA/MRi:    Social History:        Marital:   currently homeless living in shelter        Tobacco: 1/2 pk daily x 35y       ETOH: no        Caffeine: 1 daily     ROS:  CONSTITUTIONAL: No weakness, fevers or chills  EYES/ENT: No visual changes;  No vertigo or throat pain   NECK: No pain or stiffness  RESPIRATORY: No cough, wheezing, hemoptysis; No shortness of breath  CARDIOVASCULAR: No chest pain or palpitations  GASTROINTESTINAL: No abdominal or epigastric pain. No nausea, vomiting, or hematemesis; No diarrhea or constipation. No melena or hematochezia.  GENITOURINARY: No dysuria, frequency or hematuria  NEUROLOGICAL: No numbness or weakness  SKIN: No itching, burning, rashes, or lesions   All other review of systems is negative unless indicated above    PHYSICAL EXAM:            GENERAL: Pt lying comfortably, NAD.  ENMT: PERRL, +EOMI.  NECK: soft, Supple, No JVD,   CHEST/LUNG: Clear to auscultation bilaterally; No wheezing.  HEART: S1S2+, Regular rate and rhythm; No murmurs.  ABDOMEN: Soft, Nontender, Nondistended; Bowel sounds present.  MUSCULOSKELETAL: Normal range of motion.  SKIN: No rashes or lesions.  NEURO: AAOX3, no focal deficits, no motor r sensory loss.  PSYCH: normal mood.  VASCULAR:   Radial +2 R/+2 L  Femoral +2 R/+2 L  PT +1 b/l     EKbpm NSR

## 2023-02-27 ENCOUNTER — RX RENEWAL (OUTPATIENT)
Age: 53
End: 2023-02-27

## 2023-03-08 DIAGNOSIS — I70.213 ATHEROSCLEROSIS OF NATIVE ARTERIES OF EXTREMITIES WITH INTERMITTENT CLAUDICATION, BILATERAL LEGS: ICD-10-CM

## 2023-03-22 ENCOUNTER — RX RENEWAL (OUTPATIENT)
Age: 53
End: 2023-03-22

## 2023-04-24 ENCOUNTER — NON-APPOINTMENT (OUTPATIENT)
Age: 53
End: 2023-04-24

## 2023-04-24 ENCOUNTER — APPOINTMENT (OUTPATIENT)
Dept: CARDIOLOGY | Facility: CLINIC | Age: 53
End: 2023-04-24
Payer: MEDICAID

## 2023-04-24 VITALS
TEMPERATURE: 97.7 F | DIASTOLIC BLOOD PRESSURE: 66 MMHG | BODY MASS INDEX: 44.96 KG/M2 | HEIGHT: 59 IN | OXYGEN SATURATION: 95 % | HEART RATE: 80 BPM | WEIGHT: 223 LBS | SYSTOLIC BLOOD PRESSURE: 100 MMHG

## 2023-04-24 DIAGNOSIS — I73.9 PERIPHERAL VASCULAR DISEASE, UNSPECIFIED: ICD-10-CM

## 2023-04-24 DIAGNOSIS — R09.89 OTHER SPECIFIED SYMPTOMS AND SIGNS INVOLVING THE CIRCULATORY AND RESPIRATORY SYSTEMS: ICD-10-CM

## 2023-04-24 PROCEDURE — 99214 OFFICE O/P EST MOD 30 MIN: CPT | Mod: 25

## 2023-04-24 PROCEDURE — 93000 ELECTROCARDIOGRAM COMPLETE: CPT

## 2023-05-08 ENCOUNTER — NON-APPOINTMENT (OUTPATIENT)
Age: 53
End: 2023-05-08

## 2023-05-18 ENCOUNTER — TRANSCRIPTION ENCOUNTER (OUTPATIENT)
Age: 53
End: 2023-05-18

## 2023-05-18 ENCOUNTER — OUTPATIENT (OUTPATIENT)
Dept: OUTPATIENT SERVICES | Facility: HOSPITAL | Age: 53
LOS: 1 days | Discharge: HOME | End: 2023-05-18
Payer: COMMERCIAL

## 2023-05-18 VITALS
OXYGEN SATURATION: 95 % | SYSTOLIC BLOOD PRESSURE: 104 MMHG | HEART RATE: 84 BPM | TEMPERATURE: 98 F | RESPIRATION RATE: 12 BRPM | DIASTOLIC BLOOD PRESSURE: 56 MMHG

## 2023-05-18 VITALS
OXYGEN SATURATION: 98 % | HEART RATE: 72 BPM | SYSTOLIC BLOOD PRESSURE: 102 MMHG | RESPIRATION RATE: 16 BRPM | DIASTOLIC BLOOD PRESSURE: 62 MMHG

## 2023-05-18 DIAGNOSIS — Z98.62 PERIPHERAL VASCULAR ANGIOPLASTY STATUS: Chronic | ICD-10-CM

## 2023-05-18 DIAGNOSIS — I73.9 PERIPHERAL VASCULAR DISEASE, UNSPECIFIED: ICD-10-CM

## 2023-05-18 LAB
ANION GAP SERPL CALC-SCNC: 12 MMOL/L — SIGNIFICANT CHANGE UP (ref 5–17)
BASOPHILS # BLD AUTO: 0.05 K/UL — SIGNIFICANT CHANGE UP (ref 0–0.2)
BASOPHILS NFR BLD AUTO: 0.5 % — SIGNIFICANT CHANGE UP (ref 0–2)
BLD GP AB SCN SERPL QL: SIGNIFICANT CHANGE UP
BUN SERPL-MCNC: 13.6 MG/DL — SIGNIFICANT CHANGE UP (ref 8–20)
CALCIUM SERPL-MCNC: 8.9 MG/DL — SIGNIFICANT CHANGE UP (ref 8.4–10.5)
CHLORIDE SERPL-SCNC: 100 MMOL/L — SIGNIFICANT CHANGE UP (ref 96–108)
CO2 SERPL-SCNC: 25 MMOL/L — SIGNIFICANT CHANGE UP (ref 22–29)
CREAT SERPL-MCNC: 0.49 MG/DL — LOW (ref 0.5–1.3)
EGFR: 113 ML/MIN/1.73M2 — SIGNIFICANT CHANGE UP
EOSINOPHIL # BLD AUTO: 0.25 K/UL — SIGNIFICANT CHANGE UP (ref 0–0.5)
EOSINOPHIL NFR BLD AUTO: 2.5 % — SIGNIFICANT CHANGE UP (ref 0–6)
GLUCOSE SERPL-MCNC: 180 MG/DL — HIGH (ref 70–99)
HCG SERPL QL: NEGATIVE — SIGNIFICANT CHANGE UP
HCT VFR BLD CALC: 46.5 % — HIGH (ref 34.5–45)
HGB BLD-MCNC: 15.8 G/DL — HIGH (ref 11.5–15.5)
IMM GRANULOCYTES NFR BLD AUTO: 0.4 % — SIGNIFICANT CHANGE UP (ref 0–0.9)
LYMPHOCYTES # BLD AUTO: 2.51 K/UL — SIGNIFICANT CHANGE UP (ref 1–3.3)
LYMPHOCYTES # BLD AUTO: 25 % — SIGNIFICANT CHANGE UP (ref 13–44)
MCHC RBC-ENTMCNC: 30.4 PG — SIGNIFICANT CHANGE UP (ref 27–34)
MCHC RBC-ENTMCNC: 34 GM/DL — SIGNIFICANT CHANGE UP (ref 32–36)
MCV RBC AUTO: 89.6 FL — SIGNIFICANT CHANGE UP (ref 80–100)
MONOCYTES # BLD AUTO: 0.67 K/UL — SIGNIFICANT CHANGE UP (ref 0–0.9)
MONOCYTES NFR BLD AUTO: 6.7 % — SIGNIFICANT CHANGE UP (ref 2–14)
NEUTROPHILS # BLD AUTO: 6.5 K/UL — SIGNIFICANT CHANGE UP (ref 1.8–7.4)
NEUTROPHILS NFR BLD AUTO: 64.9 % — SIGNIFICANT CHANGE UP (ref 43–77)
PLATELET # BLD AUTO: 221 K/UL — SIGNIFICANT CHANGE UP (ref 150–400)
POTASSIUM SERPL-MCNC: 4 MMOL/L — SIGNIFICANT CHANGE UP (ref 3.5–5.3)
POTASSIUM SERPL-SCNC: 4 MMOL/L — SIGNIFICANT CHANGE UP (ref 3.5–5.3)
RBC # BLD: 5.19 M/UL — SIGNIFICANT CHANGE UP (ref 3.8–5.2)
RBC # FLD: 14 % — SIGNIFICANT CHANGE UP (ref 10.3–14.5)
SODIUM SERPL-SCNC: 137 MMOL/L — SIGNIFICANT CHANGE UP (ref 135–145)
WBC # BLD: 10.02 K/UL — SIGNIFICANT CHANGE UP (ref 3.8–10.5)
WBC # FLD AUTO: 10.02 K/UL — SIGNIFICANT CHANGE UP (ref 3.8–10.5)

## 2023-05-18 PROCEDURE — 85025 COMPLETE CBC W/AUTO DIFF WBC: CPT

## 2023-05-18 PROCEDURE — C1874: CPT

## 2023-05-18 PROCEDURE — C1769: CPT

## 2023-05-18 PROCEDURE — 37226: CPT

## 2023-05-18 PROCEDURE — 36415 COLL VENOUS BLD VENIPUNCTURE: CPT

## 2023-05-18 PROCEDURE — 80048 BASIC METABOLIC PNL TOTAL CA: CPT

## 2023-05-18 PROCEDURE — 86901 BLOOD TYPING SEROLOGIC RH(D): CPT

## 2023-05-18 PROCEDURE — 93005 ELECTROCARDIOGRAM TRACING: CPT

## 2023-05-18 PROCEDURE — C1725: CPT

## 2023-05-18 PROCEDURE — 93010 ELECTROCARDIOGRAM REPORT: CPT

## 2023-05-18 PROCEDURE — 84703 CHORIONIC GONADOTROPIN ASSAY: CPT

## 2023-05-18 PROCEDURE — 99152 MOD SED SAME PHYS/QHP 5/>YRS: CPT

## 2023-05-18 PROCEDURE — 86850 RBC ANTIBODY SCREEN: CPT

## 2023-05-18 PROCEDURE — C1894: CPT

## 2023-05-18 PROCEDURE — C1887: CPT

## 2023-05-18 PROCEDURE — 86900 BLOOD TYPING SEROLOGIC ABO: CPT

## 2023-05-18 PROCEDURE — C1753: CPT

## 2023-05-18 RX ORDER — EMPAGLIFLOZIN 10 MG/1
1 TABLET, FILM COATED ORAL
Qty: 0 | Refills: 0 | DISCHARGE

## 2023-05-18 RX ORDER — CARVEDILOL PHOSPHATE 80 MG/1
1 CAPSULE, EXTENDED RELEASE ORAL
Qty: 0 | Refills: 0 | DISCHARGE

## 2023-05-18 RX ORDER — SODIUM CHLORIDE 9 MG/ML
250 INJECTION INTRAMUSCULAR; INTRAVENOUS; SUBCUTANEOUS
Refills: 0 | Status: DISCONTINUED | OUTPATIENT
Start: 2023-05-18 | End: 2023-06-01

## 2023-05-18 RX ORDER — SPIRONOLACTONE 25 MG/1
1 TABLET, FILM COATED ORAL
Qty: 0 | Refills: 0 | DISCHARGE

## 2023-05-18 RX ORDER — SODIUM CHLORIDE 9 MG/ML
250 INJECTION INTRAMUSCULAR; INTRAVENOUS; SUBCUTANEOUS
Refills: 0 | Status: COMPLETED | OUTPATIENT
Start: 2023-05-18 | End: 2023-05-18

## 2023-05-18 RX ORDER — CHLORHEXIDINE GLUCONATE 213 G/1000ML
1 SOLUTION TOPICAL ONCE
Refills: 0 | Status: DISCONTINUED | OUTPATIENT
Start: 2023-05-18 | End: 2023-06-01

## 2023-05-18 RX ORDER — INSULIN LISPRO 100/ML
20 VIAL (ML) SUBCUTANEOUS
Qty: 0 | Refills: 0 | DISCHARGE

## 2023-05-18 RX ORDER — SACUBITRIL AND VALSARTAN 24; 26 MG/1; MG/1
1 TABLET, FILM COATED ORAL
Qty: 0 | Refills: 0 | DISCHARGE

## 2023-05-18 RX ORDER — INSULIN GLARGINE 100 [IU]/ML
30 INJECTION, SOLUTION SUBCUTANEOUS
Qty: 0 | Refills: 0 | DISCHARGE

## 2023-05-18 RX ADMIN — SODIUM CHLORIDE 250 MILLILITER(S): 9 INJECTION INTRAMUSCULAR; INTRAVENOUS; SUBCUTANEOUS at 17:35

## 2023-05-18 NOTE — PROGRESS NOTE ADULT - ASSESSMENT
A/P: 52 year old female with PMH of NICM, HFimpEF(was 25-30% 4/9/2022 improved 52% 10/23/2022) etiology was uncertain [facility] normal possible uncontrolled HTN, GERHARD, LE edema, and PAD. She was supposed to have a CMRI but became homeless and testing has been delayed. She c/o worsening claudication and abnormal TONI's: right 0.64/left 0.66. She presented 2/23/23 for bilateral peripheral angiogram with Dr. Rand which revealed bilateral 90% SFA stenosis and is s/p angioplasty and stenting of LSFA.  She presents today for staged PTA of RSFA with Dr. Rand.  Now s/p staged peripheral angiogram/intervention via Right DP artery with Dr. Rand: Balloon angioplasty and Self Expanding Stent x 1 to Right SFA (prelim report; official report to follow)   A/P: 52 year old female with PMH of NICM, HFimpEF(was 25-30% 4/9/2022 improved 52% 10/23/2022) etiology was uncertain [facility] normal possible uncontrolled HTN, GERHARD, LE edema, and PAD. She was supposed to have a CMRI but became homeless and testing has been delayed. She c/o worsening claudication and abnormal TONI's: right 0.64/left 0.66. She presented 2/23/23 for bilateral peripheral angiogram with Dr. Rand which revealed bilateral 90% SFA stenosis and is s/p angioplasty and stenting of LSFA.  She presents today for staged PTA of RSFA with Dr. Rand.  Now s/p staged peripheral angiogram/intervention via Right DP artery with Dr. Rand: Balloon angioplasty and Self Expanding Stent x 1 to Right SFA (prelim report; official report to follow)    -Bedrest while right DP arterial band in place; remove band 2 hours post procedure at 1300; if site remains benign x 30 minutes post removal of band then may ambulate   -If patient remains stable after ambulation, may discharge to home at 1500  -Continue current med regimen including ASA/plavix/statin and all other meds as before  -Follow up with Dr. Younger in one to two weeks  -Lifestyle mods/diet/activity/meds discussed with patient verbal understanding  -Smoking cessation emphasized with patient verbal understanding  -Discussed with Dr. Rand

## 2023-05-18 NOTE — H&P PST ADULT - NSICDXPASTMEDICALHX_GEN_ALL_CORE_FT
PAST MEDICAL HISTORY:  Acute systolic congestive heart failure     Borderline systolic HTN     DM2 (diabetes mellitus, type 2)     HLD (hyperlipidemia)     HTN (hypertension)     Obstructive sleep apnea     PAD (peripheral artery disease)

## 2023-05-18 NOTE — DISCHARGE NOTE NURSING/CASE MANAGEMENT/SOCIAL WORK - PATIENT PORTAL LINK FT
You can access the FollowMyHealth Patient Portal offered by [facility] by registering at the following website: [email or web address]. By joining [employer]’s FollowMyHealth portal, you will also be able to view your health information using other applications (apps) compatible with our system.

## 2023-05-18 NOTE — DISCHARGE NOTE PROVIDER - NSDCCPCAREPLAN_GEN_ALL_CORE_FT
PRINCIPAL DISCHARGE DIAGNOSIS  Diagnosis: PAD (peripheral artery disease)  Assessment and Plan of Treatment: Peripheral intervention

## 2023-05-18 NOTE — ASU PATIENT PROFILE, ADULT - FALL HARM RISK - HARM RISK INTERVENTIONS

## 2023-05-18 NOTE — H&P PST ADULT - HISTORY OF PRESENT ILLNESS
This is a 52 year old female with PMH of NICM, HFimpEF(was 25-30% 4/9/2022 improved 52% 10/23/2022) etiology was uncertain [facility] normal possible uncontrolled HTN, GERHARD, LE edema, and PAD. She was supposed to have a CMRI but became homeless and testing has been delayed. She c/o worsening claudication and abnormal TONI's: right 0.64/left 0.66. She presented 2/23/23 for bilateral peripheral angiogram with Dr. Rand which revealed bilateral 90% SFA stenosis and is s/p angioplasty and stenting of LSFA.  She presents today for staged PTA of RSFA with Dr. Rand.    August Classification:   Class I: Asymptomatic   Class II a: Mild claudication  Walking > 200 meters (2.5 blocks)   Class IIb: Moderate to Severe claudication Walking < 200 meters (2.5 blocks)   Class III: Rest Pain   Classd IV: Ulceration or gangrene      Associated Risk Factors:     Age: 52    DM: Yes    Smoking history: Yes     Hyperlipidemia: Yes     Family history of PAD: N/A     Known Athlerosclerotic disease(coronary, carotid, subclavian, renal, mesenteric, AAA): PAD    Antiplatelets/Anticoagulants:        Plavix:  Yes      Vascular testing:     TONI(Normal/Borderline/Abnormal/Noncompressible):       Noncompressable: > 1.4       Normal: 1.0 to 1.4       Borderline: 0.91 to 0.99       Abnormal: < 0.91 right 0.64 left 0.66      < from: Invasive Peripheral Vascular Reporting (02.23.23 @ 14:00) >  PV Interventional             Samy Mohsen Selim, MD   Cardiologist:   PV Diagnostic Cardiologist:   Samy Mohsen Selim, MD     Procedures Performed   Procedures:              1.    Ultrasound Guided Access     2.    Arterial Access - Right Femoral   3.    Descending Aorta Angiography   4.    Lower Extremity Angiography with DSA Stepping   5.    Peripheral BMS   6.    Peripheral PTA     Procedure Type:          [id number]   Indications:               Claudication involving both lower  extremities    August IIB     Presentation:   52 years old female.  52 year old female with PMH of NICM, HFimpEF(was  25-30% 4/9/2022 improved 52%  10/23/2022) etiology was uncertain [facility] normal possible uncontrolled  HTN, GERHARD, LE edema, and PAD. She  was supposed to have a CMRI but became homeless and testing has been  delayed. She c/o worsening  claudication and abnormal TONI's: right 0.64/left 0.66. She now  presents for bilateral peripheral angiogram with  possible balloon angioplasty or stent placement      Conclusions:   Bilateral SFA 90% stenoses     S/p angioplasty and stenting of the left SFA using Innova stent 6 x 40  mm  Recommendations:     continue ASA 81 mg , clopidogrel 75 mg daily    Return for Staged PTA of the right LE SFA .   Acute complication:    No complications     General Impressions:   General Diagnostic:      General Diagnostic Impressions     Infraabdominal aorta : normal     Right LE :    DONAVON: Patent   EIA: Patent   IIA: Patent   CFA: Patent   SFA: 90% stenosis   PFA:  Patent     Patient: ADIEL CURRAN         MRN: [id number]  Study Date: 02/23/2023   02:00 PM      Page 1 of 5          Popliteal: Patent   AT: Patent   TP trunk: Patent   PT: Patent   Peroneal: Patent     Left LE   DONAVON: Patent   EIA: Patent   IIA: Patent   CFA: Patent   SFA: 90% stenosis   PFA: Patent   Popliteal: Patent   AT: Patent   TP trunk: Patent   PT: Patent   Peroneal: Patent      < end of copied text >

## 2023-05-18 NOTE — DISCHARGE NOTE PROVIDER - NSDCFUADDINST_GEN_ALL_CORE_FT
Restricted use of affected foot for 48 hours.  No submerging the foot in water for 48 hours.  You may start showering today.  Call your doctor for any bleeding, swelling, loss of sensation in the foot or toes turning blue.  If heavy bleeding or large lumps form, hold pressure at the spot and come to the Emergency Room.

## 2023-05-18 NOTE — H&P PST ADULT - ASSESSMENT
52 year old female with PMH of NICM, HFimpEF(was 25-30% 4/9/2022 improved 52% 10/23/2022) etiology was uncertain [facility] normal possible uncontrolled HTN, GERHARD, LE edema, and PAD. She was supposed to have a CMRI but became homeless and testing has been delayed. She c/o worsening claudication and abnormal TONI's: right 0.64/left 0.66. She presented 2/23/23 for bilateral peripheral angiogram with Dr. Rand which revealed bilateral 90% SFA stenosis and is s/p angioplasty and stenting of LSFA.  She presents today for staged PTA of RSFA with Dr. Rand.    NPO for procedure   Consent obtained by MD  ASA and Plavix taken this am  IVF hydration preprocedure as per protocol to prevent MADINA

## 2023-05-18 NOTE — H&P PST ADULT - NSICDXPASTSURGICALHX_GEN_ALL_CORE_FT
PAST SURGICAL HISTORY:  Ankle fracture, left ORIF - 2003    H/O hand surgery LEFT - 1981    S/P peripheral artery angioplasty

## 2023-05-18 NOTE — DISCHARGE NOTE NURSING/CASE MANAGEMENT/SOCIAL WORK - NSDCPEFALRISK_GEN_ALL_CORE
For information on Fall & Injury Prevention, visit: [email or web address] OR  [email or web address] OR  https://www.cdc.gov/steadi/patient.html

## 2023-05-18 NOTE — PROGRESS NOTE ADULT - SUBJECTIVE AND OBJECTIVE BOX
Interventional Cardiology NP post procedure note:     -s/p staged peripheral angiogram/intervention via Right DP artery with Dr. Rand: Balloon angioplasty and Self Expanding Stent x 1 to Right SFA (prelim report; official report to follow)  Heparin 8,000 units IV  Visipaque 81cc      MEDICATIONS  (STANDING):  chlorhexidine 4% Liquid 1 Application(s) Topical once  sodium chloride 0.9%. 250 milliLiter(s) (250 mL/Hr) IV Continuous <Continuous>  sodium chloride 0.9%. 250 milliLiter(s) (250 mL/Hr) IV Continuous <Continuous>      Allergies:  No Known Allergies      PAST MEDICAL & SURGICAL HISTORY:  Obstructive sleep apnea      Borderline systolic HTN      Acute systolic congestive heart failure      HTN (hypertension)      HLD (hyperlipidemia)      DM2 (diabetes mellitus, type 2)      PAD (peripheral artery disease)      Ankle fracture, left  ORIF - 2003      H/O hand surgery  LEFT - 1981      S/P peripheral artery angioplasty          Vital Signs Last 24 Hrs  T(C): 36.4 (18 May 2023 07:39), Max: 36.4 (18 May 2023 07:39)  T(F): 97.5 (18 May 2023 07:39), Max: 97.5 (18 May 2023 07:39)  HR: 81 (18 May 2023 11:30) (71 - 84)  BP: 113/54 (18 May 2023 11:30) (104/56 - 114/59)  BP(mean): --  RR: 14 (18 May 2023 11:30) (12 - 14)  SpO2: 99% (18 May 2023 11:30) (95% - 99%)    Parameters below as of 18 May 2023 11:30  Patient On (Oxygen Delivery Method): room air        Physical Exam:  Constitutional: NAD, AAOx3  Cardiovascular: +S1S2 RRR  Pulmonary: CTA b/l, unlabored  GI: soft NTND +BS  Extremities: no pedal edema, +distal pulses b/l  Neuro: non focal, OROZCO x4  Procedure site: Right DP arterial site band in place; site benign without hematoma/bleeding; right foot warm/mobile/acyanotic    LABS:                        15.8   10.02 )-----------( 221      ( 18 May 2023 07:58 )             46.5     05-18    137  |  100  |  13.6  ----------------------------<  180<H>  4.0   |  25.0  |  0.49<L>    Ca    8.9      18 May 2023 08:22            RADIOLOGY & ADDITIONAL TESTS:

## 2023-05-18 NOTE — DISCHARGE NOTE PROVIDER - CARE PROVIDER_API CALL
Sai Younger)  Cardiovascular Disease; Internal Medicine  [address], [place] [address]  Phone: [phone]  Fax: [phone]  Established Patient  Follow Up Time: 2 weeks

## 2023-05-18 NOTE — DISCHARGE NOTE PROVIDER - NSDCCPTREATMENT_GEN_ALL_CORE_FT
PRINCIPAL PROCEDURE  Procedure: Angioplasty, artery, peripheral  Findings and Treatment: Restricted use of affected foot for 48 hours.  No submerging the foot in water for 48 hours.  You may start showering today.  Call your doctor for any bleeding, swelling, loss of sensation in the foot or toes, or toes turning blue.  If heavy bleeding or large lumps form, hold pressure at the spot and come to the Emergency Room.

## 2023-05-18 NOTE — DISCHARGE NOTE PROVIDER - HOSPITAL COURSE
52 year old female with PMH of NICM, HFimpEF(was 25-30% 4/9/2022 improved 52% 10/23/2022) etiology was uncertain LHC normal possible uncontrolled HTN, GERHARD, LE edema, and PAD. She was supposed to have a CMRI but became homeless and testing has been delayed. She c/o worsening claudication and abnormal TONI's: right 0.64/left 0.66. She presented 2/23/23 for bilateral peripheral angiogram with Dr. Rand which revealed bilateral 90% SFA stenosis and is s/p angioplasty and stenting of LSFA.  She presents today for staged PTA of RSFA with Dr. Rand.  Now s/p staged peripheral angiogram/intervention via Right DP artery with Dr. Rand: Balloon angioplasty and Self Expanding Stent x 1 to Right SFA (prelim report; official report to follow)    -Bedrest while right DP arterial band in place; remove band 2 hours post procedure at 1300; if site remains benign x 30 minutes post removal of band then may ambulate   -If patient remains stable after ambulation, may discharge to home at 1500  -Continue current med regimen including ASA/plavix/statin and all other meds as before  -Follow up with Dr. Younger in one to two weeks  -Lifestyle mods/diet/activity/meds discussed with patient verbal understanding  -Smoking cessation emphasized with patient verbal understanding  -Discussed with Dr. Rand

## 2023-05-22 ENCOUNTER — RX RENEWAL (OUTPATIENT)
Age: 53
End: 2023-05-22

## 2023-05-23 ENCOUNTER — APPOINTMENT (OUTPATIENT)
Dept: HEART FAILURE | Facility: CLINIC | Age: 53
End: 2023-05-23
Payer: MEDICAID

## 2023-05-23 VITALS
OXYGEN SATURATION: 97 % | WEIGHT: 229 LBS | HEIGHT: 59 IN | HEART RATE: 74 BPM | SYSTOLIC BLOOD PRESSURE: 116 MMHG | DIASTOLIC BLOOD PRESSURE: 76 MMHG | BODY MASS INDEX: 46.16 KG/M2

## 2023-05-23 DIAGNOSIS — E11.9 TYPE 2 DIABETES MELLITUS W/OUT COMPLICATIONS: ICD-10-CM

## 2023-05-23 DIAGNOSIS — Z87.891 PERSONAL HISTORY OF NICOTINE DEPENDENCE: ICD-10-CM

## 2023-05-23 PROBLEM — I73.9 PERIPHERAL VASCULAR DISEASE, UNSPECIFIED: Chronic | Status: ACTIVE | Noted: 2023-05-18

## 2023-05-23 PROCEDURE — 99214 OFFICE O/P EST MOD 30 MIN: CPT

## 2023-05-23 RX ORDER — EMPAGLIFLOZIN 10 MG/1
10 TABLET, FILM COATED ORAL
Qty: 90 | Refills: 0 | Status: DISCONTINUED | COMMUNITY
Start: 2022-07-13 | End: 2023-05-23

## 2023-05-25 ENCOUNTER — APPOINTMENT (OUTPATIENT)
Dept: CARDIOLOGY | Facility: CLINIC | Age: 53
End: 2023-05-25
Payer: MEDICAID

## 2023-05-25 VITALS — DIASTOLIC BLOOD PRESSURE: 62 MMHG | SYSTOLIC BLOOD PRESSURE: 92 MMHG

## 2023-05-25 VITALS
DIASTOLIC BLOOD PRESSURE: 60 MMHG | HEIGHT: 59 IN | TEMPERATURE: 97.7 F | BODY MASS INDEX: 46.16 KG/M2 | HEART RATE: 65 BPM | OXYGEN SATURATION: 95 % | WEIGHT: 229 LBS | SYSTOLIC BLOOD PRESSURE: 94 MMHG

## 2023-05-25 PROCEDURE — 99214 OFFICE O/P EST MOD 30 MIN: CPT

## 2023-06-01 DIAGNOSIS — I70.211 ATHEROSCLEROSIS OF NATIVE ARTERIES OF EXTREMITIES WITH INTERMITTENT CLAUDICATION, RIGHT LEG: ICD-10-CM

## 2023-06-27 ENCOUNTER — APPOINTMENT (OUTPATIENT)
Dept: CARDIOLOGY | Facility: CLINIC | Age: 53
End: 2023-06-27
Payer: MEDICAID

## 2023-06-27 PROCEDURE — 93923 UPR/LXTR ART STDY 3+ LVLS: CPT

## 2023-06-29 ENCOUNTER — NON-APPOINTMENT (OUTPATIENT)
Age: 53
End: 2023-06-29

## 2023-06-30 NOTE — CARDIOLOGY SUMMARY
[de-identified] : \par 4/24/23: SR, PRWP, nonspecific T waves aVL.\par \par 11/21/22: SR, PRWP, NS T wave changes.\par \par 4/25/22: SR. SB. PRWP. LVH. NS T wave changes.  [de-identified] : \par 10/14/22 TTE: LVE 52%, LVIDd 3.97cm, mild LVH (grade I DD), normal RV size/function.\par \par 4/9/22 TTE: LVEF 25-30% LVIDd 5.78cm LVOT VTI 13.7cm, grade II,  RVSP 33mHg (RAP 3mmHg). [de-identified] : \par 4/11/22 LHC: normal coronaries. LVEDP 26

## 2023-06-30 NOTE — DISCUSSION/SUMMARY
[Patient] : the patient [Risks] : risks [Benefits] : benefits [___ Year(s)] : in [unfilled] year(s) [FreeTextEntry1] : \par 53 y/o with ACC/AHA Stage C HFrEF (LVEF 25-30% improved to 50-55%), NICMP, uncontrolled DM (A1c 9%), HTN (dx’ed >10y ago), PAD s/p B/L SFA stenting, GERHARD not on CPAP, active tobacco use and obesity (BMI 46).The etiology of her CMP is unclear, possibly in the setting of uncontrolled HTN. \par Repeat TTE showed improvement in LVEF to 52%, mild LVH and no significant VHD. \par Overall, she is doing well from a HF standpoint. Tolerating good GDMT. Will stop SGLT2i in setting of yeast infection. We again discussed the need for aggressive risk factor modifications including absolute smoking cessation, DM control, GERHARD management and weight loss. Unfortunately she has some barriers to care including living and transportation arrangements.\par Plan as above.\par

## 2023-06-30 NOTE — PHYSICAL EXAM
[Well Developed] : well developed [No Acute Distress] : no acute distress [Obese] : obese [Normal Conjunctiva] : normal conjunctiva [Normal S1, S2] : normal S1, S2 [Clear Lung Fields] : clear lung fields [No Respiratory Distress] : no respiratory distress  [Soft] : abdomen soft [No Cyanosis] : no cyanosis [Moves all extremities] : moves all extremities [Alert and Oriented] : alert and oriented [de-identified] : difficult to assess due to body habitus, JVP does not appear elevated [de-identified] : RRR, distant heart sounds [de-identified] : ambulates w/ crutch [de-identified] : Trace - 1+ B/L LE edema, chronic venous stasis changes noted bilaterally [de-identified] : stella

## 2023-06-30 NOTE — ASSESSMENT
[FreeTextEntry1] : \par # Chronic systolic HF (LVEF 25-30% improved to 50-55%)\par -ACC/AHA stage C, NYHA II\par -NICMP, ?HTNve CMP\par -Clinically close to euvolemia, lukewarm extremities\par -GDMT: Stop Jardiance in setting of yeast infection. Continue Entresto 49-51 mg BID, Carvedilol 6.25 mg BID and Spironolactone 25mg daily.\par -Diuretics: Furosemide 40mg BID\par -Device: Not indicated as LVEF improved \par -HF education provided including lifestyle changes (low Na diet, fluid restriction, increase physical activity), current clinical condition, natural progression and prognosis.\par -Would benefit from cardiac rehab once cleared by ortho\par -Reiterated importance of lifestyle risk factor modifications such as diabetes management/glucose control, GERHARD management, and importance of smoking cessation.\par \par # HTN\par -Controlled\par -Follows with Dr. Younger\par \par # IDDM2\par -Discussed the importance of strict glucose control\par -Recommend endocrine f/u \par -Would likely benefit from GLP-1\par \par # GERHARD\par -Reports she previously did not tolerate CPAP\par -Reiterated importance of f/u with Pulmonologist Dr. Gonzalez for PFT, sleep study and CT chest\par \par # Obesity\par -BMI 46 from 48.8\par -Weight loss encouraged\par \par # Active tobacco use\par -Reports smoking 0.5-1 PPD\par -Educated regarding the risks associated to tobacco use and the importance of absolute tobacco cessation.\par -Has patches but has not been ready to start using.\par

## 2023-06-30 NOTE — END OF VISIT
[Time Spent: ___ minutes] : I have spent [unfilled] minutes of time on the encounter. [FreeTextEntry3] :  I, Opal Villalta MD independently performed a history and physical examination of the patient, and formulated the management plan.\par I have reviewed the note by SHREYAS Workman and agree with the documented findings and plan of care.\par

## 2023-06-30 NOTE — HISTORY OF PRESENT ILLNESS
[FreeTextEntry1] : 53 y/o with h/o ACC/AHA stage C HFrEF with improvement in EF to 50-55%, uncontrolled IDDM, HTN (dx’ed >10y ago), PAD s/p B/L SFA stents, GERHARD not on CPAP, active tobacco use and obesity BMI 48.8 who was referred for HF care. \par \par Mrs. Reyes was admitted to [facility] from 4/6 through 4/12 after she presented s/p mechanical fall (slipped on mud). She was found to have a left trochanter fx. As part of her work up she underwent a TTE that showed newly diagnosed LV dysfunction. Upon further interrogation, she reported BHANDARI x 2 weeks. She also endorses chronic LE edema which she attributed to standing up. She underwent LHC which revealed normal coronaries with elevated LVEDP.  She denied any h/o covid infection or recent cold like symptoms. She was started on GDMT and discharged home. \par \par Today she presents for scheduled follow-up. She reports feeling well overall and denies overt HF symptoms. She recently underwent right SFA stenting last Thursday. She also reports that her PCP diagnosed her with a yeast infection and recommended stopping Jardiance however she wanted to discuss with our team first before stopping. She reports intermittent LE edema which is typically worse at the end of the day. She specifically denies CP, SOB, near syncope or syncope, orthopnea and PND. \par Most recent labwork form 5/18 reviewed.

## 2023-08-28 ENCOUNTER — APPOINTMENT (OUTPATIENT)
Dept: CARDIOLOGY | Facility: CLINIC | Age: 53
End: 2023-08-28
Payer: MEDICAID

## 2023-08-28 ENCOUNTER — NON-APPOINTMENT (OUTPATIENT)
Age: 53
End: 2023-08-28

## 2023-08-28 VITALS
HEART RATE: 67 BPM | HEIGHT: 59 IN | OXYGEN SATURATION: 96 % | WEIGHT: 238 LBS | BODY MASS INDEX: 47.98 KG/M2 | DIASTOLIC BLOOD PRESSURE: 68 MMHG | SYSTOLIC BLOOD PRESSURE: 104 MMHG

## 2023-08-28 PROCEDURE — 93000 ELECTROCARDIOGRAM COMPLETE: CPT

## 2023-08-28 PROCEDURE — 99215 OFFICE O/P EST HI 40 MIN: CPT | Mod: 25

## 2023-08-28 RX ORDER — NICOTINE 21 MG/24HR
21 PATCH, TRANSDERMAL 24 HOURS TRANSDERMAL
Qty: 28 | Refills: 0 | Status: ACTIVE | COMMUNITY
Start: 2023-04-17

## 2023-09-29 ENCOUNTER — APPOINTMENT (OUTPATIENT)
Dept: CARDIOLOGY | Facility: CLINIC | Age: 53
End: 2023-09-29
Payer: MEDICAID

## 2023-09-29 PROCEDURE — 93306 TTE W/DOPPLER COMPLETE: CPT

## 2023-10-02 ENCOUNTER — NON-APPOINTMENT (OUTPATIENT)
Age: 53
End: 2023-10-02

## 2023-10-09 ENCOUNTER — RX RENEWAL (OUTPATIENT)
Age: 53
End: 2023-10-09

## 2023-11-08 ENCOUNTER — RX RENEWAL (OUTPATIENT)
Age: 53
End: 2023-11-08

## 2023-11-13 ENCOUNTER — APPOINTMENT (OUTPATIENT)
Dept: CARDIOLOGY | Facility: CLINIC | Age: 53
End: 2023-11-13
Payer: MEDICAID

## 2023-11-13 ENCOUNTER — NON-APPOINTMENT (OUTPATIENT)
Age: 53
End: 2023-11-13

## 2023-11-13 VITALS
SYSTOLIC BLOOD PRESSURE: 126 MMHG | OXYGEN SATURATION: 96 % | WEIGHT: 247.25 LBS | DIASTOLIC BLOOD PRESSURE: 72 MMHG | HEIGHT: 59 IN | HEART RATE: 63 BPM | BODY MASS INDEX: 49.84 KG/M2

## 2023-11-13 DIAGNOSIS — F17.200 NICOTINE DEPENDENCE, UNSPECIFIED, UNCOMPLICATED: ICD-10-CM

## 2023-11-13 DIAGNOSIS — E66.01 MORBID (SEVERE) OBESITY DUE TO EXCESS CALORIES: ICD-10-CM

## 2023-11-13 PROCEDURE — 93000 ELECTROCARDIOGRAM COMPLETE: CPT

## 2023-11-13 PROCEDURE — 99215 OFFICE O/P EST HI 40 MIN: CPT | Mod: 25

## 2023-11-13 RX ORDER — INSULIN GLARGINE 100 [IU]/ML
100 INJECTION, SOLUTION SUBCUTANEOUS
Refills: 0 | Status: ACTIVE | COMMUNITY

## 2023-11-13 RX ORDER — PEN NEEDLE, DIABETIC 29 G X1/2"
31G X 5 MM NEEDLE, DISPOSABLE MISCELLANEOUS
Qty: 100 | Refills: 0 | Status: ACTIVE | COMMUNITY
Start: 2023-09-18

## 2023-11-13 RX ORDER — INSULIN GLARGINE 100 [IU]/ML
100 INJECTION, SOLUTION SUBCUTANEOUS
Refills: 0 | Status: DISCONTINUED | COMMUNITY
End: 2023-11-13

## 2023-11-20 NOTE — H&P PST ADULT - NSSUBSTANCEUSE_GEN_ALL_CORE_SD
What Type Of Note Output Would You Prefer (Optional)?: Bullet Format
What Is The Reason For Today's Visit?: Full Body Skin Examination with No Concerns
caffeine

## 2023-11-27 ENCOUNTER — RX RENEWAL (OUTPATIENT)
Age: 53
End: 2023-11-27

## 2023-12-27 ENCOUNTER — RX RENEWAL (OUTPATIENT)
Age: 53
End: 2023-12-27

## 2024-01-31 ENCOUNTER — APPOINTMENT (OUTPATIENT)
Dept: ELECTROPHYSIOLOGY | Facility: CLINIC | Age: 54
End: 2024-01-31
Payer: MEDICAID

## 2024-01-31 VITALS
BODY MASS INDEX: 51.81 KG/M2 | OXYGEN SATURATION: 94 % | WEIGHT: 257 LBS | HEART RATE: 73 BPM | DIASTOLIC BLOOD PRESSURE: 70 MMHG | HEIGHT: 59 IN | SYSTOLIC BLOOD PRESSURE: 128 MMHG

## 2024-01-31 DIAGNOSIS — R00.2 PALPITATIONS: ICD-10-CM

## 2024-01-31 DIAGNOSIS — G47.33 OBSTRUCTIVE SLEEP APNEA (ADULT) (PEDIATRIC): ICD-10-CM

## 2024-01-31 PROCEDURE — 99203 OFFICE O/P NEW LOW 30 MIN: CPT | Mod: 25

## 2024-01-31 PROCEDURE — 93000 ELECTROCARDIOGRAM COMPLETE: CPT

## 2024-01-31 NOTE — HISTORY OF PRESENT ILLNESS
[FreeTextEntry1] : The patient is a 53-year-old female referred for arrhythmia management. The patient has a history of nonischemic cardiomyopathy, HTN, PAD s/p angioplasty/stenting (both right and left sides), GERHARD, obesity, SVT and PVCs. The patient has symptoms of palpitations and was noted to have a PVC burden of 8% on Holter monitoring (one dominant morphology). Her EF was first noted to be 25-30% in 4/2022 which later improved to 52% in 10/2022. LHC was negative for obstructive CAD. Last EF was 50% by TTE in 10/2023. She reports her palpitations started in 9/2023 and were frequent at the time when the monitoring was performed. She now experiences infrequent palpitations which are not very bothersome. She is on coreg 6.25 mg bid in addition to other HF medications.

## 2024-01-31 NOTE — DISCUSSION/SUMMARY
[EKG obtained to assist in diagnosis and management of assessed problem(s)] : EKG obtained to assist in diagnosis and management of assessed problem(s) [FreeTextEntry1] : In summary, the patient is a 53-year-old female with a history of NICM (with recovered EF, 50%), HTN, obesity, GERHARD and frequent PVCs. She was experiencing frequent palpitations in 9/2023 and at that time had a PVC burden of 8%. Now her symptoms are infrequent, and it appears that the PVC burden must be quite low as well. Recommend continuation of Coreg only. She will return for follow up in 3 months for reassessment. If her symptoms become frequent again in the meantime, she will call the office and a monitor can be arranged for PVC burden assessment. If a high burden is seen she would be a candidate for an ablation procedure.   f/u in 3 months for reassessment. Currently infrequent symptoms.

## 2024-02-15 NOTE — ASU PATIENT PROFILE, ADULT - NSICDXPASTMEDICALHX_GEN_ALL_CORE_FT
Yes
PAST MEDICAL HISTORY:  Acute systolic congestive heart failure     Borderline systolic HTN     DM2 (diabetes mellitus, type 2)     HLD (hyperlipidemia)     HTN (hypertension)     Obstructive sleep apnea     
unknown

## 2024-02-26 ENCOUNTER — APPOINTMENT (OUTPATIENT)
Dept: CARDIOLOGY | Facility: CLINIC | Age: 54
End: 2024-02-26
Payer: MEDICAID

## 2024-02-26 VITALS
HEART RATE: 81 BPM | BODY MASS INDEX: 52.21 KG/M2 | WEIGHT: 259 LBS | HEIGHT: 59 IN | SYSTOLIC BLOOD PRESSURE: 106 MMHG | OXYGEN SATURATION: 96 % | DIASTOLIC BLOOD PRESSURE: 56 MMHG

## 2024-02-26 DIAGNOSIS — I73.9 PERIPHERAL VASCULAR DISEASE, UNSPECIFIED: ICD-10-CM

## 2024-02-26 PROCEDURE — 99214 OFFICE O/P EST MOD 30 MIN: CPT

## 2024-02-26 NOTE — HISTORY OF PRESENT ILLNESS
[FreeTextEntry1] : NICM HFrEF, EF improved PVCs   A) NICM with LVEF 25-30 and  Heart Failure diagnosed 4/2022  B) s/p LHC 4/11/022 subsequent to CM and NYHA Class II HF recently diagnosed LVEF 20% by TTE, Normal Coronaries, Elevated LVEDP  C) TTE 4/9/2022: LV cavity size mildly enlarged LVID d 5.78 cm , LVEF 25-30%.  DD 2. Mild Aortic stenosis PG 13.3, MG 6.1 mmHg JULIAN 1.56 cm2. Mild cLVH.   D) Pt agrees with Cardiac MRI but won't do it without sedation, discussed with [facility] radiology, will order cMRI with sedation, pt aware she will have to go through PAT's, and wait list is 6-8 weeks   E) Pt hasn't done the CMRI,  Pulmonary evaluation for GERHARD and the blood  tests as of 6/27/2022. She states she is now in a shelter and she swill the the studies and follow ups. She also wants a form to be filled our for a "[facility] [employer] of  Physcial assessment for determination of employability."  F) Followed at Heart Failure 9/28/2022: ACC/AHA Stage C HFrEF (LVEF 25-30% and DD), The etiology of her CMP is unclear, possibly in the setting of uncontrolled HTN. She is euvolemic on exam with lukewarm extremities and is tolerating good GDMT. Will need repeat TTE to eval LVEF. She is also awaiting peripheral angiogram.   G) TTE 10/23/08546 LVEF Biplane 52%. DD 1. Mild cLVH.   H) TTE 9/29/2023 LVEF 50%, DD1  I) Holter Monitor 9/2023 SVT, NSVT aditya, PVC 7.59%- EP f/u was recommended  J) EP Eval 1/31/2024 as per notes:  She was experiencing frequent palpitations in 9/2023 and at that time had a PVC burden of 8%. Recommend continuation of Coreg only. She will return for follow up in 3 months for reassessment. If her symptoms become frequent again in the meantime, she will call the office and a monitor can be arranged for PVC burden assessment. If a high burden is seen she would be a candidate for an ablation procedure. f/u in 3 months for reassessment.    GERHARD Pulmonary evaluation was recommended. Didn't FU yet.   BL LE EDEMA A) Admits baseline BHANDARI which she states it has been improved.  B) Denies LE edema, continue Lasix, elevate legs, F/U with Vascular   PAD A)  S/P peripheral angiogram 2/23/2023 with Dr Rand, bilateral SFA 90% stenosis, s/p angioplasty and stenting of the left SFA, started on ASA, Plavix, Plan : to return for staged PTA of the RLE SFA.   B)  S/P staged PCA, s/p balloon angioplasty and MAÍRA to the right SFA through right pedal access ( right DP) on 5/18/2023  C) Post procedure TONI/PVR done 6/27/2023 CONCLUSIONS:  Right: TONI indicates borderline lower extremity arterial disease.  Left: TONI indicates mild lower extremity arterial disease.   ROS Denied dizziness, orthopnea, dyspnea, PND, angina, chest pain, discomfort, palpitation, bleeding, syncope, near syncope. Continued to smoke. Denied claudication.    FUNCTIONAL CAPACITY Est <4.0 METS. Uses cane/walker to walk    CURRENT CARDIO MEDS aspirin 81 mg daily Clopidogrel 75 mg dily atorvastatin 40 mg daily carvedilol 6.25 mg twice daily furosemide 40 mg twice daily sacubitril - valsartan 49-51 mg twice daily Jardiance 10 mg daily

## 2024-02-26 NOTE — CARDIOLOGY SUMMARY
[de-identified] : 11/21/2022\par  Sinus  Rhythm \par  -  Nonspecific T-abnormality. \par  ABNORMAL \par  \par  4/25/2022\par  Sinus  Bradycardia HR 59 bpm\par  Voltage criteria for LVH  (R(I)+S(III) exceeds 2.50 mV)  -Voltage criteria w/o ST/T abnormality may be normal. \par   -Poor R-wave progression -nonspecific -consider old anterior infarct. \par   -  Nonspecific T-abnormality. \par  ABNORMAL \par

## 2024-03-21 ENCOUNTER — APPOINTMENT (OUTPATIENT)
Dept: VASCULAR SURGERY | Facility: CLINIC | Age: 54
End: 2024-03-21
Payer: MEDICAID

## 2024-03-21 VITALS
BODY MASS INDEX: 52.82 KG/M2 | SYSTOLIC BLOOD PRESSURE: 110 MMHG | RESPIRATION RATE: 16 BRPM | WEIGHT: 262 LBS | DIASTOLIC BLOOD PRESSURE: 71 MMHG | HEART RATE: 77 BPM | HEIGHT: 59 IN | TEMPERATURE: 97.9 F | OXYGEN SATURATION: 96 %

## 2024-03-21 DIAGNOSIS — R60.0 LOCALIZED EDEMA: ICD-10-CM

## 2024-03-21 DIAGNOSIS — M79.3 PANNICULITIS, UNSPECIFIED: ICD-10-CM

## 2024-03-21 PROCEDURE — 99203 OFFICE O/P NEW LOW 30 MIN: CPT

## 2024-03-21 PROCEDURE — 93970 EXTREMITY STUDY: CPT

## 2024-03-21 NOTE — REASON FOR VISIT
[FreeTextEntry1] : Pt is here due to swelling in both legs. [Initial Evaluation] : an initial evaluation

## 2024-03-22 PROBLEM — M79.3 LIPODERMATOSCLEROSIS OF BOTH LOWER EXTREMITIES: Status: ACTIVE | Noted: 2024-03-22

## 2024-03-22 PROBLEM — R60.0 EDEMA OF BOTH LEGS: Status: ACTIVE | Noted: 2024-03-22

## 2024-03-22 NOTE — ASSESSMENT
[FreeTextEntry1] : 54yo F with asymptomatic LE edema with lipodermatosclerosis s/p bilateral LE angioplasty and stenting for PAD  Venous duplex in the office demonstrates no evidence of DVT, SVT or VI  Plan: -Compression stockings recommended daily -Leg elevation while at rest -Continue home medications for CHF -Ambulation -Follow up as needed [Arterial/Venous Disease] : arterial/venous disease [Foot care/Footwear] : foot care/footwear

## 2024-03-22 NOTE — ASSESSMENT
[FreeTextEntry1] : 52yo F with asymptomatic LE edema with lipodermatosclerosis s/p bilateral LE angioplasty and stenting for PAD  Venous duplex in the office demonstrates no evidence of DVT, SVT or VI  Plan: -Compression stockings recommended daily -Leg elevation while at rest -Continue home medications for CHF -Ambulation -Follow up as needed [Arterial/Venous Disease] : arterial/venous disease [Foot care/Footwear] : foot care/footwear

## 2024-03-22 NOTE — PHYSICAL EXAM
[Ankle Swelling (On Exam)] : present [Ankle Swelling Bilaterally] : bilaterally  [] : bilaterally [2+] : left 2+ [Varicose Veins Of Lower Extremities] : bilaterally [Ankle Swelling On The Right] : mild [Ankle Swelling On The Left] : moderate [Abdomen Tenderness] : ~T ~M No abdominal tenderness [Skin Ulcer] : no ulcer [Alert] : alert [Oriented to Person] : oriented to person [Oriented to Place] : oriented to place [Oriented to Time] : oriented to time [Calm] : calm [de-identified] : AAOx3 [de-identified] : EOMI [FreeTextEntry1] : hyperpigmentation of bilateral ankles and pretibial area

## 2024-03-22 NOTE — HISTORY OF PRESENT ILLNESS
[FreeTextEntry1] : 54yo F who PMH of CHF and b/l LE stents who presented to the office for evaluation of her lower extremities. States that when she walks half a mile her ankles get extremely swollen. Symptoms improve when she lays flat. Patient was evaluated on 2/24 by her cardiologist for SOB and was started on Entresto for an EF of 30%, now 50%. Cardiologist also stented b/l LE for imaging findings consistent with stenosed vessels, patient asymptomatic. Other complaint is b/l LE redness that has been stable for years. She continues to smoke 1/2 PPD.

## 2024-03-22 NOTE — PHYSICAL EXAM
[Ankle Swelling (On Exam)] : present [Ankle Swelling Bilaterally] : bilaterally  [] : bilaterally [2+] : left 2+ [Varicose Veins Of Lower Extremities] : bilaterally [Ankle Swelling On The Right] : mild [Ankle Swelling On The Left] : moderate [Abdomen Tenderness] : ~T ~M No abdominal tenderness [Skin Ulcer] : no ulcer [Alert] : alert [Oriented to Person] : oriented to person [Oriented to Place] : oriented to place [Oriented to Time] : oriented to time [Calm] : calm [de-identified] : AAOx3 [de-identified] : EOMI [FreeTextEntry1] : hyperpigmentation of bilateral ankles and pretibial area

## 2024-04-08 ENCOUNTER — RX RENEWAL (OUTPATIENT)
Age: 54
End: 2024-04-08

## 2024-05-01 ENCOUNTER — APPOINTMENT (OUTPATIENT)
Dept: ELECTROPHYSIOLOGY | Facility: CLINIC | Age: 54
End: 2024-05-01

## 2024-05-06 NOTE — REASON FOR VISIT
[Arrhythmia/ECG Abnorrmalities] : arrhythmia/ECG abnormalities [FreeTextEntry3] : Dr Younger [FreeTextEntry1] : INCOMPLETE NOTE

## 2024-05-06 NOTE — HISTORY OF PRESENT ILLNESS
[FreeTextEntry1] : The patient is a 53-year-old female referred for arrhythmia management. The patient has a history of nonischemic cardiomyopathy, HTN, PAD s/p angioplasty/stenting (both right and left sides), GERHARD, obesity, SVT and PVCs. The patient has symptoms of palpitations and was noted to have a PVC burden of 8% on Holter monitoring (one dominant morphology). Her EF was first noted to be 25-30% in 4/2022 which later improved to 52% in 10/2022. LHC was negative for obstructive CAD. Last EF was 50% by TTE in 10/2023. She reports her palpitations started in 9/2023 and were frequent at the time when the monitoring was performed. She now experiences infrequent palpitations which are not very bothersome. She is on Coreg 6.25 mg bid in addition to other HF medications.  Seen on 1/31/24 currently with infrequent symptoms and plan to continue Coreg only with plan for re-evaluation in 3-months. If her symptoms become frequent again in the meantime, she will call the office and a monitor can be arranged for PVC burden assessment. If a high burden is seen she would be a candidate for an ablation procedure.  Presents today for follow-up and reports ***  Currently taking ***  ECG 5/1/24: ***  ?repeat Holetr?

## 2024-05-06 NOTE — DISCUSSION/SUMMARY
[FreeTextEntry1] :   Recommendations: -Continue remote monitoring via *** -Continue *** -Continue *** -Instructed to report any bleeding events *** -Instructed to report any recurrent or new symptomatology -Continue routine follow-up with cardiologist *** -Follow-up with EP ***

## 2024-05-14 ENCOUNTER — RX RENEWAL (OUTPATIENT)
Age: 54
End: 2024-05-14

## 2024-05-21 ENCOUNTER — APPOINTMENT (OUTPATIENT)
Dept: HEART FAILURE | Facility: CLINIC | Age: 54
End: 2024-05-21

## 2024-05-28 ENCOUNTER — NON-APPOINTMENT (OUTPATIENT)
Age: 54
End: 2024-05-28

## 2024-05-28 ENCOUNTER — APPOINTMENT (OUTPATIENT)
Dept: CARDIOLOGY | Facility: CLINIC | Age: 54
End: 2024-05-28
Payer: MEDICAID

## 2024-05-28 VITALS
DIASTOLIC BLOOD PRESSURE: 60 MMHG | HEIGHT: 59 IN | BODY MASS INDEX: 53.83 KG/M2 | OXYGEN SATURATION: 93 % | WEIGHT: 267 LBS | SYSTOLIC BLOOD PRESSURE: 114 MMHG | HEART RATE: 93 BPM

## 2024-05-28 DIAGNOSIS — I10 ESSENTIAL (PRIMARY) HYPERTENSION: ICD-10-CM

## 2024-05-28 DIAGNOSIS — E78.5 HYPERLIPIDEMIA, UNSPECIFIED: ICD-10-CM

## 2024-05-28 DIAGNOSIS — I49.3 VENTRICULAR PREMATURE DEPOLARIZATION: ICD-10-CM

## 2024-05-28 DIAGNOSIS — I42.8 OTHER CARDIOMYOPATHIES: ICD-10-CM

## 2024-05-28 DIAGNOSIS — I50.22 CHRONIC SYSTOLIC (CONGESTIVE) HEART FAILURE: ICD-10-CM

## 2024-05-28 PROCEDURE — 99215 OFFICE O/P EST HI 40 MIN: CPT | Mod: 25

## 2024-05-28 PROCEDURE — 93000 ELECTROCARDIOGRAM COMPLETE: CPT

## 2024-05-28 RX ORDER — FUROSEMIDE 40 MG/1
40 TABLET ORAL
Qty: 180 | Refills: 3 | Status: ACTIVE | COMMUNITY
Start: 2023-11-27 | End: 1900-01-01

## 2024-05-28 RX ORDER — CARVEDILOL 6.25 MG/1
6.25 TABLET, FILM COATED ORAL
Qty: 180 | Refills: 3 | Status: ACTIVE | COMMUNITY
Start: 1900-01-01 | End: 1900-01-01

## 2024-05-28 RX ORDER — SPIRONOLACTONE 25 MG/1
25 TABLET ORAL
Qty: 90 | Refills: 3 | Status: ACTIVE | COMMUNITY
Start: 2022-05-17 | End: 1900-01-01

## 2024-05-28 RX ORDER — METFORMIN HYDROCHLORIDE 1000 MG/1
1000 TABLET, COATED ORAL
Refills: 0 | Status: ACTIVE | COMMUNITY
Start: 2024-05-28

## 2024-05-28 RX ORDER — ASPIRIN 81 MG/1
81 TABLET, DELAYED RELEASE ORAL DAILY
Qty: 90 | Refills: 3 | Status: ACTIVE | COMMUNITY
Start: 2022-09-01 | End: 1900-01-01

## 2024-05-28 RX ORDER — SACUBITRIL AND VALSARTAN 49; 51 MG/1; MG/1
49-51 TABLET, FILM COATED ORAL
Qty: 180 | Refills: 3 | Status: ACTIVE | COMMUNITY
Start: 2022-04-13 | End: 1900-01-01

## 2024-05-28 RX ORDER — CLOPIDOGREL BISULFATE 75 MG/1
75 TABLET, FILM COATED ORAL
Qty: 90 | Refills: 1 | Status: ACTIVE | COMMUNITY
Start: 2023-11-08 | End: 1900-01-01

## 2024-05-28 NOTE — HISTORY OF PRESENT ILLNESS
[FreeTextEntry1] : Patient presents to the office for follow up of HFrEF.   NICM HFrEF, EF improved PVCs  A) NICM with LVEF 25-30 and  Heart Failure diagnosed 4/2022  B) s/p C 4/11/022 subsequent to CM and NYHA Class II HF recently diagnosed LVEF 20% by TTE, Normal Coronaries, Elevated LVEDP  C) TTE 4/9/2022: LV cavity size mildly enlarged LVID d 5.78 cm , LVEF 25-30%.  DD 2. Mild Aortic stenosis PG 13.3, MG 6.1 mmHg JULIAN 1.56 cm2. Mild cLVH.   D) Pt agrees with Cardiac MRI but won't do it without sedation, discussed with [facility] radiology, will order cMRI with sedation, pt aware she will have to go through PAT's, and wait list is 6-8 weeks   E) Pt hasn't done the CMRI,  Pulmonary evaluation for GERHARD and the blood  tests as of 6/27/2022. She states she is now in a shelter and she swill the the studies and follow ups. She also wants a form to be filled our for a "[facility] department of  Physcial assessment for determination of employability."  F) Followed at Heart Failure 9/28/2022: ACC/AHA Stage C HFrEF (LVEF 25-30% and DD), The etiology of her CMP is unclear, possibly in the setting of uncontrolled HTN. She is euvolemic on exam with lukewarm extremities and is tolerating good GDMT. Will need repeat TTE to eval LVEF. She is also awaiting peripheral angiogram.   G) TTE 10/23/85106 LVEF Biplane 52%. DD 1. Mild cLVH.   H) TTE 9/29/2023 LVEF 50%, DD1  I) Holter Monitor 9/2023 SVT, NSVT aditya, PVC 7.59%- EP f/u was recommended  J) EP Eval 1/31/2024 as per notes:  She was experiencing frequent palpitations in 9/2023 and at that time had a PVC burden of 8%. Recommend continuation of Coreg only. She will return for follow up in 3 months for reassessment. If her symptoms become frequent again in the meantime, she will call the office and a monitor can be arranged for PVC burden assessment. If a high burden is seen she would be a candidate for an ablation procedure. f/u in 3 months for reassessment.    GERHARD Pulmonary evaluation was previously recommended. NO F/U since 2022.    BL LE EDEMA at baseline   PAD A)  S/P peripheral angiogram 2/23/2023 with Dr Rand, bilateral SFA 90% stenosis, s/p angioplasty and stenting of the left SFA, started on ASA, Plavix, Plan : to return for staged PTA of the RLE SFA.   B)  S/P staged PCA, s/p balloon angioplasty and MARÍA to the right SFA through right pedal access ( right DP) on 5/18/2023  C) Post procedure TONI/PVR done 6/27/2023 CONCLUSIONS:  Right: TONI indicates borderline lower extremity arterial disease.  Left: TONI indicates mild lower extremity arterial disease.  Diabetes: Follows with endocrinology. Labs done today. Using a continuous glucose monitor now.   ROS Reports feeling ok. Reports chronic BHANDARI when walking to her home, slightly worse when compared to months ago. Reports rare momentary dizziness, resolving spontaneously. Denies chest pain, SOB at rest, palpitations, lightheadedness, fatigue, syncope, near syncope. Reports chronic LE edema, unchanged from usual baseline. She still smokes "less than a pack" per day, denies alcohol use or illicit drug use. She uses a crutch to walk due to arthritis pain in her joints.   FUNCTIONAL CAPACITY Est <4.0 METS. Uses cane/walker to walk    CURRENT CARDIO MEDS aspirin 81 mg daily Clopidogrel 75 mg dily atorvastatin 40 mg daily carvedilol 6.25 mg twice daily furosemide 40 mg twice daily sacubitril - valsartan 49-51 mg twice daily Jardiance 10 mg daily

## 2024-05-28 NOTE — DISCUSSION/SUMMARY
[FreeTextEntry1] : Patient presents to the office for follow up of HFrEF.  Assessment/ Plan: Chronic systolic heart failure (428.22) (I50.22)    - HFrecEF     -TTE 9/2023 with stable LVE 50%     -Continue GDMT. Labs done by endocrine today- advised patient to have results sent to our office for review.      -Euvolemic on exam. Suspect BHANDARI multifactorial- obesity, HF, current smoking. Advised to f/u with pulmonary asap due to scattered wheeze.      -FU with HF- cancelled apt recently due to issues with transportation.      - smoking cessation advised   PAD (peripheral artery disease) (443.9) (I73.9)     -s/p angioplasty and stenting of the left SFA, on ASA, Plavix.     -s/p staged PTA, balloon angioplasty and MARÍA to right SFA through pedal access (right     DP) on 5/18/2023     - Continue ASA, Plavix , Atorvastatin.     - Vascular Dr. Pierce f/u PRN.  Follow up in 3-4 months, sooner if needed.  Patient was advised to contact the office or seek emergency medical care for any new, worsening or concerning symptoms. Patient verbalized understanding and is in agreement with the above plan.  Galilea Turcios was present in office at the time of visit. [EKG obtained to assist in diagnosis and management of assessed problem(s)] : EKG obtained to assist in diagnosis and management of assessed problem(s)

## 2024-05-28 NOTE — CARDIOLOGY SUMMARY
[de-identified] : 5/28/2024: NSR, no acute ST/T changes   11/21/2022 Sinus  Rhythm  -  Nonspecific T-abnormality.  ABNORMAL   4/25/2022 Sinus  Bradycardia HR 59 bpm Voltage criteria for LVH  (R(I)+S(III) exceeds 2.50 mV)  -Voltage criteria w/o ST/T abnormality may be normal.   -Poor R-wave progression -nonspecific -consider old anterior infarct.   -  Nonspecific T-abnormality.  ABNORMAL

## 2024-05-28 NOTE — PHYSICAL EXAM
[No Acute Distress] : no acute distress [Obese] : obese [No Carotid Bruit] : no carotid bruit [Normal S1, S2] : normal S1, S2 [No Murmur] : no murmur [No Respiratory Distress] : no respiratory distress  [Wheeze ____] : wheeze [unfilled] [Edema ___] : edema [unfilled] [Moves all extremities] : moves all extremities [Normal Speech] : normal speech [Alert and Oriented] : alert and oriented [Normal memory] : normal memory [de-identified] : using crutch to ambulate  [de-identified] : BL LE dry skin, purple discoloration to BL LE

## 2024-05-28 NOTE — PHYSICAL EXAM
[No Acute Distress] : no acute distress [Obese] : obese [No Carotid Bruit] : no carotid bruit [Normal S1, S2] : normal S1, S2 [No Murmur] : no murmur [No Respiratory Distress] : no respiratory distress  [Wheeze ____] : wheeze [unfilled] [Edema ___] : edema [unfilled] [Moves all extremities] : moves all extremities [Normal Speech] : normal speech [Alert and Oriented] : alert and oriented [Normal memory] : normal memory [de-identified] : using crutch to ambulate  [de-identified] : BL LE dry skin, purple discoloration to BL LE

## 2024-05-28 NOTE — CARDIOLOGY SUMMARY
[de-identified] : 5/28/2024: NSR, no acute ST/T changes   11/21/2022 Sinus  Rhythm  -  Nonspecific T-abnormality.  ABNORMAL   4/25/2022 Sinus  Bradycardia HR 59 bpm Voltage criteria for LVH  (R(I)+S(III) exceeds 2.50 mV)  -Voltage criteria w/o ST/T abnormality may be normal.   -Poor R-wave progression -nonspecific -consider old anterior infarct.   -  Nonspecific T-abnormality.  ABNORMAL

## 2024-05-28 NOTE — HISTORY OF PRESENT ILLNESS
[FreeTextEntry1] : Patient presents to the office for follow up of HFrEF.   NICM HFrEF, EF improved PVCs  A) NICM with LVEF 25-30 and  Heart Failure diagnosed 4/2022  B) s/p C 4/11/022 subsequent to CM and NYHA Class II HF recently diagnosed LVEF 20% by TTE, Normal Coronaries, Elevated LVEDP  C) TTE 4/9/2022: LV cavity size mildly enlarged LVID d 5.78 cm , LVEF 25-30%.  DD 2. Mild Aortic stenosis PG 13.3, MG 6.1 mmHg JULIAN 1.56 cm2. Mild cLVH.   D) Pt agrees with Cardiac MRI but won't do it without sedation, discussed with [facility] radiology, will order cMRI with sedation, pt aware she will have to go through PAT's, and wait list is 6-8 weeks   E) Pt hasn't done the CMRI,  Pulmonary evaluation for GERHARD and the blood  tests as of 6/27/2022. She states she is now in a shelter and she swill the the studies and follow ups. She also wants a form to be filled our for a "[facility] department of  Physcial assessment for determination of employability."  F) Followed at Heart Failure 9/28/2022: ACC/AHA Stage C HFrEF (LVEF 25-30% and DD), The etiology of her CMP is unclear, possibly in the setting of uncontrolled HTN. She is euvolemic on exam with lukewarm extremities and is tolerating good GDMT. Will need repeat TTE to eval LVEF. She is also awaiting peripheral angiogram.   G) TTE 10/23/38127 LVEF Biplane 52%. DD 1. Mild cLVH.   H) TTE 9/29/2023 LVEF 50%, DD1  I) Holter Monitor 9/2023 SVT, NSVT aditya, PVC 7.59%- EP f/u was recommended  J) EP Eval 1/31/2024 as per notes:  She was experiencing frequent palpitations in 9/2023 and at that time had a PVC burden of 8%. Recommend continuation of Coreg only. She will return for follow up in 3 months for reassessment. If her symptoms become frequent again in the meantime, she will call the office and a monitor can be arranged for PVC burden assessment. If a high burden is seen she would be a candidate for an ablation procedure. f/u in 3 months for reassessment.    GERHARD Pulmonary evaluation was previously recommended. NO F/U since 2022.    BL LE EDEMA at baseline   PAD A)  S/P peripheral angiogram 2/23/2023 with Dr Rand, bilateral SFA 90% stenosis, s/p angioplasty and stenting of the left SFA, started on ASA, Plavix, Plan : to return for staged PTA of the RLE SFA.   B)  S/P staged PCA, s/p balloon angioplasty and MARÍA to the right SFA through right pedal access ( right DP) on 5/18/2023  C) Post procedure TONI/PVR done 6/27/2023 CONCLUSIONS:  Right: TONI indicates borderline lower extremity arterial disease.  Left: TONI indicates mild lower extremity arterial disease.  Diabetes: Follows with endocrinology. Labs done today. Using a continuous glucose monitor now.   ROS Reports feeling ok. Reports chronic BHANDARI when walking to her home, slightly worse when compared to months ago. Reports rare momentary dizziness, resolving spontaneously. Denies chest pain, SOB at rest, palpitations, lightheadedness, fatigue, syncope, near syncope. Reports chronic LE edema, unchanged from usual baseline. She still smokes "less than a pack" per day, denies alcohol use or illicit drug use. She uses a crutch to walk due to arthritis pain in her joints.   FUNCTIONAL CAPACITY Est <4.0 METS. Uses cane/walker to walk    CURRENT CARDIO MEDS aspirin 81 mg daily Clopidogrel 75 mg dily atorvastatin 40 mg daily carvedilol 6.25 mg twice daily furosemide 40 mg twice daily sacubitril - valsartan 49-51 mg twice daily Jardiance 10 mg daily

## 2024-05-28 NOTE — REVIEW OF SYSTEMS
[Feeling Fatigued] : not feeling fatigued [SOB] : no shortness of breath [Dyspnea on exertion] : dyspnea during exertion [Chest Discomfort] : no chest discomfort [Lower Ext Edema] : lower extremity edema [Palpitations] : no palpitations [Orthopnea] : no orthopnea [Syncope] : no syncope [Joint Pain] : joint pain [Dizziness] : dizziness

## 2024-05-29 ENCOUNTER — RX RENEWAL (OUTPATIENT)
Age: 54
End: 2024-05-29

## 2024-05-29 RX ORDER — ATORVASTATIN CALCIUM 40 MG/1
40 TABLET, FILM COATED ORAL
Qty: 90 | Refills: 0 | Status: ACTIVE | COMMUNITY
Start: 1900-01-01 | End: 1900-01-01

## 2024-07-15 ENCOUNTER — RX RENEWAL (OUTPATIENT)
Age: 54
End: 2024-07-15

## 2024-09-24 ENCOUNTER — NON-APPOINTMENT (OUTPATIENT)
Age: 54
End: 2024-09-24

## 2024-09-24 ENCOUNTER — APPOINTMENT (OUTPATIENT)
Dept: CARDIOLOGY | Facility: CLINIC | Age: 54
End: 2024-09-24
Payer: MEDICAID

## 2024-09-24 VITALS
WEIGHT: 266 LBS | SYSTOLIC BLOOD PRESSURE: 120 MMHG | BODY MASS INDEX: 53.63 KG/M2 | HEART RATE: 82 BPM | HEIGHT: 59 IN | OXYGEN SATURATION: 92 % | DIASTOLIC BLOOD PRESSURE: 72 MMHG

## 2024-09-24 DIAGNOSIS — R06.09 OTHER FORMS OF DYSPNEA: ICD-10-CM

## 2024-09-24 DIAGNOSIS — R00.2 PALPITATIONS: ICD-10-CM

## 2024-09-24 DIAGNOSIS — I10 ESSENTIAL (PRIMARY) HYPERTENSION: ICD-10-CM

## 2024-09-24 DIAGNOSIS — R05.9 COUGH, UNSPECIFIED: ICD-10-CM

## 2024-09-24 DIAGNOSIS — I50.22 CHRONIC SYSTOLIC (CONGESTIVE) HEART FAILURE: ICD-10-CM

## 2024-09-24 DIAGNOSIS — I73.9 PERIPHERAL VASCULAR DISEASE, UNSPECIFIED: ICD-10-CM

## 2024-09-24 PROCEDURE — 99214 OFFICE O/P EST MOD 30 MIN: CPT | Mod: 25

## 2024-09-24 PROCEDURE — 93000 ELECTROCARDIOGRAM COMPLETE: CPT

## 2024-09-24 NOTE — HISTORY OF PRESENT ILLNESS
[FreeTextEntry1] : Patient presents to the office for follow up of HFrEF.   NICM HFrEF, EF improved PVCs  A) NICM with LVEF 25-30 and  Heart Failure diagnosed 4/2022  B) s/p C 4/11/022 subsequent to CM and NYHA Class II HF recently diagnosed LVEF 20% by TTE, Normal Coronaries, Elevated LVEDP  C) TTE 4/9/2022: LV cavity size mildly enlarged LVID d 5.78 cm , LVEF 25-30%.  DD 2. Mild Aortic stenosis PG 13.3, MG 6.1 mmHg JULIAN 1.56 cm2. Mild cLVH.   D) Pt agrees with Cardiac MRI but won't do it without sedation, discussed with [facility] radiology, will order cMRI with sedation, pt aware she will have to go through PAT's, and wait list is 6-8 weeks   E) Pt hasn't done the CMRI,  Pulmonary evaluation for GERHARD and the blood  tests as of 6/27/2022. She states she is now in a shelter and she swill the the studies and follow ups. She also wants a form to be filled our for a "[facility] department of  Physcial assessment for determination of employability."  F) Followed at Heart Failure 9/28/2022: ACC/AHA Stage C HFrEF (LVEF 25-30% and DD), The etiology of her CMP is unclear, possibly in the setting of uncontrolled HTN. She is euvolemic on exam with lukewarm extremities and is tolerating good GDMT. Will need repeat TTE to eval LVEF. She is also awaiting peripheral angiogram.   G) TTE 10/23/76242 LVEF Biplane 52%. DD 1. Mild cLVH.   H) TTE 9/29/2023 LVEF 50%, DD1  I) Holter Monitor 9/2023 SVT, NSVT aditya, PVC 7.59%- EP f/u was recommended  J) EP Eval 1/31/2024 as per notes:  She was experiencing frequent palpitations in 9/2023 and at that time had a PVC burden of 8%. Recommend continuation of Coreg only. She will return for follow up in 3 months for reassessment. If her symptoms become frequent again in the meantime, she will call the office and a monitor can be arranged for PVC burden assessment. If a high burden is seen she would be a candidate for an ablation procedure. f/u in 3 months for reassessment.    GERHARD Pulmonary evaluation was previously recommended. NO F/U since 2022.    BL LE EDEMA at baseline   PAD A)  S/P peripheral angiogram 2/23/2023 with Dr Rand, bilateral SFA 90% stenosis, s/p angioplasty and stenting of the left SFA, started on ASA, Plavix, Plan : to return for staged PTA of the RLE SFA.   B)  S/P staged PCA, s/p balloon angioplasty and MARÍA to the right SFA through right pedal access ( right DP) on 5/18/2023  C) Post procedure TONI/PVR done 6/27/2023 CONCLUSIONS:  Right: TONI indicates borderline lower extremity arterial disease.  Left: TONI indicates mild lower extremity arterial disease.  Diabetes: Follows with endocrinology. Labs done recently- she has brought results for review. Using a continuous glucose monitor now.   ROS Reports having a cough the last week and a half, denies fevers/ chills. States she continues to feel dyspnea with exertion while walking short distances. She has not yet seen Pulmonary as previously recommended. Reports chronic intermittent palpitations, no change from usual baseline. Reports no change in chronic LE edema; she is following with podiatry for foot care. Reports dizziness when closing her eyes sometimes. Denies chest pain, SOB at rest, lightheadedness, = syncope, near syncope. She is still smoking a little under one PPD. Denies alcohol use or illicit drug use. She uses a crutch to walk due to arthritis pain in her joints.  She is living in the same Shelter as last visit, hopes to be moving soon.   FUNCTIONAL CAPACITY Est <4.0 METS. Uses cane/walker to walk    CURRENT CARDIO MEDS aspirin 81 mg daily Clopidogrel 75 mg dily atorvastatin 40 mg daily carvedilol 6.25 mg twice daily furosemide 40 mg twice daily sacubitril - valsartan 49-51 mg twice daily Spironolactone 25mg daily

## 2024-09-24 NOTE — REVIEW OF SYSTEMS
[Feeling Fatigued] : not feeling fatigued [SOB] : no shortness of breath [Dyspnea on exertion] : dyspnea during exertion [Chest Discomfort] : no chest discomfort [Lower Ext Edema] : lower extremity edema [Palpitations] : palpitations [Syncope] : no syncope [Dizziness] : dizziness [de-identified] : dizziness when closing eyes sometimes

## 2024-09-24 NOTE — CARDIOLOGY SUMMARY
[de-identified] : 9/24/2024: NSR rate variation  5/28/2024: NSR, no acute ST/T changes   11/21/2022 Sinus  Rhythm  -  Nonspecific T-abnormality.  ABNORMAL   4/25/2022 Sinus  Bradycardia HR 59 bpm Voltage criteria for LVH  (R(I)+S(III) exceeds 2.50 mV)  -Voltage criteria w/o ST/T abnormality may be normal.   -Poor R-wave progression -nonspecific -consider old anterior infarct.   -  Nonspecific T-abnormality.  ABNORMAL

## 2024-09-24 NOTE — PHYSICAL EXAM
[No Acute Distress] : no acute distress [Obese] : obese [No Carotid Bruit] : no carotid bruit [Normal S1, S2] : normal S1, S2 [No Murmur] : no murmur [No Respiratory Distress] : no respiratory distress  [Wheeze ____] : wheeze [unfilled] [Edema ___] : edema [unfilled] [Moves all extremities] : moves all extremities [Normal Speech] : normal speech [Alert and Oriented] : alert and oriented [Normal memory] : normal memory [de-identified] : using crutch  [de-identified] : BL LE pink, dry flaking skin

## 2024-09-24 NOTE — DISCUSSION/SUMMARY
[FreeTextEntry1] : Patient presents to the office for follow up of HFrEF.  Assessment/ Plan: Chronic systolic heart failure (428.22) (I50.22)    - HFrecEF     -TTE 9/2023 with stable LVE 50%, repeat TTE ordered.      -Continue GDMT. Labs done by endocrine reviewed.      -Near euvolemic on exam with chronic BL LE edema. Suspect BHANDARI multifactorial- obesity, current smoking. Advised to f/u with pulmonary asap due to scattered wheeze. Patient did not schedule this appointment after last visit but is willing to do so now.      -FU with HF- cancelled apt a few months ago due to issues with transportation. States she has transportation now.      - smoking cessation advised      - Lifestyle modifications for cardiovascular health, including dietary, exercise and weight loss recommendations, were discussed with patient today.  PAD (peripheral artery disease) (443.9) (I73.9)     -s/p angioplasty and stenting of the left SFA, on ASA, Plavix.     -s/p staged PTA, balloon angioplasty and MARÍA to right SFA through pedal access (right     DP) on 5/18/2023     - Continue ASA, Plavix , Atorvastatin.     - Vascular Dr. Pierce f/u PRN.  Follow up in 6 months, sooner if needed pending TTE results.  Patient was advised to contact the office or seek emergency medical care for any new, worsening or concerning symptoms. Patient verbalized understanding and is in agreement with the above plan.  Galilea Greenwood was present in office at the time of visit. [EKG obtained to assist in diagnosis and management of assessed problem(s)] : EKG obtained to assist in diagnosis and management of assessed problem(s)

## 2024-10-11 ENCOUNTER — APPOINTMENT (OUTPATIENT)
Dept: CARDIOLOGY | Facility: CLINIC | Age: 54
End: 2024-10-11
Payer: MEDICARE

## 2024-10-11 PROCEDURE — 93306 TTE W/DOPPLER COMPLETE: CPT

## 2024-10-14 ENCOUNTER — NON-APPOINTMENT (OUTPATIENT)
Age: 54
End: 2024-10-14

## 2024-10-23 ENCOUNTER — EMERGENCY (EMERGENCY)
Facility: HOSPITAL | Age: 54
LOS: 1 days | Discharge: HOME | End: 2024-10-23
Attending: EMERGENCY MEDICINE
Payer: MEDICARE

## 2024-10-23 VITALS
RESPIRATION RATE: 18 BRPM | DIASTOLIC BLOOD PRESSURE: 67 MMHG | TEMPERATURE: 98 F | SYSTOLIC BLOOD PRESSURE: 100 MMHG | HEART RATE: 66 BPM | OXYGEN SATURATION: 94 %

## 2024-10-23 VITALS
WEIGHT: 268.96 LBS | TEMPERATURE: 97 F | RESPIRATION RATE: 17 BRPM | SYSTOLIC BLOOD PRESSURE: 108 MMHG | HEART RATE: 88 BPM | DIASTOLIC BLOOD PRESSURE: 71 MMHG | OXYGEN SATURATION: 93 %

## 2024-10-23 DIAGNOSIS — Z98.62 PERIPHERAL VASCULAR ANGIOPLASTY STATUS: Chronic | ICD-10-CM

## 2024-10-23 LAB
ALBUMIN SERPL ELPH-MCNC: 3.7 G/DL — SIGNIFICANT CHANGE UP (ref 3.3–5.2)
ALP SERPL-CCNC: 78 U/L — SIGNIFICANT CHANGE UP (ref 40–120)
ALT FLD-CCNC: 21 U/L — SIGNIFICANT CHANGE UP
ANION GAP SERPL CALC-SCNC: 15 MMOL/L — SIGNIFICANT CHANGE UP (ref 5–17)
APPEARANCE UR: CLEAR — SIGNIFICANT CHANGE UP
APTT BLD: 41.2 SEC — HIGH (ref 24.5–35.6)
AST SERPL-CCNC: 20 U/L — SIGNIFICANT CHANGE UP
BACTERIA # UR AUTO: ABNORMAL /HPF
BASOPHILS # BLD AUTO: 0.03 K/UL — SIGNIFICANT CHANGE UP (ref 0–0.2)
BASOPHILS NFR BLD AUTO: 0.2 % — SIGNIFICANT CHANGE UP (ref 0–2)
BILIRUB SERPL-MCNC: 0.3 MG/DL — LOW (ref 0.4–2)
BILIRUB UR-MCNC: NEGATIVE — SIGNIFICANT CHANGE UP
BUN SERPL-MCNC: 13.2 MG/DL — SIGNIFICANT CHANGE UP (ref 8–20)
CALCIUM SERPL-MCNC: 8.7 MG/DL — SIGNIFICANT CHANGE UP (ref 8.4–10.5)
CAST: 1 /LPF — SIGNIFICANT CHANGE UP (ref 0–4)
CHLORIDE SERPL-SCNC: 97 MMOL/L — SIGNIFICANT CHANGE UP (ref 96–108)
CO2 SERPL-SCNC: 27 MMOL/L — SIGNIFICANT CHANGE UP (ref 22–29)
COLOR SPEC: YELLOW — SIGNIFICANT CHANGE UP
CREAT SERPL-MCNC: 0.52 MG/DL — SIGNIFICANT CHANGE UP (ref 0.5–1.3)
DIFF PNL FLD: NEGATIVE — SIGNIFICANT CHANGE UP
EGFR: 111 ML/MIN/1.73M2 — SIGNIFICANT CHANGE UP
EOSINOPHIL # BLD AUTO: 0.14 K/UL — SIGNIFICANT CHANGE UP (ref 0–0.5)
EOSINOPHIL NFR BLD AUTO: 1.1 % — SIGNIFICANT CHANGE UP (ref 0–6)
GLUCOSE SERPL-MCNC: 156 MG/DL — HIGH (ref 70–99)
GLUCOSE UR QL: NEGATIVE MG/DL — SIGNIFICANT CHANGE UP
HCT VFR BLD CALC: 43.8 % — SIGNIFICANT CHANGE UP (ref 34.5–45)
HGB BLD-MCNC: 14.4 G/DL — SIGNIFICANT CHANGE UP (ref 11.5–15.5)
IMM GRANULOCYTES NFR BLD AUTO: 0.4 % — SIGNIFICANT CHANGE UP (ref 0–0.9)
INR BLD: 1.04 RATIO — SIGNIFICANT CHANGE UP (ref 0.85–1.16)
KETONES UR-MCNC: ABNORMAL MG/DL
LEUKOCYTE ESTERASE UR-ACNC: ABNORMAL
LYMPHOCYTES # BLD AUTO: 2.76 K/UL — SIGNIFICANT CHANGE UP (ref 1–3.3)
LYMPHOCYTES # BLD AUTO: 21.6 % — SIGNIFICANT CHANGE UP (ref 13–44)
MCHC RBC-ENTMCNC: 29.3 PG — SIGNIFICANT CHANGE UP (ref 27–34)
MCHC RBC-ENTMCNC: 32.9 GM/DL — SIGNIFICANT CHANGE UP (ref 32–36)
MCV RBC AUTO: 89 FL — SIGNIFICANT CHANGE UP (ref 80–100)
MONOCYTES # BLD AUTO: 0.75 K/UL — SIGNIFICANT CHANGE UP (ref 0–0.9)
MONOCYTES NFR BLD AUTO: 5.9 % — SIGNIFICANT CHANGE UP (ref 2–14)
NEUTROPHILS # BLD AUTO: 9.02 K/UL — HIGH (ref 1.8–7.4)
NEUTROPHILS NFR BLD AUTO: 70.8 % — SIGNIFICANT CHANGE UP (ref 43–77)
NITRITE UR-MCNC: POSITIVE
PH UR: 5.5 — SIGNIFICANT CHANGE UP (ref 5–8)
PLATELET # BLD AUTO: 252 K/UL — SIGNIFICANT CHANGE UP (ref 150–400)
POTASSIUM SERPL-MCNC: 4.3 MMOL/L — SIGNIFICANT CHANGE UP (ref 3.5–5.3)
POTASSIUM SERPL-SCNC: 4.3 MMOL/L — SIGNIFICANT CHANGE UP (ref 3.5–5.3)
PROT SERPL-MCNC: 6.9 G/DL — SIGNIFICANT CHANGE UP (ref 6.6–8.7)
PROT UR-MCNC: NEGATIVE MG/DL — SIGNIFICANT CHANGE UP
PROTHROM AB SERPL-ACNC: 12.1 SEC — SIGNIFICANT CHANGE UP (ref 9.9–13.4)
RBC # BLD: 4.92 M/UL — SIGNIFICANT CHANGE UP (ref 3.8–5.2)
RBC # FLD: 15.4 % — HIGH (ref 10.3–14.5)
RBC CASTS # UR COMP ASSIST: 1 /HPF — SIGNIFICANT CHANGE UP (ref 0–4)
SODIUM SERPL-SCNC: 139 MMOL/L — SIGNIFICANT CHANGE UP (ref 135–145)
SP GR SPEC: 1.03 — SIGNIFICANT CHANGE UP (ref 1–1.03)
SQUAMOUS # UR AUTO: 6 /HPF — HIGH (ref 0–5)
UROBILINOGEN FLD QL: 1 MG/DL — SIGNIFICANT CHANGE UP (ref 0.2–1)
WBC # BLD: 12.75 K/UL — HIGH (ref 3.8–10.5)
WBC # FLD AUTO: 12.75 K/UL — HIGH (ref 3.8–10.5)
WBC UR QL: 43 /HPF — HIGH (ref 0–5)

## 2024-10-23 PROCEDURE — 70450 CT HEAD/BRAIN W/O DYE: CPT | Mod: 26,MC

## 2024-10-23 PROCEDURE — 99284 EMERGENCY DEPT VISIT MOD MDM: CPT | Mod: FS

## 2024-10-23 PROCEDURE — 72125 CT NECK SPINE W/O DYE: CPT | Mod: 26,MC

## 2024-10-23 RX ORDER — CEPHALEXIN 500 MG
1 CAPSULE ORAL
Qty: 21 | Refills: 0
Start: 2024-10-23 | End: 2024-10-29

## 2024-10-23 RX ORDER — ACETAMINOPHEN 325 MG
1000 TABLET ORAL ONCE
Refills: 0 | Status: COMPLETED | OUTPATIENT
Start: 2024-10-23 | End: 2024-10-23

## 2024-10-23 RX ORDER — CEPHALEXIN 500 MG
500 CAPSULE ORAL ONCE
Refills: 0 | Status: COMPLETED | OUTPATIENT
Start: 2024-10-23 | End: 2024-10-23

## 2024-10-23 RX ADMIN — Medication 500 MILLIGRAM(S): at 19:55

## 2024-10-23 RX ADMIN — Medication 400 MILLIGRAM(S): at 15:49

## 2024-10-23 NOTE — ED ADULT NURSE NOTE - OBJECTIVE STATEMENT
Pt presents to ED a&ox4 c/o head pressure and bump on back of head 2 days ago. Pt states her head feels better when she holds it. Pt denies injury/ trauma, illness, fever, chills, blurred vision, n/v/d. NAD noted, respirations equal and unlabored.

## 2024-10-23 NOTE — ED PROVIDER NOTE - NSFOLLOWUPINSTRUCTIONS_ED_ALL_ED_FT
use over the counter Tylenol 975 mg every 8 hours as needed for the headache   continue antibiotics  : Follow-up with your primary care doctor and bring the result in the 2 or 3 days  Follow-up with the ENT for finding on sinusitis on your CT scan and nasal polyps  Follow-up with neurology as well  Come back in the emergency room if any worsening of the headache neck pain dizziness abdominal pain nausea or vomiting or any new concern    Headache    A headache is pain or discomfort felt around the head or neck area. The specific cause of a headache may not be found as there are many types including tension headaches, migraine headaches, and cluster headaches. Watch your condition for any changes. Things you can do to manage your pain include taking over the counter and prescription medications as instructed by your health care provider, lying down in a dark quiet room, limiting stress, getting regular sleep, and refraining from alcohol and tobacco products.    SEEK IMMEDIATE MEDICAL CARE IF YOU HAVE ANY OF THE FOLLOWING SYMPTOMS: fever, vomiting, stiff neck, loss of vision, problems with speech, muscle weakness, loss of balance, trouble walking, passing out, or confusion.    Urinary Tract Infection    A urinary tract infection (UTI) is an infection of any part of the urinary tract, which includes the kidneys, ureters, bladder, and urethra. Risk factors include ignoring your need to urinate, wiping back to front if female, being an uncircumcised male, and having diabetes or a weak immune system. Symptoms include frequent urination, pain or burning with urination, foul smelling urine, cloudy urine, pain in the lower abdomen, blood in the urine, and fever. If you were prescribed an antibiotic medicine, take it as told by your health care provider. Do not stop taking the antibiotic even if you start to feel better.    SEEK IMMEDIATE MEDICAL CARE IF YOU HAVE ANY OF THE FOLLOWING SYMPTOMS: severe back or abdominal pain, fever, inability to keep fluids or medicine down, dizziness/lightheadedness, or a change in mental status.

## 2024-10-23 NOTE — ED PROVIDER NOTE - CLINICAL SUMMARY MEDICAL DECISION MAKING FREE TEXT BOX
53 year old female with PMH Cardiomyopathy, hypertension hyperlipidemia, diabetes on insulin and history of peripheral arterial disease , presents emergency room complaining of head pressure for the past week intermittently. denies any fall trauma or injury.  States the headaches get better when she holds her head.  She realized the bump on the back of her the neck 2 days ago that it is hurting when she touch.  Denies any sore throat , chest pain shortness of breath. coughing occasionally current smoker.  States she takes her medication every day on routine. pt had  f.u With urgent care yesterday recommend Ct scan   head pressure for 1 week-  and 2 days of right posterior chain cervical lymphadenopathy  - will obtain CT head and neck check the labs cbc cmp ptt and IV Tylenol for pain

## 2024-10-23 NOTE — ED ADULT TRIAGE NOTE - CHIEF COMPLAINT QUOTE
"pressure to my entire head" x 1 week, pt reports she noticed "lump on the right behind my ear/throat area". denies dizziness/visual changes/ams/loc.

## 2024-10-23 NOTE — ED PROVIDER NOTE - PATIENT PORTAL LINK FT
Essential hypertension
You can access the FollowMyHealth Patient Portal offered by [facility] by registering at the following website: [email or web address]. By joining [employer]’s FollowMyHealth portal, you will also be able to view your health information using other applications (apps) compatible with our system.

## 2024-10-23 NOTE — ED PROVIDER NOTE - PHYSICAL EXAMINATION
Const: AOX3 nontoxic appearing, no apparent respiratory or physical distress. obese female   HEENT: NC/AT. Moist mucous membranes. pharynx clear uvula midline - on examno anterior cervical  lymphadenopathy  Eyes: NATHANIEL. EOMI  Neck: Soft and supple. Full ROM without pain. enlarged lymph node noted on right posterior cervical chain  Cardiac: Regular rate and regular rhythm. +S1/S2. none pitting LE edema , chronic cellulitis noted - no open wound   Resp: Speaking in full sentences. No evidence of respiratory distress. No wheezes, rales or rhonchi. No adventitious breath sounds   Abd: Soft, non-tender, non-distended.   Skin: No rashes, abrasions or lacerations.  Neuro: Awake, alert & oriented x 3. Moves all extremities symmetrically.

## 2024-10-23 NOTE — ED PROVIDER NOTE - PROVIDER TOKENS
PROVIDER:[TOKEN:[[id number]],FOLLOWUP:[4-6 Days]],PROVIDER:[TOKEN:[[id number]],FOLLOWUP:[Routine]]

## 2024-10-23 NOTE — ED PROVIDER NOTE - PROGRESS NOTE DETAILS
Patient feeling better urine consistent with UTI and CT scan with a sinusitis explained to the patient with discharge the patient on antibiotics follow-up with primary care doctor or ENT and neurology as needed

## 2024-10-23 NOTE — ED PROVIDER NOTE - ATTENDING APP SHARED VISIT CONTRIBUTION OF CARE
53-year-old female; with PMH significant for HTN, HLD, cardiomyopathy, DM (on insulin), peripheral artery disease); now presenting with head pressure–left-sided head pressure nonradiating, x 1 week.  Patient reports palpating swelling over the right lateral scalp 2 days ago.  Denies drainage.  Denies trauma.  Denies numbness or tingling.  Denies nausea or vomiting.  Denies fever, chills, sweats.  Denies any neck stiffness.  Denies any cough congestion or rhinorrhea.  Gen: Alert, NAD  Head: NC, AT, PERRL, EOMI, normal lids/conjunctiva. +right occipital lymphadenopathy.   Neck: +supple, no tenderness/meningismus/JVD, +Trachea midline  Pulm: Bilateral BS, normal resp effort, no wheeze/stridor/retractions  CV: RRR, no M/R/G, 2+dist pulses  Abd: soft, NT/ND, +BS, no hepatosplenomegaly  Mskel: ROM intact x4 extremities.  no edema/erythema/cyanosis  Skin: no rash, warm, dry  Neuro: AAOx3, no sensory/motor deficits, CN 2-12 intact  A/P: 53yoF p/w headache and neck swelling  -ct head, ct neck, pain control, re-eval.

## 2024-10-23 NOTE — ED PROVIDER NOTE - CARE PROVIDER_API CALL
Dipesh Reynolds  Head/Neck Surgery  [address], [address]  [place] [address]  Phone: [phone]  Fax: [phone]  Follow Up Time: 4-6 Days    Shahid Nicolas  Neurology  [address], [address]  [place] [address]  Phone: [phone]  Fax: [phone]  Follow Up Time: Routine

## 2024-10-23 NOTE — ED PROVIDER NOTE - CARE PROVIDERS DIRECT ADDRESSES
[email or web address]

## 2024-10-23 NOTE — ED PROVIDER NOTE - OBJECTIVE STATEMENT
53 year old female with PMH Cardiomyopathy, hypertension hyperlipidemia, diabetes on insulin and history of peripheral arterial disease , presents emergency room complaining of head pressure for the past week intermittently. denies any fall trauma or injury.  States the headaches get better when she holds her head.  She realized the bump on the back of her the neck 2 days ago that it is hurting when she touch.  Denies any sore throat , chest pain shortness of breath. coughing occasionally current smoker.  States she takes her medication every day on routine. pt had  f.u With urgent care yesterday recommend Ct scan

## 2024-10-26 DIAGNOSIS — E78.5 HYPERLIPIDEMIA, UNSPECIFIED: ICD-10-CM

## 2024-10-26 DIAGNOSIS — F17.200 NICOTINE DEPENDENCE, UNSPECIFIED, UNCOMPLICATED: ICD-10-CM

## 2024-10-26 DIAGNOSIS — R05.9 COUGH, UNSPECIFIED: ICD-10-CM

## 2024-10-26 DIAGNOSIS — I10 ESSENTIAL (PRIMARY) HYPERTENSION: ICD-10-CM

## 2024-10-26 DIAGNOSIS — R59.0 LOCALIZED ENLARGED LYMPH NODES: ICD-10-CM

## 2024-10-26 DIAGNOSIS — R51.9 HEADACHE, UNSPECIFIED: ICD-10-CM

## 2024-10-26 DIAGNOSIS — N39.0 URINARY TRACT INFECTION, SITE NOT SPECIFIED: ICD-10-CM

## 2024-10-26 DIAGNOSIS — I42.9 CARDIOMYOPATHY, UNSPECIFIED: ICD-10-CM

## 2024-10-26 DIAGNOSIS — Z79.4 LONG TERM (CURRENT) USE OF INSULIN: ICD-10-CM

## 2024-10-26 DIAGNOSIS — E11.51 TYPE 2 DIABETES MELLITUS WITH DIABETIC PERIPHERAL ANGIOPATHY WITHOUT GANGRENE: ICD-10-CM

## 2024-11-20 PROCEDURE — 87077 CULTURE AEROBIC IDENTIFY: CPT

## 2024-11-20 PROCEDURE — 70450 CT HEAD/BRAIN W/O DYE: CPT | Mod: MC

## 2024-11-20 PROCEDURE — 96374 THER/PROPH/DIAG INJ IV PUSH: CPT

## 2024-11-20 PROCEDURE — 85730 THROMBOPLASTIN TIME PARTIAL: CPT

## 2024-11-20 PROCEDURE — 81001 URINALYSIS AUTO W/SCOPE: CPT

## 2024-11-20 PROCEDURE — 85025 COMPLETE CBC W/AUTO DIFF WBC: CPT

## 2024-11-20 PROCEDURE — 72125 CT NECK SPINE W/O DYE: CPT | Mod: MC

## 2024-11-20 PROCEDURE — 36415 COLL VENOUS BLD VENIPUNCTURE: CPT

## 2024-11-20 PROCEDURE — 87086 URINE CULTURE/COLONY COUNT: CPT

## 2024-11-20 PROCEDURE — 80053 COMPREHEN METABOLIC PANEL: CPT

## 2024-11-20 PROCEDURE — 87186 SC STD MICRODIL/AGAR DIL: CPT

## 2024-11-20 PROCEDURE — 85610 PROTHROMBIN TIME: CPT

## 2024-11-20 PROCEDURE — 99284 EMERGENCY DEPT VISIT MOD MDM: CPT | Mod: 25

## 2024-11-25 ENCOUNTER — RX RENEWAL (OUTPATIENT)
Age: 54
End: 2024-11-25

## 2024-12-29 ENCOUNTER — EMERGENCY (EMERGENCY)
Facility: HOSPITAL | Age: 54
LOS: 1 days | Discharge: LEFT BEFORE DISCHARGE | End: 2024-12-29
Attending: EMERGENCY MEDICINE
Payer: COMMERCIAL

## 2024-12-29 VITALS
SYSTOLIC BLOOD PRESSURE: 101 MMHG | OXYGEN SATURATION: 93 % | DIASTOLIC BLOOD PRESSURE: 66 MMHG | HEART RATE: 68 BPM | RESPIRATION RATE: 17 BRPM | TEMPERATURE: 98 F

## 2024-12-29 VITALS
HEART RATE: 87 BPM | OXYGEN SATURATION: 94 % | SYSTOLIC BLOOD PRESSURE: 95 MMHG | WEIGHT: 270.07 LBS | RESPIRATION RATE: 18 BRPM | TEMPERATURE: 98 F | DIASTOLIC BLOOD PRESSURE: 53 MMHG

## 2024-12-29 DIAGNOSIS — Z98.62 PERIPHERAL VASCULAR ANGIOPLASTY STATUS: Chronic | ICD-10-CM

## 2024-12-29 LAB
ALBUMIN SERPL ELPH-MCNC: 3.9 G/DL — SIGNIFICANT CHANGE UP (ref 3.3–5.2)
ALP SERPL-CCNC: 97 U/L — SIGNIFICANT CHANGE UP (ref 40–120)
ALT FLD-CCNC: 20 U/L — SIGNIFICANT CHANGE UP
ANION GAP SERPL CALC-SCNC: 15 MMOL/L — SIGNIFICANT CHANGE UP (ref 5–17)
AST SERPL-CCNC: 15 U/L — SIGNIFICANT CHANGE UP
BASOPHILS # BLD AUTO: 0.03 K/UL — SIGNIFICANT CHANGE UP (ref 0–0.2)
BASOPHILS NFR BLD AUTO: 0.3 % — SIGNIFICANT CHANGE UP (ref 0–2)
BILIRUB SERPL-MCNC: <0.2 MG/DL — LOW (ref 0.4–2)
BUN SERPL-MCNC: 11.9 MG/DL — SIGNIFICANT CHANGE UP (ref 8–20)
CALCIUM SERPL-MCNC: 9.3 MG/DL — SIGNIFICANT CHANGE UP (ref 8.4–10.5)
CHLORIDE SERPL-SCNC: 94 MMOL/L — LOW (ref 96–108)
CO2 SERPL-SCNC: 29 MMOL/L — SIGNIFICANT CHANGE UP (ref 22–29)
CREAT SERPL-MCNC: 0.6 MG/DL — SIGNIFICANT CHANGE UP (ref 0.5–1.3)
CRP SERPL-MCNC: 15 MG/L — HIGH
EGFR: 107 ML/MIN/1.73M2 — SIGNIFICANT CHANGE UP
EOSINOPHIL # BLD AUTO: 0.15 K/UL — SIGNIFICANT CHANGE UP (ref 0–0.5)
EOSINOPHIL NFR BLD AUTO: 1.4 % — SIGNIFICANT CHANGE UP (ref 0–6)
ERYTHROCYTE [SEDIMENTATION RATE] IN BLOOD: 33 MM/HR — HIGH (ref 0–20)
GLUCOSE BLDC GLUCOMTR-MCNC: 160 MG/DL — HIGH (ref 70–99)
GLUCOSE SERPL-MCNC: 192 MG/DL — HIGH (ref 70–99)
HCT VFR BLD CALC: 47.2 % — HIGH (ref 34.5–45)
HGB BLD-MCNC: 15.2 G/DL — SIGNIFICANT CHANGE UP (ref 11.5–15.5)
IMM GRANULOCYTES NFR BLD AUTO: 0.4 % — SIGNIFICANT CHANGE UP (ref 0–0.9)
LACTATE BLDV-MCNC: 2 MMOL/L — SIGNIFICANT CHANGE UP (ref 0.5–2)
LYMPHOCYTES # BLD AUTO: 2.29 K/UL — SIGNIFICANT CHANGE UP (ref 1–3.3)
LYMPHOCYTES # BLD AUTO: 20.9 % — SIGNIFICANT CHANGE UP (ref 13–44)
MCHC RBC-ENTMCNC: 28.4 PG — SIGNIFICANT CHANGE UP (ref 27–34)
MCHC RBC-ENTMCNC: 32.2 G/DL — SIGNIFICANT CHANGE UP (ref 32–36)
MCV RBC AUTO: 88.2 FL — SIGNIFICANT CHANGE UP (ref 80–100)
MONOCYTES # BLD AUTO: 0.61 K/UL — SIGNIFICANT CHANGE UP (ref 0–0.9)
MONOCYTES NFR BLD AUTO: 5.6 % — SIGNIFICANT CHANGE UP (ref 2–14)
NEUTROPHILS # BLD AUTO: 7.86 K/UL — HIGH (ref 1.8–7.4)
NEUTROPHILS NFR BLD AUTO: 71.4 % — SIGNIFICANT CHANGE UP (ref 43–77)
PLATELET # BLD AUTO: 259 K/UL — SIGNIFICANT CHANGE UP (ref 150–400)
POTASSIUM SERPL-MCNC: 4.2 MMOL/L — SIGNIFICANT CHANGE UP (ref 3.5–5.3)
POTASSIUM SERPL-SCNC: 4.2 MMOL/L — SIGNIFICANT CHANGE UP (ref 3.5–5.3)
PROT SERPL-MCNC: 7.4 G/DL — SIGNIFICANT CHANGE UP (ref 6.6–8.7)
RBC # BLD: 5.35 M/UL — HIGH (ref 3.8–5.2)
RBC # FLD: 15.8 % — HIGH (ref 10.3–14.5)
SODIUM SERPL-SCNC: 138 MMOL/L — SIGNIFICANT CHANGE UP (ref 135–145)
WBC # BLD: 10.98 K/UL — HIGH (ref 3.8–10.5)
WBC # FLD AUTO: 10.98 K/UL — HIGH (ref 3.8–10.5)

## 2024-12-29 PROCEDURE — 93970 EXTREMITY STUDY: CPT | Mod: 26

## 2024-12-29 PROCEDURE — 82962 GLUCOSE BLOOD TEST: CPT

## 2024-12-29 PROCEDURE — 87040 BLOOD CULTURE FOR BACTERIA: CPT

## 2024-12-29 PROCEDURE — 96374 THER/PROPH/DIAG INJ IV PUSH: CPT

## 2024-12-29 PROCEDURE — G0378: CPT

## 2024-12-29 PROCEDURE — 96375 TX/PRO/DX INJ NEW DRUG ADDON: CPT

## 2024-12-29 PROCEDURE — 99223 1ST HOSP IP/OBS HIGH 75: CPT

## 2024-12-29 PROCEDURE — 80053 COMPREHEN METABOLIC PANEL: CPT

## 2024-12-29 PROCEDURE — 83605 ASSAY OF LACTIC ACID: CPT

## 2024-12-29 PROCEDURE — 93970 EXTREMITY STUDY: CPT

## 2024-12-29 PROCEDURE — 36415 COLL VENOUS BLD VENIPUNCTURE: CPT

## 2024-12-29 PROCEDURE — 85652 RBC SED RATE AUTOMATED: CPT

## 2024-12-29 PROCEDURE — 99284 EMERGENCY DEPT VISIT MOD MDM: CPT | Mod: 25

## 2024-12-29 PROCEDURE — 85025 COMPLETE CBC W/AUTO DIFF WBC: CPT

## 2024-12-29 PROCEDURE — 86140 C-REACTIVE PROTEIN: CPT

## 2024-12-29 RX ORDER — SODIUM CHLORIDE 9 MG/ML
1000 INJECTION, SOLUTION INTRAVENOUS
Refills: 0 | Status: DISCONTINUED | OUTPATIENT
Start: 2024-12-29 | End: 2025-01-06

## 2024-12-29 RX ORDER — CLOPIDOGREL BISULFATE 75 MG/1
75 TABLET, FILM COATED ORAL DAILY
Refills: 0 | Status: DISCONTINUED | OUTPATIENT
Start: 2024-12-30 | End: 2025-01-06

## 2024-12-29 RX ORDER — METFORMIN 850 MG/1
1 TABLET ORAL
Refills: 0 | DISCHARGE

## 2024-12-29 RX ORDER — SACUBITRIL AND VALSARTAN 24; 26 MG/1; MG/1
1 TABLET, FILM COATED ORAL
Refills: 0 | Status: DISCONTINUED | OUTPATIENT
Start: 2024-12-29 | End: 2025-01-06

## 2024-12-29 RX ORDER — INSULIN LISPRO 100/ML
VIAL (ML) SUBCUTANEOUS AT BEDTIME
Refills: 0 | Status: DISCONTINUED | OUTPATIENT
Start: 2024-12-29 | End: 2025-01-06

## 2024-12-29 RX ORDER — SPIRONOLACTONE 50 MG/1
25 TABLET ORAL DAILY
Refills: 0 | Status: DISCONTINUED | OUTPATIENT
Start: 2024-12-29 | End: 2025-01-06

## 2024-12-29 RX ORDER — DEXTROSE MONOHYDRATE 25 G/50ML
25 INJECTION, SOLUTION INTRAVENOUS ONCE
Refills: 0 | Status: DISCONTINUED | OUTPATIENT
Start: 2024-12-29 | End: 2025-01-06

## 2024-12-29 RX ORDER — CEFAZOLIN SODIUM 1 G
2000 VIAL (EA) INJECTION ONCE
Refills: 0 | Status: COMPLETED | OUTPATIENT
Start: 2024-12-29 | End: 2024-12-29

## 2024-12-29 RX ORDER — DEXTROSE MONOHYDRATE 25 G/50ML
12.5 INJECTION, SOLUTION INTRAVENOUS ONCE
Refills: 0 | Status: DISCONTINUED | OUTPATIENT
Start: 2024-12-29 | End: 2025-01-06

## 2024-12-29 RX ORDER — AMPICILLIN SODIUM AND SULBACTAM SODIUM 100; 50 MG/ML; MG/ML
3 INJECTION, POWDER, FOR SOLUTION INTRAVENOUS ONCE
Refills: 0 | Status: COMPLETED | OUTPATIENT
Start: 2024-12-29 | End: 2024-12-29

## 2024-12-29 RX ORDER — ASPIRIN 81 MG
81 TABLET, DELAYED RELEASE (ENTERIC COATED) ORAL DAILY
Refills: 0 | Status: DISCONTINUED | OUTPATIENT
Start: 2024-12-29 | End: 2025-01-06

## 2024-12-29 RX ORDER — ATORVASTATIN CALCIUM 40 MG/1
40 TABLET, FILM COATED ORAL AT BEDTIME
Refills: 0 | Status: DISCONTINUED | OUTPATIENT
Start: 2024-12-29 | End: 2025-01-06

## 2024-12-29 RX ORDER — CEFAZOLIN SODIUM 1 G
VIAL (EA) INJECTION
Refills: 0 | Status: DISCONTINUED | OUTPATIENT
Start: 2024-12-29 | End: 2024-12-29

## 2024-12-29 RX ORDER — PIPERACILLIN AND TAZOBACTAM 3; .375 G/15ML; G/15ML
3.38 INJECTION, POWDER, LYOPHILIZED, FOR SOLUTION INTRAVENOUS ONCE
Refills: 0 | Status: DISCONTINUED | OUTPATIENT
Start: 2024-12-29 | End: 2024-12-29

## 2024-12-29 RX ORDER — FUROSEMIDE 20 MG
40 TABLET ORAL
Refills: 0 | Status: DISCONTINUED | OUTPATIENT
Start: 2024-12-29 | End: 2025-01-06

## 2024-12-29 RX ORDER — NICOTINE POLACRILEX 4 MG/1
1 LOZENGE ORAL DAILY
Refills: 0 | Status: DISCONTINUED | OUTPATIENT
Start: 2024-12-29 | End: 2025-01-06

## 2024-12-29 RX ORDER — GLUCAGON INJECTION, SOLUTION 0.5 MG/.1ML
1 INJECTION, SOLUTION SUBCUTANEOUS ONCE
Refills: 0 | Status: DISCONTINUED | OUTPATIENT
Start: 2024-12-29 | End: 2025-01-06

## 2024-12-29 RX ORDER — CEFAZOLIN SODIUM 1 G
VIAL (EA) INJECTION
Refills: 0 | Status: DISCONTINUED | OUTPATIENT
Start: 2024-12-29 | End: 2025-01-06

## 2024-12-29 RX ORDER — INSULIN LISPRO 100/ML
VIAL (ML) SUBCUTANEOUS
Refills: 0 | Status: DISCONTINUED | OUTPATIENT
Start: 2024-12-29 | End: 2025-01-06

## 2024-12-29 RX ORDER — CEFAZOLIN SODIUM 1 G
2000 VIAL (EA) INJECTION EVERY 8 HOURS
Refills: 0 | Status: DISCONTINUED | OUTPATIENT
Start: 2024-12-30 | End: 2025-01-06

## 2024-12-29 RX ORDER — CARVEDILOL 25 MG/1
6.25 TABLET, FILM COATED ORAL EVERY 12 HOURS
Refills: 0 | Status: DISCONTINUED | OUTPATIENT
Start: 2024-12-29 | End: 2025-01-06

## 2024-12-29 RX ORDER — DEXTROSE MONOHYDRATE 25 G/50ML
15 INJECTION, SOLUTION INTRAVENOUS ONCE
Refills: 0 | Status: DISCONTINUED | OUTPATIENT
Start: 2024-12-29 | End: 2025-01-06

## 2024-12-29 RX ADMIN — CARVEDILOL 6.25 MILLIGRAM(S): 25 TABLET, FILM COATED ORAL at 20:14

## 2024-12-29 RX ADMIN — SACUBITRIL AND VALSARTAN 1 TABLET(S): 24; 26 TABLET, FILM COATED ORAL at 20:14

## 2024-12-29 RX ADMIN — AMPICILLIN SODIUM AND SULBACTAM SODIUM 200 GRAM(S): 100; 50 INJECTION, POWDER, FOR SOLUTION INTRAVENOUS at 17:34

## 2024-12-29 RX ADMIN — ATORVASTATIN CALCIUM 40 MILLIGRAM(S): 40 TABLET, FILM COATED ORAL at 21:51

## 2024-12-29 RX ADMIN — Medication 2000 MILLIGRAM(S): at 20:14

## 2024-12-29 RX ADMIN — Medication 40 MILLIGRAM(S): at 20:14

## 2024-12-29 NOTE — ED CDU PROVIDER INITIAL DAY NOTE - OBJECTIVE STATEMENT
55 yo female PMHx NICM, HFimpEF (was 25-30% 4/9/2022 improved 52% 10/23/2022) etiology was uncertain, HTN, GERHARD (not on CPAP), LE edema, and PAD s/p b/l stents, currently lives in a shelter presents to ED c/o b/l lower extremity redness x 1 week. Presented to urgent care was sent to the ED for IV antibiotics. No other complaints at this time.   Denies f/c/n/v/cp/sob/palpitations/cough/rash/headache/dizziness/abd.pain/d/c/dysuria/hematuria

## 2024-12-29 NOTE — ED CDU PROVIDER INITIAL DAY NOTE - PHYSICAL EXAMINATION
Gen: Nontoxic, well appearing, in NAD.  Skin: Warm and dry as visualized. Lower extremity swelling b/l with increased warm and seeping clear fluid blisters.  Head: NC/AT.  Eyes: PERRLA. EOMI.  Neck: Supple, FROM. Trachea midline.   Resp: No distress.  Cardio: Well perfused.  PV: 1+ dorsalis pedis pulses, b/l toes warm, cap refill <2 sec  Ext: No deformities. MAEx4. FROM.   Neuro: A&Ox3. Appears nonfocal.   Psych: Normal affect and mood.

## 2024-12-29 NOTE — ED PROVIDER NOTE - PHYSICAL EXAMINATION
Head: atraumatic, normacephalic  Face: atraumatic, no crepitus no orbiral/maxillary/mandibular ttp  throat: uvula midline no exudates  eyes: perrla eomi  heart: rrr s1s2  lungs: ctab  abd: soft, nt nd +bs no rebound/guarding no cva ttp  skin: warm  LE: bl le swelling erythema increased warmth and seeping clear fluid blisters, no calf ttp 1+ dorsalis pedis pulses bl toes warm cap refil <2sec  back: no midline cervical/thoracic/lumbar ttp

## 2024-12-29 NOTE — ED ADULT NURSE NOTE - OBJECTIVE STATEMENT
PT presents to ED from urgent care for b/l leg redness swelling and wounds.  PT states RLE had a scab that she scratched and "peeled the skin off" LLE wound just "happened" no known injury. Pt states redness to B/L legs x1 week. Denies fevers chills. IV placed labs drawn. pending u/s

## 2024-12-29 NOTE — ED PROVIDER NOTE - CLINICAL SUMMARY MEDICAL DECISION MAKING FREE TEXT BOX
54 year old female with PMH of NICM, HFimpEF(was 25-30% 4/9/2022 improved 52% 10/23/2022) etiology was uncertain LHC normal possible uncontrolled HTN, GERHARD, LE edema, and PAD SP BL STENTS few years ago. currently lives in a shelter. pw bl LE redness x 1 week. went to urgent care was sent to the ED of IV antibiotics. Denies f/c/n/v/cp/sob/palpitations/ cough/rash/headache/dizziness/abd.pain/d/c/dysuria/hematuria    le cellulitis will tx with abx labs bl sono to ro dvt - obs vs admit 54 year old female with PMH of NICM, HFimpEF(was 25-30% 4/9/2022 improved 52% 10/23/2022) etiology was uncertain LHC normal possible uncontrolled HTN, GERHARD, LE edema, and PAD SP BL STENTS few years ago. currently lives in a shelter. pw bl LE redness x 1 week. went to urgent care was sent to the ED of IV antibiotics. Denies f/c/n/v/cp/sob/palpitations/ cough/rash/headache/dizziness/abd.pain/d/c/dysuria/hematuria    le cellulitis will tx with abx labs bl sono to ro dvt   will obs for iv abx infectious dz consult

## 2024-12-29 NOTE — ED ADULT NURSE NOTE - NSFALLRISKINTERV_ED_ALL_ED

## 2024-12-29 NOTE — ED ADULT TRIAGE NOTE - CHIEF COMPLAINT QUOTE
Cellulitis to bilateral legs x 2 weeks. went to  who suggested pt come to ED for IV antibiotics. Pt denies fevers/chills

## 2024-12-29 NOTE — ED CDU PROVIDER INITIAL DAY NOTE - CLINICAL SUMMARY MEDICAL DECISION MAKING FREE TEXT BOX
55 yo female PMHx NICM, HFimpEF (was 25-30% 4/9/2022 improved 52% 10/23/2022) etiology was uncertain, HTN, GERHARD (not on CPAP), LE edema, and PAD s/p b/l stents, currently lives in a shelter presents to ED c/o b/l lower extremity redness x 1 week. No fevers. LE with b/l cellulitis, warm and perfused. US negative for DVT. Patient previously seen by ID for cellulitis and placed on Cefazolin - placed on Cefazolin pending ID evaluation.

## 2024-12-29 NOTE — ED CDU PROVIDER INITIAL DAY NOTE - NSICDXFAMILYHX_GEN_ALL_CORE_FT
ACT = 147 (sec). ACT was drawn at 09:20 EST. ACT result was completed at 09:24 EST. FAMILY HISTORY:  Aunt  Still living? Unknown  FH: leukemia, Age at diagnosis: Age Unknown

## 2024-12-29 NOTE — ED PROVIDER NOTE - OBJECTIVE STATEMENT
54 year old female with PMH of NICM, HFimpEF(was 25-30% 4/9/2022 improved 52% 10/23/2022) etiology was uncertain LHC normal possible uncontrolled HTN, GERHARD, LE edema, and PAD SP BL STENTS few years ago. currently lives in a shelter. pw bl LE redness x 1 week. went to urgent care was sent to the ED of IV antibiotics. Denies f/c/n/v/cp/sob/palpitations/ cough/rash/headache/dizziness/abd.pain/d/c/dysuria/hematuria

## 2024-12-29 NOTE — ED CDU PROVIDER INITIAL DAY NOTE - ATTENDING APP SHARED VISIT CONTRIBUTION OF CARE
I, Diane Harrison, performed the initial face to face bedside interview with this patient regarding history of present illness, review of symptoms and relevant past medical, social and family history.  I completed an independent physical examination.  I was the initial provider who evaluated this patient. I have signed out the follow up of any pending tests (i.e. labs, radiological studies) to the ACP.  I have communicated the patient’s plan of care and disposition with the ACP.

## 2024-12-30 NOTE — ED CDU PROVIDER SUBSEQUENT DAY NOTE - ATTENDING APP SHARED VISIT CONTRIBUTION OF CARE
I personally saw the patient with the PA, and completed the key components of the history and physical exam. I then discussed the management plan with the PA.     guero wilkins

## 2024-12-30 NOTE — ED CDU PROVIDER SUBSEQUENT DAY NOTE - CLINICAL SUMMARY MEDICAL DECISION MAKING FREE TEXT BOX
53 yo female PMHx NICM, HFimpEF (was 25-30% 4/9/2022 improved 52% 10/23/2022) etiology was uncertain, HTN, GERHARD (not on CPAP), LE edema, and PAD s/p b/l stents, currently lives in a shelter presents to ED c/o b/l lower extremity redness x 1 week. Patient placed in OBS for cellulitis - continued IV abx and ID consult. Patient attempted to be located by multiple staff members on multiple occasions. I tried calling patient and LMOM. Patient had expressed earlier she was concerned about losing shelter spot if gone for too long. Patient was A&Ox3 and able to make her own medical decisions when I evaluated patient. Patient possible eloped with IV in place. [facility] contacted by DEBORA Luna.

## 2024-12-30 NOTE — ED CDU PROVIDER SUBSEQUENT DAY NOTE - PHYSICAL EXAMINATION
Problem: Impaired Functional Mobility  Goal: Achieve highest/safest level of mobility/gait  Description  Interventions:  - Assess patient's functional ability and stability  - Promote increasing activity/tolerance for mobility and gait  - Educate and eng Eloped

## 2024-12-30 NOTE — ED CDU PROVIDER SUBSEQUENT DAY NOTE - HISTORY
PRAMOD Johnson: Patient attempted to be located by multiple staff members on multiple occasions. I tried calling patient and LMOM. Patient had expressed earlier she was concerned about losing shelter spot if gone for too long. Patient was A&Ox3 and able to make her own medical decisions when I evaluated patient. Patient possible eloped with IV in place. [facility] contacted by DEBORA Luna.

## 2025-02-10 ENCOUNTER — RESULT REVIEW (OUTPATIENT)
Age: 55
End: 2025-02-10

## 2025-02-10 ENCOUNTER — INPATIENT (INPATIENT)
Facility: HOSPITAL | Age: 55
LOS: 8 days | Discharge: HOME | DRG: 872 | End: 2025-02-19
Attending: HOSPITALIST | Admitting: STUDENT IN AN ORGANIZED HEALTH CARE EDUCATION/TRAINING PROGRAM
Payer: COMMERCIAL

## 2025-02-10 VITALS
TEMPERATURE: 98 F | WEIGHT: 220.46 LBS | HEART RATE: 103 BPM | SYSTOLIC BLOOD PRESSURE: 98 MMHG | OXYGEN SATURATION: 94 % | DIASTOLIC BLOOD PRESSURE: 65 MMHG | RESPIRATION RATE: 20 BRPM

## 2025-02-10 DIAGNOSIS — Z98.62 PERIPHERAL VASCULAR ANGIOPLASTY STATUS: Chronic | ICD-10-CM

## 2025-02-10 DIAGNOSIS — A41.9 SEPSIS, UNSPECIFIED ORGANISM: ICD-10-CM

## 2025-02-10 LAB
A1C WITH ESTIMATED AVERAGE GLUCOSE RESULT: 8.5 % — HIGH (ref 4–5.6)
ALBUMIN SERPL ELPH-MCNC: 2.7 G/DL — LOW (ref 3.3–5.2)
ALBUMIN SERPL ELPH-MCNC: 3 G/DL — LOW (ref 3.3–5.2)
ALBUMIN SERPL ELPH-MCNC: 3.5 G/DL — SIGNIFICANT CHANGE UP (ref 3.3–5.2)
ALP SERPL-CCNC: 104 U/L — SIGNIFICANT CHANGE UP (ref 40–120)
ALP SERPL-CCNC: 71 U/L — SIGNIFICANT CHANGE UP (ref 40–120)
ALP SERPL-CCNC: 89 U/L — SIGNIFICANT CHANGE UP (ref 40–120)
ALT FLD-CCNC: 28 U/L — SIGNIFICANT CHANGE UP
ALT FLD-CCNC: 33 U/L — HIGH
ALT FLD-CCNC: 42 U/L — HIGH
AMORPH CRY # UR COMP ASSIST: PRESENT
ANION GAP SERPL CALC-SCNC: 16 MMOL/L — SIGNIFICANT CHANGE UP (ref 5–17)
ANISOCYTOSIS BLD QL: SLIGHT — SIGNIFICANT CHANGE UP
APAP SERPL-MCNC: <3 UG/ML — LOW (ref 10–26)
APPEARANCE UR: ABNORMAL
APTT BLD: 36.7 SEC — HIGH (ref 24.5–35.6)
APTT BLD: 45.2 SEC — HIGH (ref 24.5–35.6)
AST SERPL-CCNC: 22 U/L — SIGNIFICANT CHANGE UP
AST SERPL-CCNC: 22 U/L — SIGNIFICANT CHANGE UP
AST SERPL-CCNC: 24 U/L — SIGNIFICANT CHANGE UP
BACTERIA # UR AUTO: ABNORMAL /HPF
BASOPHILS # BLD AUTO: 0 K/UL — SIGNIFICANT CHANGE UP (ref 0–0.2)
BASOPHILS # BLD AUTO: 0 K/UL — SIGNIFICANT CHANGE UP (ref 0–0.2)
BASOPHILS NFR BLD AUTO: 0 % — SIGNIFICANT CHANGE UP (ref 0–2)
BASOPHILS NFR BLD AUTO: 0 % — SIGNIFICANT CHANGE UP (ref 0–2)
BILIRUB SERPL-MCNC: 0.3 MG/DL — LOW (ref 0.4–2)
BILIRUB SERPL-MCNC: 0.4 MG/DL — SIGNIFICANT CHANGE UP (ref 0.4–2)
BILIRUB SERPL-MCNC: 0.4 MG/DL — SIGNIFICANT CHANGE UP (ref 0.4–2)
BILIRUB UR-MCNC: NEGATIVE — SIGNIFICANT CHANGE UP
BUN SERPL-MCNC: 38.7 MG/DL — HIGH (ref 8–20)
BUN SERPL-MCNC: 42.8 MG/DL — HIGH (ref 8–20)
BUN SERPL-MCNC: 50.1 MG/DL — HIGH (ref 8–20)
BURR CELLS BLD QL SMEAR: PRESENT — SIGNIFICANT CHANGE UP
CALCIUM SERPL-MCNC: 8.1 MG/DL — LOW (ref 8.4–10.5)
CALCIUM SERPL-MCNC: 8.2 MG/DL — LOW (ref 8.4–10.5)
CALCIUM SERPL-MCNC: 9.1 MG/DL — SIGNIFICANT CHANGE UP (ref 8.4–10.5)
CAST: 25 /LPF — HIGH (ref 0–4)
CHLORIDE SERPL-SCNC: 94 MMOL/L — LOW (ref 96–108)
CHLORIDE SERPL-SCNC: 96 MMOL/L — SIGNIFICANT CHANGE UP (ref 96–108)
CHLORIDE SERPL-SCNC: 98 MMOL/L — SIGNIFICANT CHANGE UP (ref 96–108)
CO2 SERPL-SCNC: 25 MMOL/L — SIGNIFICANT CHANGE UP (ref 22–29)
CO2 SERPL-SCNC: 26 MMOL/L — SIGNIFICANT CHANGE UP (ref 22–29)
CO2 SERPL-SCNC: 29 MMOL/L — SIGNIFICANT CHANGE UP (ref 22–29)
COARSE GRAN CASTS #/AREA URNS AUTO: PRESENT
COD CRY URNS QL: PRESENT
COLOR SPEC: SIGNIFICANT CHANGE UP
CREAT ?TM UR-MCNC: 186 MG/DL — SIGNIFICANT CHANGE UP
CREAT SERPL-MCNC: 2.14 MG/DL — HIGH (ref 0.5–1.3)
CREAT SERPL-MCNC: 2.65 MG/DL — HIGH (ref 0.5–1.3)
CREAT SERPL-MCNC: 2.77 MG/DL — HIGH (ref 0.5–1.3)
DACRYOCYTES BLD QL SMEAR: SLIGHT — SIGNIFICANT CHANGE UP
DACRYOCYTES BLD QL SMEAR: SLIGHT — SIGNIFICANT CHANGE UP
DIFF PNL FLD: ABNORMAL
EGFR: 20 ML/MIN/1.73M2 — LOW
EGFR: 21 ML/MIN/1.73M2 — LOW
EGFR: 27 ML/MIN/1.73M2 — LOW
EOSINOPHIL # BLD AUTO: 0 K/UL — SIGNIFICANT CHANGE UP (ref 0–0.5)
EOSINOPHIL # BLD AUTO: 0 K/UL — SIGNIFICANT CHANGE UP (ref 0–0.5)
EOSINOPHIL NFR BLD AUTO: 0 % — SIGNIFICANT CHANGE UP (ref 0–6)
EOSINOPHIL NFR BLD AUTO: 0 % — SIGNIFICANT CHANGE UP (ref 0–6)
ESTIMATED AVERAGE GLUCOSE: 197 MG/DL — HIGH (ref 68–114)
ETHANOL SERPL-MCNC: <10 MG/DL — SIGNIFICANT CHANGE UP (ref 0–9)
FINE GRAN CASTS #/AREA URNS AUTO: PRESENT
FLUAV AG NPH QL: SIGNIFICANT CHANGE UP
FLUBV AG NPH QL: SIGNIFICANT CHANGE UP
GAS PNL BLDA: SIGNIFICANT CHANGE UP
GAS PNL BLDV: SIGNIFICANT CHANGE UP
GIANT PLATELETS BLD QL SMEAR: PRESENT — SIGNIFICANT CHANGE UP
GIANT PLATELETS BLD QL SMEAR: PRESENT — SIGNIFICANT CHANGE UP
GLUCOSE BLDC GLUCOMTR-MCNC: 234 MG/DL — HIGH (ref 70–99)
GLUCOSE BLDC GLUCOMTR-MCNC: 236 MG/DL — HIGH (ref 70–99)
GLUCOSE BLDC GLUCOMTR-MCNC: 247 MG/DL — HIGH (ref 70–99)
GLUCOSE BLDC GLUCOMTR-MCNC: 256 MG/DL — HIGH (ref 70–99)
GLUCOSE BLDC GLUCOMTR-MCNC: 277 MG/DL — HIGH (ref 70–99)
GLUCOSE BLDC GLUCOMTR-MCNC: 285 MG/DL — HIGH (ref 70–99)
GLUCOSE BLDC GLUCOMTR-MCNC: 297 MG/DL — HIGH (ref 70–99)
GLUCOSE BLDC GLUCOMTR-MCNC: 320 MG/DL — HIGH (ref 70–99)
GLUCOSE BLDC GLUCOMTR-MCNC: 329 MG/DL — HIGH (ref 70–99)
GLUCOSE SERPL-MCNC: 229 MG/DL — HIGH (ref 70–99)
GLUCOSE SERPL-MCNC: 230 MG/DL — HIGH (ref 70–99)
GLUCOSE SERPL-MCNC: 303 MG/DL — HIGH (ref 70–99)
GLUCOSE UR QL: NEGATIVE MG/DL — SIGNIFICANT CHANGE UP
HCT VFR BLD CALC: 48.2 % — HIGH (ref 34.5–45)
HCT VFR BLD CALC: 56.1 % — HIGH (ref 34.5–45)
HGB BLD-MCNC: 15.1 G/DL — SIGNIFICANT CHANGE UP (ref 11.5–15.5)
HGB BLD-MCNC: 17.3 G/DL — HIGH (ref 11.5–15.5)
INR BLD: 1.31 RATIO — HIGH (ref 0.85–1.16)
KETONES UR-MCNC: NEGATIVE MG/DL — SIGNIFICANT CHANGE UP
LACTATE BLDV-MCNC: 3 MMOL/L — HIGH (ref 0.5–2)
LACTATE SERPL-SCNC: 1.4 MMOL/L — SIGNIFICANT CHANGE UP (ref 0.5–2)
LACTATE SERPL-SCNC: 2.3 MMOL/L — HIGH (ref 0.5–2)
LEUKOCYTE ESTERASE UR-ACNC: NEGATIVE — SIGNIFICANT CHANGE UP
LIDOCAIN IGE QN: 7 U/L — LOW (ref 22–51)
LYMPHOCYTES # BLD AUTO: 0.6 K/UL — LOW (ref 1–3.3)
LYMPHOCYTES # BLD AUTO: 0.82 K/UL — LOW (ref 1–3.3)
LYMPHOCYTES # BLD AUTO: 3.5 % — LOW (ref 13–44)
LYMPHOCYTES # BLD AUTO: 5.4 % — LOW (ref 13–44)
MACROCYTES BLD QL: SLIGHT — SIGNIFICANT CHANGE UP
MACROCYTES BLD QL: SLIGHT — SIGNIFICANT CHANGE UP
MAGNESIUM SERPL-MCNC: 1.9 MG/DL — SIGNIFICANT CHANGE UP (ref 1.6–2.6)
MAGNESIUM SERPL-MCNC: 2.2 MG/DL — SIGNIFICANT CHANGE UP (ref 1.6–2.6)
MANUAL SMEAR VERIFICATION: SIGNIFICANT CHANGE UP
MANUAL SMEAR VERIFICATION: SIGNIFICANT CHANGE UP
MCHC RBC-ENTMCNC: 28.2 PG — SIGNIFICANT CHANGE UP (ref 27–34)
MCHC RBC-ENTMCNC: 28.3 PG — SIGNIFICANT CHANGE UP (ref 27–34)
MCHC RBC-ENTMCNC: 30.8 G/DL — LOW (ref 32–36)
MCHC RBC-ENTMCNC: 31.3 G/DL — LOW (ref 32–36)
MCV RBC AUTO: 90.4 FL — SIGNIFICANT CHANGE UP (ref 80–100)
MCV RBC AUTO: 91.5 FL — SIGNIFICANT CHANGE UP (ref 80–100)
METAMYELOCYTES # FLD: 0.9 % — HIGH (ref 0–0)
METAMYELOCYTES # FLD: 2.7 % — HIGH (ref 0–0)
METAMYELOCYTES NFR BLD: 0.9 % — HIGH (ref 0–0)
METAMYELOCYTES NFR BLD: 2.7 % — HIGH (ref 0–0)
MONOCYTES # BLD AUTO: 0.55 K/UL — SIGNIFICANT CHANGE UP (ref 0–0.9)
MONOCYTES # BLD AUTO: 1.07 K/UL — HIGH (ref 0–0.9)
MONOCYTES NFR BLD AUTO: 3.6 % — SIGNIFICANT CHANGE UP (ref 2–14)
MONOCYTES NFR BLD AUTO: 6.2 % — SIGNIFICANT CHANGE UP (ref 2–14)
MRSA PCR RESULT.: SIGNIFICANT CHANGE UP
NEUTROPHILS # BLD AUTO: 13.12 K/UL — HIGH (ref 1.8–7.4)
NEUTROPHILS # BLD AUTO: 14.91 K/UL — HIGH (ref 1.8–7.4)
NEUTROPHILS NFR BLD AUTO: 80.2 % — HIGH (ref 43–77)
NEUTROPHILS NFR BLD AUTO: 85 % — HIGH (ref 43–77)
NEUTS BAND # BLD: 1.8 % — SIGNIFICANT CHANGE UP (ref 0–8)
NEUTS BAND # BLD: 6.3 % — SIGNIFICANT CHANGE UP (ref 0–8)
NEUTS BAND NFR BLD: 1.8 % — SIGNIFICANT CHANGE UP (ref 0–8)
NEUTS BAND NFR BLD: 6.3 % — SIGNIFICANT CHANGE UP (ref 0–8)
NITRITE UR-MCNC: NEGATIVE — SIGNIFICANT CHANGE UP
NRBC # BLD: 1 /100 WBCS — HIGH (ref 0–0)
NRBC BLD-RTO: 1 /100 WBCS — HIGH (ref 0–0)
OSMOLALITY UR: 546 MOSM/KG — SIGNIFICANT CHANGE UP (ref 300–1000)
OVALOCYTES BLD QL SMEAR: SLIGHT — SIGNIFICANT CHANGE UP
OVALOCYTES BLD QL SMEAR: SLIGHT — SIGNIFICANT CHANGE UP
PCP SPEC-MCNC: SIGNIFICANT CHANGE UP
PCP SPEC-MCNC: SIGNIFICANT CHANGE UP
PH UR: 5.5 — SIGNIFICANT CHANGE UP (ref 5–8)
PHOSPHATE SERPL-MCNC: 3.1 MG/DL — SIGNIFICANT CHANGE UP (ref 2.4–4.7)
PHOSPHATE SERPL-MCNC: 5.1 MG/DL — HIGH (ref 2.4–4.7)
PLAT MORPH BLD: NORMAL — SIGNIFICANT CHANGE UP
PLAT MORPH BLD: NORMAL — SIGNIFICANT CHANGE UP
PLATELET # BLD AUTO: 266 K/UL — SIGNIFICANT CHANGE UP (ref 150–400)
PLATELET # BLD AUTO: 278 K/UL — SIGNIFICANT CHANGE UP (ref 150–400)
POIKILOCYTOSIS BLD QL AUTO: SLIGHT — SIGNIFICANT CHANGE UP
POLYCHROMASIA BLD QL SMEAR: SLIGHT — SIGNIFICANT CHANGE UP
POLYCHROMASIA BLD QL SMEAR: SLIGHT — SIGNIFICANT CHANGE UP
POTASSIUM SERPL-MCNC: 3.7 MMOL/L — SIGNIFICANT CHANGE UP (ref 3.5–5.3)
POTASSIUM SERPL-MCNC: 4.6 MMOL/L — SIGNIFICANT CHANGE UP (ref 3.5–5.3)
POTASSIUM SERPL-MCNC: 5.1 MMOL/L — SIGNIFICANT CHANGE UP (ref 3.5–5.3)
POTASSIUM SERPL-SCNC: 3.7 MMOL/L — SIGNIFICANT CHANGE UP (ref 3.5–5.3)
POTASSIUM SERPL-SCNC: 4.6 MMOL/L — SIGNIFICANT CHANGE UP (ref 3.5–5.3)
POTASSIUM SERPL-SCNC: 5.1 MMOL/L — SIGNIFICANT CHANGE UP (ref 3.5–5.3)
PROCALCITONIN SERPL-MCNC: 1.32 NG/ML — HIGH (ref 0.02–0.1)
PROT ?TM UR-MCNC: 236 MG/DL — HIGH (ref 0–12)
PROT SERPL-MCNC: 6.7 G/DL — SIGNIFICANT CHANGE UP (ref 6.6–8.7)
PROT SERPL-MCNC: 7 G/DL — SIGNIFICANT CHANGE UP (ref 6.6–8.7)
PROT SERPL-MCNC: 8.2 G/DL — SIGNIFICANT CHANGE UP (ref 6.6–8.7)
PROT UR-MCNC: 300 MG/DL
PROTHROM AB SERPL-ACNC: 14.8 SEC — HIGH (ref 9.9–13.4)
RBC # BLD: 5.33 M/UL — HIGH (ref 3.8–5.2)
RBC # BLD: 6.13 M/UL — HIGH (ref 3.8–5.2)
RBC # FLD: 16.6 % — HIGH (ref 10.3–14.5)
RBC # FLD: 17.7 % — HIGH (ref 10.3–14.5)
RBC BLD AUTO: ABNORMAL
RBC BLD AUTO: ABNORMAL
RBC CASTS # UR COMP ASSIST: 6 /HPF — HIGH (ref 0–4)
RSV RNA NPH QL NAA+NON-PROBE: SIGNIFICANT CHANGE UP
S AUREUS DNA NOSE QL NAA+PROBE: SIGNIFICANT CHANGE UP
SARS-COV-2 RNA SPEC QL NAA+PROBE: DETECTED
SMUDGE CELLS # BLD: PRESENT — SIGNIFICANT CHANGE UP
SODIUM SERPL-SCNC: 137 MMOL/L — SIGNIFICANT CHANGE UP (ref 135–145)
SODIUM SERPL-SCNC: 139 MMOL/L — SIGNIFICANT CHANGE UP (ref 135–145)
SODIUM SERPL-SCNC: 140 MMOL/L — SIGNIFICANT CHANGE UP (ref 135–145)
SODIUM UR-SCNC: 42 MMOL/L — SIGNIFICANT CHANGE UP
SP GR SPEC: 1.03 — SIGNIFICANT CHANGE UP (ref 1–1.03)
SQUAMOUS # UR AUTO: 15 /HPF — HIGH (ref 0–5)
T3 SERPL-MCNC: 86 NG/DL — SIGNIFICANT CHANGE UP (ref 80–200)
T4 AB SER-ACNC: 6 UG/DL — SIGNIFICANT CHANGE UP (ref 4.5–12)
T4 FREE SERPL-MCNC: 1.3 NG/DL — SIGNIFICANT CHANGE UP (ref 0.9–1.7)
TROPONIN T, HIGH SENSITIVITY RESULT: 39 NG/L — SIGNIFICANT CHANGE UP (ref 0–51)
TROPONIN T, HIGH SENSITIVITY RESULT: 43 NG/L — SIGNIFICANT CHANGE UP (ref 0–51)
TSH SERPL-MCNC: 1.46 UIU/ML — SIGNIFICANT CHANGE UP (ref 0.27–4.2)
UROBILINOGEN FLD QL: 1 MG/DL — SIGNIFICANT CHANGE UP (ref 0.2–1)
UUN UR-MCNC: 552 MG/DL — SIGNIFICANT CHANGE UP
VARIANT LYMPHS # BLD: 1.8 % — SIGNIFICANT CHANGE UP (ref 0–6)
VARIANT LYMPHS # BLD: 2.6 % — SIGNIFICANT CHANGE UP (ref 0–6)
VARIANT LYMPHS NFR BLD MANUAL: 1.8 % — SIGNIFICANT CHANGE UP (ref 0–6)
VARIANT LYMPHS NFR BLD MANUAL: 2.6 % — SIGNIFICANT CHANGE UP (ref 0–6)
WBC # BLD: 15.17 K/UL — HIGH (ref 3.8–10.5)
WBC # BLD: 17.18 K/UL — HIGH (ref 3.8–10.5)
WBC # FLD AUTO: 15.17 K/UL — HIGH (ref 3.8–10.5)
WBC # FLD AUTO: 17.18 K/UL — HIGH (ref 3.8–10.5)
WBC UR QL: 4 /HPF — SIGNIFICANT CHANGE UP (ref 0–5)

## 2025-02-10 PROCEDURE — 71250 CT THORAX DX C-: CPT | Mod: 26

## 2025-02-10 PROCEDURE — 93010 ELECTROCARDIOGRAM REPORT: CPT

## 2025-02-10 PROCEDURE — 70450 CT HEAD/BRAIN W/O DYE: CPT | Mod: 26

## 2025-02-10 PROCEDURE — 36556 INSERT NON-TUNNEL CV CATH: CPT

## 2025-02-10 PROCEDURE — 99291 CRITICAL CARE FIRST HOUR: CPT | Mod: 25

## 2025-02-10 PROCEDURE — 99291 CRITICAL CARE FIRST HOUR: CPT | Mod: GC

## 2025-02-10 PROCEDURE — 73700 CT LOWER EXTREMITY W/O DYE: CPT | Mod: 26,50

## 2025-02-10 PROCEDURE — 71045 X-RAY EXAM CHEST 1 VIEW: CPT | Mod: 26

## 2025-02-10 PROCEDURE — 73590 X-RAY EXAM OF LOWER LEG: CPT | Mod: 26,LT

## 2025-02-10 PROCEDURE — 71045 X-RAY EXAM CHEST 1 VIEW: CPT | Mod: 26,77

## 2025-02-10 PROCEDURE — 93306 TTE W/DOPPLER COMPLETE: CPT | Mod: 26

## 2025-02-10 RX ORDER — NOREPINEPHRINE BITARTRATE 8 MG
0.05 SOLUTION INTRAVENOUS
Qty: 8 | Refills: 0 | Status: DISCONTINUED | OUTPATIENT
Start: 2025-02-10 | End: 2025-02-11

## 2025-02-10 RX ORDER — INSULIN GLARGINE-YFGN 100 [IU]/ML
30 INJECTION, SOLUTION SUBCUTANEOUS AT BEDTIME
Refills: 0 | Status: DISCONTINUED | OUTPATIENT
Start: 2025-02-10 | End: 2025-02-12

## 2025-02-10 RX ORDER — DEXTROSE 50 % IN WATER 50 %
15 SYRINGE (ML) INTRAVENOUS ONCE
Refills: 0 | Status: DISCONTINUED | OUTPATIENT
Start: 2025-02-10 | End: 2025-02-19

## 2025-02-10 RX ORDER — DEXTROSE 50 % IN WATER 50 %
25 SYRINGE (ML) INTRAVENOUS ONCE
Refills: 0 | Status: DISCONTINUED | OUTPATIENT
Start: 2025-02-10 | End: 2025-02-19

## 2025-02-10 RX ORDER — AZITHROMYCIN 250 MG
500 CAPSULE ORAL ONCE
Refills: 0 | Status: COMPLETED | OUTPATIENT
Start: 2025-02-10 | End: 2025-02-10

## 2025-02-10 RX ORDER — HEPARIN SODIUM 1000 [USP'U]/ML
7500 INJECTION INTRAVENOUS; SUBCUTANEOUS EVERY 12 HOURS
Refills: 0 | Status: DISCONTINUED | OUTPATIENT
Start: 2025-02-10 | End: 2025-02-19

## 2025-02-10 RX ORDER — SODIUM CHLORIDE 9 G/1000ML
1000 INJECTION, SOLUTION INTRAVENOUS
Refills: 0 | Status: DISCONTINUED | OUTPATIENT
Start: 2025-02-10 | End: 2025-02-19

## 2025-02-10 RX ORDER — FUROSEMIDE 10 MG/ML
80 INJECTION INTRAMUSCULAR; INTRAVENOUS ONCE
Refills: 0 | Status: COMPLETED | OUTPATIENT
Start: 2025-02-10 | End: 2025-02-10

## 2025-02-10 RX ORDER — AZITHROMYCIN 250 MG
500 CAPSULE ORAL EVERY 24 HOURS
Refills: 0 | Status: DISCONTINUED | OUTPATIENT
Start: 2025-02-11 | End: 2025-02-11

## 2025-02-10 RX ORDER — GLUCAGON 3 MG/1
1 POWDER NASAL ONCE
Refills: 0 | Status: DISCONTINUED | OUTPATIENT
Start: 2025-02-10 | End: 2025-02-19

## 2025-02-10 RX ORDER — AZITHROMYCIN 250 MG
CAPSULE ORAL
Refills: 0 | Status: DISCONTINUED | OUTPATIENT
Start: 2025-02-10 | End: 2025-02-11

## 2025-02-10 RX ORDER — IPRATROPIUM BROMIDE AND ALBUTEROL SULFATE .5; 2.5 MG/3ML; MG/3ML
3 SOLUTION RESPIRATORY (INHALATION) ONCE
Refills: 0 | Status: COMPLETED | OUTPATIENT
Start: 2025-02-10 | End: 2025-02-10

## 2025-02-10 RX ORDER — DEXAMETHASONE 0.5 MG/1
6 TABLET ORAL ONCE
Refills: 0 | Status: COMPLETED | OUTPATIENT
Start: 2025-02-10 | End: 2025-02-10

## 2025-02-10 RX ORDER — INSULIN LISPRO 100 U/ML
INJECTION, SOLUTION INTRAVENOUS; SUBCUTANEOUS
Refills: 0 | Status: DISCONTINUED | OUTPATIENT
Start: 2025-02-10 | End: 2025-02-19

## 2025-02-10 RX ORDER — BUMETANIDE 1 MG/1
1 TABLET ORAL DAILY
Refills: 0 | Status: COMPLETED | OUTPATIENT
Start: 2025-02-11 | End: 2025-02-12

## 2025-02-10 RX ORDER — IPRATROPIUM BROMIDE AND ALBUTEROL SULFATE .5; 2.5 MG/3ML; MG/3ML
3 SOLUTION RESPIRATORY (INHALATION) EVERY 6 HOURS
Refills: 0 | Status: DISCONTINUED | OUTPATIENT
Start: 2025-02-10 | End: 2025-02-19

## 2025-02-10 RX ORDER — VANCOMYCIN HCL IN 5 % DEXTROSE 1.5G/250ML
1000 PLASTIC BAG, INJECTION (ML) INTRAVENOUS ONCE
Refills: 0 | Status: COMPLETED | OUTPATIENT
Start: 2025-02-10 | End: 2025-02-10

## 2025-02-10 RX ORDER — ATORVASTATIN CALCIUM 80 MG/1
40 TABLET, FILM COATED ORAL AT BEDTIME
Refills: 0 | Status: DISCONTINUED | OUTPATIENT
Start: 2025-02-10 | End: 2025-02-19

## 2025-02-10 RX ORDER — CEFEPIME 2 G/20ML
2000 INJECTION, POWDER, FOR SOLUTION INTRAVENOUS EVERY 12 HOURS
Refills: 0 | Status: DISCONTINUED | OUTPATIENT
Start: 2025-02-10 | End: 2025-02-11

## 2025-02-10 RX ORDER — CEFEPIME 2 G/20ML
2000 INJECTION, POWDER, FOR SOLUTION INTRAVENOUS ONCE
Refills: 0 | Status: COMPLETED | OUTPATIENT
Start: 2025-02-10 | End: 2025-02-10

## 2025-02-10 RX ORDER — DEXTROSE 50 % IN WATER 50 %
12.5 SYRINGE (ML) INTRAVENOUS ONCE
Refills: 0 | Status: DISCONTINUED | OUTPATIENT
Start: 2025-02-10 | End: 2025-02-19

## 2025-02-10 RX ORDER — INFLUENZA A VIRUS A/IDAHO/07/2018 (H1N1) ANTIGEN (MDCK CELL DERIVED, PROPIOLACTONE INACTIVATED, INFLUENZA A VIRUS A/INDIANA/08/2018 (H3N2) ANTIGEN (MDCK CELL DERIVED, PROPIOLACTONE INACTIVATED), INFLUENZA B VIRUS B/SINGAPORE/INFTT-16-0610/2016 ANTIGEN (MDCK CELL DERIVED, PROPIOLACTONE INACTIVATED), INFLUENZA B VIRUS B/IOWA/06/2017 ANTIGEN (MDCK CELL DERIVED, PROPIOLACTONE INACTIVATED) 15; 15; 15; 15 UG/.5ML; UG/.5ML; UG/.5ML; UG/.5ML
0.5 INJECTION, SUSPENSION INTRAMUSCULAR ONCE
Refills: 0 | Status: DISCONTINUED | OUTPATIENT
Start: 2025-02-10 | End: 2025-02-19

## 2025-02-10 RX ORDER — CEFEPIME 2 G/20ML
INJECTION, POWDER, FOR SOLUTION INTRAVENOUS
Refills: 0 | Status: DISCONTINUED | OUTPATIENT
Start: 2025-02-10 | End: 2025-02-10

## 2025-02-10 RX ORDER — CLOPIDOGREL BISULFATE 75 MG/1
75 TABLET, FILM COATED ORAL DAILY
Refills: 0 | Status: DISCONTINUED | OUTPATIENT
Start: 2025-02-11 | End: 2025-02-19

## 2025-02-10 RX ORDER — CEFEPIME 2 G/20ML
2000 INJECTION, POWDER, FOR SOLUTION INTRAVENOUS EVERY 8 HOURS
Refills: 0 | Status: DISCONTINUED | OUTPATIENT
Start: 2025-02-10 | End: 2025-02-10

## 2025-02-10 RX ORDER — INSULIN GLARGINE-YFGN 100 [IU]/ML
25 INJECTION, SOLUTION SUBCUTANEOUS AT BEDTIME
Refills: 0 | Status: DISCONTINUED | OUTPATIENT
Start: 2025-02-10 | End: 2025-02-10

## 2025-02-10 RX ORDER — ALBUTEROL SULFATE 2.5 MG/3ML
10 VIAL, NEBULIZER (ML) INHALATION
Qty: 100 | Refills: 0 | Status: DISCONTINUED | OUTPATIENT
Start: 2025-02-10 | End: 2025-02-10

## 2025-02-10 RX ORDER — CARVEDILOL 3.12 MG/1
6.25 TABLET, FILM COATED ORAL EVERY 12 HOURS
Refills: 0 | Status: DISCONTINUED | OUTPATIENT
Start: 2025-02-11 | End: 2025-02-11

## 2025-02-10 RX ORDER — INSULIN LISPRO 100 U/ML
INJECTION, SOLUTION INTRAVENOUS; SUBCUTANEOUS EVERY 6 HOURS
Refills: 0 | Status: DISCONTINUED | OUTPATIENT
Start: 2025-02-10 | End: 2025-02-10

## 2025-02-10 RX ORDER — CEFEPIME 2 G/20ML
INJECTION, POWDER, FOR SOLUTION INTRAVENOUS
Refills: 0 | Status: COMPLETED | OUTPATIENT
Start: 2025-02-10 | End: 2025-02-10

## 2025-02-10 RX ORDER — ASPIRIN 325 MG
81 TABLET ORAL DAILY
Refills: 0 | Status: DISCONTINUED | OUTPATIENT
Start: 2025-02-11 | End: 2025-02-19

## 2025-02-10 RX ORDER — METHYLPREDNISOLONE ACETATE 80 MG/ML
40 INJECTION, SUSPENSION INTRA-ARTICULAR; INTRALESIONAL; INTRAMUSCULAR; SOFT TISSUE EVERY 12 HOURS
Refills: 0 | Status: DISCONTINUED | OUTPATIENT
Start: 2025-02-10 | End: 2025-02-11

## 2025-02-10 RX ORDER — SPIRONOLACTONE 25 MG
25 TABLET ORAL DAILY
Refills: 0 | Status: DISCONTINUED | OUTPATIENT
Start: 2025-02-11 | End: 2025-02-12

## 2025-02-10 RX ORDER — INSULIN LISPRO 100 U/ML
8 INJECTION, SOLUTION INTRAVENOUS; SUBCUTANEOUS
Refills: 0 | Status: DISCONTINUED | OUTPATIENT
Start: 2025-02-10 | End: 2025-02-13

## 2025-02-10 RX ORDER — CEFEPIME 2 G/20ML
2000 INJECTION, POWDER, FOR SOLUTION INTRAVENOUS EVERY 8 HOURS
Refills: 0 | Status: COMPLETED | OUTPATIENT
Start: 2025-02-10 | End: 2025-02-10

## 2025-02-10 RX ORDER — INSULIN GLARGINE-YFGN 100 [IU]/ML
10 INJECTION, SOLUTION SUBCUTANEOUS ONCE
Refills: 0 | Status: COMPLETED | OUTPATIENT
Start: 2025-02-10 | End: 2025-02-10

## 2025-02-10 RX ADMIN — CEFEPIME 2000 MILLIGRAM(S): 2 INJECTION, POWDER, FOR SOLUTION INTRAVENOUS at 13:51

## 2025-02-10 RX ADMIN — Medication 500 MILLILITER(S): at 04:40

## 2025-02-10 RX ADMIN — INSULIN LISPRO 8: 100 INJECTION, SOLUTION INTRAVENOUS; SUBCUTANEOUS at 11:34

## 2025-02-10 RX ADMIN — FUROSEMIDE 80 MILLIGRAM(S): 10 INJECTION INTRAMUSCULAR; INTRAVENOUS at 11:29

## 2025-02-10 RX ADMIN — Medication 8 UNIT(S)/HR: at 17:03

## 2025-02-10 RX ADMIN — IPRATROPIUM BROMIDE AND ALBUTEROL SULFATE 3 MILLILITER(S): .5; 2.5 SOLUTION RESPIRATORY (INHALATION) at 21:23

## 2025-02-10 RX ADMIN — CEFEPIME 2000 MILLIGRAM(S): 2 INJECTION, POWDER, FOR SOLUTION INTRAVENOUS at 03:28

## 2025-02-10 RX ADMIN — Medication 250 MILLIGRAM(S): at 23:16

## 2025-02-10 RX ADMIN — IPRATROPIUM BROMIDE AND ALBUTEROL SULFATE 3 MILLILITER(S): .5; 2.5 SOLUTION RESPIRATORY (INHALATION) at 15:05

## 2025-02-10 RX ADMIN — Medication 1000 MILLILITER(S): at 06:22

## 2025-02-10 RX ADMIN — Medication 250 MILLIGRAM(S): at 03:29

## 2025-02-10 RX ADMIN — METHYLPREDNISOLONE ACETATE 40 MILLIGRAM(S): 80 INJECTION, SUSPENSION INTRA-ARTICULAR; INTRALESIONAL; INTRAMUSCULAR; SOFT TISSUE at 10:07

## 2025-02-10 RX ADMIN — Medication 500 MILLIGRAM(S): at 04:45

## 2025-02-10 RX ADMIN — INSULIN LISPRO 8 UNIT(S): 100 INJECTION, SOLUTION INTRAVENOUS; SUBCUTANEOUS at 22:45

## 2025-02-10 RX ADMIN — INSULIN GLARGINE-YFGN 10 UNIT(S): 100 INJECTION, SOLUTION SUBCUTANEOUS at 10:07

## 2025-02-10 RX ADMIN — CEFEPIME 2000 MILLIGRAM(S): 2 INJECTION, POWDER, FOR SOLUTION INTRAVENOUS at 22:44

## 2025-02-10 RX ADMIN — Medication 250 MILLIGRAM(S): at 04:44

## 2025-02-10 RX ADMIN — HEPARIN SODIUM 7500 UNIT(S): 1000 INJECTION INTRAVENOUS; SUBCUTANEOUS at 17:04

## 2025-02-10 RX ADMIN — DEXAMETHASONE 6 MILLIGRAM(S): 0.5 TABLET ORAL at 06:27

## 2025-02-10 RX ADMIN — IPRATROPIUM BROMIDE AND ALBUTEROL SULFATE 3 MILLILITER(S): .5; 2.5 SOLUTION RESPIRATORY (INHALATION) at 06:14

## 2025-02-10 RX ADMIN — Medication 500 MILLILITER(S): at 03:29

## 2025-02-10 RX ADMIN — INSULIN GLARGINE-YFGN 30 UNIT(S): 100 INJECTION, SOLUTION SUBCUTANEOUS at 22:46

## 2025-02-10 RX ADMIN — INSULIN LISPRO 4: 100 INJECTION, SOLUTION INTRAVENOUS; SUBCUTANEOUS at 22:45

## 2025-02-10 RX ADMIN — METHYLPREDNISOLONE ACETATE 40 MILLIGRAM(S): 80 INJECTION, SUSPENSION INTRA-ARTICULAR; INTRALESIONAL; INTRAMUSCULAR; SOFT TISSUE at 18:07

## 2025-02-10 RX ADMIN — Medication 4 MG/HR: at 12:40

## 2025-02-10 NOTE — ED ADULT NURSE NOTE - ED STAT RN HANDOFF DETAILS
pt alert but sleepy, resp. even and unlabored on BIPAP @ 50 FiO2, pt admitted to MICU for acute respiratory distress/cellulitis of bilat legs, patent 18g RAC and 22g in L Wrist, central line placed by ED MD not confirmed by XR, levophed running @ 0.06mg/kg/hr

## 2025-02-10 NOTE — ED PROVIDER NOTE - CLINICAL SUMMARY MEDICAL DECISION MAKING FREE TEXT BOX
54yF with h/o NICM, HFrEF, GERHARD on nocturnal CPAP, htn, hld presenting with 1 week of shortness of breath. Patient hypoxic to 80s on RA. Placed on NC and then transitioned to BIPAP. Labs with CODY, hypercapnia, and lactic acidosis. CT chest with infiltrate and patient is COVID positive. concern for sepsis 2/2 pna and cellulitis. Patient initially given 500cc of IVF due to HFrEF (unable to get good windows on POCUS). Additional 1L NS and levo started due to worsening hypotension. Will monitor for worse resp status 2/2 pulmonary edema with additional crystalloid. Accepted to MICU given septic shock. 54yF with h/o NICM, HFrEF, GERHARD on nocturnal CPAP, htn, hld presenting with 1 week of shortness of breath. Patient hypoxic to 80s on RA. Placed on NC and then transitioned to BIPAP. Labs with CODY, hypercapnia, and lactic acidosis. CT chest with infiltrate and patient is COVID positive. concern for sepsis 2/2 pna and cellulitis. 30cc/kg bolus not given due to concern for fluid overload as patient with b/l LE swelling. Initially given 500cc IVF and then additional 1L NS and levo started due to worsening hypotension. Will monitor for worse resp status 2/2 pulmonary edema with additional crystalloid. Started on BiPAP. Accepted to MICU given septic shock.

## 2025-02-10 NOTE — ED PROVIDER NOTE - CARE PLAN
Principal Discharge DX:	Sepsis with acute hypoxic respiratory failure  Secondary Diagnosis:	Cellulitis  Secondary Diagnosis:	Septic shock  Secondary Diagnosis:	COVID  Secondary Diagnosis:	CODY (acute kidney injury)   1

## 2025-02-10 NOTE — ED PROVIDER NOTE - CRITICAL CARE ATTENDING CONTRIBUTION TO CARE
Management for septic shock and acute hypoxic respiratory failure with IVF, vasopressor, and NIV. RIJ Central line placed by resident physician Dr. Cottrell under my direct supervision.

## 2025-02-10 NOTE — PATIENT PROFILE ADULT - FALL HARM RISK - HARM RISK INTERVENTIONS
Assistance with ambulation/Assistance OOB with selected safe patient handling equipment/Communicate Risk of Fall with Harm to all staff/Discuss with provider need for PT consult/Monitor gait and stability/Reinforce activity limits and safety measures with patient and family/Tailored Fall Risk Interventions/Visual Cue: Yellow wristband and red socks/Bed in lowest position, wheels locked, appropriate side rails in place/Call bell, personal items and telephone in reach/Instruct patient to call for assistance before getting out of bed or chair/Non-slip footwear when patient is out of bed/[place] to call system/Physically safe environment - no spills, clutter or unnecessary equipment/Purposeful Proactive Rounding/Room/bathroom lighting operational, light cord in reach

## 2025-02-10 NOTE — PROGRESS NOTE ADULT - SUBJECTIVE AND OBJECTIVE BOX
INTERVAL HPI/OVERNIGHT EVENTS:   53 y/o Female with PMH of NICM, HFrEF, GERHARD on CPAP, HTN, HLD, DM, and PAD presented to ED on 2/10/2025 with SOB, fatigue, and weakness for 1 week. Found to be hypoxic to the 80s on RA, placed on BIPAP. Positive for COVID-19. CT chest notable for patchy ground glass opacity in RUL. Started on cefepime. Admitted to MICU for sepsis with AHRF 2/2 COVID-19 PNA, cellulitis of LLE, and CODY.    SUBJECTIVE: Patient seen and examined at bedside. On BIPAP Not responding to verbal stimuli    ROS: Unable to obtain due to clinical status    VITAL SIGNS:  ICU Vital Signs Last 24 Hrs  T(C): 37.9 (10 Feb 2025 10:15), Max: 37.9 (10 Feb 2025 10:00)  T(F): 100.2 (10 Feb 2025 10:15), Max: 100.2 (10 Feb 2025 10:00)  HR: 69 (10 Feb 2025 10:15) (69 - 116)  BP: 119/79 (10 Feb 2025 10:15) (64/34 - 142/72)  BP(mean): 90 (10 Feb 2025 10:15) (41 - 105)  ABP: --  ABP(mean): --  RR: 17 (10 Feb 2025 10:15) (15 - 29)  SpO2: 97% (10 Feb 2025 10:15) (90% - 100%)    O2 Parameters below as of 10 Feb 2025 10:00      O2 Concentration (%): 50        Plateau pressure:   P/F ratio:     02-10 @ 07:01  -  02-10 @ 12:13  --------------------------------------------------------  IN: 0 mL / OUT: 450 mL / NET: -450 mL      CAPILLARY BLOOD GLUCOSE      POCT Blood Glucose.: 320 mg/dL (10 Feb 2025 11:32)      ECG: reviewed.    PHYSICAL EXAM:    GENERAL: Obese. Tachypneic  HEAD:  NCAT  EYES: PERRLA, conjunctiva and sclera clear  NECK: Supple, trachea midline, no JVD  HEART: Tachycardic. No MRG  LUNGS: Coarse breath sounds and diffuse rhonchi B/L  ABDOMEN: Soft, nontender, nondistended, +BS  EXTREMITIES: 2+ peripheral pulses bilaterally, cap refill<2 secs. No clubbing or cyanosis. B/L LE edema  NERVOUS SYSTEM: Unable to follow commands. Withdraws extremities to painful stimuli  SKIN: LLE erythematous and warm to touch    MEDICATIONS:  MEDICATIONS  (STANDING):  albuterol Continuous Nebulization (Vibrating Mesh Nebulizer) 10 mG/Hr (4 mL/Hr) Continuous Inhalation. <Continuous>  albuterol/ipratropium for Nebulization 3 milliLiter(s) Nebulizer every 6 hours  cefepime  Injectable.      cefepime  Injectable. 2000 milliGRAM(s) IV Push every 8 hours  chlorhexidine 2% Cloths 1 Application(s) Topical <User Schedule>  dextrose 5%. 1000 milliLiter(s) (100 mL/Hr) IV Continuous <Continuous>  dextrose 5%. 1000 milliLiter(s) (50 mL/Hr) IV Continuous <Continuous>  dextrose 50% Injectable 25 Gram(s) IV Push once  dextrose 50% Injectable 12.5 Gram(s) IV Push once  dextrose 50% Injectable 25 Gram(s) IV Push once  glucagon  Injectable 1 milliGRAM(s) IntraMuscular once  heparin   Injectable 7500 Unit(s) SubCutaneous every 12 hours  influenza   Vaccine 0.5 milliLiter(s) IntraMuscular once  insulin lispro (ADMELOG) corrective regimen sliding scale   SubCutaneous every 6 hours  methylPREDNISolone sodium succinate Injectable 40 milliGRAM(s) IV Push every 12 hours  norepinephrine Infusion 0.05 MICROgram(s)/kG/Min (9.38 mL/Hr) IV Continuous <Continuous>    MEDICATIONS  (PRN):  dextrose Oral Gel 15 Gram(s) Oral once PRN Blood Glucose LESS THAN 70 milliGRAM(s)/deciliter      ALLERGIES:  Allergies    No Known Allergies    Intolerances        LABS:                        15.1   17.18 )-----------( 266      ( 10 Feb 2025 09:33 )             48.2     02-10    137  |  96  |  42.8[H]  ----------------------------<  303[H]  4.6   |  25.0  |  2.65[H]    Ca    8.2[L]      10 Feb 2025 09:33  Phos  5.1     02-10  Mg     1.9     02-10    TPro  7.0  /  Alb  3.0[L]  /  TBili  0.4  /  DBili  x   /  AST  22  /  ALT  33[H]  /  AlkPhos  89  02-10    PT/INR - ( 10 Feb 2025 03:20 )   PT: 14.8 sec;   INR: 1.31 ratio         PTT - ( 10 Feb 2025 09:33 )  PTT:36.7 sec  Urinalysis Basic - ( 10 Feb 2025 09:33 )    Color: Dark Yellow / Appearance: Cloudy / S.027 / pH: x  Gluc: 303 mg/dL / Ketone: Negative mg/dL  / Bili: Negative / Urobili: 1.0 mg/dL   Blood: x / Protein: 300 mg/dL / Nitrite: Negative   Leuk Esterase: Negative / RBC: 6 /HPF / WBC 4 /HPF   Sq Epi: x / Non Sq Epi: 15 /HPF / Bacteria: Few /HPF      ABG:  pO2, Arterial: 88 mmHg (02-10-25 @ 08:19)  pH, Arterial: 7.340 (02-10-25 @ 08:19)  pCO2, Arterial: 52 mmHg (02-10-25 @ 08:19)  pCO2, Arterial: 71 mmHg (02-10-25 @ 03:28)  pH, Arterial: 7.240 (02-10-25 @ 03:28)  pO2, Arterial: 102 mmHg (02-10-25 @ 03:28)      vBG:  pCO2, Venous: 71 mmHg (02-10-25 @ 09:33)  pH, Venous: 7.220 (02-10-25 @ 09:33)  pO2, Venous: 51 mmHg (02-10-25 @ 09:33)  HCO3, Venous: 29 mmol/L (02-10-25 @ 09:33)    Micro:        RADIOLOGY & ADDITIONAL TESTS: Reviewed.

## 2025-02-10 NOTE — H&P ADULT - HISTORY OF PRESENT ILLNESS
CHIEF COMPLAINT:    HPI:    PAST MEDICAL & SURGICAL HISTORY:  Obstructive sleep apnea      Borderline systolic HTN      Acute systolic congestive heart failure      HTN (hypertension)      HLD (hyperlipidemia)      DM2 (diabetes mellitus, type 2)      PAD (peripheral artery disease)      Ankle fracture, left  ORIF -       H/O hand surgery  LEFT -       S/P peripheral artery angioplasty          FAMILY HISTORY:  FH: leukemia (Aunt)        SOCIAL HISTORY:  Smoking: __ packs x ___ years  EtOH Use:  Marital Status:  Occupation:  Recent Travel:  Country of Birth:  Advance Directives:    Allergies    No Known Allergies    Intolerances        HOME MEDICATIONS:    REVIEW OF SYSTEMS:  Constitutional:   Eyes:  ENT:  CV:  Resp:  GI:  :  MSK:  Integumentary:  Neurological:  Psychiatric:  Endocrine:  Hematologic/Lymphatic:  Allergic/Immunologic:  [ ] All other systems negative  [ ] Unable to assess ROS because ________    OBJECTIVE:  ICU Vital Signs Last 24 Hrs  T(C): 37.7 (10 Feb 2025 09:15), Max: 37.8 (10 Feb 2025 08:45)  T(F): 99.9 (10 Feb 2025 09:15), Max: 100 (10 Feb 2025 08:45)  HR: 70 (10 Feb 2025 09:15) (70 - 116)  BP: 121/86 (10 Feb 2025 09:15) (64/34 - 142/72)  BP(mean): 98 (10 Feb 2025 09:15) (41 - 105)  ABP: --  ABP(mean): --  RR: 16 (10 Feb 2025 09:15) (15 - 29)  SpO2: 97% (10 Feb 2025 09:15) (90% - 100%)    O2 Parameters below as of 10 Feb 2025 09:00  Patient On (Oxygen Delivery Method): BiPAP/CPAP    O2 Concentration (%): 50          CAPILLARY BLOOD GLUCOSE      POCT Blood Glucose.: 329 mg/dL (10 Feb 2025 09:08)      PHYSICAL EXAM:  General:   HEENT:   Lymph Nodes:  Neck:   Respiratory:   Cardiovascular:   Abdomen:   Extremities:   Skin:   Neurological:      HOSPITAL MEDICATIONS:  MEDICATIONS  (STANDING):  albuterol Continuous Nebulization (Vibrating Mesh Nebulizer) 10 mG/Hr (4 mL/Hr) Continuous Inhalation. <Continuous>  albuterol/ipratropium for Nebulization 3 milliLiter(s) Nebulizer every 6 hours  cefepime  Injectable.      cefepime  Injectable. 2000 milliGRAM(s) IV Push every 8 hours  chlorhexidine 2% Cloths 1 Application(s) Topical <User Schedule>  dextrose 5%. 1000 milliLiter(s) (100 mL/Hr) IV Continuous <Continuous>  dextrose 5%. 1000 milliLiter(s) (50 mL/Hr) IV Continuous <Continuous>  dextrose 50% Injectable 25 Gram(s) IV Push once  dextrose 50% Injectable 12.5 Gram(s) IV Push once  dextrose 50% Injectable 25 Gram(s) IV Push once  glucagon  Injectable 1 milliGRAM(s) IntraMuscular once  heparin   Injectable 7500 Unit(s) SubCutaneous every 12 hours  influenza   Vaccine 0.5 milliLiter(s) IntraMuscular once  insulin glargine Injectable (LANTUS) 10 Unit(s) SubCutaneous once  insulin lispro (ADMELOG) corrective regimen sliding scale   SubCutaneous every 6 hours  methylPREDNISolone sodium succinate Injectable 40 milliGRAM(s) IV Push every 12 hours  norepinephrine Infusion 0.05 MICROgram(s)/kG/Min (9.38 mL/Hr) IV Continuous <Continuous>    MEDICATIONS  (PRN):  dextrose Oral Gel 15 Gram(s) Oral once PRN Blood Glucose LESS THAN 70 milliGRAM(s)/deciliter      LABS:                        15.1   17.18 )-----------( 266      ( 10 Feb 2025 09:33 )             48.2     02-10    139  |  94[L]  |  38.7[H]  ----------------------------<  230[H]  5.1   |  29.0  |  2.77[H]    Ca    9.1      10 Feb 2025 03:20    TPro  8.2  /  Alb  3.5  /  TBili  0.4  /  DBili  x   /  AST  24  /  ALT  42[H]  /  AlkPhos  104  02-10    PT/INR - ( 10 Feb 2025 03:20 )   PT: 14.8 sec;   INR: 1.31 ratio         PTT - ( 10 Feb 2025 03:20 )  PTT:45.2 sec  Urinalysis Basic - ( 10 Feb 2025 09:33 )    Color: Dark Yellow / Appearance: Cloudy / S.027 / pH: x  Gluc: x / Ketone: Negative mg/dL  / Bili: Negative / Urobili: 1.0 mg/dL   Blood: x / Protein: 300 mg/dL / Nitrite: Negative   Leuk Esterase: Negative / RBC: x / WBC x   Sq Epi: x / Non Sq Epi: x / Bacteria: x      Arterial Blood Gas:  02-10 @ 03:28  7.240/71/102/30/98.4/3.0  ABG lactate: --    Venous Blood Gas:  02-10 @ 03:20  --/--/--/--/--  VBG Lactate: 3.00      MICROBIOLOGY: reviewed     RADIOLOGY:  [x ] Reviewed and interpreted by me    EKG: reviewed  CHIEF COMPLAINT: SOB/cough    HPI: 53 yo female PMHx NICM, HFimpEF (was 25-30% 4/9/2022 improved 52% 10/23/2022) etiology was uncertain, HTN, GERHARD (not on CPAP), LE edema, and PAD s/p b/l stents, currently lives in a shelter presents to ED c/o cough and SOB x 1 week. In ED, patient c/o associated weakness, fatigue, and shortness of breath with cough. She is unsure of fever, Denies chest pain, abd pain/N/V. Patient's daughter reports she was recently treated for cellulitis but eloped from the hospital. Patient does not use home O2.    In ED, patient hypoxic to 80s on RA. Placed on NC and then transitioned to BIPAP. Labs with CODY, hypercapnia, and lactic acidosis. CT chest with infiltrate and patient is COVID positive. MICU consulted for worsening respiratory status, sepsis.    PAST MEDICAL & SURGICAL HISTORY:  Obstructive sleep apnea      Borderline systolic HTN      Acute systolic congestive heart failure      HTN (hypertension)      HLD (hyperlipidemia)      DM2 (diabetes mellitus, type 2)      PAD (peripheral artery disease)      Ankle fracture, left  ORIF - 2003      H/O hand surgery  LEFT - 1981      S/P peripheral artery angioplasty          FAMILY HISTORY:  FH: leukemia (Aunt)        SOCIAL HISTORY:  Smoking: __ packs x ___ years  EtOH Use:  Marital Status:  Occupation:  Recent Travel:  Country of Birth:  Advance Directives:    Allergies    No Known Allergies    Intolerances        HOME MEDICATIONS:  OBJECTIVE:  ICU Vital Signs Last 24 Hrs  T(C): 37.7 (10 Feb 2025 09:15), Max: 37.8 (10 Feb 2025 08:45)  T(F): 99.9 (10 Feb 2025 09:15), Max: 100 (10 Feb 2025 08:45)  HR: 70 (10 Feb 2025 09:15) (70 - 116)  BP: 121/86 (10 Feb 2025 09:15) (64/34 - 142/72)  BP(mean): 98 (10 Feb 2025 09:15) (41 - 105)  ABP: --  ABP(mean): --  RR: 16 (10 Feb 2025 09:15) (15 - 29)  SpO2: 97% (10 Feb 2025 09:15) (90% - 100%)    O2 Parameters below as of 10 Feb 2025 09:00  Patient On (Oxygen Delivery Method): BiPAP/CPAP    O2 Concentration (%): 50          CAPILLARY BLOOD GLUCOSE      POCT Blood Glucose.: 329 mg/dL (10 Feb 2025 09:08)      HOSPITAL MEDICATIONS:  MEDICATIONS  (STANDING):  albuterol Continuous Nebulization (Vibrating Mesh Nebulizer) 10 mG/Hr (4 mL/Hr) Continuous Inhalation. <Continuous>  albuterol/ipratropium for Nebulization 3 milliLiter(s) Nebulizer every 6 hours  cefepime  Injectable.      cefepime  Injectable. 2000 milliGRAM(s) IV Push every 8 hours  chlorhexidine 2% Cloths 1 Application(s) Topical <User Schedule>  dextrose 5%. 1000 milliLiter(s) (100 mL/Hr) IV Continuous <Continuous>  dextrose 5%. 1000 milliLiter(s) (50 mL/Hr) IV Continuous <Continuous>  dextrose 50% Injectable 25 Gram(s) IV Push once  dextrose 50% Injectable 12.5 Gram(s) IV Push once  dextrose 50% Injectable 25 Gram(s) IV Push once  glucagon  Injectable 1 milliGRAM(s) IntraMuscular once  heparin   Injectable 7500 Unit(s) SubCutaneous every 12 hours  influenza   Vaccine 0.5 milliLiter(s) IntraMuscular once  insulin glargine Injectable (LANTUS) 10 Unit(s) SubCutaneous once  insulin lispro (ADMELOG) corrective regimen sliding scale   SubCutaneous every 6 hours  methylPREDNISolone sodium succinate Injectable 40 milliGRAM(s) IV Push every 12 hours  norepinephrine Infusion 0.05 MICROgram(s)/kG/Min (9.38 mL/Hr) IV Continuous <Continuous>    MEDICATIONS  (PRN):  dextrose Oral Gel 15 Gram(s) Oral once PRN Blood Glucose LESS THAN 70 milliGRAM(s)/decilite

## 2025-02-10 NOTE — ED PROVIDER NOTE - OBJECTIVE STATEMENT
54yF with h/o NICM, HFrEF, GERHARD on nocturnal CPAP, htn, hld presenting with 1 week of shortness of breath. Patient with weakness, fatigue, and shortness of breath with cough. She is unsure of fever, Denies chest pain, abd pain/N/V. Patient's daughter reports she was recently treated for cellulitis but eloped from the hospital . Patient does not use home O2.

## 2025-02-10 NOTE — ED PROVIDER NOTE - PHYSICAL EXAMINATION
Gen: +tachypnea  Head: normocephalic, atraumatic  EENT: EOMI  Lung: +increased WOB  CV: +tachy, b/l LE edema  Abd: soft, non-tender, non-distended  MSK: no visible deformities, full range of motion in all 4 extremities  Neuro: A&Ox4; No focal neurologic deficits  Skin: left LE cellulitis

## 2025-02-10 NOTE — H&P ADULT - ATTENDING COMMENTS
54-year-old female with history of nonischemic cardiomyopathy heart failure with improved EF from 25 to 52% essential hypertension sleep apnea lower extremity edema with recurrent left lower extremity cellulitis as well as Klebsiella pneumonia UTI peripheral artery disease s/p angioplasty and stent placement in 2024 currently from shelter presented with difficulty breathing and cough nonproductive for more than a week associated with generalized weakness and fatigue found to have left lower extremity redness admitted to medical ICU for    Acute on possible chronic hypoxic hypercapnic respiratory failure  Shock: Most likely distributive to rule out cardiogenic  Left lower extremity cellulitis  COVID-19 systemic viral illness: Present for over 10 days  Acute metabolic/hypercapnic encephalopathy    Patient failed to respond to 1.5 L of IV crystalloid requiring norepinephrine infusion as well as failed BiPAP requiring AVAPS with eventual improvement on blood gas      ====================== NEUROLOGY=====================  CT head without contrast negative for acute pathology  q4 NeuroChecks  Fall Aspiration and Seizure precuations   Urine for drug screen  ==================== RESPIRATORY======================  methylPREDNISolone sodium succinate Injectable 40 milliGRAM(s) IV Push every 12 hours    albuterol Continuous Nebulization (Vibrating Mesh Nebulizer) 10 mG/Hr (4 mL/Hr) Continuous Inhalation. <Continuous>  albuterol/ipratropium for Nebulization 3 milliLiter(s) Nebulizer every 6 hours   changed to AVAPS with target volume 450  pressure increased to 25-35;  EPAP of 8 and FiO2 40 to 50% with backup respiratory rate of 18  ====================CARDIOVASCULAR==================  norepinephrine Infusion 0.05 MICROgram(s)/kG/Min (9.38 mL/Hr) IV Continuous <Continuous>   MAP goal more than 65  TTE  ===================HEMATOLOGIC/ONC ===================  heparin   Injectable 7500 Unit(s) SubCutaneous every 12 hours    ===================== RENAL =========================  Continue monitoring urine output   indwelling urinary catheter  Strict I's and O's  Close monitoring of potassium, phosphorus, magnesium with target levels of more than 4, 3, 2 respectively    ==================== GASTROINTESTINAL===================  dextrose 5%. 1000 milliLiter(s) (100 mL/Hr) IV Continuous <Continuous>  dextrose 5%. 1000 milliLiter(s) (50 mL/Hr) IV Continuous <Continuous>    Diet:  n.p.o. while on noninvasive ventilator  =======================    ENDOCRINE  =====================  dextrose 50% Injectable 25 Gram(s) IV Push once  dextrose 50% Injectable 12.5 Gram(s) IV Push once  dextrose 50% Injectable 25 Gram(s) IV Push once  dextrose Oral Gel 15 Gram(s) Oral once PRN Blood Glucose LESS THAN 70 milliGRAM(s)/deciliter  glucagon  Injectable 1 milliGRAM(s) IntraMuscular once  insulin lispro (ADMELOG) corrective regimen sliding scale   SubCutaneous every 6 hours    Blood sugar goal: 100-200  ========================INFECTIOUS DISEASE================  cefepime  Injectable.      cefepime  Injectable. 2000 milliGRAM(s) IV Push every 8 hours  S/p dose of vancomycin yesterday, will get random level tomorrow    Cx:  blood culture sent from ED  CT scan of the left lower extremity without obvious gas gangrene or necrotizing fasciitis but phlegmon and cellulitic changes    Care plan discussed with ICU care team   will need to establish healthcare proxy/next of kin     upon my evaluation, this patient had a high probability of imminent or life-threatening deterioration due to critical condition, which required my direct attention, intervention, and personal management. I have personally provided 70 minutes of critical care time exclusive of time spent teaching and/or time spent on separately billable procedures. Time includes review of laboratory data, radiology results, discussion with consultants, and monitoring for potential decompensation. Interventions were performed as documented above.

## 2025-02-10 NOTE — PROGRESS NOTE ADULT - ASSESSMENT
====================ASSESSMENT/PLAN==============  55 y/o Female with PMH of NICM, HFrEF, GERHARD on CPAP, HTN, HLD, DM, and PAD presented to ED on 2/10/2025 with SOB, fatigue, and weakness for 1 week. Found to be hypoxic to the 80s on RA, placed on BIPAP. Positive for COVID-19. CT chest notable for patchy ground glass opacity in RUL. Started on cefepime. Admitted to MICU for sepsis with AHRF 2/2 COVID-19 PNA, cellulitis of LLE, and CODY. Currently on BIPAP. C/w care in ICU.     ====================== NEUROLOGY=====================    - Aspiration precautions  - HOB 30 degrees  - Promote normal sleep wake cycles, delirium precautions  - Q4h neuro checks    ==================== RESPIRATORY======================    NPPV for AHRF 2/2 COVID-19 PNA    TV: 450 mL  FiO2 (%): 50  Expiratory pressure: 6  Back up rate (bpm): 16  Maximum pressure: 25  Minimum pressure: 15      - CT chest on 2/10 notable for patchy ground glass opacity in RUL  - Target SpO2 range (%): 88-97%  - Duoneb Q6h  - Solumedrol 40mg IV Q12h      ====================CARDIOVASCULAR==================    - C/w Levophed 0.05 micrograms/kG/min  - Maintain MAP 65-70      ===================HEMATOLOGIC/ONC ===================    - Heparin 7500 Units SubQ Q12h      ===================== RENAL =========================    - Continue strict I&Os Q1h  - Avoid Nephrotoxic agents   - Adjusting medications for renal function   - Monitor CMP  - Replacing electrolytes as needed with Goal K> 4, PO> 3, Mg> 2    ==================== GASTROINTESTINAL===================     - Diet: NPO, except medications  - Dietitian consult pending    =======================    ENDOCRINE  =====================    dextrose 50% Injectable 25 Gram(s) IV Push once  dextrose 50% Injectable 12.5 Gram(s) IV Push once  dextrose 50% Injectable 25 Gram(s) IV Push once  dextrose Oral Gel 15 Gram(s) Oral once PRN Blood Glucose LESS THAN 70 milliGRAM(s)/deciliter  glucagon  Injectable 1 milliGRAM(s) IntraMuscular once  insulin lispro (ADMELOG) corrective regimen sliding scale   SubCutaneous every 6 hours  insulin glargine injectable (LANTUS) 10 Units SubQ every morning    - Goal euglycemia     ========================INFECTIOUS DISEASE================    LLE cellulitis     - Cefepime 2000mg IV Q8h    ____________________________________________    Dispo: MICU   ====================ASSESSMENT/PLAN==============  53 y/o Female with PMH of NICM, HFrEF, GERHARD on CPAP, HTN, HLD, DM, and PAD presented to ED on 2/10/2025 with SOB, fatigue, and weakness for 1 week. Found to be hypoxic to the 80s on RA, placed on BIPAP. Positive for COVID-19. CT chest notable for patchy ground glass opacity in RUL. Started on cefepime. Admitted to MICU for sepsis with AHRF 2/2 COVID-19 PNA, cellulitis of LLE, and CODY. Currently on BIPAP. C/w care in ICU.     ====================== NEUROLOGY=====================    - Aspiration precautions  - HOB 30 degrees  - Promote normal sleep wake cycles, delirium precautions  - Q4h neuro checks  - CTH pending    ==================== RESPIRATORY======================    NPPV for AHRF 2/2 COVID-19 PNA    TV: 450 mL  FiO2 (%): 50  Expiratory pressure: 6  Back up rate (bpm): 16  Maximum pressure: 25  Minimum pressure: 15      - CT chest on 2/10 notable for patchy ground glass opacity in RUL  - Target SpO2 range (%): 88-97%  - Duoneb Q6h  - Solumedrol 40mg IV Q12h      ====================CARDIOVASCULAR==================    - C/w Levophed 0.05 micrograms/kG/min  - Maintain MAP 65-70      ===================HEMATOLOGIC/ONC ===================    - Heparin 7500 Units SubQ Q12h      ===================== RENAL =========================    - Continue strict I&Os Q1h  - Avoid Nephrotoxic agents   - Adjusting medications for renal function   - Monitor CMP  - Replacing electrolytes as needed with Goal K> 4, PO> 3, Mg> 2  - 80mg Lasix given    ==================== GASTROINTESTINAL===================     - Diet: NPO, except medications  - Dietitian consult pending    =======================    ENDOCRINE  =====================    dextrose 50% Injectable 25 Gram(s) IV Push once  dextrose 50% Injectable 12.5 Gram(s) IV Push once  dextrose 50% Injectable 25 Gram(s) IV Push once  dextrose Oral Gel 15 Gram(s) Oral once PRN Blood Glucose LESS THAN 70 milliGRAM(s)/deciliter  glucagon  Injectable 1 milliGRAM(s) IntraMuscular once  insulin lispro (ADMELOG) corrective regimen sliding scale   SubCutaneous every 6 hours  insulin glargine injectable (LANTUS) 10 Units SubQ every morning    - Goal euglycemia     ========================INFECTIOUS DISEASE================    LLE cellulitis     - Cefepime 2000mg IV Q8h  - CT B/L LE pending  ____________________________________________    Dispo: MICU   ====================ASSESSMENT/PLAN==============  53 y/o Female with PMH of NICM, HFrEF, GERHARD on CPAP, HTN, HLD, DM, and PAD presented to ED on 2/10/2025 with SOB, fatigue, and weakness for 1 week. Found to be hypoxic to the 80s on RA, placed on BIPAP. Positive for COVID-19. CT chest notable for patchy ground glass opacity in RUL. Started on cefepime. Admitted to MICU for sepsis with AHRF 2/2 COVID-19 PNA, cellulitis of LLE, and CODY. Currently on BIPAP. C/w care in ICU.     ====================== NEUROLOGY=====================    - Aspiration precautions  - HOB 30 degrees  - Promote normal sleep wake cycles, delirium precautions  - Q4h neuro checks  - CTH pending    ==================== RESPIRATORY======================    NPPV for AHRF 2/2 COVID-19 PNA    TV: 450 mL  FiO2 (%): 50  Expiratory pressure: 6  Back up rate (bpm): 16  Maximum pressure: 25  Minimum pressure: 15      - CT chest on 2/10 notable for patchy ground glass opacity in RUL  - Target SpO2 range (%): 88-97%  - Duoneb Q6h  - Solumedrol 40mg IV Q12h      ====================CARDIOVASCULAR==================    - C/w Levophed 0.05 micrograms/kG/min  - Maintain MAP 65-70      ===================HEMATOLOGIC/ONC ===================    - Heparin 7500 Units SubQ Q12h      ===================== RENAL =========================    CODY     - Continue strict I&Os Q1h  - Avoid Nephrotoxic agents   - Adjusting medications for renal function   - Cr slightly improving. 2.77 on admission --> 2.65  - Continue to monitor CMP  - Replacing electrolytes as needed with Goal K> 4, PO> 3, Mg> 2  - 80mg Lasix given    ==================== GASTROINTESTINAL===================     - Diet: NPO, except medications  - Dietitian consult pending    =======================    ENDOCRINE  =====================    dextrose 50% Injectable 25 Gram(s) IV Push once  dextrose 50% Injectable 12.5 Gram(s) IV Push once  dextrose 50% Injectable 25 Gram(s) IV Push once  dextrose Oral Gel 15 Gram(s) Oral once PRN Blood Glucose LESS THAN 70 milliGRAM(s)/deciliter  glucagon  Injectable 1 milliGRAM(s) IntraMuscular once  insulin lispro (ADMELOG) corrective regimen sliding scale   SubCutaneous every 6 hours  insulin glargine injectable (LANTUS) 10 Units SubQ every morning    - Goal euglycemia     ========================INFECTIOUS DISEASE================    LLE cellulitis     - Cefepime 2000mg IV Q8h  - CT B/L LE pending  ____________________________________________    Dispo: MICU   ====================ASSESSMENT/PLAN==============  55 y/o Female with PMH of NICM, HFrEF, GERHARD on CPAP, HTN, HLD, DM, and PAD presented to ED on 2/10/2025 with SOB, fatigue, and weakness for 1 week. Found to be hypoxic to the 80s on RA, placed on BIPAP. Positive for COVID-19. CT chest notable for patchy ground glass opacity in RUL. Started on cefepime. Admitted to MICU for sepsis with AHRF 2/2 COVID-19 PNA, cellulitis of LLE, and CODY. Currently on BIPAP. C/w care in ICU.     ====================== NEUROLOGY=====================    - Aspiration precautions  - HOB 30 degrees  - Promote normal sleep wake cycles, delirium precautions  - Q4h neuro checks  - CTH negative for acute transcortical infarction or hemorrhage     ==================== RESPIRATORY======================    NPPV for AHRF 2/2 COVID-19 PNA    TV: 450 mL  FiO2 (%): 50  Expiratory pressure: 6  Back up rate (bpm): 16  Maximum pressure: 25  Minimum pressure: 15      - CT chest on 2/10 notable for patchy ground glass opacity in RUL  - Target SpO2 range (%): 88-97%  - Duoneb Q6h  - Solumedrol 40mg IV Q12h      ====================CARDIOVASCULAR==================    - C/w Levophed 0.05 micrograms/kG/min  - Maintain MAP 65-70      ===================HEMATOLOGIC/ONC ===================    - Heparin 7500 Units SubQ Q12h      ===================== RENAL =========================    CODY     - Continue strict I&Os Q1h  - Avoid Nephrotoxic agents   - Adjusting medications for renal function   - Cr slightly improving. 2.77 on admission --> 2.65  - Continue to monitor CMP  - Replacing electrolytes as needed with Goal K> 4, PO> 3, Mg> 2  - 80mg Lasix given    ==================== GASTROINTESTINAL===================     - Diet: NPO, except medications  - Dietitian consult pending    =======================    ENDOCRINE  =====================    dextrose 50% Injectable 25 Gram(s) IV Push once  dextrose 50% Injectable 12.5 Gram(s) IV Push once  dextrose 50% Injectable 25 Gram(s) IV Push once  dextrose Oral Gel 15 Gram(s) Oral once PRN Blood Glucose LESS THAN 70 milliGRAM(s)/deciliter  glucagon  Injectable 1 milliGRAM(s) IntraMuscular once  insulin lispro (ADMELOG) corrective regimen sliding scale   SubCutaneous every 6 hours  insulin glargine injectable (LANTUS) 10 Units SubQ every morning    - Goal euglycemia     ========================INFECTIOUS DISEASE================    LLE cellulitis     - Cefepime 2000mg IV Q8h  - CT B/L LE notable for moderate LT lower leg and LT foot edema. No discrete abscess or gas visualized  ____________________________________________    Dispo: MICU

## 2025-02-10 NOTE — H&P ADULT - NSHPPHYSICALEXAM_GEN_ALL_CORE
General: Arousable to tactile/painful and verbal stimuli, morbidly obese, on AVAPs  HEENT: Normocephalic, atraumatic. No scleral icterus or conjunctival injection. EOMI. Moist mucous membranes. Oropharynx clear.   Neck:. Soft and supple.  Cardiac: RRR, Peripheral pulses 2+ and symmetric. No LE edema.  Resp: on AVAPs. diffuse b/l wheezing with crackles on R  Abd: Soft, non-tender, non-distended. No guarding, rebound, or rigidity.  Back: Spine midline and non-tender.   Skin: LLE circumferential erythema without fluctuance, with 2x2 stage 3 ulcer on anterior shin, no discharge  Neuro: AO x 4. Moves all extremities symmetrically. Motor strength and sensation grossly intact.

## 2025-02-10 NOTE — H&P ADULT - NSHPREVIEWOFSYSTEMS_GEN_ALL_CORE
General: Denies fever, chills  HEENT: Denies sore throat  Neck: Denies neck pain  Resp: +coughing, SOB  Cardiovascular: Denies CP, palpitations, LE edema  GI: Denies nausea, vomiting, abdominal pain, diarrhea, constipation, blood in stool  : Denies dysuria, hematuria  MSK: Denies back pain  Neuro: Denies HA, dizziness, numbness, weakness  Skin: Denies rashes.

## 2025-02-10 NOTE — ED PROCEDURE NOTE - ATTENDING CONTRIBUTION TO CARE
Right IJ CVC placed by resident physician Dr. Cottrell under my direct supervision. Confirmed by compass and chest xray.

## 2025-02-10 NOTE — PATIENT PROFILE ADULT - DO YOU FEEL LIKE HURTING YOURSELF OR OTHERS?
no
I have seen and examined the baby and reviewed all labs. I reviewed prenatal history with mother;     Physical Exam:  Gen: NAD  HEENT: anterior fontanel open soft and flat, no cleft lip/palate, ears normal set, no ear pits or tags. no lesions in mouth/throat,  red reflex positive bilaterally, nares clinically patent  Resp: good air entry and clear to auscultation bilaterally  Cardio: Normal S1/S2, regular rate and rhythm, no murmurs, rubs or gallops, 2+ femoral pulses bilaterally  Abd: soft, non tender, non distended, normal bowel sounds, no organomegaly,  umbilical stump clean/ intact  Neuro: +grasp/suck/mari, normal tone  Extremities: negative justice and ortolani, full range of motion x 4, no crepitus  Skin: pink  Genitals: testes palpated b/l, midline meatus, lisa 1, anus visually patent     Well  via ;   Routine  care;   Feeding and  care were discussed today. Parent questions were answered    Columba Ash MD
no

## 2025-02-10 NOTE — ED PROCEDURE NOTE - PROCEDURE ADDITIONAL DETAILS
TLC CVC placed under ultrasound guidance, guidewire visualized in RIJ and compass with pressures <3mmHg. Pending CXR confirmation and vbg.

## 2025-02-10 NOTE — H&P ADULT - ASSESSMENT
HPI: 53 yo female PMHx NICM, HFimpEF (was 25-30% 4/9/2022 improved 52% 10/23/2022) etiology was uncertain, HTN, GERHARD (not on CPAP), LE edema, and PAD s/p b/l stents, currently lives in a shelter presents to ED c/o cough and SOB x 1 week. In ED, patient c/o associated weakness, fatigue, and shortness of breath with cough. She is unsure of fever, Denies chest pain, abd pain/N/V. Patient's daughter reports she was recently treated for cellulitis but eloped from the hospital. Patient does not use home O2.    In ED, patient hypoxic to 80s on RA. Placed on NC and then transitioned to BIPAP. Labs with CODY, hypercapnia, and lactic acidosis. CT chest with infiltrate and patient is COVID positive. MICU consulted for worsening respiratory status, sepsis.    #1 Acute Hypoxic and Hypercapnic Respiratory Failure  HPI: 55 yo female PMHx NICM, HFimpEF (was 25-30% 4/9/2022 improved 52% 10/23/2022) etiology was uncertain, HTN, GERHARD (not on CPAP), LE edema, and PAD s/p b/l stents, currently lives in a shelter presents to ED c/o cough and SOB x 1 week. In ED, patient c/o associated weakness, fatigue, and shortness of breath with cough. She is unsure of fever, Denies chest pain, abd pain/N/V. Patient's daughter reports she was recently treated for cellulitis but eloped from the hospital. Patient does not use home O2.    In ED, patient hypoxic to 80s on RA. Placed on NC and then transitioned to BIPAP. Labs with CODY, hypercapnia, and lactic acidosis. CT chest with infiltrate and patient is COVID positive. MICU consulted for worsening respiratory status, sepsis.      ====================== NEUROLOGY=====================  #Metabolic encephalopathy  - Pt is lethargic, arousable to painful and verbal stimulus    - CT head pending   - Aspiration precautions  - HOB 30 degrees  - Promote normal sleep wake cycles, delirium precautions    ==================== RESPIRATORY======================  Acute hypoxic and hypercarbic respiratory failure   Arterial Blood Gas:  02-10 @ 03:28  7.240/71/102/30/98.4/3.0  ABG lactate: --  - Continue AVAPS, repeat ABG shows improvement 7.340/52/88/28/98.9/2.3  - Maintain Spo2 >92%  - Continue pulmonary toileting  - CT, no PE, but noted scattered mild patchy groundglass within the right upper lobe likely 2/2 COVID > 1 week    COPD exacerbation  - albuterol nebs continous  - solumedrol given in ED    ====================CARDIOVASCULAR==================    - EKG normal sinus rhythm, no interval changes, no ischemic changes  - Troponin 43-> 39  - pro BNP 60492  - TTE pending  - on levophed with increasing requirements 0.06->0.1    ===================HEMATOLOGIC/ONC ===================  - Heparin 7500U    ===================== RENAL =========================    - CODY BUN/Cr 42.8/2.65  - Continue monitoring urine output - sams placed  - UA with many epithelial cells, pending UC  - Avoid Nephrotoxic agents   - Adjusting medications for renal  function   - Replacing electrolytes as needed with Goal K> 4, PO> 3, Mg> 2    ==================== GASTROINTESTINAL===================  - NPO   =======================ENDOCRINE  =====================    - Goal euglycemia   - While NPO, FSBG and MONET q6h  - Lantus with ISS   ========================INFECTIOUS DISEASE================  - sepsis 2/2 cellulitis vs pneumonia, on IV antibiotics - cefepime  - CT lower extremities b/l pending  - Sputum pending  - MRSA pending  - BC pending  - UA with many epithelial cells, pending UC  - RVP COVID +    ========================DISPO========================  Full code.   - Patient does not have capacity to make medical decisions  - Surrogate is son  - Full code, continue medical management

## 2025-02-10 NOTE — ED ADULT TRIAGE NOTE - CHIEF COMPLAINT QUOTE
pt biba for shortness of breath, has been having flu like symptoms and family at home is also sick. has not been taking home meds, hx of CHF/diabetes. patient hypoxic to 87 on RA with ems, placed on 3 lnc with improvement. bital lower extremities swollen with open sores.   ekg done

## 2025-02-10 NOTE — ED ADULT NURSE NOTE - OBJECTIVE STATEMENT
As per daughter, pt didn't take medication yesterday, not eat and drinking well, SOB, tachypneic. +active smoker. Daughter report was breathing very heavy in her sleep. Have been taking dayquil  but refuse to eat. Subjective fever. Daughter report was confused and couldn't hold a topic while talking and called EMS.  Pt tachypneic, hypoxic placed on 3 L NC by EMS. Hx of DM, cellulitis to the lower extremites.

## 2025-02-10 NOTE — H&P ADULT - NSHPLABSRESULTS_GEN_ALL_CORE
LABS:                        15.1   17.18 )-----------( 266      ( 10 Feb 2025 09:33 )             48.2     02-10    139  |  94[L]  |  38.7[H]  ----------------------------<  230[H]  5.1   |  29.0  |  2.77[H]    Ca    9.1      10 Feb 2025 03:20    TPro  8.2  /  Alb  3.5  /  TBili  0.4  /  DBili  x   /  AST  24  /  ALT  42[H]  /  AlkPhos  104  02-10    PT/INR - ( 10 Feb 2025 03:20 )   PT: 14.8 sec;   INR: 1.31 ratio         PTT - ( 10 Feb 2025 03:20 )  PTT:45.2 sec  Urinalysis Basic - ( 10 Feb 2025 09:33 )    Color: Dark Yellow / Appearance: Cloudy / S.027 / pH: x  Gluc: x / Ketone: Negative mg/dL  / Bili: Negative / Urobili: 1.0 mg/dL   Blood: x / Protein: 300 mg/dL / Nitrite: Negative   Leuk Esterase: Negative / RBC: x / WBC x   Sq Epi: x / Non Sq Epi: x / Bacteria: x      Arterial Blood Gas:  02-10 @ 03:28  7.240/71/102/30/98.4/3.0  ABG lactate: --    Venous Blood Gas:  02-10 @ 03:20  --/--/--/--/--  VBG Lactate: 3.00      MICROBIOLOGY: reviewed     RADIOLOGY:  [x ] Reviewed and interpreted by me    EKG: reviewed

## 2025-02-11 DIAGNOSIS — I27.20 PULMONARY HYPERTENSION, UNSPECIFIED: ICD-10-CM

## 2025-02-11 LAB
ALBUMIN SERPL ELPH-MCNC: 2.7 G/DL — LOW (ref 3.3–5.2)
ALP SERPL-CCNC: 70 U/L — SIGNIFICANT CHANGE UP (ref 40–120)
ALT FLD-CCNC: 26 U/L — SIGNIFICANT CHANGE UP
ANION GAP SERPL CALC-SCNC: 13 MMOL/L — SIGNIFICANT CHANGE UP (ref 5–17)
AST SERPL-CCNC: 20 U/L — SIGNIFICANT CHANGE UP
BASOPHILS # BLD AUTO: 0.02 K/UL — SIGNIFICANT CHANGE UP (ref 0–0.2)
BASOPHILS NFR BLD AUTO: 0.1 % — SIGNIFICANT CHANGE UP (ref 0–2)
BILIRUB SERPL-MCNC: 0.3 MG/DL — LOW (ref 0.4–2)
BUN SERPL-MCNC: 50.4 MG/DL — HIGH (ref 8–20)
CALCIUM SERPL-MCNC: 8.2 MG/DL — LOW (ref 8.4–10.5)
CHLORIDE SERPL-SCNC: 97 MMOL/L — SIGNIFICANT CHANGE UP (ref 96–108)
CO2 SERPL-SCNC: 29 MMOL/L — SIGNIFICANT CHANGE UP (ref 22–29)
CREAT SERPL-MCNC: 1.79 MG/DL — HIGH (ref 0.5–1.3)
EGFR: 33 ML/MIN/1.73M2 — LOW
EOSINOPHIL # BLD AUTO: 0 K/UL — SIGNIFICANT CHANGE UP (ref 0–0.5)
EOSINOPHIL NFR BLD AUTO: 0 % — SIGNIFICANT CHANGE UP (ref 0–6)
GAS PNL BLDA: SIGNIFICANT CHANGE UP
GAS PNL BLDA: SIGNIFICANT CHANGE UP
GLUCOSE BLDC GLUCOMTR-MCNC: 100 MG/DL — HIGH (ref 70–99)
GLUCOSE BLDC GLUCOMTR-MCNC: 193 MG/DL — HIGH (ref 70–99)
GLUCOSE BLDC GLUCOMTR-MCNC: 211 MG/DL — HIGH (ref 70–99)
GLUCOSE BLDC GLUCOMTR-MCNC: 216 MG/DL — HIGH (ref 70–99)
GLUCOSE BLDC GLUCOMTR-MCNC: 238 MG/DL — HIGH (ref 70–99)
GLUCOSE BLDC GLUCOMTR-MCNC: 254 MG/DL — HIGH (ref 70–99)
GLUCOSE SERPL-MCNC: 217 MG/DL — HIGH (ref 70–99)
HCT VFR BLD CALC: 42.4 % — SIGNIFICANT CHANGE UP (ref 34.5–45)
HGB BLD-MCNC: 13.6 G/DL — SIGNIFICANT CHANGE UP (ref 11.5–15.5)
IMM GRANULOCYTES NFR BLD AUTO: 0.7 % — SIGNIFICANT CHANGE UP (ref 0–0.9)
LEGIONELLA AG UR QL: NEGATIVE — SIGNIFICANT CHANGE UP
LYMPHOCYTES # BLD AUTO: 0.55 K/UL — LOW (ref 1–3.3)
LYMPHOCYTES # BLD AUTO: 3.8 % — LOW (ref 13–44)
MAGNESIUM SERPL-MCNC: 2.3 MG/DL — SIGNIFICANT CHANGE UP (ref 1.6–2.6)
MCHC RBC-ENTMCNC: 28.3 PG — SIGNIFICANT CHANGE UP (ref 27–34)
MCHC RBC-ENTMCNC: 32.1 G/DL — SIGNIFICANT CHANGE UP (ref 32–36)
MCV RBC AUTO: 88.1 FL — SIGNIFICANT CHANGE UP (ref 80–100)
MONOCYTES # BLD AUTO: 0.91 K/UL — HIGH (ref 0–0.9)
MONOCYTES NFR BLD AUTO: 6.3 % — SIGNIFICANT CHANGE UP (ref 2–14)
NEUTROPHILS # BLD AUTO: 12.82 K/UL — HIGH (ref 1.8–7.4)
NEUTROPHILS NFR BLD AUTO: 89.1 % — HIGH (ref 43–77)
PHOSPHATE SERPL-MCNC: 3.6 MG/DL — SIGNIFICANT CHANGE UP (ref 2.4–4.7)
PLATELET # BLD AUTO: 232 K/UL — SIGNIFICANT CHANGE UP (ref 150–400)
POTASSIUM SERPL-MCNC: 3.9 MMOL/L — SIGNIFICANT CHANGE UP (ref 3.5–5.3)
POTASSIUM SERPL-SCNC: 3.9 MMOL/L — SIGNIFICANT CHANGE UP (ref 3.5–5.3)
PROT SERPL-MCNC: 6.6 G/DL — SIGNIFICANT CHANGE UP (ref 6.6–8.7)
RBC # BLD: 4.81 M/UL — SIGNIFICANT CHANGE UP (ref 3.8–5.2)
RBC # FLD: 16.3 % — HIGH (ref 10.3–14.5)
S PNEUM AG UR QL: NEGATIVE — SIGNIFICANT CHANGE UP
SODIUM SERPL-SCNC: 139 MMOL/L — SIGNIFICANT CHANGE UP (ref 135–145)
VANCOMYCIN FLD-MCNC: 6.4 UG/ML — SIGNIFICANT CHANGE UP
WBC # BLD: 14.4 K/UL — HIGH (ref 3.8–10.5)
WBC # FLD AUTO: 14.4 K/UL — HIGH (ref 3.8–10.5)

## 2025-02-11 PROCEDURE — 99233 SBSQ HOSP IP/OBS HIGH 50: CPT

## 2025-02-11 PROCEDURE — 99223 1ST HOSP IP/OBS HIGH 75: CPT | Mod: 25

## 2025-02-11 PROCEDURE — 99233 SBSQ HOSP IP/OBS HIGH 50: CPT | Mod: GC

## 2025-02-11 RX ORDER — PREDNISONE 20 MG/1
20 TABLET ORAL DAILY
Refills: 0 | Status: DISCONTINUED | OUTPATIENT
Start: 2025-02-12 | End: 2025-02-12

## 2025-02-11 RX ORDER — PREDNISONE 20 MG/1
TABLET ORAL
Refills: 0 | Status: DISCONTINUED | OUTPATIENT
Start: 2025-02-12 | End: 2025-02-12

## 2025-02-11 RX ORDER — MIDODRINE HYDROCHLORIDE 5 MG/1
5 TABLET ORAL EVERY 8 HOURS
Refills: 0 | Status: DISCONTINUED | OUTPATIENT
Start: 2025-02-11 | End: 2025-02-13

## 2025-02-11 RX ORDER — CEFAZOLIN SODIUM IN 0.9 % NACL 3 G/100 ML
1000 INTRAVENOUS SOLUTION, PIGGYBACK (ML) INTRAVENOUS EVERY 12 HOURS
Refills: 0 | Status: DISCONTINUED | OUTPATIENT
Start: 2025-02-11 | End: 2025-02-19

## 2025-02-11 RX ADMIN — INSULIN LISPRO 8 UNIT(S): 100 INJECTION, SOLUTION INTRAVENOUS; SUBCUTANEOUS at 17:29

## 2025-02-11 RX ADMIN — ATORVASTATIN CALCIUM 40 MILLIGRAM(S): 80 TABLET, FILM COATED ORAL at 22:28

## 2025-02-11 RX ADMIN — BUMETANIDE 1 MILLIGRAM(S): 1 TABLET ORAL at 05:42

## 2025-02-11 RX ADMIN — CLOPIDOGREL BISULFATE 75 MILLIGRAM(S): 75 TABLET, FILM COATED ORAL at 12:01

## 2025-02-11 RX ADMIN — HEPARIN SODIUM 7500 UNIT(S): 1000 INJECTION INTRAVENOUS; SUBCUTANEOUS at 17:27

## 2025-02-11 RX ADMIN — IPRATROPIUM BROMIDE AND ALBUTEROL SULFATE 3 MILLILITER(S): .5; 2.5 SOLUTION RESPIRATORY (INHALATION) at 04:06

## 2025-02-11 RX ADMIN — IPRATROPIUM BROMIDE AND ALBUTEROL SULFATE 3 MILLILITER(S): .5; 2.5 SOLUTION RESPIRATORY (INHALATION) at 15:43

## 2025-02-11 RX ADMIN — INSULIN LISPRO 6: 100 INJECTION, SOLUTION INTRAVENOUS; SUBCUTANEOUS at 17:29

## 2025-02-11 RX ADMIN — INSULIN GLARGINE-YFGN 30 UNIT(S): 100 INJECTION, SOLUTION SUBCUTANEOUS at 22:28

## 2025-02-11 RX ADMIN — Medication 81 MILLIGRAM(S): at 12:01

## 2025-02-11 RX ADMIN — INSULIN LISPRO 4: 100 INJECTION, SOLUTION INTRAVENOUS; SUBCUTANEOUS at 12:02

## 2025-02-11 RX ADMIN — MIDODRINE HYDROCHLORIDE 5 MILLIGRAM(S): 5 TABLET ORAL at 17:28

## 2025-02-11 RX ADMIN — MIDODRINE HYDROCHLORIDE 5 MILLIGRAM(S): 5 TABLET ORAL at 09:15

## 2025-02-11 RX ADMIN — Medication 1000 MILLIGRAM(S): at 17:28

## 2025-02-11 RX ADMIN — IPRATROPIUM BROMIDE AND ALBUTEROL SULFATE 3 MILLILITER(S): .5; 2.5 SOLUTION RESPIRATORY (INHALATION) at 21:25

## 2025-02-11 RX ADMIN — INSULIN LISPRO 8 UNIT(S): 100 INJECTION, SOLUTION INTRAVENOUS; SUBCUTANEOUS at 12:01

## 2025-02-11 RX ADMIN — Medication 1 APPLICATION(S): at 05:43

## 2025-02-11 RX ADMIN — CEFEPIME 2000 MILLIGRAM(S): 2 INJECTION, POWDER, FOR SOLUTION INTRAVENOUS at 09:15

## 2025-02-11 RX ADMIN — IPRATROPIUM BROMIDE AND ALBUTEROL SULFATE 3 MILLILITER(S): .5; 2.5 SOLUTION RESPIRATORY (INHALATION) at 08:46

## 2025-02-11 RX ADMIN — HEPARIN SODIUM 7500 UNIT(S): 1000 INJECTION INTRAVENOUS; SUBCUTANEOUS at 05:42

## 2025-02-11 RX ADMIN — METHYLPREDNISOLONE ACETATE 40 MILLIGRAM(S): 80 INJECTION, SUSPENSION INTRA-ARTICULAR; INTRALESIONAL; INTRAMUSCULAR; SOFT TISSUE at 05:42

## 2025-02-11 RX ADMIN — INSULIN LISPRO 8 UNIT(S): 100 INJECTION, SOLUTION INTRAVENOUS; SUBCUTANEOUS at 06:35

## 2025-02-11 RX ADMIN — INSULIN LISPRO 2: 100 INJECTION, SOLUTION INTRAVENOUS; SUBCUTANEOUS at 06:35

## 2025-02-11 NOTE — PROGRESS NOTE ADULT - SUBJECTIVE AND OBJECTIVE BOX
Overnight Events:  - Started on midodrine 5mg Q8h and high flow nasal cannula    - Pt seen and examined at bedside, states that she slept well and feels better than yesterday. Pt has no acute complaints. Denies fever, chills, SOB, CP, N/V/D, or abdominal pain.      Physical Exam:  Vital Signs Last 24 Hrs  T(C): 37.2 (11 Feb 2025 06:00), Max: 38.1 (10 Feb 2025 11:15)  T(F): 99 (11 Feb 2025 06:00), Max: 100.6 (10 Feb 2025 11:15)  HR: 83 (11 Feb 2025 07:00) (69 - 104)  BP: 119/77 (11 Feb 2025 07:00) (82/54 - 142/72)  BP(mean): 88 (11 Feb 2025 07:00) (51 - 105)  RR: 18 (11 Feb 2025 07:00) (12 - 28)  SpO2: 93% (11 Feb 2025 07:00) (88% - 100%)    Parameters below as of 11 Feb 2025 04:35  Patient On (Oxygen Delivery Method): BiPAP/CPAP, avaps 60%    Plateau pressure:   P/F ratio:     02-10 @ 07:01  -  02-11 @ 07:00  --------------------------------------------------------  IN: 131 mL / OUT: 1860 mL / NET: -1729 mL      CAPILLARY BLOOD GLUCOSE      POCT Blood Glucose.: 193 mg/dL (11 Feb 2025 06:32)        PHYSICAL EXAM:    General: Obese. Alert, awake, NAD  Neuro: AAOx4, No obvious acute motor deficit  HEENT: PERRLA, Oral mucosa moist  PULM: Diminished breath sounds B/L. No wheezing or crackles  CVS: RRR, no MRG  ABD: Soft, NT, ND, normoactive bowel sounds, no CVA tenderness  EXT: No b/l LE edema, pedal pulses palpable. No clubbing or cyanosis   SKIN: Warm and well perfused. LLE erythematous, warm to touch, and TTP      MEDICATIONS:  MEDICATIONS  (STANDING):  albuterol/ipratropium for Nebulization 3 milliLiter(s) Nebulizer every 6 hours  aspirin  chewable 81 milliGRAM(s) Oral daily  atorvastatin 40 milliGRAM(s) Oral at bedtime  azithromycin  IVPB      azithromycin  IVPB 500 milliGRAM(s) IV Intermittent every 24 hours  buMETAnide Injectable 1 milliGRAM(s) IV Push daily  carvedilol 6.25 milliGRAM(s) Oral every 12 hours  cefepime  Injectable. 2000 milliGRAM(s) IV Push every 12 hours  chlorhexidine 2% Cloths 1 Application(s) Topical <User Schedule>  clopidogrel Tablet 75 milliGRAM(s) Oral daily  dextrose 5%. 1000 milliLiter(s) (100 mL/Hr) IV Continuous <Continuous>  dextrose 5%. 1000 milliLiter(s) (50 mL/Hr) IV Continuous <Continuous>  dextrose 50% Injectable 25 Gram(s) IV Push once  dextrose 50% Injectable 12.5 Gram(s) IV Push once  dextrose 50% Injectable 25 Gram(s) IV Push once  glucagon  Injectable 1 milliGRAM(s) IntraMuscular once  heparin   Injectable 7500 Unit(s) SubCutaneous every 12 hours  influenza   Vaccine 0.5 milliLiter(s) IntraMuscular once  insulin glargine Injectable (LANTUS) 30 Unit(s) SubCutaneous at bedtime  insulin lispro (ADMELOG) corrective regimen sliding scale   SubCutaneous three times a day before meals  insulin lispro Injectable (ADMELOG) 8 Unit(s) SubCutaneous three times a day before meals  insulin regular Infusion 8 Unit(s)/Hr (8 mL/Hr) IV Continuous <Continuous>  methylPREDNISolone sodium succinate Injectable 40 milliGRAM(s) IV Push every 12 hours  midodrine 5 milliGRAM(s) Oral every 8 hours  norepinephrine Infusion 0.05 MICROgram(s)/kG/Min (9.38 mL/Hr) IV Continuous <Continuous>  spironolactone 25 milliGRAM(s) Oral daily    MEDICATIONS  (PRN):  dextrose Oral Gel 15 Gram(s) Oral once PRN Blood Glucose LESS THAN 70 milliGRAM(s)/deciliter      ALLERGIES:  Allergies    No Known Allergies    Intolerances        LABS:                        13.6   14.40 )-----------( 232      ( 11 Feb 2025 02:37 )             42.4     02-11    139  |  97  |  50.4[H]  ----------------------------<  217[H]  3.9   |  29.0  |  1.79[H]    Ca    8.2[L]      11 Feb 2025 02:37  Phos  3.6     02-11  Mg     2.3     02-11    TPro  6.6  /  Alb  2.7[L]  /  TBili  0.3[L]  /  DBili  x   /  AST  20  /  ALT  26  /  AlkPhos  70  02-11    PT/INR - ( 10 Feb 2025 03:20 )   PT: 14.8 sec;   INR: 1.31 ratio         PTT - ( 10 Feb 2025 09:33 )  PTT:36.7 sec  Urinalysis Basic - ( 11 Feb 2025 02:37 )    Color: x / Appearance: x / SG: x / pH: x  Gluc: 217 mg/dL / Ketone: x  / Bili: x / Urobili: x   Blood: x / Protein: x / Nitrite: x   Leuk Esterase: x / RBC: x / WBC x   Sq Epi: x / Non Sq Epi: x / Bacteria: x      ABG:  pH, Arterial: 7.420 (02-11-25 @ 04:19)  pO2, Arterial: 112 mmHg (02-11-25 @ 04:19)  pCO2, Arterial: 49 mmHg (02-11-25 @ 04:19)  pO2, Arterial: 80 mmHg (02-10-25 @ 15:42)  pH, Arterial: 7.350 (02-10-25 @ 15:42)  pCO2, Arterial: 55 mmHg (02-10-25 @ 15:42)  pO2, Arterial: 88 mmHg (02-10-25 @ 08:19)  pH, Arterial: 7.340 (02-10-25 @ 08:19)  pCO2, Arterial: 52 mmHg (02-10-25 @ 08:19)      vBG:  pCO2, Venous: 71 mmHg (02-10-25 @ 09:33)  pH, Venous: 7.220 (02-10-25 @ 09:33)  pO2, Venous: 51 mmHg (02-10-25 @ 09:33)  HCO3, Venous: 29 mmol/L (02-10-25 @ 09:33)

## 2025-02-11 NOTE — CONSULT NOTE ADULT - ASSESSMENT
54y old  Female PMH  NICM, HFrecEF NICM,  HTN, GERHARD (not on CPAP), LE edema, and PAD s/p b/l stents. Presents with SOB, lethargy, decreased PO intake. Course complicated by COVID, PNA, CODY, cellulitis. Cardiology consulted for pHTN

## 2025-02-11 NOTE — CONSULT NOTE ADULT - SUBJECTIVE AND OBJECTIVE BOX
[facility]                                              CARDIOLOGY AT [address], [place] [address]                                             Telephone: [phone]. Fax:[phone]                                                       CARDIOLOGY CONSULTATION NOTE                                                                                             History obtained by: Patient and medical record  Community Cardiologist: ty Younger    obtained: Yes [  ] No [ x ]  Reason for Consultation: pHTN  Available out pt records reviewed: Yes [  ] No [  x]    HPI:  Patient is a 54y old  Female PMH  NICM, HFrecEF NICM,  HTN, GERHARD (not on CPAP), LE edema, and PAD s/p b/l stents. Presents with SOB, lethargy, decreased PO intake. Course complicated by COVID, PNA, CODY, cellulitis. Cardiology consulted for pHTN      CARDIAC TESTING   ECHO:    < from: TTE W or WO Ultrasound Enhancing Agent (02.10.25 @ 11:25) >  CONCLUSIONS:     1. Technically difficult image quality.   2. Left ventricular systolic function is normal with an ejection fraction visually estimated at 55 to 60 %.   3. Enlarged right ventricular cavity size and moderately reduced right ventricular systolic function.   4. Mild to moderate tricuspid regurgitation.   5. Estimated pulmonary artery systolic pressure is 52 mmHg, consistent with echocardiographic evidence of pulmonary hyptertension.   6. No pericardial effusion seen.   7. No prior echocardiogram is available for comparison.    < end of copied text >  STRESS:    CATH:   < from: Cardiac Catheterization (04.11.22 @ 14:57) >  CORONARY VESSELS: The coronary circulation is left dominant.  LM:   --  LM: Normal.  LAD:   --  LAD: Normal.  CX:   --  Circumflex: Normal.  --  LPDA: Normal.  RCA:   --  RCA: Normal and non dominant.Not selectively injected.    < end of copied text >    ELECTROPHYSIOLOGY:     PAST MEDICAL HISTORY  Obstructive sleep apnea    Borderline systolic HTN    Acute systolic congestive heart failure    HTN (hypertension)    HLD (hyperlipidemia)    DM2 (diabetes mellitus, type 2)    PAD (peripheral artery disease)        PAST SURGICAL HISTORY  Ankle fracture, left    H/O hand surgery    S/P peripheral artery angioplasty        SOCIAL HISTORY:  Denies smoking/alcohol/drugs  CIGARETTES:     ALCOHOL:  DRUGS:    FAMILY HISTORY:  FH: leukemia (Aunt)      Family History of Cardiovascular Disease:  Yes [  ] No [ x ]  Coronary Artery Disease in first degree relative: Yes [  ] No [ x ]  Sudden Cardiac Death in First degree relative: Yes [  ] No [x  ]    HOME MEDICATIONS:  Admelog 100 units/mL injectable solution: 20 unit(s) injectable 3 times a day (11 Feb 2025 09:44)  Basaglar KwikPen 100 units/mL subcutaneous solution: 30 unit(s) subcutaneous once a day (at bedtime) (11 Feb 2025 09:44)  Coreg 6.25 mg oral tablet: 1 tab(s) orally 2 times a day (11 Feb 2025 09:44)  furosemide 40 mg oral tablet: 1 tab(s) orally 2 times a day (11 Feb 2025 09:44)  Jardiance 10 mg oral tablet: 1 tab(s) orally once a day (in the morning) (11 Feb 2025 09:44)  metFORMIN 1000 mg oral tablet: 1 tab(s) orally 2 times a day (11 Feb 2025 09:44)  sacubitril-valsartan 49 mg-51 mg oral tablet: 1 tab(s) orally 2 times a day (11 Feb 2025 09:44)  spironolactone 25 mg oral tablet: 1 tab(s) orally once a day (11 Feb 2025 09:44)      CURRENT CARDIAC MEDICATIONS:  buMETAnide Injectable 1 milliGRAM(s) IV Push daily, Stop order after: 2 Days  midodrine 5 milliGRAM(s) Oral every 8 hours  spironolactone 25 milliGRAM(s) Oral daily      CURRENT OTHER MEDICATIONS:  albuterol/ipratropium for Nebulization 3 milliLiter(s) Nebulizer every 6 hours  aspirin  chewable 81 milliGRAM(s) Oral daily  atorvastatin 40 milliGRAM(s) Oral at bedtime  ceFAZolin  Injectable. 1000 milliGRAM(s) IV Push every 12 hours  chlorhexidine 2% Cloths 1 Application(s) Topical <User Schedule>  clopidogrel Tablet 75 milliGRAM(s) Oral daily  dextrose 5%. 1000 milliLiter(s) (100 mL/Hr) IV Continuous <Continuous>  dextrose 5%. 1000 milliLiter(s) (50 mL/Hr) IV Continuous <Continuous>  dextrose 50% Injectable 25 Gram(s) IV Push once, Stop order after: 1 Doses  dextrose 50% Injectable 12.5 Gram(s) IV Push once, Stop order after: 1 Doses  dextrose 50% Injectable 25 Gram(s) IV Push once, Stop order after: 1 Doses  dextrose Oral Gel 15 Gram(s) Oral once, Stop order after: 1 Doses PRN Blood Glucose LESS THAN 70 milliGRAM(s)/deciliter  glucagon  Injectable 1 milliGRAM(s) IntraMuscular once, Stop order after: 1 Doses  heparin   Injectable 7500 Unit(s) SubCutaneous every 12 hours  influenza   Vaccine 0.5 milliLiter(s) IntraMuscular once  insulin glargine Injectable (LANTUS) 30 Unit(s) SubCutaneous at bedtime  insulin lispro (ADMELOG) corrective regimen sliding scale   SubCutaneous three times a day before meals  insulin lispro Injectable (ADMELOG) 8 Unit(s) SubCutaneous three times a day before meals      ALLERGIES:   No Known Allergies      REVIEW OF SYMPTOMS:   CONSTITUTIONAL: No fever, no chills, no weight loss, no weight gain, no fatigue   ENMT:  No vertigo; No sinus or throat pain  NECK: No pain or stiffness  CARDIOVASCULAR: No chest pain, +dyspnea, no syncope/presyncope, no palpitations, no dizziness, no Orthopnea, no Paroxsymal nocturnal dyspnea  RESPIRATORY: no Shortness of breath, no cough, no wheezing  : No dysuria, no hematuria   GI: No dark color stool, no nausea, no diarrhea, no constipation, no abdominal pain   NEURO: No headache, no slurred speech   MUSCULOSKELETAL: No joint pain or swelling; No muscle, back, or extremity pain  PSYCH: No agitation, no anxiety.    ALL OTHER REVIEW OF SYSTEMS ARE NEGATIVE.    VITAL SIGNS:  T(C): 37.5 (02-11-25 @ 12:00), Max: 37.5 (02-11-25 @ 12:00)  T(F): 99.5 (02-11-25 @ 12:00), Max: 99.5 (02-11-25 @ 12:00)  HR: 85 (02-11-25 @ 12:00) (74 - 104)  BP: 91/56 (02-11-25 @ 12:00) (76/56 - 130/60)  RR: 26 (02-11-25 @ 12:00) (12 - 28)  SpO2: 96% (02-11-25 @ 12:00) (83% - 98%)    INTAKE AND OUTPUT:     02-10 @ 07:01  -  02-11 @ 07:00  --------------------------------------------------------  IN: 131 mL / OUT: 1860 mL / NET: -1729 mL    02-11 @ 07:01  -  02-11 @ 14:03  --------------------------------------------------------  IN: 4.6 mL / OUT: 0 mL / NET: 4.6 mL        PHYSICAL EXAM:  Constitutional: Comfortable . No acute distress.   HEENT: Atraumatic and normocephalic , neck is supple . no JVD. No carotid bruit.  CNS: A&Ox3. No focal deficits.   Respiratory: CTAB, unlabored   Cardiovascular: RRR normal s1 s2. No murmur. No rubs or gallop.  Gastrointestinal: Soft, non-tender. +Bowel sounds.   Extremities: 2+ Peripheral Pulses, No clubbing, cyanosis, or edema +erythema   Psychiatric: Calm . no agitation.   Skin: Warm and dry, no ulcers on extremities     LABS:                            13.6   14.40 )-----------( 232      ( 11 Feb 2025 02:37 )             42.4     02-11    139  |  97  |  50.4[H]  ----------------------------<  217[H]  3.9   |  29.0  |  1.79[H]    Ca    8.2[L]      11 Feb 2025 02:37  Phos  3.6     02-11  Mg     2.3     02-11    TPro  6.6  /  Alb  2.7[L]  /  TBili  0.3[L]  /  DBili  x   /  AST  20  /  ALT  26  /  AlkPhos  70  02-11    PT/INR - ( 10 Feb 2025 03:20 )   PT: 14.8 sec;   INR: 1.31 ratio         PTT - ( 10 Feb 2025 09:33 )  PTT:36.7 sec  Urinalysis Basic - ( 11 Feb 2025 02:37 )    Color: x / Appearance: x / SG: x / pH: x  Gluc: 217 mg/dL / Ketone: x  / Bili: x / Urobili: x   Blood: x / Protein: x / Nitrite: x   Leuk Esterase: x / RBC: x / WBC x   Sq Epi: x / Non Sq Epi: x / Bacteria: x              INTERPRETATION OF TELEMETRY: SR    ECG: SR  Prior ECG: Yes [  ] No [ x ]

## 2025-02-11 NOTE — PROGRESS NOTE ADULT - SUBJECTIVE AND OBJECTIVE BOX
MICU DOWNGRADE NOTE  *Pt seen and examined prior to midnight*    Patient is a 54y old  Female who presents with a chief complaint of Acute hypoxemic respiratory failure, COVID, shock state (11 Feb 2025 23:02)    HOSPITAL COURSE:  54yoF hx HFrEF w/ LV recovery, PAD s/p bilateral LE Stents, HTN, GERHARD not on CPAP admitted to MICU for acute hypoxemic respiratory failure requiring NIV in setting of COVID and also shock state in setting of LLE cellulitis.  Pt did not require intubation and was weaned from NIV to HFNC. Pt transiently on vasopressors, weaned off on and placed on midodrine.  Pt seen and examined, currently on HFNC and without active complaint.    MEDICATIONS  (STANDING):  albuterol/ipratropium for Nebulization 3 milliLiter(s) Nebulizer every 6 hours  aspirin  chewable 81 milliGRAM(s) Oral daily  atorvastatin 40 milliGRAM(s) Oral at bedtime  ceFAZolin  Injectable. 1000 milliGRAM(s) IV Push every 12 hours  chlorhexidine 2% Cloths 1 Application(s) Topical <User Schedule>  clopidogrel Tablet 75 milliGRAM(s) Oral daily  dexAMETHasone  Injectable 6 milliGRAM(s) IV Push daily  dextrose 5%. 1000 milliLiter(s) (100 mL/Hr) IV Continuous <Continuous>  dextrose 5%. 1000 milliLiter(s) (50 mL/Hr) IV Continuous <Continuous>  dextrose 50% Injectable 25 Gram(s) IV Push once  dextrose 50% Injectable 12.5 Gram(s) IV Push once  dextrose 50% Injectable 25 Gram(s) IV Push once  glucagon  Injectable 1 milliGRAM(s) IntraMuscular once  heparin   Injectable 7500 Unit(s) SubCutaneous every 12 hours  influenza   Vaccine 0.5 milliLiter(s) IntraMuscular once  insulin glargine Injectable (LANTUS) 30 Unit(s) SubCutaneous at bedtime  insulin lispro (ADMELOG) corrective regimen sliding scale   SubCutaneous three times a day before meals  insulin lispro Injectable (ADMELOG) 8 Unit(s) SubCutaneous three times a day before meals  midodrine 5 milliGRAM(s) Oral every 8 hours  remdesivir  IVPB   IV Intermittent   remdesivir  IVPB 200 milliGRAM(s) IV Intermittent every 24 hours    MEDICATIONS  (PRN):  dextrose Oral Gel 15 Gram(s) Oral once PRN Blood Glucose LESS THAN 70 milliGRAM(s)/deciliter      Allergies: Allergies  No Known Allergies    Intolerances    REVIEW OF SYSTEMS:  CONSTITUTIONAL: No fever, weight loss, or fatigue  EYES: No eye pain, visual disturbances, or discharge  ENMT:  No difficulty hearing, tinnitus, vertigo; No sinus or throat pain  NECK: No pain or stiffness  BREASTS: No pain, masses, or nipple discharge  RESPIRATORY: No cough, wheezing, chills or hemoptysis; No shortness of breath  CARDIOVASCULAR: No chest pain, palpitations, dizziness, or leg swelling  GASTROINTESTINAL: No abdominal or epigastric pain. No nausea, vomiting, or hematemesis; No diarrhea or constipation. No melena or hematochezia.  GENITOURINARY: No dysuria, frequency, hematuria, or incontinence  NEUROLOGICAL: No headaches, memory loss, loss of strength, numbness, or tremors  SKIN: No itching, burning, rashes, or lesions   LYMPH NODES: No enlarged glands  ENDOCRINE: No heat or cold intolerance; No hair loss  MUSCULOSKELETAL: No joint pain or swelling; No muscle, back, or extremity pain  PSYCHIATRIC: No depression, anxiety, mood swings, or difficulty sleeping  HEME/LYMPH: No easy bruising, or bleeding gums      T(C): 37 (02-12-25 @ 03:00), Max: 37.5 (02-11-25 @ 12:00)  HR: 64 (02-12-25 @ 06:00) (60 - 101)  BP: 105/54 (02-12-25 @ 06:00) (76/56 - 125/75)  RR: 20 (02-12-25 @ 06:00) (11 - 28)  SpO2: 99% (02-12-25 @ 06:00) (83% - 100%)  Wt(kg): --Vital Signs Last 24 Hrs  T(C): 37 (12 Feb 2025 03:00), Max: 37.5 (11 Feb 2025 12:00)  T(F): 98.6 (12 Feb 2025 03:00), Max: 99.5 (11 Feb 2025 12:00)  HR: 64 (12 Feb 2025 06:00) (60 - 101)  BP: 105/54 (12 Feb 2025 06:00) (76/56 - 125/75)  BP(mean): 70 (12 Feb 2025 06:00) (62 - 101)  RR: 20 (12 Feb 2025 06:00) (11 - 28)  SpO2: 99% (12 Feb 2025 06:00) (83% - 100%)    Parameters below as of 12 Feb 2025 04:29  Patient On (Oxygen Delivery Method): nasal cannula, high flow, 80%      I&O's Summary    11 Feb 2025 07:01  -  12 Feb 2025 07:00  --------------------------------------------------------  IN: 4.6 mL / OUT: 1395 mL / NET: -1390.4 mL    PHYSICAL EXAM:  GENERAL:  Tired appearing female on HFNC  EYES:  Clear conjuctiva, extraocular movement intact  ENT: Moist mucous membranes  RESP:  Non-labored breathing pattern, lungs clear to ausculation in anterior fields  CV: Regular rate and rhythm, no murmurs appreciated, no lower extremity edema  GI: Soft, non-tender, non-distended  NEURO: Awake, alert, conversant, able to move all extremities against gravity  PSYCH: Calm, cooperative  SKIN: LLE redness and swelling    Consultant(s) Notes Reviewed:  [x ] YES  [ ] NO    LABS:                        13.2   15.19 )-----------( 229      ( 12 Feb 2025 03:13 )             43.2     02-12    136  |  96  |  51.3[H]  ----------------------------<  121[H]  4.3   |  31.0[H]  |  1.31[H]    Ca    8.7      12 Feb 2025 03:13  Phos  3.9     02-12  Mg     2.5     02-12    TPro  6.5[L]  /  Alb  2.7[L]  /  TBili  0.3[L]  /  DBili  x   /  AST  31  /  ALT  34[H]  /  AlkPhos  75  02-12    PTT - ( 10 Feb 2025 09:33 )  PTT:36.7 sec  Urinalysis Basic - ( 12 Feb 2025 03:13 )    Color: x / Appearance: x / SG: x / pH: x  Gluc: 121 mg/dL / Ketone: x  / Bili: x / Urobili: x   Blood: x / Protein: x / Nitrite: x   Leuk Esterase: x / RBC: x / WBC x   Sq Epi: x / Non Sq Epi: x / Bacteria: x      CAPILLARY BLOOD GLUCOSE  POCT Blood Glucose.: 116 mg/dL (12 Feb 2025 06:18)  POCT Blood Glucose.: 100 mg/dL (11 Feb 2025 22:26)  POCT Blood Glucose.: 254 mg/dL (11 Feb 2025 17:25)      ABG - ( 11 Feb 2025 10:12 )  pH, Arterial: 7.420 pH, Blood: x     /  pCO2: 52    /  pO2: 116   / HCO3: 34    / Base Excess: 9.2   /  SaO2: 99.9        Urinalysis Basic - ( 12 Feb 2025 03:13 )  Color: x / Appearance: x / SG: x / pH: x  Gluc: 121 mg/dL / Ketone: x  / Bili: x / Urobili: x   Blood: x / Protein: x / Nitrite: x   Leuk Esterase: x / RBC: x / WBC x   Sq Epi: x / Non Sq Epi: x / Bacteria: x        RADIOLOGY & ADDITIONAL TESTS:  Reviewed.

## 2025-02-11 NOTE — PROGRESS NOTE ADULT - ASSESSMENT
Assessment & Plan:     ====================== NEUROLOGY=====================    Q4 neuro checks    ==================== RESPIRATORY======================    On high flow nasal cannula    albuterol/ipratropium for Nebulization 3 milliLiter(s) Nebulizer every 6 hours    ====================CARDIOVASCULAR==================  buMETAnide Injectable 1 milliGRAM(s) IV Push daily  carvedilol 6.25 milliGRAM(s) Oral every 12 hours  midodrine 5 milliGRAM(s) Oral every 8 hours  norepinephrine Infusion 0.05 MICROgram(s)/kG/Min (9.38 mL/Hr) IV Continuous <Continuous>  spironolactone 25 milliGRAM(s) Oral daily    ===================HEMATOLOGIC/ONC ===================  aspirin  chewable 81 milliGRAM(s) Oral daily  clopidogrel Tablet 75 milliGRAM(s) Oral daily  heparin   Injectable 7500 Unit(s) SubCutaneous every 12 hours    ===================== RENAL =========================  Continue monitoring urine output    CODY improving. Cr 2.77 on admission --> 2.14 (2/10) --> 1.79 (2/11)    ==================== GASTROINTESTINAL===================  dextrose 5%. 1000 milliLiter(s) (100 mL/Hr) IV Continuous <Continuous>  dextrose 5%. 1000 milliLiter(s) (50 mL/Hr) IV Continuous <Continuous>    Diet: NPO    =======================    ENDOCRINE  =====================  atorvastatin 40 milliGRAM(s) Oral at bedtime  dextrose 50% Injectable 25 Gram(s) IV Push once  dextrose 50% Injectable 12.5 Gram(s) IV Push once  dextrose 50% Injectable 25 Gram(s) IV Push once  dextrose Oral Gel 15 Gram(s) Oral once PRN Blood Glucose LESS THAN 70 milliGRAM(s)/deciliter  glucagon  Injectable 1 milliGRAM(s) IntraMuscular once  insulin glargine Injectable (LANTUS) 30 Unit(s) SubCutaneous at bedtime  insulin lispro (ADMELOG) corrective regimen sliding scale   SubCutaneous three times a day before meals  insulin lispro Injectable (ADMELOG) 8 Unit(s) SubCutaneous three times a day before meals  insulin regular Infusion 8 Unit(s)/Hr (8 mL/Hr) IV Continuous <Continuous>  methylPREDNISolone sodium succinate Injectable 40 milliGRAM(s) IV Push every 12 hours    Blood sugar goal: 100-200    ========================INFECTIOUS DISEASE================    - Completed cefepime on 2/10  - Started azithromycin  IVPB 500 milliGRAM(s) IV Intermittent every 24 hours    Cx: Blood cx from ED pending      Care plan discussed with ICU care team  Plan d/w Family       Assessment & Plan:     ====================== NEUROLOGY=====================    Q4 neuro checks    ==================== RESPIRATORY======================    On high flow nasal cannula    albuterol/ipratropium for Nebulization 3 milliLiter(s) Nebulizer every 6 hours    ====================CARDIOVASCULAR==================  buMETAnide Injectable 1 milliGRAM(s) IV Push daily  carvedilol 6.25 milliGRAM(s) Oral every 12 hours  midodrine 5 milliGRAM(s) Oral every 8 hours  norepinephrine Infusion 0.05 MICROgram(s)/kG/Min (9.38 mL/Hr) IV Continuous <Continuous>  spironolactone 25 milliGRAM(s) Oral daily    ===================HEMATOLOGIC/ONC ===================  aspirin  chewable 81 milliGRAM(s) Oral daily  clopidogrel Tablet 75 milliGRAM(s) Oral daily  heparin   Injectable 7500 Unit(s) SubCutaneous every 12 hours    ===================== RENAL =========================  Continue monitoring urine output    CODY improving. Cr 2.77 on admission --> 2.14 (2/10) --> 1.79 (2/11)    ==================== GASTROINTESTINAL===================  dextrose 5%. 1000 milliLiter(s) (100 mL/Hr) IV Continuous <Continuous>  dextrose 5%. 1000 milliLiter(s) (50 mL/Hr) IV Continuous <Continuous>    Diet: NPO    =======================    ENDOCRINE  =====================  atorvastatin 40 milliGRAM(s) Oral at bedtime  dextrose 50% Injectable 25 Gram(s) IV Push once  dextrose 50% Injectable 12.5 Gram(s) IV Push once  dextrose 50% Injectable 25 Gram(s) IV Push once  dextrose Oral Gel 15 Gram(s) Oral once PRN Blood Glucose LESS THAN 70 milliGRAM(s)/deciliter  glucagon  Injectable 1 milliGRAM(s) IntraMuscular once  insulin glargine Injectable (LANTUS) 30 Unit(s) SubCutaneous at bedtime  insulin lispro (ADMELOG) corrective regimen sliding scale   SubCutaneous three times a day before meals  insulin lispro Injectable (ADMELOG) 8 Unit(s) SubCutaneous three times a day before meals  insulin regular Infusion 8 Unit(s)/Hr (8 mL/Hr) IV Continuous <Continuous>  methylPREDNISolone sodium succinate Injectable 40 milliGRAM(s) IV Push every 12 hours    Blood sugar goal: 100-200    ========================INFECTIOUS DISEASE================    - Completed vancomycin on 2/10. Random vancomycin level 6.4  - Completed cefepime on 2/10  - Started azithromycin  IVPB 500 milliGRAM(s) IV Intermittent every 24 hours      Cx: Blood cx from ED pending      Care plan discussed with ICU care team  Plan d/w Family       Assessment & Plan:     55 y/o Female with PMH of NICM, HFrEF, GERHARD on CPAP, HTN, HLD, DM, and PAD presented to ED on 2/10/2025 with SOB, fatigue, and weakness for 1 week. Found to be hypoxic to the 80s on RA, placed on BIPAP. Positive for COVID-19. CT chest notable for patchy ground glass opacity in RUL. Started on cefepime. Admitted to MICU for sepsis with AHRF 2/2 COVID-19 PNA, sepsis 2/2 cellulitis of LLE, and CODY. Currently on high flow nasal cannula and midodrine 5mg Q8h.      ====================== NEUROLOGY=====================    Q4h neuro checks    ==================== RESPIRATORY======================    On high flow nasal cannula    albuterol/ipratropium for Nebulization 3 milliLiter(s) Nebulizer every 6 hours    ====================CARDIOVASCULAR==================  buMETAnide Injectable 1 milliGRAM(s) IV Push daily  carvedilol 6.25 milliGRAM(s) Oral every 12 hours  midodrine 5 milliGRAM(s) Oral every 8 hours  norepinephrine Infusion 0.05 MICROgram(s)/kG/Min (9.38 mL/Hr) IV Continuous <Continuous>  spironolactone 25 milliGRAM(s) Oral daily    ===================HEMATOLOGIC/ONC ===================  aspirin  chewable 81 milliGRAM(s) Oral daily  clopidogrel Tablet 75 milliGRAM(s) Oral daily  heparin   Injectable 7500 Unit(s) SubCutaneous every 12 hours    ===================== RENAL =========================  Continue monitoring urine output    CODY improving. Cr 2.77 on admission --> 2.14 (2/10) --> 1.79 (2/11)    ==================== GASTROINTESTINAL===================  dextrose 5%. 1000 milliLiter(s) (100 mL/Hr) IV Continuous <Continuous>  dextrose 5%. 1000 milliLiter(s) (50 mL/Hr) IV Continuous <Continuous>    Diet: NPO    =======================    ENDOCRINE  =====================  atorvastatin 40 milliGRAM(s) Oral at bedtime  dextrose 50% Injectable 25 Gram(s) IV Push once  dextrose 50% Injectable 12.5 Gram(s) IV Push once  dextrose 50% Injectable 25 Gram(s) IV Push once  dextrose Oral Gel 15 Gram(s) Oral once PRN Blood Glucose LESS THAN 70 milliGRAM(s)/deciliter  glucagon  Injectable 1 milliGRAM(s) IntraMuscular once  insulin glargine Injectable (LANTUS) 30 Unit(s) SubCutaneous at bedtime  insulin lispro (ADMELOG) corrective regimen sliding scale   SubCutaneous three times a day before meals  insulin lispro Injectable (ADMELOG) 8 Unit(s) SubCutaneous three times a day before meals  insulin regular Infusion 8 Unit(s)/Hr (8 mL/Hr) IV Continuous <Continuous>  methylPREDNISolone sodium succinate Injectable 40 milliGRAM(s) IV Push every 12 hours    Blood sugar goal: 100-200    ========================INFECTIOUS DISEASE================    - Completed vancomycin on 2/10. Random vancomycin level 6.4  - Completed cefepime on 2/10  - Started azithromycin  IVPB 500 milliGRAM(s) IV Intermittent every 24 hours      Cx: Blood cx from ED pending      Care plan discussed with ICU care team  Plan d/w Family       Assessment & Plan:     55 y/o Female with PMH of NICM, HFrEF, GERHARD on CPAP, HTN, HLD, DM, and PAD presented to ED on 2/10/2025 with SOB, fatigue, and weakness for 1 week. Found to be hypoxic to the 80s on RA, placed on BIPAP. Positive for COVID-19. CT chest notable for patchy ground glass opacity in RUL. Admitted to MICU for sepsis with AHRF 2/2 COVID-19 PNA, sepsis 2/2 cellulitis of LLE, and CODY. Currently on high flow nasal cannula and midodrine 5mg Q8h.  Currently on cefepime. TTE 2/10 notable for evidence of pulmonary hypertension, pending cardio consult      ====================== NEUROLOGY=====================    Q4h neuro checks    ==================== RESPIRATORY======================    On high flow nasal cannula    albuterol/ipratropium for Nebulization 3 milliLiter(s) Nebulizer every 6 hours    STAT ABG pending    ====================CARDIOVASCULAR==================  buMETAnide Injectable 1 milliGRAM(s) IV Push daily  midodrine 5 milliGRAM(s) Oral every 8 hours  spironolactone 25 milliGRAM(s) Oral daily    - TTE 2/10 notable for pulmonary artery systolic pressure of 52 mmHg, with evidence of pulmonary HTN  - pending cardio consult    ===================HEMATOLOGIC/ONC ===================  aspirin  chewable 81 milliGRAM(s) Oral daily  clopidogrel Tablet 75 milliGRAM(s) Oral daily  heparin   Injectable 7500 Unit(s) SubCutaneous every 12 hours    ===================== RENAL =========================  Continue monitoring urine output    CODY improving. Cr 2.77 on admission --> 2.14 (2/10) --> 1.79 (2/11)    ==================== GASTROINTESTINAL===================  dextrose 5%. 1000 milliLiter(s) (100 mL/Hr) IV Continuous <Continuous>  dextrose 5%. 1000 milliLiter(s) (50 mL/Hr) IV Continuous <Continuous>    Diet: NPO except for meds    =======================    ENDOCRINE  =====================  atorvastatin 40 milliGRAM(s) Oral at bedtime  dextrose 50% Injectable 25 Gram(s) IV Push once  dextrose 50% Injectable 12.5 Gram(s) IV Push once  dextrose 50% Injectable 25 Gram(s) IV Push once  dextrose Oral Gel 15 Gram(s) Oral once PRN Blood Glucose LESS THAN 70 milliGRAM(s)/deciliter  glucagon  Injectable 1 milliGRAM(s) IntraMuscular once  insulin glargine Injectable (LANTUS) 30 Unit(s) SubCutaneous at bedtime  insulin lispro (ADMELOG) corrective regimen sliding scale   SubCutaneous three times a day before meals  insulin lispro Injectable (ADMELOG) 8 Unit(s) SubCutaneous three times a day before meals  insulin regular Infusion 8 Unit(s)/Hr (8 mL/Hr) IV Continuous <Continuous>  methylPREDNISolone sodium succinate Injectable 40 milliGRAM(s) IV Push every 12 hours    Blood sugar goal: 100-200    ========================INFECTIOUS DISEASE================    - Completed vancomycin on 2/10. Random vancomycin level 6.4  - Completed cefepime on 2/10  - D/c'd azithromycin  IVPB 500 milliGRAM(s) IV Intermittent every 24 hours  - restarted cefepime 1000mg IV Q12h for PNA      Cx: Blood cx from ED pending. If negative, will start Ancef    Activity: OOB to chair as tolerated  PT eval pending    Care plan discussed with ICU care team  Plan d/w Family       Assessment & Plan:     53 y/o Female with PMH of NICM, HFrEF, GERHARD on CPAP, HTN, HLD, DM, and PAD presented to ED on 2/10/2025 with SOB, fatigue, and weakness for 1 week. Found to be hypoxic to the 80s on RA, placed on BIPAP. Positive for COVID-19. CT chest notable for patchy ground glass opacity in RUL. Admitted to MICU for sepsis with AHRF 2/2 COVID-19 PNA, sepsis 2/2 cellulitis of LLE, and CODY. Currently on high flow nasal cannula and midodrine 5mg Q8h.  Currently on cefepime. TTE 2/10 notable for evidence of pulmonary hypertension, pending cardio consult      ====================== NEUROLOGY=====================    Q4h neuro checks    ==================== RESPIRATORY======================    On high flow nasal cannula    albuterol/ipratropium for Nebulization 3 milliLiter(s) Nebulizer every 6 hours    STAT ABG pending    ====================CARDIOVASCULAR==================  buMETAnide Injectable 1 milliGRAM(s) IV Push daily  midodrine 5 milliGRAM(s) Oral every 8 hours  spironolactone 25 milliGRAM(s) Oral daily    - TTE 2/10 notable for pulmonary artery systolic pressure of 52 mmHg, with evidence of pulmonary HTN  - pending cardio consult    ===================HEMATOLOGIC/ONC ===================  aspirin  chewable 81 milliGRAM(s) Oral daily  clopidogrel Tablet 75 milliGRAM(s) Oral daily  heparin   Injectable 7500 Unit(s) SubCutaneous every 12 hours    ===================== RENAL =========================  Continue monitoring urine output    CODY improving. Cr 2.77 on admission --> 2.14 (2/10) --> 1.79 (2/11)    ==================== GASTROINTESTINAL===================  dextrose 5%. 1000 milliLiter(s) (100 mL/Hr) IV Continuous <Continuous>  dextrose 5%. 1000 milliLiter(s) (50 mL/Hr) IV Continuous <Continuous>    Diet: DASH/ TLC    =======================    ENDOCRINE  =====================  atorvastatin 40 milliGRAM(s) Oral at bedtime  dextrose 50% Injectable 25 Gram(s) IV Push once  dextrose 50% Injectable 12.5 Gram(s) IV Push once  dextrose 50% Injectable 25 Gram(s) IV Push once  dextrose Oral Gel 15 Gram(s) Oral once PRN Blood Glucose LESS THAN 70 milliGRAM(s)/deciliter  glucagon  Injectable 1 milliGRAM(s) IntraMuscular once  insulin glargine Injectable (LANTUS) 30 Unit(s) SubCutaneous at bedtime  insulin lispro (ADMELOG) corrective regimen sliding scale   SubCutaneous three times a day before meals  insulin lispro Injectable (ADMELOG) 8 Unit(s) SubCutaneous three times a day before meals  insulin regular Infusion 8 Unit(s)/Hr (8 mL/Hr) IV Continuous <Continuous>  methylPREDNISolone sodium succinate Injectable 40 milliGRAM(s) IV Push every 12 hours    Blood sugar goal: 100-200    ========================INFECTIOUS DISEASE================    - Completed vancomycin on 2/10. Random vancomycin level 6.4  - Completed cefepime on 2/10  - D/c'd azithromycin  IVPB 500 milliGRAM(s) IV Intermittent every 24 hours  - restarted cefepime 1000mg IV Q12h for PNA      Cx: Blood cx from ED pending. If negative, will start Ancef    Activity: OOB to chair as tolerated  PT eval pending    Care plan discussed with ICU care team  Plan d/w Family       no concerns

## 2025-02-11 NOTE — PHYSICAL THERAPY INITIAL EVALUATION ADULT - IMPAIRMENTS FOUND, PT EVAL
anthropometric characteristics/gait, locomotion, and balance/muscle strength/ventilation and respiration/gas exchange

## 2025-02-11 NOTE — PHYSICAL THERAPY INITIAL EVALUATION ADULT - GENERAL OBSERVATIONS, REHAB EVAL
Pt received supine in bed + telemetry//BP + Right IJ triple lumen + Hi flow O2 + non re-breather + sams. Pt c/o 6/10 LLE pain, pt agreeable to PT

## 2025-02-11 NOTE — PHYSICAL THERAPY INITIAL EVALUATION ADULT - IMPAIRED TRANSFERS: BED/CHAIR, REHAB EVAL
SPO2 decreased to 90% on high flow and non re-breather, RN Aware, difficulty weight bearing on LLE/impaired balance/pain/impaired postural control/decreased strength

## 2025-02-11 NOTE — PROGRESS NOTE ADULT - ATTENDING COMMENTS
Patient seen and examined    Events:   Normalized mental status  Tolerating diet  Weaned off of bilevel to high flow nasal cannula  Weaned off of norepinephrine infusion   started on midodrine    ====================ASSESSMENT/PLAN==============    54-year-old female with history of nonischemic cardiomyopathy heart failure with improved EF from 25 to 52% essential hypertension sleep apnea lower extremity edema with recurrent left lower extremity cellulitis as well as Klebsiella pneumonia UTI peripheral artery disease s/p angioplasty and stent placement in 2024 currently from shelter presented with difficulty breathing and cough nonproductive for more than a week associated with generalized weakness and fatigue found to have left lower extremity redness admitted to medical ICU for    Acute on possible chronic hypoxic hypercapnic respiratory failure  Shock: Most likely distributive  with possible component of acute cor pulmonale  Moderate pulmonary hypertension with RV dysfunction  Left lower extremity cellulitis  COVID-19 systemic viral illness: Present for over 10 days  Acute metabolic/hypercapnic encephalopathy  ====================== NEUROLOGY=====================   physical therapy evaluation  Every 4 neurochecks  Fall aspiration seizure precaution  ==================== RESPIRATORY======================   nocturnal AVAPS with daytime high flow with progressive wean as tolerated  Incentive spirometry   albuterol/ipratropium for Nebulization 3 milliLiter(s) Nebulizer every 6 hours  Prednisone taper  ====================CARDIOVASCULAR==================  buMETAnide Injectable 1 milliGRAM(s) IV Push daily  midodrine 5 milliGRAM(s) Oral every 8 hours  spironolactone 25 milliGRAM(s) Oral daily   cardiology consulted for pulmonary hypertension management/cor pulmonale  ===================HEMATOLOGIC/ONC ===================  aspirin  chewable 81 milliGRAM(s) Oral daily  clopidogrel Tablet 75 milliGRAM(s) Oral daily  heparin   Injectable 7500 Unit(s) SubCutaneous every 12 hours    SCDs  ===================== RENAL =========================  Continue monitoring urine output    Avoid Nephrotoxic agents   Adjusting medications for renal  function   Replacing electrolytes as needed with Goal K> 4, PO> 3, Mg> 2  ==================== GASTROINTESTINAL===================  dextrose 5%. 1000 milliLiter(s) (100 mL/Hr) IV Continuous <Continuous>  dextrose 5%. 1000 milliLiter(s) (50 mL/Hr) IV Continuous <Continuous>     diet: TLC diet as tolerated  Daily weight    =======================    ENDOCRINE  =====================  atorvastatin 40 milliGRAM(s) Oral at bedtime  dextrose 50% Injectable 25 Gram(s) IV Push once  dextrose 50% Injectable 12.5 Gram(s) IV Push once  dextrose 50% Injectable 25 Gram(s) IV Push once  dextrose Oral Gel 15 Gram(s) Oral once PRN Blood Glucose LESS THAN 70 milliGRAM(s)/deciliter  glucagon  Injectable 1 milliGRAM(s) IntraMuscular once  insulin glargine Injectable (LANTUS) 30 Unit(s) SubCutaneous at bedtime  insulin lispro (ADMELOG) corrective regimen sliding scale   SubCutaneous three times a day before meals  insulin lispro Injectable (ADMELOG) 8 Unit(s) SubCutaneous three times a day before meals    ========================INFECTIOUS DISEASE================  ceFAZolin  Injectable. 1000 milliGRAM(s) IV Push every 12 hours    Cultures blood culture negative to date    ==========================================================    CODE STATUS: full  Plan of care discussed with: patient and ICU team

## 2025-02-11 NOTE — PHYSICAL THERAPY INITIAL EVALUATION ADULT - ADDITIONAL COMMENTS
Pt lives in an apartment with her 15 y/o daughter. 4 steps to enter with handrails, no steps inside. Pt was independent PTA with an axillary crutch. Pt owns no other DME.

## 2025-02-11 NOTE — PHYSICAL THERAPY INITIAL EVALUATION ADULT - PERTINENT HX OF CURRENT PROBLEM, REHAB EVAL
53 y/o Female with PMH of NICM, HFrEF, GERHARD on CPAP, HTN, HLD, DM, and PAD presented to ED on 2/10/2025 with SOB, fatigue, and weakness for 1 week. Found to be hypoxic to the 80s on RA, placed on BIPAP. Positive for COVID-19. CT chest notable for patchy ground glass opacity in RUL. Admitted to MICU for sepsis with AHRF 2/2 COVID-19 PNA, sepsis 2/2 cellulitis of LLE, and CODY.

## 2025-02-11 NOTE — PHYSICAL THERAPY INITIAL EVALUATION ADULT - LONG TERM MEMORY, REHAB EVAL
- ECHO in April showed.    The left ventricle is normal in size with concentric remodeling and normal systolic function.  The estimated ejection fraction is 55%.  Normal left ventricular diastolic function.  Normal right ventricular size with normal right ventricular systolic function.  Mild tricuspid regurgitation.  Intermediate central venous pressure (8 mmHg).  The estimated PA systolic pressure is 28 mmHg.     - Continue Entresto.  - Continue outpatient f/u with Cardiology as instructed.   intact

## 2025-02-11 NOTE — CONSULT NOTE ADULT - NS ATTEND AMEND GEN_ALL_CORE FT
Patient seen and examined by me.  Patient was noncompliant with her cardiac medications.  patient is currently being treated for COVID/PNA and Celluliltis  Patient is advised to f/u as outpatient with Dr Younger  No further inpatient workup is needed  Discussed with ICU team  I have discussed my recommendation with the PA which are outlined above.  Will sign off

## 2025-02-11 NOTE — PROGRESS NOTE ADULT - ASSESSMENT
54yoF hx HFrEF w/ LV recovery, PAD s/p bilateral LE Stents, HTN, GERHARD not on CPAP admitted to MICU for acute hypoxemic respiratory failure requiring NIV in setting of COVID and also shock state in setting of LLE cellulitis    Acute hypoxemic respiratory failure due to COVID  -CT chest reviewed   -Start remdesivir  -Change from prednisone to IV dexamethasone  -s/p NIV, now on HFNC, continue to wean as tolerated    Septic shock due to LLE cellulitis  -s/p vasopressors, now on midodrine   -Remains with soft SBP  -MSRA swab negative  -On cefepime  -Transfer to step down    HFrEF w/ LV recovery  -CT chest did not reveal pulmonary edema  -Seen by cardiology, recommended diuresis due to missed doses  -Currently on IV bumex, transition to oral as clinically indicated   -Placed on spironolactone, will d/c due to soft SBP and remaining on midodrine    CODY  -Due to shock state, improved w/ IVF  -Currently on IV diuresis as above, monitor closely    Hx DM  -On lantus 30U and ademlog 8U TID     Hx PAD  -ASA, plavix, statin    Hx HTN  -BP remains soft on midodrine as above  -D/c spironolactone  -Monitor and resume as tolerated    Prophylactic measure  -Heparin 7500units BID (higher dose due to obesity)    Dispo: medically active, pending IV abx, transition off midodrine.  Estimated LOS: unclear.  54yoF hx HFrEF w/ LV recovery, PAD s/p bilateral LE Stents, HTN, GERHARD not on CPAP admitted to MICU for acute hypoxemic respiratory failure requiring NIV in setting of COVID and also shock state in setting of LLE cellulitis    Acute hypoxemic respiratory failure due to COVID  -CT chest reviewed   -Start remdesivir  -Change from prednisone to IV dexamethasone  -s/p NIV, now on HFNC, continue to wean as tolerated    Septic shock due to LLE cellulitis  -s/p vasopressors, now on midodrine   -Remains with soft SBP  -MSRA swab negative  -On cefepime  -Transfer to step down    HFrEF w/ LV recovery  -CT chest did not reveal pulmonary edema  -Seen by cardiology, recommended diuresis x 2 days due to missed doses  -Transition to oral as clinically indicated   -Placed on spironolactone, will d/c due to soft SBP and remaining on midodrine  -Entresto and carvedilol on hold    CODY  -Due to shock state, improved w/ IVF, monitor    Hx DM  -On lantus 30U and ademlog 8U TID  -Jardiance, metformin on hold  -Up-titrate pre-meal insulin based on FS trend    Hx PAD  -ASA, plavix, statin    Hx HTN  -BP remains soft on midodrine as above  -D/c spironolactone, entresto and coreg on hold    Prophylactic measure  -Heparin 7500units BID (higher dose due to obesity)    Dispo: medically active, pending IV abx, transition off midodrine.  Estimated LOS: unclear.

## 2025-02-11 NOTE — CONSULT NOTE ADULT - PROBLEM SELECTOR RECOMMENDATION 9
-Presumed new onset. Prior echo with no mention  -PASP 52  -Patient has GERHARD, and active COVID PNA all which can affect pulmonary pressures  -TTE noted with RV dysfunction  -CT chest with no pleural effusion   -Agree with diuretics since she missed doses    No further work up planned  Outpatient follow up

## 2025-02-12 LAB
ALBUMIN SERPL ELPH-MCNC: 2.7 G/DL — LOW (ref 3.3–5.2)
ALP SERPL-CCNC: 75 U/L — SIGNIFICANT CHANGE UP (ref 40–120)
ALT FLD-CCNC: 34 U/L — HIGH
ANION GAP SERPL CALC-SCNC: 9 MMOL/L — SIGNIFICANT CHANGE UP (ref 5–17)
AST SERPL-CCNC: 31 U/L — SIGNIFICANT CHANGE UP
BASOPHILS # BLD AUTO: 0.02 K/UL — SIGNIFICANT CHANGE UP (ref 0–0.2)
BASOPHILS NFR BLD AUTO: 0.1 % — SIGNIFICANT CHANGE UP (ref 0–2)
BILIRUB SERPL-MCNC: 0.3 MG/DL — LOW (ref 0.4–2)
BUN SERPL-MCNC: 51.3 MG/DL — HIGH (ref 8–20)
CALCIUM SERPL-MCNC: 8.7 MG/DL — SIGNIFICANT CHANGE UP (ref 8.4–10.5)
CHLORIDE SERPL-SCNC: 96 MMOL/L — SIGNIFICANT CHANGE UP (ref 96–108)
CO2 SERPL-SCNC: 31 MMOL/L — HIGH (ref 22–29)
CREAT SERPL-MCNC: 1.31 MG/DL — HIGH (ref 0.5–1.3)
EGFR: 48 ML/MIN/1.73M2 — LOW
EOSINOPHIL # BLD AUTO: 0.02 K/UL — SIGNIFICANT CHANGE UP (ref 0–0.5)
EOSINOPHIL NFR BLD AUTO: 0.1 % — SIGNIFICANT CHANGE UP (ref 0–6)
GLUCOSE BLDC GLUCOMTR-MCNC: 116 MG/DL — HIGH (ref 70–99)
GLUCOSE BLDC GLUCOMTR-MCNC: 121 MG/DL — HIGH (ref 70–99)
GLUCOSE BLDC GLUCOMTR-MCNC: 258 MG/DL — HIGH (ref 70–99)
GLUCOSE BLDC GLUCOMTR-MCNC: 281 MG/DL — HIGH (ref 70–99)
GLUCOSE BLDC GLUCOMTR-MCNC: 323 MG/DL — HIGH (ref 70–99)
GLUCOSE SERPL-MCNC: 121 MG/DL — HIGH (ref 70–99)
HCT VFR BLD CALC: 43.2 % — SIGNIFICANT CHANGE UP (ref 34.5–45)
HGB BLD-MCNC: 13.2 G/DL — SIGNIFICANT CHANGE UP (ref 11.5–15.5)
IMM GRANULOCYTES # BLD AUTO: 0.12 K/UL — HIGH (ref 0–0.07)
IMM GRANULOCYTES NFR BLD AUTO: 0.8 % — SIGNIFICANT CHANGE UP (ref 0–0.9)
LYMPHOCYTES # BLD AUTO: 1.06 K/UL — SIGNIFICANT CHANGE UP (ref 1–3.3)
LYMPHOCYTES NFR BLD AUTO: 7 % — LOW (ref 13–44)
MAGNESIUM SERPL-MCNC: 2.5 MG/DL — SIGNIFICANT CHANGE UP (ref 1.6–2.6)
MCHC RBC-ENTMCNC: 27.7 PG — SIGNIFICANT CHANGE UP (ref 27–34)
MCHC RBC-ENTMCNC: 30.6 G/DL — LOW (ref 32–36)
MCV RBC AUTO: 90.8 FL — SIGNIFICANT CHANGE UP (ref 80–100)
MONOCYTES # BLD AUTO: 0.94 K/UL — HIGH (ref 0–0.9)
MONOCYTES NFR BLD AUTO: 6.2 % — SIGNIFICANT CHANGE UP (ref 2–14)
NEUTROPHILS # BLD AUTO: 13.03 K/UL — HIGH (ref 1.8–7.4)
NEUTROPHILS NFR BLD AUTO: 85.8 % — HIGH (ref 43–77)
NRBC # BLD AUTO: 0 K/UL — SIGNIFICANT CHANGE UP (ref 0–0)
NRBC # FLD: 0 K/UL — SIGNIFICANT CHANGE UP (ref 0–0)
NRBC BLD AUTO-RTO: 0 /100 WBCS — SIGNIFICANT CHANGE UP (ref 0–0)
PHOSPHATE SERPL-MCNC: 3.9 MG/DL — SIGNIFICANT CHANGE UP (ref 2.4–4.7)
PLATELET # BLD AUTO: 229 K/UL — SIGNIFICANT CHANGE UP (ref 150–400)
PMV BLD: 9.7 FL — SIGNIFICANT CHANGE UP (ref 7–13)
POTASSIUM SERPL-MCNC: 4.3 MMOL/L — SIGNIFICANT CHANGE UP (ref 3.5–5.3)
POTASSIUM SERPL-SCNC: 4.3 MMOL/L — SIGNIFICANT CHANGE UP (ref 3.5–5.3)
PROT SERPL-MCNC: 6.5 G/DL — LOW (ref 6.6–8.7)
RBC # BLD: 4.76 M/UL — SIGNIFICANT CHANGE UP (ref 3.8–5.2)
RBC # FLD: 16.2 % — HIGH (ref 10.3–14.5)
SODIUM SERPL-SCNC: 136 MMOL/L — SIGNIFICANT CHANGE UP (ref 135–145)
WBC # BLD: 15.19 K/UL — HIGH (ref 3.8–10.5)
WBC # FLD AUTO: 15.19 K/UL — HIGH (ref 3.8–10.5)

## 2025-02-12 PROCEDURE — 99233 SBSQ HOSP IP/OBS HIGH 50: CPT

## 2025-02-12 RX ORDER — REMDESIVIR 5 MG/ML
INJECTION INTRAVENOUS
Refills: 0 | Status: COMPLETED | OUTPATIENT
Start: 2025-02-12 | End: 2025-02-16

## 2025-02-12 RX ORDER — REMDESIVIR 5 MG/ML
200 INJECTION INTRAVENOUS EVERY 24 HOURS
Refills: 0 | Status: COMPLETED | OUTPATIENT
Start: 2025-02-12 | End: 2025-02-12

## 2025-02-12 RX ORDER — ACETAMINOPHEN 500 MG/5ML
650 LIQUID (ML) ORAL EVERY 6 HOURS
Refills: 0 | Status: DISCONTINUED | OUTPATIENT
Start: 2025-02-12 | End: 2025-02-19

## 2025-02-12 RX ORDER — INSULIN GLARGINE-YFGN 100 [IU]/ML
35 INJECTION, SOLUTION SUBCUTANEOUS AT BEDTIME
Refills: 0 | Status: DISCONTINUED | OUTPATIENT
Start: 2025-02-12 | End: 2025-02-14

## 2025-02-12 RX ORDER — ONDANSETRON HCL/PF 4 MG/2 ML
4 VIAL (ML) INJECTION EVERY 6 HOURS
Refills: 0 | Status: DISCONTINUED | OUTPATIENT
Start: 2025-02-12 | End: 2025-02-19

## 2025-02-12 RX ORDER — DEXAMETHASONE 0.5 MG/1
6 TABLET ORAL DAILY
Refills: 0 | Status: DISCONTINUED | OUTPATIENT
Start: 2025-02-12 | End: 2025-02-17

## 2025-02-12 RX ORDER — REMDESIVIR 5 MG/ML
100 INJECTION INTRAVENOUS EVERY 24 HOURS
Refills: 0 | Status: COMPLETED | OUTPATIENT
Start: 2025-02-13 | End: 2025-02-16

## 2025-02-12 RX ADMIN — INSULIN LISPRO 8 UNIT(S): 100 INJECTION, SOLUTION INTRAVENOUS; SUBCUTANEOUS at 15:52

## 2025-02-12 RX ADMIN — IPRATROPIUM BROMIDE AND ALBUTEROL SULFATE 3 MILLILITER(S): .5; 2.5 SOLUTION RESPIRATORY (INHALATION) at 22:12

## 2025-02-12 RX ADMIN — INSULIN LISPRO 8 UNIT(S): 100 INJECTION, SOLUTION INTRAVENOUS; SUBCUTANEOUS at 08:06

## 2025-02-12 RX ADMIN — Medication 81 MILLIGRAM(S): at 11:34

## 2025-02-12 RX ADMIN — Medication 650 MILLIGRAM(S): at 22:33

## 2025-02-12 RX ADMIN — IPRATROPIUM BROMIDE AND ALBUTEROL SULFATE 3 MILLILITER(S): .5; 2.5 SOLUTION RESPIRATORY (INHALATION) at 09:04

## 2025-02-12 RX ADMIN — Medication 1 APPLICATION(S): at 06:21

## 2025-02-12 RX ADMIN — HEPARIN SODIUM 7500 UNIT(S): 1000 INJECTION INTRAVENOUS; SUBCUTANEOUS at 06:10

## 2025-02-12 RX ADMIN — Medication 1000 MILLIGRAM(S): at 17:31

## 2025-02-12 RX ADMIN — IPRATROPIUM BROMIDE AND ALBUTEROL SULFATE 3 MILLILITER(S): .5; 2.5 SOLUTION RESPIRATORY (INHALATION) at 15:31

## 2025-02-12 RX ADMIN — INSULIN LISPRO 8 UNIT(S): 100 INJECTION, SOLUTION INTRAVENOUS; SUBCUTANEOUS at 11:33

## 2025-02-12 RX ADMIN — ATORVASTATIN CALCIUM 40 MILLIGRAM(S): 80 TABLET, FILM COATED ORAL at 22:33

## 2025-02-12 RX ADMIN — CLOPIDOGREL BISULFATE 75 MILLIGRAM(S): 75 TABLET, FILM COATED ORAL at 11:34

## 2025-02-12 RX ADMIN — INSULIN LISPRO 6: 100 INJECTION, SOLUTION INTRAVENOUS; SUBCUTANEOUS at 15:51

## 2025-02-12 RX ADMIN — MIDODRINE HYDROCHLORIDE 5 MILLIGRAM(S): 5 TABLET ORAL at 02:41

## 2025-02-12 RX ADMIN — Medication 1000 MILLIGRAM(S): at 06:09

## 2025-02-12 RX ADMIN — MIDODRINE HYDROCHLORIDE 5 MILLIGRAM(S): 5 TABLET ORAL at 17:32

## 2025-02-12 RX ADMIN — MIDODRINE HYDROCHLORIDE 5 MILLIGRAM(S): 5 TABLET ORAL at 09:36

## 2025-02-12 RX ADMIN — INSULIN LISPRO 8: 100 INJECTION, SOLUTION INTRAVENOUS; SUBCUTANEOUS at 11:33

## 2025-02-12 RX ADMIN — HEPARIN SODIUM 7500 UNIT(S): 1000 INJECTION INTRAVENOUS; SUBCUTANEOUS at 17:32

## 2025-02-12 RX ADMIN — BUMETANIDE 1 MILLIGRAM(S): 1 TABLET ORAL at 06:09

## 2025-02-12 RX ADMIN — INSULIN GLARGINE-YFGN 35 UNIT(S): 100 INJECTION, SOLUTION SUBCUTANEOUS at 22:53

## 2025-02-12 RX ADMIN — REMDESIVIR 200 MILLIGRAM(S): 5 INJECTION INTRAVENOUS at 09:25

## 2025-02-12 RX ADMIN — IPRATROPIUM BROMIDE AND ALBUTEROL SULFATE 3 MILLILITER(S): .5; 2.5 SOLUTION RESPIRATORY (INHALATION) at 04:25

## 2025-02-12 RX ADMIN — Medication 25 MILLIGRAM(S): at 06:10

## 2025-02-12 RX ADMIN — DEXAMETHASONE 6 MILLIGRAM(S): 0.5 TABLET ORAL at 06:40

## 2025-02-12 NOTE — PROGRESS NOTE ADULT - ASSESSMENT
53yo F hx HFrEF w/ LV recovery, PAD s/p bilateral LE Stents, HTN, GERHARD not on CPAP admitted to MICU for acute hypoxemic respiratory failure requiring NIV in setting of COVID and also shock state in setting of LLE cellulitis    Acute hypoxemic respiratory failure due to COVID  -CT chest reviewed   -Continue remdesivir  -Continue dexamethasone  -s/p NIV, now on HFNC, continue to wean as tolerated    Septic shock due to LLE cellulitis  -s/p vasopressors, now on midodrine   -MSRA swab negative  -Continue Cefazolin   -Wound Care Consult for LLE wounds     HFrEF w/ LV recovery  -CT chest did not reveal pulmonary edema  -Seen by cardiology, recommended diuresis x 2 days due to missed doses  -Transition to oral as clinically indicated   -Spironolactone was started but later held due to soft SBP and being on midodrine  -Entresto and carvedilol on hold    CODY  -Due to shock state, improving     DM 2  A1c 8.5  -Jardiance, metformin on hold  -Accu checks and ISS  -Increase Lantus to 35 units  -Continue Admelog 8 units     Hx PAD  -ASA, plavix, statin    Hx HTN  -BP remains soft on midodrine as above  -spironolactone, entresto and coreg on hold    DVT Prophylaxis -- Heparin SQ    Dispo: Home vs [facility] pending progress. Currently active with Respiratory Failure / LLE Cellulitis.

## 2025-02-12 NOTE — PROGRESS NOTE ADULT - SUBJECTIVE AND OBJECTIVE BOX
Sepsis    HPI:  CHIEF COMPLAINT: SOB/cough    HPI: 55 yo female PMHx NICM, HFimpEF (was 25-30% 4/9/2022 improved 52% 10/23/2022) etiology was uncertain, HTN, GERHARD (not on CPAP), LE edema, and PAD s/p b/l stents, currently lives in a shelter presents to ED c/o cough and SOB x 1 week. In ED, patient c/o associated weakness, fatigue, and shortness of breath with cough. She is unsure of fever, Denies chest pain, abd pain/N/V. Patient's daughter reports she was recently treated for cellulitis but eloped from the hospital. Patient does not use home O2.  In ED, patient hypoxic to 80s on RA. Placed on NC and then transitioned to BIPAP. Labs with CODY, hypercapnia, and lactic acidosis. CT chest with infiltrate and patient is COVID positive. MICU consulted for worsening respiratory status, (10 Feb 2025 08:30)    Interval History:  Patient was seen and examined at bedside around 9:45 am.  Breathing is improving.   Denies chest pain, palpitations, visual symptoms, nausea, vomiting or abdominal pain.  Wounds and pain in left leg.     ROS:  As per interval history otherwise unremarkable.    PHYSICAL EXAM:  Vital Signs   T(C): 36.7 (12 Feb 2025 11:40), Max: 37.2 (11 Feb 2025 16:00)  T(F): 98 (12 Feb 2025 11:40), Max: 99 (11 Feb 2025 16:00)  HR: 89 (12 Feb 2025 14:00) (60 - 89)  BP: 107/56 (12 Feb 2025 14:00) (89/53 - 112/63)  BP(mean): 71 (12 Feb 2025 14:00) (65 - 95)  RR: 17 (12 Feb 2025 14:00) (11 - 27)  SpO2: 100% (12 Feb 2025 14:00) (95% - 100%)  Parameters below as of 12 Feb 2025 12:09  Patient On (Oxygen Delivery Method): nasal cannula, high flow, 100%  General: Middle aged female sitting in bed comfortably. No acute distress  HEENT: EOMI. Clear conjunctivae. Moist mucus membrane  Neck: Supple.   Chest: Good air entry. Scattered wheezing and rhonchi.   Heart: Normal S1 & S2. RRR.   Abdomen: Obese. Soft. Non-tender. + BS  LLE: Swollen, erythematous, warm and tender with wounds with mucopurulent discharge. RLE: Lymphedema with chronic skin changes.    Neuro: Awake and alert. No focal deficit. Speech clear.   Skin: Warm and Dry  Psychiatry: Normal mood and affect    I&O's Summary    11 Feb 2025 07:01  -  12 Feb 2025 07:00  --------------------------------------------------------  IN: 4.6 mL / OUT: 1395 mL / NET: -1390.4 mL    12 Feb 2025 07:01  -  12 Feb 2025 15:47  --------------------------------------------------------  IN: 200 mL / OUT: 675 mL / NET: -475 mL    LABS:  CAPILLARY BLOOD GLUCOSE  POCT Blood Glucose.: 323 mg/dL (12 Feb 2025 11:31)  POCT Blood Glucose.: 121 mg/dL (12 Feb 2025 08:05)  POCT Blood Glucose.: 116 mg/dL (12 Feb 2025 06:18)  POCT Blood Glucose.: 100 mg/dL (11 Feb 2025 22:26)  POCT Blood Glucose.: 254 mg/dL (11 Feb 2025 17:25)                      13.2   15.19 )-----------( 229      ( 12 Feb 2025 03:13 )             43.2     02-12    136  |  96  |  51.3[H]  ----------------------------<  121[H]  4.3   |  31.0[H]  |  1.31[H]    Ca    8.7      12 Feb 2025 03:13  Phos  3.9     02-12  Mg     2.5     02-12    TPro  6.5[L]  /  Alb  2.7[L]  /  TBili  0.3[L]  /  DBili  x   /  AST  31  /  ALT  34[H]  /  AlkPhos  75  02-12    Urinalysis Basic - ( 12 Feb 2025 03:13 )    Color: x / Appearance: x / SG: x / pH: x  Gluc: 121 mg/dL / Ketone: x  / Bili: x / Urobili: x   Blood: x / Protein: x / Nitrite: x   Leuk Esterase: x / RBC: x / WBC x   Sq Epi: x / Non Sq Epi: x / Bacteria: x    RADIOLOGY & ADDITIONAL STUDIES:  Reviewed     MEDICATIONS  (STANDING):  albuterol/ipratropium for Nebulization 3 milliLiter(s) Nebulizer every 6 hours  aspirin  chewable 81 milliGRAM(s) Oral daily  atorvastatin 40 milliGRAM(s) Oral at bedtime  ceFAZolin  Injectable. 1000 milliGRAM(s) IV Push every 12 hours  chlorhexidine 2% Cloths 1 Application(s) Topical <User Schedule>  clopidogrel Tablet 75 milliGRAM(s) Oral daily  dexAMETHasone  Injectable 6 milliGRAM(s) IV Push daily  dextrose 5%. 1000 milliLiter(s) (100 mL/Hr) IV Continuous <Continuous>  dextrose 5%. 1000 milliLiter(s) (50 mL/Hr) IV Continuous <Continuous>  dextrose 50% Injectable 25 Gram(s) IV Push once  dextrose 50% Injectable 12.5 Gram(s) IV Push once  dextrose 50% Injectable 25 Gram(s) IV Push once  glucagon  Injectable 1 milliGRAM(s) IntraMuscular once  heparin   Injectable 7500 Unit(s) SubCutaneous every 12 hours  influenza   Vaccine 0.5 milliLiter(s) IntraMuscular once  insulin glargine Injectable (LANTUS) 30 Unit(s) SubCutaneous at bedtime  insulin lispro (ADMELOG) corrective regimen sliding scale   SubCutaneous three times a day before meals  insulin lispro Injectable (ADMELOG) 8 Unit(s) SubCutaneous three times a day before meals  midodrine 5 milliGRAM(s) Oral every 8 hours  remdesivir  IVPB   IV Intermittent     MEDICATIONS  (PRN):  acetaminophen     Tablet .. 650 milliGRAM(s) Oral every 6 hours PRN Temp greater or equal to 38C (100.4F), Mild Pain (1 - 3)  dextrose Oral Gel 15 Gram(s) Oral once PRN Blood Glucose LESS THAN 70 milliGRAM(s)/deciliter  ondansetron Injectable 4 milliGRAM(s) IV Push every 6 hours PRN Nausea and/or Vomiting

## 2025-02-12 NOTE — CHART NOTE - NSCHARTNOTEFT_GEN_A_CORE
Nutrition Note: Consult received for MST Score = />2. Upon chart review, pt does not currently meet criteria for protein calorie malnutrition risk per MST. Aware pt being downgraded from ICU to stepdown. Chart reviewed; RD to follow up with full nutrition assessment per medical floor protocol.

## 2025-02-13 LAB
ALBUMIN SERPL ELPH-MCNC: 2.9 G/DL — LOW (ref 3.3–5.2)
ALP SERPL-CCNC: 75 U/L — SIGNIFICANT CHANGE UP (ref 40–120)
ALT FLD-CCNC: 26 U/L — SIGNIFICANT CHANGE UP
ANION GAP SERPL CALC-SCNC: 8 MMOL/L — SIGNIFICANT CHANGE UP (ref 5–17)
AST SERPL-CCNC: 17 U/L — SIGNIFICANT CHANGE UP
BASOPHILS # BLD AUTO: 0.01 K/UL — SIGNIFICANT CHANGE UP (ref 0–0.2)
BASOPHILS NFR BLD AUTO: 0.1 % — SIGNIFICANT CHANGE UP (ref 0–2)
BILIRUB SERPL-MCNC: 0.2 MG/DL — LOW (ref 0.4–2)
BUN SERPL-MCNC: 49.9 MG/DL — HIGH (ref 8–20)
CALCIUM SERPL-MCNC: 8.6 MG/DL — SIGNIFICANT CHANGE UP (ref 8.4–10.5)
CHLORIDE SERPL-SCNC: 93 MMOL/L — LOW (ref 96–108)
CO2 SERPL-SCNC: 34 MMOL/L — HIGH (ref 22–29)
CREAT SERPL-MCNC: 1.11 MG/DL — SIGNIFICANT CHANGE UP (ref 0.5–1.3)
EGFR: 59 ML/MIN/1.73M2 — LOW
EOSINOPHIL # BLD AUTO: 0.01 K/UL — SIGNIFICANT CHANGE UP (ref 0–0.5)
EOSINOPHIL NFR BLD AUTO: 0.1 % — SIGNIFICANT CHANGE UP (ref 0–6)
GLUCOSE BLDC GLUCOMTR-MCNC: 161 MG/DL — HIGH (ref 70–99)
GLUCOSE BLDC GLUCOMTR-MCNC: 181 MG/DL — HIGH (ref 70–99)
GLUCOSE BLDC GLUCOMTR-MCNC: 239 MG/DL — HIGH (ref 70–99)
GLUCOSE BLDC GLUCOMTR-MCNC: 311 MG/DL — HIGH (ref 70–99)
GLUCOSE SERPL-MCNC: 216 MG/DL — HIGH (ref 70–99)
HCT VFR BLD CALC: 42.2 % — SIGNIFICANT CHANGE UP (ref 34.5–45)
HGB BLD-MCNC: 12.8 G/DL — SIGNIFICANT CHANGE UP (ref 11.5–15.5)
IMM GRANULOCYTES # BLD AUTO: 0.05 K/UL — SIGNIFICANT CHANGE UP (ref 0–0.07)
IMM GRANULOCYTES NFR BLD AUTO: 0.5 % — SIGNIFICANT CHANGE UP (ref 0–0.9)
LYMPHOCYTES # BLD AUTO: 0.92 K/UL — LOW (ref 1–3.3)
LYMPHOCYTES NFR BLD AUTO: 8.5 % — LOW (ref 13–44)
MAGNESIUM SERPL-MCNC: 2.5 MG/DL — SIGNIFICANT CHANGE UP (ref 1.8–2.6)
MCHC RBC-ENTMCNC: 27.5 PG — SIGNIFICANT CHANGE UP (ref 27–34)
MCHC RBC-ENTMCNC: 30.3 G/DL — LOW (ref 32–36)
MCV RBC AUTO: 90.8 FL — SIGNIFICANT CHANGE UP (ref 80–100)
MONOCYTES # BLD AUTO: 1.02 K/UL — HIGH (ref 0–0.9)
MONOCYTES NFR BLD AUTO: 9.4 % — SIGNIFICANT CHANGE UP (ref 2–14)
NEUTROPHILS # BLD AUTO: 8.84 K/UL — HIGH (ref 1.8–7.4)
NEUTROPHILS NFR BLD AUTO: 81.4 % — HIGH (ref 43–77)
NRBC # BLD AUTO: 0.02 K/UL — HIGH (ref 0–0)
NRBC # FLD: 0.02 K/UL — HIGH (ref 0–0)
NRBC BLD AUTO-RTO: 0 /100 WBCS — SIGNIFICANT CHANGE UP (ref 0–0)
PLATELET # BLD AUTO: 236 K/UL — SIGNIFICANT CHANGE UP (ref 150–400)
PMV BLD: 9.9 FL — SIGNIFICANT CHANGE UP (ref 7–13)
POTASSIUM SERPL-MCNC: 5.2 MMOL/L — SIGNIFICANT CHANGE UP (ref 3.5–5.3)
POTASSIUM SERPL-SCNC: 5.2 MMOL/L — SIGNIFICANT CHANGE UP (ref 3.5–5.3)
PROT SERPL-MCNC: 6.5 G/DL — LOW (ref 6.6–8.7)
RBC # BLD: 4.65 M/UL — SIGNIFICANT CHANGE UP (ref 3.8–5.2)
RBC # FLD: 16.3 % — HIGH (ref 10.3–14.5)
SODIUM SERPL-SCNC: 135 MMOL/L — SIGNIFICANT CHANGE UP (ref 135–145)
WBC # BLD: 10.85 K/UL — HIGH (ref 3.8–10.5)
WBC # FLD AUTO: 10.85 K/UL — HIGH (ref 3.8–10.5)

## 2025-02-13 PROCEDURE — 99233 SBSQ HOSP IP/OBS HIGH 50: CPT

## 2025-02-13 RX ORDER — INSULIN LISPRO 100 U/ML
10 INJECTION, SOLUTION INTRAVENOUS; SUBCUTANEOUS
Refills: 0 | Status: DISCONTINUED | OUTPATIENT
Start: 2025-02-13 | End: 2025-02-19

## 2025-02-13 RX ORDER — SODIUM HYPOCHLORITE 0.12 MG/ML
1 SOLUTION TOPICAL DAILY
Refills: 0 | Status: DISCONTINUED | OUTPATIENT
Start: 2025-02-13 | End: 2025-02-19

## 2025-02-13 RX ORDER — MIDODRINE HYDROCHLORIDE 5 MG/1
2.5 TABLET ORAL EVERY 8 HOURS
Refills: 0 | Status: DISCONTINUED | OUTPATIENT
Start: 2025-02-13 | End: 2025-02-14

## 2025-02-13 RX ADMIN — IPRATROPIUM BROMIDE AND ALBUTEROL SULFATE 3 MILLILITER(S): .5; 2.5 SOLUTION RESPIRATORY (INHALATION) at 21:39

## 2025-02-13 RX ADMIN — MIDODRINE HYDROCHLORIDE 2.5 MILLIGRAM(S): 5 TABLET ORAL at 14:44

## 2025-02-13 RX ADMIN — Medication 81 MILLIGRAM(S): at 11:34

## 2025-02-13 RX ADMIN — REMDESIVIR 200 MILLIGRAM(S): 5 INJECTION INTRAVENOUS at 09:44

## 2025-02-13 RX ADMIN — INSULIN LISPRO 10 UNIT(S): 100 INJECTION, SOLUTION INTRAVENOUS; SUBCUTANEOUS at 16:14

## 2025-02-13 RX ADMIN — HEPARIN SODIUM 7500 UNIT(S): 1000 INJECTION INTRAVENOUS; SUBCUTANEOUS at 06:11

## 2025-02-13 RX ADMIN — ATORVASTATIN CALCIUM 40 MILLIGRAM(S): 80 TABLET, FILM COATED ORAL at 22:42

## 2025-02-13 RX ADMIN — INSULIN GLARGINE-YFGN 35 UNIT(S): 100 INJECTION, SOLUTION SUBCUTANEOUS at 22:42

## 2025-02-13 RX ADMIN — Medication 1000 MILLIGRAM(S): at 17:01

## 2025-02-13 RX ADMIN — HEPARIN SODIUM 7500 UNIT(S): 1000 INJECTION INTRAVENOUS; SUBCUTANEOUS at 17:01

## 2025-02-13 RX ADMIN — MIDODRINE HYDROCHLORIDE 5 MILLIGRAM(S): 5 TABLET ORAL at 02:56

## 2025-02-13 RX ADMIN — Medication 1 APPLICATION(S): at 08:02

## 2025-02-13 RX ADMIN — INSULIN LISPRO 8 UNIT(S): 100 INJECTION, SOLUTION INTRAVENOUS; SUBCUTANEOUS at 07:59

## 2025-02-13 RX ADMIN — INSULIN LISPRO 4: 100 INJECTION, SOLUTION INTRAVENOUS; SUBCUTANEOUS at 11:35

## 2025-02-13 RX ADMIN — INSULIN LISPRO 2: 100 INJECTION, SOLUTION INTRAVENOUS; SUBCUTANEOUS at 08:00

## 2025-02-13 RX ADMIN — IPRATROPIUM BROMIDE AND ALBUTEROL SULFATE 3 MILLILITER(S): .5; 2.5 SOLUTION RESPIRATORY (INHALATION) at 14:51

## 2025-02-13 RX ADMIN — INSULIN LISPRO 8: 100 INJECTION, SOLUTION INTRAVENOUS; SUBCUTANEOUS at 16:14

## 2025-02-13 RX ADMIN — CLOPIDOGREL BISULFATE 75 MILLIGRAM(S): 75 TABLET, FILM COATED ORAL at 11:34

## 2025-02-13 RX ADMIN — IPRATROPIUM BROMIDE AND ALBUTEROL SULFATE 3 MILLILITER(S): .5; 2.5 SOLUTION RESPIRATORY (INHALATION) at 09:19

## 2025-02-13 RX ADMIN — INSULIN LISPRO 8 UNIT(S): 100 INJECTION, SOLUTION INTRAVENOUS; SUBCUTANEOUS at 11:34

## 2025-02-13 RX ADMIN — Medication 1000 MILLIGRAM(S): at 06:09

## 2025-02-13 RX ADMIN — DEXAMETHASONE 6 MILLIGRAM(S): 0.5 TABLET ORAL at 06:17

## 2025-02-13 NOTE — PROGRESS NOTE ADULT - SUBJECTIVE AND OBJECTIVE BOX
Sepsis    HPI:  CHIEF COMPLAINT: SOB/cough    HPI: 53 yo female PMHx NICM, HFimpEF (was 25-30% 4/9/2022 improved 52% 10/23/2022) etiology was uncertain, HTN, GERHARD (not on CPAP), LE edema, and PAD s/p b/l stents, currently lives in a shelter presents to ED c/o cough and SOB x 1 week. In ED, patient c/o associated weakness, fatigue, and shortness of breath with cough. She is unsure of fever, Denies chest pain, abd pain/N/V. Patient's daughter reports she was recently treated for cellulitis but eloped from the hospital. Patient does not use home O2.  In ED, patient hypoxic to 80s on RA. Placed on NC and then transitioned to BIPAP. Labs with CODY, hypercapnia, and lactic acidosis. CT chest with infiltrate and patient is COVID positive. MICU consulted for worsening respiratory status, (10 Feb 2025 08:30)    Interval History:  Patient was seen and examined at bedside around 11:30 am.  Breathing is slightly better.    Denies chest pain, palpitations, nausea, vomiting or abdominal pain.    ROS:  As per interval history otherwise unremarkable.    PHYSICAL EXAM:  Vital Signs   T(C): 36.7 (13 Feb 2025 12:00), Max: 37.4 (12 Feb 2025 23:00)  T(F): 98 (13 Feb 2025 12:00), Max: 99.3 (12 Feb 2025 23:00)  HR: 59 (13 Feb 2025 15:00) (52 - 88)  BP: 137/73 (13 Feb 2025 15:00) (87/77 - 138/63)  BP(mean): 92 (13 Feb 2025 15:00) (71 - 92)  RR: 14 (13 Feb 2025 15:00) (13 - 27)  SpO2: 90% (13 Feb 2025 15:00) (90% - 100%)  Parameters below as of 13 Feb 2025 16:00  Patient On (Oxygen Delivery Method): nasal cannula, high flow  General: Middle aged female sitting in chair comfortably. No acute distress  HEENT: EOMI. Clear conjunctivae. Moist mucus membrane  Neck: Supple.   Chest: Good air entry. Scattered wheezing and rhonchi.   Heart: Normal S1 & S2. RRR.   Abdomen: Obese. Soft. Non-tender. + BS  LLE: Swollen, erythematous, warm and tender with wounds with mucopurulent discharge. RLE: Lymphedema with chronic skin changes.    Neuro: Awake and alert. No focal deficit. Speech clear.   Skin: Warm and Dry  Psychiatry: Normal mood and affect    I&O's Summary    12 Feb 2025 07:01  -  13 Feb 2025 07:00  --------------------------------------------------------  IN: 200 mL / OUT: 1920 mL / NET: -1720 mL    13 Feb 2025 07:01  -  13 Feb 2025 16:06  --------------------------------------------------------  IN: 100 mL / OUT: 800 mL / NET: -700 mL    LABS:  CAPILLARY BLOOD GLUCOSE  POCT Blood Glucose.: 239 mg/dL (13 Feb 2025 11:22)  POCT Blood Glucose.: 181 mg/dL (13 Feb 2025 07:57)  POCT Blood Glucose.: 281 mg/dL (12 Feb 2025 22:31)                      12.8   10.85 )-----------( 236      ( 13 Feb 2025 04:00 )             42.2     02-13    135  |  93[L]  |  49.9[H]  ----------------------------<  216[H]  5.2   |  34.0[H]  |  1.11    Ca    8.6      13 Feb 2025 04:00  Phos  3.9     02-12  Mg     2.5     02-13    TPro  6.5[L]  /  Alb  2.9[L]  /  TBili  0.2[L]  /  DBili  x   /  AST  17  /  ALT  26  /  AlkPhos  75  02-13    Blood Culture: 02-10 @ 03:20  Organism --  Gram Stain Blood -- Gram Stain --  Specimen Source .Blood BLOOD  Culture-Blood --    02-10 @ 03:10  Organism --  Gram Stain Blood -- Gram Stain --  Specimen Source .Blood BLOOD  Culture-Blood --    RADIOLOGY & ADDITIONAL STUDIES:  Reviewed     MEDICATIONS  (STANDING):  albuterol/ipratropium for Nebulization 3 milliLiter(s) Nebulizer every 6 hours  aspirin  chewable 81 milliGRAM(s) Oral daily  atorvastatin 40 milliGRAM(s) Oral at bedtime  ceFAZolin  Injectable. 1000 milliGRAM(s) IV Push every 12 hours  chlorhexidine 2% Cloths 1 Application(s) Topical <User Schedule>  clopidogrel Tablet 75 milliGRAM(s) Oral daily  Dakins Solution - 1/4 Strength 1 Application(s) Topical daily  dexAMETHasone  Injectable 6 milliGRAM(s) IV Push daily  dextrose 5%. 1000 milliLiter(s) (100 mL/Hr) IV Continuous <Continuous>  dextrose 5%. 1000 milliLiter(s) (50 mL/Hr) IV Continuous <Continuous>  dextrose 50% Injectable 25 Gram(s) IV Push once  dextrose 50% Injectable 12.5 Gram(s) IV Push once  dextrose 50% Injectable 25 Gram(s) IV Push once  glucagon  Injectable 1 milliGRAM(s) IntraMuscular once  heparin   Injectable 7500 Unit(s) SubCutaneous every 12 hours  influenza   Vaccine 0.5 milliLiter(s) IntraMuscular once  insulin glargine Injectable (LANTUS) 35 Unit(s) SubCutaneous at bedtime  insulin lispro (ADMELOG) corrective regimen sliding scale   SubCutaneous three times a day before meals  insulin lispro Injectable (ADMELOG) 8 Unit(s) SubCutaneous three times a day before meals  midodrine 2.5 milliGRAM(s) Oral every 8 hours  remdesivir  IVPB 100 milliGRAM(s) IV Intermittent every 24 hours  remdesivir  IVPB   IV Intermittent     MEDICATIONS  (PRN):  acetaminophen     Tablet .. 650 milliGRAM(s) Oral every 6 hours PRN Temp greater or equal to 38C (100.4F), Mild Pain (1 - 3)  dextrose Oral Gel 15 Gram(s) Oral once PRN Blood Glucose LESS THAN 70 milliGRAM(s)/deciliter  ondansetron Injectable 4 milliGRAM(s) IV Push every 6 hours PRN Nausea and/or Vomiting

## 2025-02-13 NOTE — ADVANCED PRACTICE NURSE CONSULT - REASON FOR CONSULT
Wound Care was consulted for evaluation of the following:  -b/l LE venous ulcers     Pt seen at bedside today:  - stating that she has been experiencing moderate drainage from the LLE with increased wamrth and painful/tender wounds beds. requires multiple dressing changes by nursing staff

## 2025-02-13 NOTE — PROGRESS NOTE ADULT - ASSESSMENT
55yo F hx HFrEF w/ LV recovery, PAD s/p bilateral LE Stents, HTN, GERHARD not on CPAP admitted to MICU for acute hypoxemic respiratory failure requiring NIV in setting of COVID and also shock state in setting of LLE cellulitis    Acute hypoxemic respiratory failure due to COVID  -CT chest reviewed   -Continue remdesivir  -Continue dexamethasone  -s/p NIV, now on HFNC, continue to wean as tolerated    Septic shock due to LLE cellulitis  -s/p vasopressors, now on midodrine   -MSRA swab negative  -Continue Cefazolin   -Wound Care Consult for LE Wounds appreciated     HFrEF w/ LV recovery  -CT chest did not reveal pulmonary edema  -Seen by cardiology, recommended diuresis x 2 days due to missed doses  -Transition to oral from tomorrow   -Spironolactone was started but later held due to soft SBP and being on midodrine  -Entresto and carvedilol on hold    CODY  -Due to shock state, improving     DM 2  A1c 8.5  -Jardiance, metformin on hold  -Accu checks and ISS  -Increase Lantus to 35 units  -Continue Admelog 8 units     Hx PAD  -ASA, plavix, statin    Hx HTN  -spironolactone, entresto and coreg on hold    DVT Prophylaxis -- Heparin SQ    Dispo: Home vs [facility] pending progress. Currently active with Respiratory Failure / LLE Cellulitis.

## 2025-02-13 NOTE — ADVANCED PRACTICE NURSE CONSULT - RECOMMEDATIONS
maitain b/l LE elevation when applicable above heart level     1. b/l LE venous stasis ulcers with superimposed cellulitis     WOUNDS:  =======  RLE  cleanse with NS  apply medihoney in nickel thick layer to wound bed   apply cavilon no sting barrier film to periwound region  apply allevyn bordered foam dressing   change daily     LLE  cleanse with dakins 1/4 strength solution and non woven gauze   cover with aquacell ag+, then abd pad  secure with kerlix and ACE x1 wrapped loosely in spiral fashion  change QD and PRN for strike through drainage       GENERAL GUIDELINES, AND RECOMMENDATIONS  ==============================  -Carefully check that no tubes are entangled with the bed linens or have contact to patient’s skin. Keep bed sheets smooth and wrinkle free to prevent injury to the skin   - Assure to position pt feet at 20 degrees, then HOB at a 30 degree angle  to limit sliding/friction/shearing, especially in bed bound populations.         Turn and position the patient Q2 hours. Use wedges when turning and positioning to the left/right, notably if patient can only assist minimally.    Place wedges / moldable pillows above the waist for effective sacral offloading. Keep shoulders and hips properly aligned for greater comfort. Place pillow between legs to support bony prominences and reduce friction/rubbing.  Apply waffle/sacral cushion for sacral offloading, both in bed and in chair. if limited mobility, maintain full fowlers chair seated position for <2 hours at a time.   Keep the pt’s heels protected and elevated off the surface of the bed. Place what the patient can tolerate: pillows, moldable pillows, offloading heel boots      This was a 25  minute visit, with greater than 50% counseling. My findings and suggestions that may benefit the pt were disscussed with the family/caregiver if present at bedside and with pt permission,  shift RN at bedside, in addition to overseeing team/provider via chat and or telephone.     Attending on file for patient notified was ICU covering team     Wound Care will continue to follow the patient? : no    Continue to monitor skin integrity as per policy,  and call or re-consult Wound Care with any acute changes or lack of progression of current recommendations. Wound care rceommendations are generally for shift RN dressing changes, unless otherwise specified. Wound Care IS NOT continuing to follow the patient unless otherwise specified as noted above.     Kyree KANGN, RN, CWCN  Wound Ostomy Nurse Educator   [facility]  Please call/message over Teams and/or Email for clarification/contact.  (E): [email or web address]

## 2025-02-13 NOTE — ADVANCED PRACTICE NURSE CONSULT - ASSESSMENT
MARY SCORE  Last mary score is : ---15  19+: pt is NOT at risk for developing pressure injuries  15-18: pt is AT RISK for developing pressure injuries   13-14: pt is AT MODERATE RISK for developing pressure injuries   10-12: pt is AT HIGH RISK for developing pressure injuries   6-9: pt is AT VERY HIGH RISK for developing pressure injuries     PHYSICAL EXAM  Is pt AOx?: x4 in nad resting comfortably in chair   Bed bound?: no   Incontinent?: yes  Mattress/ bed active?: low airloss surface   Can pt assist with turn or dependent?: yes but requires some assist   Active pressure injury prevention measures? : low airloss bed, offloading heel boots when not in chair       SKIN CHECK  -obese body habitus   -b/l Le edema L>R, chronic skin changes/discoloration i..e hemosiderosis.   -over the RLE posterior is non pitting edema with full thickness shallow wound bed measuring 1x3.5x0.25cm with serous drainage. there is dried crusting exudate over the wound edges, with partial granulation tissue at the wound base. No obvious necrosis, purulence, normal temperature gradient. palpable pedal pulses.   -over the LLE amongst chronic discoloration is macular erythemtous red rash ascending and marked off with surgical marker. over the shin and lateral extremity is full thickness wound bed open maceration and biofilm vs slough buildup covering wound base. there is moderate serous drainage. the wounds measure 2.5x2.5cm and 3x3cm respectively. Warm to warm temperature gradient, diff to palpate pedal pulses sec to pedal edema.

## 2025-02-14 LAB
ALBUMIN SERPL ELPH-MCNC: 2.8 G/DL — LOW (ref 3.3–5.2)
ALP SERPL-CCNC: 84 U/L — SIGNIFICANT CHANGE UP (ref 40–120)
ALT FLD-CCNC: 20 U/L — SIGNIFICANT CHANGE UP
ANION GAP SERPL CALC-SCNC: 10 MMOL/L — SIGNIFICANT CHANGE UP (ref 5–17)
AST SERPL-CCNC: 23 U/L — SIGNIFICANT CHANGE UP
BASOPHILS # BLD AUTO: 0.03 K/UL — SIGNIFICANT CHANGE UP (ref 0–0.2)
BASOPHILS NFR BLD AUTO: 0.3 % — SIGNIFICANT CHANGE UP (ref 0–2)
BILIRUB SERPL-MCNC: 0.3 MG/DL — LOW (ref 0.4–2)
BUN SERPL-MCNC: 37.3 MG/DL — HIGH (ref 8–20)
CALCIUM SERPL-MCNC: 9 MG/DL — SIGNIFICANT CHANGE UP (ref 8.4–10.5)
CHLORIDE SERPL-SCNC: 96 MMOL/L — SIGNIFICANT CHANGE UP (ref 96–108)
CO2 SERPL-SCNC: 30 MMOL/L — HIGH (ref 22–29)
CREAT SERPL-MCNC: 0.82 MG/DL — SIGNIFICANT CHANGE UP (ref 0.5–1.3)
EGFR: 85 ML/MIN/1.73M2 — SIGNIFICANT CHANGE UP
EOSINOPHIL # BLD AUTO: 0.08 K/UL — SIGNIFICANT CHANGE UP (ref 0–0.5)
EOSINOPHIL NFR BLD AUTO: 0.7 % — SIGNIFICANT CHANGE UP (ref 0–6)
GLUCOSE BLDC GLUCOMTR-MCNC: 150 MG/DL — HIGH (ref 70–99)
GLUCOSE BLDC GLUCOMTR-MCNC: 157 MG/DL — HIGH (ref 70–99)
GLUCOSE BLDC GLUCOMTR-MCNC: 203 MG/DL — HIGH (ref 70–99)
GLUCOSE BLDC GLUCOMTR-MCNC: 247 MG/DL — HIGH (ref 70–99)
GLUCOSE BLDC GLUCOMTR-MCNC: 256 MG/DL — HIGH (ref 70–99)
GLUCOSE SERPL-MCNC: 138 MG/DL — HIGH (ref 70–99)
HCT VFR BLD CALC: 44.9 % — SIGNIFICANT CHANGE UP (ref 34.5–45)
HGB BLD-MCNC: 13.7 G/DL — SIGNIFICANT CHANGE UP (ref 11.5–15.5)
IMM GRANULOCYTES # BLD AUTO: 0.08 K/UL — HIGH (ref 0–0.07)
IMM GRANULOCYTES NFR BLD AUTO: 0.7 % — SIGNIFICANT CHANGE UP (ref 0–0.9)
LYMPHOCYTES # BLD AUTO: 1.3 K/UL — SIGNIFICANT CHANGE UP (ref 1–3.3)
LYMPHOCYTES NFR BLD AUTO: 10.9 % — LOW (ref 13–44)
MAGNESIUM SERPL-MCNC: 2.2 MG/DL — SIGNIFICANT CHANGE UP (ref 1.6–2.6)
MCHC RBC-ENTMCNC: 27.6 PG — SIGNIFICANT CHANGE UP (ref 27–34)
MCHC RBC-ENTMCNC: 30.5 G/DL — LOW (ref 32–36)
MCV RBC AUTO: 90.5 FL — SIGNIFICANT CHANGE UP (ref 80–100)
MONOCYTES # BLD AUTO: 1.04 K/UL — HIGH (ref 0–0.9)
MONOCYTES NFR BLD AUTO: 8.7 % — SIGNIFICANT CHANGE UP (ref 2–14)
NEUTROPHILS # BLD AUTO: 9.43 K/UL — HIGH (ref 1.8–7.4)
NEUTROPHILS NFR BLD AUTO: 78.7 % — HIGH (ref 43–77)
NRBC # BLD AUTO: 0 K/UL — SIGNIFICANT CHANGE UP (ref 0–0)
NRBC # FLD: 0 K/UL — SIGNIFICANT CHANGE UP (ref 0–0)
NRBC BLD AUTO-RTO: 0 /100 WBCS — SIGNIFICANT CHANGE UP (ref 0–0)
PLATELET # BLD AUTO: 236 K/UL — SIGNIFICANT CHANGE UP (ref 150–400)
PMV BLD: 9.7 FL — SIGNIFICANT CHANGE UP (ref 7–13)
POTASSIUM SERPL-MCNC: 5.7 MMOL/L — HIGH (ref 3.5–5.3)
POTASSIUM SERPL-SCNC: 5.7 MMOL/L — HIGH (ref 3.5–5.3)
PROT SERPL-MCNC: 7 G/DL — SIGNIFICANT CHANGE UP (ref 6.6–8.7)
RBC # BLD: 4.96 M/UL — SIGNIFICANT CHANGE UP (ref 3.8–5.2)
RBC # FLD: 16 % — HIGH (ref 10.3–14.5)
SODIUM SERPL-SCNC: 136 MMOL/L — SIGNIFICANT CHANGE UP (ref 135–145)
WBC # BLD: 11.96 K/UL — HIGH (ref 3.8–10.5)
WBC # FLD AUTO: 11.96 K/UL — HIGH (ref 3.8–10.5)

## 2025-02-14 PROCEDURE — 99233 SBSQ HOSP IP/OBS HIGH 50: CPT

## 2025-02-14 RX ORDER — SODIUM ZIRCONIUM CYCLOSILICATE 5 G/5G
10 POWDER, FOR SUSPENSION ORAL EVERY 8 HOURS
Refills: 0 | Status: COMPLETED | OUTPATIENT
Start: 2025-02-14 | End: 2025-02-14

## 2025-02-14 RX ORDER — CARVEDILOL 3.12 MG/1
6.25 TABLET, FILM COATED ORAL EVERY 12 HOURS
Refills: 0 | Status: DISCONTINUED | OUTPATIENT
Start: 2025-02-15 | End: 2025-02-19

## 2025-02-14 RX ORDER — INSULIN GLARGINE-YFGN 100 [IU]/ML
40 INJECTION, SOLUTION SUBCUTANEOUS AT BEDTIME
Refills: 0 | Status: DISCONTINUED | OUTPATIENT
Start: 2025-02-14 | End: 2025-02-19

## 2025-02-14 RX ORDER — FUROSEMIDE 10 MG/ML
40 INJECTION INTRAMUSCULAR; INTRAVENOUS DAILY
Refills: 0 | Status: DISCONTINUED | OUTPATIENT
Start: 2025-02-14 | End: 2025-02-19

## 2025-02-14 RX ORDER — METHOCARBAMOL 500 MG/1
500 TABLET, FILM COATED ORAL THREE TIMES A DAY
Refills: 0 | Status: DISCONTINUED | OUTPATIENT
Start: 2025-02-14 | End: 2025-02-19

## 2025-02-14 RX ORDER — GABAPENTIN 400 MG/1
100 CAPSULE ORAL ONCE
Refills: 0 | Status: COMPLETED | OUTPATIENT
Start: 2025-02-14 | End: 2025-02-14

## 2025-02-14 RX ORDER — GABAPENTIN 400 MG/1
100 CAPSULE ORAL THREE TIMES A DAY
Refills: 0 | Status: DISCONTINUED | OUTPATIENT
Start: 2025-02-14 | End: 2025-02-19

## 2025-02-14 RX ORDER — METHOCARBAMOL 500 MG/1
500 TABLET, FILM COATED ORAL ONCE
Refills: 0 | Status: COMPLETED | OUTPATIENT
Start: 2025-02-14 | End: 2025-02-14

## 2025-02-14 RX ADMIN — METHOCARBAMOL 500 MILLIGRAM(S): 500 TABLET, FILM COATED ORAL at 16:58

## 2025-02-14 RX ADMIN — IPRATROPIUM BROMIDE AND ALBUTEROL SULFATE 3 MILLILITER(S): .5; 2.5 SOLUTION RESPIRATORY (INHALATION) at 15:18

## 2025-02-14 RX ADMIN — Medication 1000 MILLIGRAM(S): at 05:32

## 2025-02-14 RX ADMIN — DEXAMETHASONE 6 MILLIGRAM(S): 0.5 TABLET ORAL at 05:32

## 2025-02-14 RX ADMIN — INSULIN LISPRO 10 UNIT(S): 100 INJECTION, SOLUTION INTRAVENOUS; SUBCUTANEOUS at 13:27

## 2025-02-14 RX ADMIN — Medication 81 MILLIGRAM(S): at 11:34

## 2025-02-14 RX ADMIN — INSULIN LISPRO 10 UNIT(S): 100 INJECTION, SOLUTION INTRAVENOUS; SUBCUTANEOUS at 11:30

## 2025-02-14 RX ADMIN — IPRATROPIUM BROMIDE AND ALBUTEROL SULFATE 3 MILLILITER(S): .5; 2.5 SOLUTION RESPIRATORY (INHALATION) at 22:02

## 2025-02-14 RX ADMIN — IPRATROPIUM BROMIDE AND ALBUTEROL SULFATE 3 MILLILITER(S): .5; 2.5 SOLUTION RESPIRATORY (INHALATION) at 03:45

## 2025-02-14 RX ADMIN — INSULIN LISPRO 10 UNIT(S): 100 INJECTION, SOLUTION INTRAVENOUS; SUBCUTANEOUS at 08:24

## 2025-02-14 RX ADMIN — ATORVASTATIN CALCIUM 40 MILLIGRAM(S): 80 TABLET, FILM COATED ORAL at 22:11

## 2025-02-14 RX ADMIN — METHOCARBAMOL 500 MILLIGRAM(S): 500 TABLET, FILM COATED ORAL at 11:44

## 2025-02-14 RX ADMIN — Medication 1000 MILLIGRAM(S): at 18:30

## 2025-02-14 RX ADMIN — REMDESIVIR 200 MILLIGRAM(S): 5 INJECTION INTRAVENOUS at 11:34

## 2025-02-14 RX ADMIN — SODIUM ZIRCONIUM CYCLOSILICATE 10 GRAM(S): 5 POWDER, FOR SUSPENSION ORAL at 17:02

## 2025-02-14 RX ADMIN — INSULIN LISPRO 2: 100 INJECTION, SOLUTION INTRAVENOUS; SUBCUTANEOUS at 16:30

## 2025-02-14 RX ADMIN — FUROSEMIDE 40 MILLIGRAM(S): 10 INJECTION INTRAMUSCULAR; INTRAVENOUS at 18:35

## 2025-02-14 RX ADMIN — HEPARIN SODIUM 7500 UNIT(S): 1000 INJECTION INTRAVENOUS; SUBCUTANEOUS at 05:29

## 2025-02-14 RX ADMIN — Medication 650 MILLIGRAM(S): at 05:35

## 2025-02-14 RX ADMIN — SODIUM ZIRCONIUM CYCLOSILICATE 10 GRAM(S): 5 POWDER, FOR SUSPENSION ORAL at 11:34

## 2025-02-14 RX ADMIN — CLOPIDOGREL BISULFATE 75 MILLIGRAM(S): 75 TABLET, FILM COATED ORAL at 11:34

## 2025-02-14 RX ADMIN — GABAPENTIN 100 MILLIGRAM(S): 400 CAPSULE ORAL at 16:58

## 2025-02-14 RX ADMIN — Medication 650 MILLIGRAM(S): at 04:35

## 2025-02-14 RX ADMIN — INSULIN GLARGINE-YFGN 40 UNIT(S): 100 INJECTION, SOLUTION SUBCUTANEOUS at 22:11

## 2025-02-14 RX ADMIN — IPRATROPIUM BROMIDE AND ALBUTEROL SULFATE 3 MILLILITER(S): .5; 2.5 SOLUTION RESPIRATORY (INHALATION) at 08:51

## 2025-02-14 RX ADMIN — Medication 1 APPLICATION(S): at 05:33

## 2025-02-14 RX ADMIN — SODIUM HYPOCHLORITE 1 APPLICATION(S): 0.12 SOLUTION TOPICAL at 13:00

## 2025-02-14 RX ADMIN — GABAPENTIN 100 MILLIGRAM(S): 400 CAPSULE ORAL at 11:44

## 2025-02-14 NOTE — PROGRESS NOTE ADULT - ASSESSMENT
53yo F hx HFrEF w/ LV recovery, PAD s/p bilateral LE Stents, HTN, GERHARD not on CPAP admitted to MICU for acute hypoxemic respiratory failure requiring NIV in setting of COVID and also shock state in setting of LLE cellulitis    Acute hypoxemic respiratory failure due to COVID  -CT chest reviewed   -Continue remdesivir  -Continue dexamethasone  -s/p NIV, now on HFNC, continue to wean as tolerated    Septic shock due to LLE cellulitis  -s/p vasopressors, now on midodrine   -MSRA swab negative  -Continue Cefazolin   -Wound Care Consult for LE Wounds appreciated     HFrEF w/ LV recovery  -CT chest did not reveal pulmonary edema  -Seen by cardiology, recommended diuresis x 2 days due to missed doses  -Resume PO Lasix at 40 mg daily    -Hold Aldactone and Entresto due to hyperkalemia   -Resume Coreg from tomorrow if BP remains stable. Weaned off Midodrine.     CODY  -Due to shock state, improved    Hyperkalemia  -Hold Aldactone and Entresto  -Lokelma 10 gm x 2    DM 2  A1c 8.5  -Jardiance, metformin on hold  -Accu checks and ISS  -Increase Lantus to 40 units  -Increase Admelog to 10 units     Hx PAD  -ASA, plavix, statin    Hx HTN  -spironolactone, entresto and coreg on hold    DVT Prophylaxis -- Heparin SQ    Dispo: Home vs [facility] pending progress. Currently active with Respiratory Failure / LLE Cellulitis. PT Idalmis.  53yo F hx HFrEF w/ LV recovery, PAD s/p bilateral LE Stents, HTN, GERHARD not on CPAP admitted to MICU for acute hypoxemic respiratory failure requiring NIV in setting of COVID and also shock state in setting of LLE cellulitis    Acute hypoxemic respiratory failure due to COVID  -CT chest reviewed   -Continue remdesivir  -Continue dexamethasone  -s/p NIV, now on HFNC, continue to wean as tolerated    Septic shock due to LLE cellulitis  -s/p vasopressors, now on midodrine   -MSRA swab negative  -Continue Cefazolin   -Wound Care Consult for LE Wounds appreciated     HFrEF w/ LV recovery  -CT chest did not reveal pulmonary edema  -Seen by cardiology, recommended diuresis x 2 days due to missed doses  -Resume PO Lasix at 40 mg daily    -Hold Aldactone and Entresto due to hyperkalemia   -Resume Coreg from tomorrow if BP remains stable. Weaned off Midodrine.     CODY  -Due to shock state, improved  -TOV today     Hyperkalemia  -Hold Aldactone and Entresto  -Lokelma 10 gm x 2    DM 2  A1c 8.5  -Jardiance, metformin on hold  -Accu checks and ISS  -Increase Lantus to 40 units  -Increase Admelog to 10 units     Hx PAD  -ASA, plavix, statin    Hx HTN  -spironolactone, entresto and coreg on hold    DVT Prophylaxis -- Heparin SQ    Dispo: Home vs [facility] pending progress. Currently active with Respiratory Failure / LLE Cellulitis. PT Idalmis.

## 2025-02-14 NOTE — PROGRESS NOTE ADULT - SUBJECTIVE AND OBJECTIVE BOX
Sepsis    HPI:  CHIEF COMPLAINT: SOB/cough    HPI: 53 yo female PMHx NICM, HFimpEF (was 25-30% 4/9/2022 improved 52% 10/23/2022) etiology was uncertain, HTN, GERHARD (not on CPAP), LE edema, and PAD s/p b/l stents, currently lives in a shelter presents to ED c/o cough and SOB x 1 week. In ED, patient c/o associated weakness, fatigue, and shortness of breath with cough. She is unsure of fever, Denies chest pain, abd pain/N/V. Patient's daughter reports she was recently treated for cellulitis but eloped from the hospital. Patient does not use home O2.  In ED, patient hypoxic to 80s on RA. Placed on NC and then transitioned to BIPAP. Labs with CODY, hypercapnia, and lactic acidosis. CT chest with infiltrate and patient is COVID positive. MICU consulted for worsening respiratory status, (10 Feb 2025 08:30)    Interval History:  Patient was seen and examined at bedside around 9:45 am.  Feels OK.   Breathing is slowly improving.   Denies chest pain, palpitations, nausea, vomiting or abdominal pain.  Complaining of pain in legs (L>R).     ROS:  As per interval history otherwise unremarkable.    PHYSICAL EXAM:  Vital Signs  T(C): 36.6 (14 Feb 2025 11:54), Max: 36.9 (14 Feb 2025 04:08)  T(F): 97.9 (14 Feb 2025 11:54), Max: 98.4 (14 Feb 2025 04:08)  HR: 84 (14 Feb 2025 15:52) (55 - 86)  BP: 155/83 (14 Feb 2025 14:00) (115/67 - 155/83)  BP(mean): 104 (14 Feb 2025 14:00) (82 - 115)  RR: 18 (14 Feb 2025 14:00) (15 - 20)  SpO2: 94% (14 Feb 2025 15:52) (90% - 99%)  Parameters below as of 14 Feb 2025 15:52  Patient On (Oxygen Delivery Method): nasal cannula, high flow  General: Middle aged female sitting in chair comfortably. No acute distress  HEENT: EOMI. Clear conjunctivae. Moist mucus membrane  Neck: Supple.   Chest: Good air entry. Scattered wheezing and rhonchi.   Heart: Normal S1 & S2. RRR.   Abdomen: Obese. Soft. Non-tender. + BS  LLE: Dressing in place. RLE: Lymphedema with chronic skin changes. Allevyn on wound.     Neuro: Awake and alert. No focal deficit. Speech clear.   Skin: Warm and Dry  Psychiatry: Normal mood and affect    I&O's Summary    13 Feb 2025 07:01  -  14 Feb 2025 07:00  --------------------------------------------------------  IN: 535 mL / OUT: 2290 mL / NET: -1755 mL    14 Feb 2025 07:01  -  14 Feb 2025 16:31  --------------------------------------------------------  IN: 550 mL / OUT: 1650 mL / NET: -1100 mL    LABS:  CAPILLARY BLOOD GLUCOSE  POCT Blood Glucose.: 256 mg/dL (14 Feb 2025 13:26)  POCT Blood Glucose.: 247 mg/dL (14 Feb 2025 11:56)  POCT Blood Glucose.: 150 mg/dL (14 Feb 2025 08:17)  POCT Blood Glucose.: 161 mg/dL (13 Feb 2025 22:35)                      13.7   11.96 )-----------( 236      ( 14 Feb 2025 07:08 )             44.9     02-14    136  |  96  |  37.3[H]  ----------------------------<  138[H]  5.7[H]   |  30.0[H]  |  0.82    Ca    9.0      14 Feb 2025 07:08  Mg     2.2     02-14    TPro  7.0  /  Alb  2.8[L]  /  TBili  0.3[L]  /  DBili  x   /  AST  23  /  ALT  20  /  AlkPhos  84  02-14    Blood Culture: 02-10 @ 03:20  Organism --  Gram Stain Blood -- Gram Stain --  Specimen Source .Blood BLOOD  Culture-Blood --    02-10 @ 03:10  Organism --  Gram Stain Blood -- Gram Stain --  Specimen Source .Blood BLOOD  Culture-Blood --    RADIOLOGY & ADDITIONAL STUDIES:  Reviewed     MEDICATIONS  (STANDING):  albuterol/ipratropium for Nebulization 3 milliLiter(s) Nebulizer every 6 hours  aspirin  chewable 81 milliGRAM(s) Oral daily  atorvastatin 40 milliGRAM(s) Oral at bedtime  ceFAZolin  Injectable. 1000 milliGRAM(s) IV Push every 12 hours  chlorhexidine 2% Cloths 1 Application(s) Topical <User Schedule>  clopidogrel Tablet 75 milliGRAM(s) Oral daily  Dakins Solution - 1/4 Strength 1 Application(s) Topical daily  dexAMETHasone  Injectable 6 milliGRAM(s) IV Push daily  dextrose 5%. 1000 milliLiter(s) (100 mL/Hr) IV Continuous <Continuous>  dextrose 5%. 1000 milliLiter(s) (50 mL/Hr) IV Continuous <Continuous>  dextrose 50% Injectable 25 Gram(s) IV Push once  dextrose 50% Injectable 12.5 Gram(s) IV Push once  dextrose 50% Injectable 25 Gram(s) IV Push once  gabapentin 100 milliGRAM(s) Oral three times a day  glucagon  Injectable 1 milliGRAM(s) IntraMuscular once  heparin   Injectable 7500 Unit(s) SubCutaneous every 12 hours  influenza   Vaccine 0.5 milliLiter(s) IntraMuscular once  insulin glargine Injectable (LANTUS) 35 Unit(s) SubCutaneous at bedtime  insulin lispro (ADMELOG) corrective regimen sliding scale   SubCutaneous three times a day before meals  insulin lispro Injectable (ADMELOG) 10 Unit(s) SubCutaneous three times a day before meals  methocarbamol 500 milliGRAM(s) Oral three times a day  remdesivir  IVPB   IV Intermittent   remdesivir  IVPB 100 milliGRAM(s) IV Intermittent every 24 hours  sodium zirconium cyclosilicate 10 Gram(s) Oral every 8 hours    MEDICATIONS  (PRN):  acetaminophen     Tablet .. 650 milliGRAM(s) Oral every 6 hours PRN Temp greater or equal to 38C (100.4F), Mild Pain (1 - 3)  dextrose Oral Gel 15 Gram(s) Oral once PRN Blood Glucose LESS THAN 70 milliGRAM(s)/deciliter  ondansetron Injectable 4 milliGRAM(s) IV Push every 6 hours PRN Nausea and/or Vomiting

## 2025-02-14 NOTE — PROGRESS NOTE ADULT - NS ATTEST RISK PROBLEM GEN_ALL_CORE FT
DM 2 with Hyperglycemia.  Hyperkalemia.
Respiratory Failure.  LLE Cellulitis.
Respiratory Failure.  LLE Cellulitis.

## 2025-02-15 LAB
ALBUMIN SERPL ELPH-MCNC: 3.1 G/DL — LOW (ref 3.3–5.2)
ALP SERPL-CCNC: 77 U/L — SIGNIFICANT CHANGE UP (ref 40–120)
ALT FLD-CCNC: 17 U/L — SIGNIFICANT CHANGE UP
ANION GAP SERPL CALC-SCNC: 11 MMOL/L — SIGNIFICANT CHANGE UP (ref 5–17)
AST SERPL-CCNC: 16 U/L — SIGNIFICANT CHANGE UP
BASOPHILS # BLD AUTO: 0.02 K/UL — SIGNIFICANT CHANGE UP (ref 0–0.2)
BASOPHILS NFR BLD AUTO: 0.2 % — SIGNIFICANT CHANGE UP (ref 0–2)
BILIRUB SERPL-MCNC: 0.4 MG/DL — SIGNIFICANT CHANGE UP (ref 0.4–2)
BUN SERPL-MCNC: 26.2 MG/DL — HIGH (ref 8–20)
CALCIUM SERPL-MCNC: 8.9 MG/DL — SIGNIFICANT CHANGE UP (ref 8.4–10.5)
CHLORIDE SERPL-SCNC: 96 MMOL/L — SIGNIFICANT CHANGE UP (ref 96–108)
CO2 SERPL-SCNC: 32 MMOL/L — HIGH (ref 22–29)
CREAT SERPL-MCNC: 0.69 MG/DL — SIGNIFICANT CHANGE UP (ref 0.5–1.3)
CULTURE RESULTS: SIGNIFICANT CHANGE UP
CULTURE RESULTS: SIGNIFICANT CHANGE UP
EGFR: 103 ML/MIN/1.73M2 — SIGNIFICANT CHANGE UP
EOSINOPHIL # BLD AUTO: 0.15 K/UL — SIGNIFICANT CHANGE UP (ref 0–0.5)
EOSINOPHIL NFR BLD AUTO: 1.1 % — SIGNIFICANT CHANGE UP (ref 0–6)
GLUCOSE BLDC GLUCOMTR-MCNC: 179 MG/DL — HIGH (ref 70–99)
GLUCOSE BLDC GLUCOMTR-MCNC: 195 MG/DL — HIGH (ref 70–99)
GLUCOSE BLDC GLUCOMTR-MCNC: 218 MG/DL — HIGH (ref 70–99)
GLUCOSE BLDC GLUCOMTR-MCNC: 276 MG/DL — HIGH (ref 70–99)
GLUCOSE SERPL-MCNC: 146 MG/DL — HIGH (ref 70–99)
HCT VFR BLD CALC: 48 % — HIGH (ref 34.5–45)
HGB BLD-MCNC: 14.3 G/DL — SIGNIFICANT CHANGE UP (ref 11.5–15.5)
IMM GRANULOCYTES # BLD AUTO: 0.14 K/UL — HIGH (ref 0–0.07)
IMM GRANULOCYTES NFR BLD AUTO: 1.1 % — HIGH (ref 0–0.9)
LYMPHOCYTES # BLD AUTO: 1.33 K/UL — SIGNIFICANT CHANGE UP (ref 1–3.3)
LYMPHOCYTES NFR BLD AUTO: 10.2 % — LOW (ref 13–44)
MAGNESIUM SERPL-MCNC: 1.7 MG/DL — SIGNIFICANT CHANGE UP (ref 1.6–2.6)
MCHC RBC-ENTMCNC: 27.2 PG — SIGNIFICANT CHANGE UP (ref 27–34)
MCHC RBC-ENTMCNC: 29.8 G/DL — LOW (ref 32–36)
MCV RBC AUTO: 91.3 FL — SIGNIFICANT CHANGE UP (ref 80–100)
MONOCYTES # BLD AUTO: 0.98 K/UL — HIGH (ref 0–0.9)
MONOCYTES NFR BLD AUTO: 7.5 % — SIGNIFICANT CHANGE UP (ref 2–14)
NEUTROPHILS # BLD AUTO: 10.43 K/UL — HIGH (ref 1.8–7.4)
NEUTROPHILS NFR BLD AUTO: 79.9 % — HIGH (ref 43–77)
NRBC # BLD AUTO: 0 K/UL — SIGNIFICANT CHANGE UP (ref 0–0)
NRBC # FLD: 0 K/UL — SIGNIFICANT CHANGE UP (ref 0–0)
NRBC BLD AUTO-RTO: 0 /100 WBCS — SIGNIFICANT CHANGE UP (ref 0–0)
PLATELET # BLD AUTO: 229 K/UL — SIGNIFICANT CHANGE UP (ref 150–400)
PMV BLD: 9.8 FL — SIGNIFICANT CHANGE UP (ref 7–13)
POTASSIUM SERPL-MCNC: 5.1 MMOL/L — SIGNIFICANT CHANGE UP (ref 3.5–5.3)
POTASSIUM SERPL-SCNC: 5.1 MMOL/L — SIGNIFICANT CHANGE UP (ref 3.5–5.3)
PROT SERPL-MCNC: 6.8 G/DL — SIGNIFICANT CHANGE UP (ref 6.6–8.7)
RBC # BLD: 5.26 M/UL — HIGH (ref 3.8–5.2)
RBC # FLD: 16 % — HIGH (ref 10.3–14.5)
SODIUM SERPL-SCNC: 139 MMOL/L — SIGNIFICANT CHANGE UP (ref 135–145)
SPECIMEN SOURCE: SIGNIFICANT CHANGE UP
SPECIMEN SOURCE: SIGNIFICANT CHANGE UP
WBC # BLD: 13.05 K/UL — HIGH (ref 3.8–10.5)
WBC # FLD AUTO: 13.05 K/UL — HIGH (ref 3.8–10.5)

## 2025-02-15 PROCEDURE — 99233 SBSQ HOSP IP/OBS HIGH 50: CPT

## 2025-02-15 RX ORDER — MAGNESIUM SULFATE 500 MG/ML
2 SYRINGE (ML) INJECTION ONCE
Refills: 0 | Status: COMPLETED | OUTPATIENT
Start: 2025-02-15 | End: 2025-02-15

## 2025-02-15 RX ORDER — SPIRONOLACTONE 25 MG
25 TABLET ORAL DAILY
Refills: 0 | Status: DISCONTINUED | OUTPATIENT
Start: 2025-02-15 | End: 2025-02-19

## 2025-02-15 RX ADMIN — ATORVASTATIN CALCIUM 40 MILLIGRAM(S): 80 TABLET, FILM COATED ORAL at 21:54

## 2025-02-15 RX ADMIN — SODIUM HYPOCHLORITE 1 APPLICATION(S): 0.12 SOLUTION TOPICAL at 13:25

## 2025-02-15 RX ADMIN — INSULIN LISPRO 2: 100 INJECTION, SOLUTION INTRAVENOUS; SUBCUTANEOUS at 09:14

## 2025-02-15 RX ADMIN — CARVEDILOL 6.25 MILLIGRAM(S): 3.12 TABLET, FILM COATED ORAL at 09:13

## 2025-02-15 RX ADMIN — IPRATROPIUM BROMIDE AND ALBUTEROL SULFATE 3 MILLILITER(S): .5; 2.5 SOLUTION RESPIRATORY (INHALATION) at 08:44

## 2025-02-15 RX ADMIN — IPRATROPIUM BROMIDE AND ALBUTEROL SULFATE 3 MILLILITER(S): .5; 2.5 SOLUTION RESPIRATORY (INHALATION) at 22:04

## 2025-02-15 RX ADMIN — INSULIN LISPRO 10 UNIT(S): 100 INJECTION, SOLUTION INTRAVENOUS; SUBCUTANEOUS at 18:07

## 2025-02-15 RX ADMIN — GABAPENTIN 100 MILLIGRAM(S): 400 CAPSULE ORAL at 09:16

## 2025-02-15 RX ADMIN — Medication 25 MILLIGRAM(S): at 09:55

## 2025-02-15 RX ADMIN — INSULIN LISPRO 10 UNIT(S): 100 INJECTION, SOLUTION INTRAVENOUS; SUBCUTANEOUS at 09:15

## 2025-02-15 RX ADMIN — INSULIN LISPRO 6: 100 INJECTION, SOLUTION INTRAVENOUS; SUBCUTANEOUS at 13:29

## 2025-02-15 RX ADMIN — FUROSEMIDE 40 MILLIGRAM(S): 10 INJECTION INTRAMUSCULAR; INTRAVENOUS at 05:11

## 2025-02-15 RX ADMIN — HEPARIN SODIUM 7500 UNIT(S): 1000 INJECTION INTRAVENOUS; SUBCUTANEOUS at 09:13

## 2025-02-15 RX ADMIN — METHOCARBAMOL 500 MILLIGRAM(S): 500 TABLET, FILM COATED ORAL at 18:04

## 2025-02-15 RX ADMIN — METHOCARBAMOL 500 MILLIGRAM(S): 500 TABLET, FILM COATED ORAL at 09:16

## 2025-02-15 RX ADMIN — DEXAMETHASONE 6 MILLIGRAM(S): 0.5 TABLET ORAL at 05:10

## 2025-02-15 RX ADMIN — INSULIN LISPRO 10 UNIT(S): 100 INJECTION, SOLUTION INTRAVENOUS; SUBCUTANEOUS at 13:30

## 2025-02-15 RX ADMIN — Medication 1000 MILLIGRAM(S): at 05:10

## 2025-02-15 RX ADMIN — Medication 25 GRAM(S): at 18:09

## 2025-02-15 RX ADMIN — CLOPIDOGREL BISULFATE 75 MILLIGRAM(S): 75 TABLET, FILM COATED ORAL at 13:24

## 2025-02-15 RX ADMIN — Medication 1000 MILLIGRAM(S): at 18:22

## 2025-02-15 RX ADMIN — IPRATROPIUM BROMIDE AND ALBUTEROL SULFATE 3 MILLILITER(S): .5; 2.5 SOLUTION RESPIRATORY (INHALATION) at 15:42

## 2025-02-15 RX ADMIN — Medication 81 MILLIGRAM(S): at 13:25

## 2025-02-15 RX ADMIN — INSULIN GLARGINE-YFGN 40 UNIT(S): 100 INJECTION, SOLUTION SUBCUTANEOUS at 21:54

## 2025-02-15 RX ADMIN — CARVEDILOL 6.25 MILLIGRAM(S): 3.12 TABLET, FILM COATED ORAL at 18:22

## 2025-02-15 RX ADMIN — REMDESIVIR 200 MILLIGRAM(S): 5 INJECTION INTRAVENOUS at 09:55

## 2025-02-15 RX ADMIN — HEPARIN SODIUM 7500 UNIT(S): 1000 INJECTION INTRAVENOUS; SUBCUTANEOUS at 18:22

## 2025-02-15 RX ADMIN — INSULIN LISPRO 4: 100 INJECTION, SOLUTION INTRAVENOUS; SUBCUTANEOUS at 18:07

## 2025-02-15 RX ADMIN — GABAPENTIN 100 MILLIGRAM(S): 400 CAPSULE ORAL at 18:03

## 2025-02-15 RX ADMIN — Medication 1 APPLICATION(S): at 05:41

## 2025-02-15 NOTE — PROGRESS NOTE ADULT - SUBJECTIVE AND OBJECTIVE BOX
ADIEL CURRAN    [id number]    54y      Female    INTERVAL HPI/OVERNIGHT EVENTS:  patient being seen for covid and cellulitis. patient seen at bedside and denies any complaints.     patient is on 3-4l NC    REVIEW OF SYSTEMS:    CONSTITUTIONAL: No fever, weight loss, or fatigue  RESPIRATORY: No cough, wheezing, hemoptysis; No shortness of breath  CARDIOVASCULAR: No chest pain, palpitations  GASTROINTESTINAL: No abdominal or epigastric pain. No nausea, vomiting  NEUROLOGICAL: No headaches, memory loss, loss of strength.  MISCELLANEOUS:      Vital Signs Last 24 Hrs  T(C): 36.5 (15 Feb 2025 07:52), Max: 37.2 (15 Feb 2025 00:04)  T(F): 97.7 (15 Feb 2025 07:52), Max: 99 (15 Feb 2025 00:04)  HR: 66 (15 Feb 2025 12:00) (66 - 86)  BP: 139/70 (15 Feb 2025 12:00) (120/69 - 148/73)  BP(mean): 89 (15 Feb 2025 12:00) (83 - 100)  RR: 14 (15 Feb 2025 12:00) (14 - 20)  SpO2: 96% (15 Feb 2025 12:00) (94% - 100%)    Parameters below as of 15 Feb 2025 12:00  Patient On (Oxygen Delivery Method): nasal cannula  O2 Flow (L/min): 5      PHYSICAL EXAM:  General: Middle aged female , obese, speaking in fullsentences  HEENT: EOMI. Clear conjunctivae. Moist mucus membrane  Neck: Supple.   Chest: Good air entry. Scattered wheezing and rhonchi.   Heart: Normal S1 & S2. RRR.   Abdomen: Obese. Soft. Non-tender. + BS  LLE: Dressing in place. RLE: Lymphedema with chronic skin changes. Allevyn on wound.     Neuro: Awake and alert. No focal deficit. Speech clear.   Skin: Warm and Dry  Psychiatry: Normal mood and affect      LABS:                        14.3   13.05 )-----------( 229      ( 15 Feb 2025 07:06 )             48.0     02-15    139  |  96  |  26.2[H]  ----------------------------<  146[H]  5.1   |  32.0[H]  |  0.69    Ca    8.9      15 Feb 2025 07:06  Mg     1.7     02-15    TPro  6.8  /  Alb  3.1[L]  /  TBili  0.4  /  DBili  x   /  AST  16  /  ALT  17  /  AlkPhos  77  02-15      Urinalysis Basic - ( 15 Feb 2025 07:06 )    Color: x / Appearance: x / SG: x / pH: x  Gluc: 146 mg/dL / Ketone: x  / Bili: x / Urobili: x   Blood: x / Protein: x / Nitrite: x   Leuk Esterase: x / RBC: x / WBC x   Sq Epi: x / Non Sq Epi: x / Bacteria: x          MEDICATIONS  (STANDING):  albuterol/ipratropium for Nebulization 3 milliLiter(s) Nebulizer every 6 hours  aspirin  chewable 81 milliGRAM(s) Oral daily  atorvastatin 40 milliGRAM(s) Oral at bedtime  carvedilol 6.25 milliGRAM(s) Oral every 12 hours  ceFAZolin  Injectable. 1000 milliGRAM(s) IV Push every 12 hours  chlorhexidine 2% Cloths 1 Application(s) Topical <User Schedule>  clopidogrel Tablet 75 milliGRAM(s) Oral daily  Dakins Solution - 1/4 Strength 1 Application(s) Topical daily  dexAMETHasone  Injectable 6 milliGRAM(s) IV Push daily  dextrose 5%. 1000 milliLiter(s) (100 mL/Hr) IV Continuous <Continuous>  dextrose 5%. 1000 milliLiter(s) (50 mL/Hr) IV Continuous <Continuous>  dextrose 50% Injectable 25 Gram(s) IV Push once  dextrose 50% Injectable 12.5 Gram(s) IV Push once  dextrose 50% Injectable 25 Gram(s) IV Push once  furosemide    Tablet 40 milliGRAM(s) Oral daily  gabapentin 100 milliGRAM(s) Oral three times a day  glucagon  Injectable 1 milliGRAM(s) IntraMuscular once  heparin   Injectable 7500 Unit(s) SubCutaneous every 12 hours  influenza   Vaccine 0.5 milliLiter(s) IntraMuscular once  insulin glargine Injectable (LANTUS) 40 Unit(s) SubCutaneous at bedtime  insulin lispro (ADMELOG) corrective regimen sliding scale   SubCutaneous three times a day before meals  insulin lispro Injectable (ADMELOG) 10 Unit(s) SubCutaneous three times a day before meals  magnesium sulfate  IVPB 2 Gram(s) IV Intermittent once  methocarbamol 500 milliGRAM(s) Oral three times a day  remdesivir  IVPB   IV Intermittent   remdesivir  IVPB 100 milliGRAM(s) IV Intermittent every 24 hours  spironolactone 25 milliGRAM(s) Oral daily    MEDICATIONS  (PRN):  acetaminophen     Tablet .. 650 milliGRAM(s) Oral every 6 hours PRN Temp greater or equal to 38C (100.4F), Mild Pain (1 - 3)  dextrose Oral Gel 15 Gram(s) Oral once PRN Blood Glucose LESS THAN 70 milliGRAM(s)/deciliter  ondansetron Injectable 4 milliGRAM(s) IV Push every 6 hours PRN Nausea and/or Vomiting      RADIOLOGY & ADDITIONAL TESTS:

## 2025-02-15 NOTE — PROGRESS NOTE ADULT - ASSESSMENT
55yo F hx HFrEF w/ LV recovery, PAD s/p bilateral LE Stents, HTN, GERHARD not on CPAP admitted to MICU for acute hypoxemic respiratory failure requiring NIV in setting of COVID and also shock state in setting of LLE cellulitis    Acute hypoxemic respiratory failure due to COVID  -CT chest reviewed   -Continue remdesivir 1more day   -Continue dexamethasone  -on NC    Septic shock due to LLE cellulitis  -s/p vasopressors, now on midodrine   -MSRA swab negative  -Continue Cefazolin   -Wound Care Consult for LE Wounds appreciated     HFrEF w/ LV recovery  -CT chest did not reveal pulmonary edema  -Seen by cardiology, recommended diuresis x 2 days due to missed doses  - PO Lasix at 40 mg daily    -resume Aldactone   - Entresto on hold due to hyperkalemia   -Resume Coreg     CODY  -monitor    Hyperkalemia  - resolved  monitor    DM 2  A1c 8.5  -Jardiance, metformin on hold  -Accu checks and ISS  - lantus and ssi     Hx PAD  -ASA, plavix, statin    Hx HTN  -spironolactone resumed  - hold entresto     DVT Prophylaxis -- Heparin SQ    Dispo: Home vs [facility] pending progress. Currently active with Respiratory Failure / LLE Cellulitis. PT Eval.

## 2025-02-16 LAB
ALBUMIN SERPL ELPH-MCNC: 3.1 G/DL — LOW (ref 3.3–5.2)
ALP SERPL-CCNC: 80 U/L — SIGNIFICANT CHANGE UP (ref 40–120)
ALT FLD-CCNC: 14 U/L — SIGNIFICANT CHANGE UP
ANION GAP SERPL CALC-SCNC: 13 MMOL/L — SIGNIFICANT CHANGE UP (ref 5–17)
AST SERPL-CCNC: 16 U/L — SIGNIFICANT CHANGE UP
BILIRUB SERPL-MCNC: 0.6 MG/DL — SIGNIFICANT CHANGE UP (ref 0.4–2)
BUN SERPL-MCNC: 25.4 MG/DL — HIGH (ref 8–20)
CALCIUM SERPL-MCNC: 8.7 MG/DL — SIGNIFICANT CHANGE UP (ref 8.4–10.5)
CHLORIDE SERPL-SCNC: 92 MMOL/L — LOW (ref 96–108)
CO2 SERPL-SCNC: 31 MMOL/L — HIGH (ref 22–29)
CREAT SERPL-MCNC: 0.67 MG/DL — SIGNIFICANT CHANGE UP (ref 0.5–1.3)
EGFR: 104 ML/MIN/1.73M2 — SIGNIFICANT CHANGE UP
GLUCOSE BLDC GLUCOMTR-MCNC: 189 MG/DL — HIGH (ref 70–99)
GLUCOSE BLDC GLUCOMTR-MCNC: 269 MG/DL — HIGH (ref 70–99)
GLUCOSE BLDC GLUCOMTR-MCNC: 285 MG/DL — HIGH (ref 70–99)
GLUCOSE SERPL-MCNC: 182 MG/DL — HIGH (ref 70–99)
POTASSIUM SERPL-MCNC: 6 MMOL/L — HIGH (ref 3.5–5.3)
POTASSIUM SERPL-SCNC: 6 MMOL/L — HIGH (ref 3.5–5.3)
PROT SERPL-MCNC: 6.8 G/DL — SIGNIFICANT CHANGE UP (ref 6.6–8.7)
SODIUM SERPL-SCNC: 135 MMOL/L — SIGNIFICANT CHANGE UP (ref 135–145)

## 2025-02-16 PROCEDURE — 99233 SBSQ HOSP IP/OBS HIGH 50: CPT

## 2025-02-16 PROCEDURE — 71045 X-RAY EXAM CHEST 1 VIEW: CPT | Mod: 26

## 2025-02-16 RX ORDER — SACUBITRIL AND VALSARTAN 49; 51 MG/1; MG/1
1 TABLET, FILM COATED ORAL
Refills: 0 | Status: DISCONTINUED | OUTPATIENT
Start: 2025-02-16 | End: 2025-02-19

## 2025-02-16 RX ADMIN — Medication 25 MILLIGRAM(S): at 05:27

## 2025-02-16 RX ADMIN — DEXAMETHASONE 6 MILLIGRAM(S): 0.5 TABLET ORAL at 05:26

## 2025-02-16 RX ADMIN — ATORVASTATIN CALCIUM 40 MILLIGRAM(S): 80 TABLET, FILM COATED ORAL at 21:50

## 2025-02-16 RX ADMIN — SACUBITRIL AND VALSARTAN 1 TABLET(S): 49; 51 TABLET, FILM COATED ORAL at 17:11

## 2025-02-16 RX ADMIN — GABAPENTIN 100 MILLIGRAM(S): 400 CAPSULE ORAL at 23:56

## 2025-02-16 RX ADMIN — REMDESIVIR 200 MILLIGRAM(S): 5 INJECTION INTRAVENOUS at 09:00

## 2025-02-16 RX ADMIN — INSULIN LISPRO 6: 100 INJECTION, SOLUTION INTRAVENOUS; SUBCUTANEOUS at 11:08

## 2025-02-16 RX ADMIN — HEPARIN SODIUM 7500 UNIT(S): 1000 INJECTION INTRAVENOUS; SUBCUTANEOUS at 17:10

## 2025-02-16 RX ADMIN — GABAPENTIN 100 MILLIGRAM(S): 400 CAPSULE ORAL at 17:10

## 2025-02-16 RX ADMIN — CLOPIDOGREL BISULFATE 75 MILLIGRAM(S): 75 TABLET, FILM COATED ORAL at 11:08

## 2025-02-16 RX ADMIN — INSULIN LISPRO 6: 100 INJECTION, SOLUTION INTRAVENOUS; SUBCUTANEOUS at 16:27

## 2025-02-16 RX ADMIN — Medication 1 APPLICATION(S): at 05:42

## 2025-02-16 RX ADMIN — INSULIN LISPRO 2: 100 INJECTION, SOLUTION INTRAVENOUS; SUBCUTANEOUS at 08:20

## 2025-02-16 RX ADMIN — Medication 1000 MILLIGRAM(S): at 05:26

## 2025-02-16 RX ADMIN — Medication 1000 MILLIGRAM(S): at 17:10

## 2025-02-16 RX ADMIN — INSULIN LISPRO 10 UNIT(S): 100 INJECTION, SOLUTION INTRAVENOUS; SUBCUTANEOUS at 08:21

## 2025-02-16 RX ADMIN — Medication 650 MILLIGRAM(S): at 21:50

## 2025-02-16 RX ADMIN — Medication 650 MILLIGRAM(S): at 22:50

## 2025-02-16 RX ADMIN — IPRATROPIUM BROMIDE AND ALBUTEROL SULFATE 3 MILLILITER(S): .5; 2.5 SOLUTION RESPIRATORY (INHALATION) at 22:19

## 2025-02-16 RX ADMIN — HEPARIN SODIUM 7500 UNIT(S): 1000 INJECTION INTRAVENOUS; SUBCUTANEOUS at 05:27

## 2025-02-16 RX ADMIN — IPRATROPIUM BROMIDE AND ALBUTEROL SULFATE 3 MILLILITER(S): .5; 2.5 SOLUTION RESPIRATORY (INHALATION) at 16:13

## 2025-02-16 RX ADMIN — IPRATROPIUM BROMIDE AND ALBUTEROL SULFATE 3 MILLILITER(S): .5; 2.5 SOLUTION RESPIRATORY (INHALATION) at 09:30

## 2025-02-16 RX ADMIN — FUROSEMIDE 40 MILLIGRAM(S): 10 INJECTION INTRAMUSCULAR; INTRAVENOUS at 05:27

## 2025-02-16 RX ADMIN — METHOCARBAMOL 500 MILLIGRAM(S): 500 TABLET, FILM COATED ORAL at 17:11

## 2025-02-16 RX ADMIN — INSULIN GLARGINE-YFGN 40 UNIT(S): 100 INJECTION, SOLUTION SUBCUTANEOUS at 21:50

## 2025-02-16 RX ADMIN — METHOCARBAMOL 500 MILLIGRAM(S): 500 TABLET, FILM COATED ORAL at 23:56

## 2025-02-16 RX ADMIN — SODIUM HYPOCHLORITE 1 APPLICATION(S): 0.12 SOLUTION TOPICAL at 11:09

## 2025-02-16 RX ADMIN — METHOCARBAMOL 500 MILLIGRAM(S): 500 TABLET, FILM COATED ORAL at 08:21

## 2025-02-16 RX ADMIN — CARVEDILOL 6.25 MILLIGRAM(S): 3.12 TABLET, FILM COATED ORAL at 17:10

## 2025-02-16 RX ADMIN — GABAPENTIN 100 MILLIGRAM(S): 400 CAPSULE ORAL at 08:21

## 2025-02-16 RX ADMIN — CARVEDILOL 6.25 MILLIGRAM(S): 3.12 TABLET, FILM COATED ORAL at 05:27

## 2025-02-16 RX ADMIN — Medication 81 MILLIGRAM(S): at 11:09

## 2025-02-16 RX ADMIN — INSULIN LISPRO 10 UNIT(S): 100 INJECTION, SOLUTION INTRAVENOUS; SUBCUTANEOUS at 11:08

## 2025-02-16 RX ADMIN — INSULIN LISPRO 10 UNIT(S): 100 INJECTION, SOLUTION INTRAVENOUS; SUBCUTANEOUS at 16:26

## 2025-02-16 NOTE — PROGRESS NOTE ADULT - ASSESSMENT
ADIEL CURRAN    [id number]    54y      Female    INTERVAL HPI/OVERNIGHT EVENTS:  patient being seen for covid and cellulitis. patient seen at bedside and complains of left leg swelling    patient is on 3L NC    REVIEW OF SYSTEMS:    CONSTITUTIONAL: pain  RESPIRATORY: No cough, wheezing, hemoptysis; No shortness of breath  CARDIOVASCULAR: No chest pain, palpitations  GASTROINTESTINAL: No abdominal or epigastric pain. No nausea, vomiting  NEUROLOGICAL: No headaches, memory loss, loss of strength.  MISCELLANEOUS:      Vital Signs Last 24 Hrs  T(C): 36.8 (16 Feb 2025 12:48), Max: 37.2 (16 Feb 2025 04:00)  T(F): 98.2 (16 Feb 2025 12:48), Max: 99 (16 Feb 2025 04:00)  HR: 79 (16 Feb 2025 14:00) (62 - 92)  BP: 147/81 (16 Feb 2025 14:00) (115/96 - 153/69)  BP(mean): 99 (16 Feb 2025 14:00) (83 - 103)  RR: 19 (16 Feb 2025 14:00) (15 - 20)  SpO2: 94% (16 Feb 2025 14:00) (92% - 97%)    Parameters below as of 16 Feb 2025 14:00  Patient On (Oxygen Delivery Method): nasal cannula  O2 Flow (L/min): 2      PHYSICAL EXAM:  General: Middle aged female , obese, speaking in full sentences  HEENT: EOMI. Clear conjunctivae. Moist mucus membrane  Neck: Supple.   Chest: Good air entry. Scattered wheezing and rhonchi.   Heart: Normal S1 & S2. RRR.   Abdomen: Obese. Soft. Non-tender. + BS  LLE: Dressing in place. RLE: Lymphedema with chronic skin changes.   Neuro: Awake and alert. No focal deficit. Speech clear.   Psychiatry: Normal mood and affect      LABS:                        14.3   13.05 )-----------( 229      ( 15 Feb 2025 07:06 )             48.0     02-16    135  |  92[L]  |  25.4[H]  ----------------------------<  182[H]  6.0[H]   |  31.0[H]  |  0.67    Ca    8.7      16 Feb 2025 10:15  Mg     1.7     02-15    TPro  6.8  /  Alb  3.1[L]  /  TBili  0.6  /  DBili  x   /  AST  16  /  ALT  14  /  AlkPhos  80  02-16      Urinalysis Basic - ( 16 Feb 2025 10:15 )    Color: x / Appearance: x / SG: x / pH: x  Gluc: 182 mg/dL / Ketone: x  / Bili: x / Urobili: x   Blood: x / Protein: x / Nitrite: x   Leuk Esterase: x / RBC: x / WBC x   Sq Epi: x / Non Sq Epi: x / Bacteria: x          MEDICATIONS  (STANDING):  albuterol/ipratropium for Nebulization 3 milliLiter(s) Nebulizer every 6 hours  aspirin  chewable 81 milliGRAM(s) Oral daily  atorvastatin 40 milliGRAM(s) Oral at bedtime  carvedilol 6.25 milliGRAM(s) Oral every 12 hours  ceFAZolin  Injectable. 1000 milliGRAM(s) IV Push every 12 hours  chlorhexidine 2% Cloths 1 Application(s) Topical <User Schedule>  clopidogrel Tablet 75 milliGRAM(s) Oral daily  Dakins Solution - 1/4 Strength 1 Application(s) Topical daily  dexAMETHasone  Injectable 6 milliGRAM(s) IV Push daily  dextrose 5%. 1000 milliLiter(s) (100 mL/Hr) IV Continuous <Continuous>  dextrose 5%. 1000 milliLiter(s) (50 mL/Hr) IV Continuous <Continuous>  dextrose 50% Injectable 25 Gram(s) IV Push once  dextrose 50% Injectable 12.5 Gram(s) IV Push once  dextrose 50% Injectable 25 Gram(s) IV Push once  furosemide    Tablet 40 milliGRAM(s) Oral daily  gabapentin 100 milliGRAM(s) Oral three times a day  glucagon  Injectable 1 milliGRAM(s) IntraMuscular once  heparin   Injectable 7500 Unit(s) SubCutaneous every 12 hours  influenza   Vaccine 0.5 milliLiter(s) IntraMuscular once  insulin glargine Injectable (LANTUS) 40 Unit(s) SubCutaneous at bedtime  insulin lispro (ADMELOG) corrective regimen sliding scale   SubCutaneous three times a day before meals  insulin lispro Injectable (ADMELOG) 10 Unit(s) SubCutaneous three times a day before meals  methocarbamol 500 milliGRAM(s) Oral three times a day  sacubitril 24 mG/valsartan 26 mG 1 Tablet(s) Oral two times a day  spironolactone 25 milliGRAM(s) Oral daily    MEDICATIONS  (PRN):  acetaminophen     Tablet .. 650 milliGRAM(s) Oral every 6 hours PRN Temp greater or equal to 38C (100.4F), Mild Pain (1 - 3)  dextrose Oral Gel 15 Gram(s) Oral once PRN Blood Glucose LESS THAN 70 milliGRAM(s)/deciliter  ondansetron Injectable 4 milliGRAM(s) IV Push every 6 hours PRN Nausea and/or Vomiting      RADIOLOGY & ADDITIONAL TESTS:    le duplex - ordered       a/p  55yo F hx HFrEF w/ LV recovery, PAD s/p bilateral LE Stents, HTN, GERHARD not on CPAP admitted to MICU for acute hypoxemic respiratory failure requiring NIV in setting of COVID and also shock state in setting of LLE cellulitis    Acute hypoxemic respiratory failure due to COVID  -CT chest reviewed   -last day of remdesivir  -Continue dexamethasone  -on NC taper as tolerated    Septic shock due to LLE cellulitis  -s/p vasopressors, now on midodrine   -MSRA swab negative  -Continue Cefazolin   -Wound Care Consult for LE Wounds appreciated   le duplex ordered    HFrEF w/ LV recovery  - PO Lasix at 40 mg daily    - Aldactone   - Entresto and coreg resumed    CODY  -monitor    DM 2  A1c 8.5  -Jardiance, metformin on hold  -Accu checks and ISS  - lantus and ssi     Hx PAD  -ASA, plavix, statin    Hx HTN  -spironolactone resumed  - hold entresto     DVT Prophylaxis -- Heparin SQ    Dispo: Home vs [facility] pending progress.   likely dc in 1-2 days

## 2025-02-17 LAB
ALBUMIN SERPL ELPH-MCNC: 2.9 G/DL — LOW (ref 3.3–5.2)
ALP SERPL-CCNC: 76 U/L — SIGNIFICANT CHANGE UP (ref 40–120)
ALT FLD-CCNC: 11 U/L — SIGNIFICANT CHANGE UP
ANION GAP SERPL CALC-SCNC: 9 MMOL/L — SIGNIFICANT CHANGE UP (ref 5–17)
AST SERPL-CCNC: 12 U/L — SIGNIFICANT CHANGE UP
BASOPHILS # BLD AUTO: 0.03 K/UL — SIGNIFICANT CHANGE UP (ref 0–0.2)
BASOPHILS NFR BLD AUTO: 0.2 % — SIGNIFICANT CHANGE UP (ref 0–2)
BILIRUB SERPL-MCNC: 0.4 MG/DL — SIGNIFICANT CHANGE UP (ref 0.4–2)
BUN SERPL-MCNC: 29 MG/DL — HIGH (ref 8–20)
CALCIUM SERPL-MCNC: 8.4 MG/DL — SIGNIFICANT CHANGE UP (ref 8.4–10.5)
CHLORIDE SERPL-SCNC: 96 MMOL/L — SIGNIFICANT CHANGE UP (ref 96–108)
CO2 SERPL-SCNC: 33 MMOL/L — HIGH (ref 22–29)
CREAT SERPL-MCNC: 0.59 MG/DL — SIGNIFICANT CHANGE UP (ref 0.5–1.3)
EGFR: 107 ML/MIN/1.73M2 — SIGNIFICANT CHANGE UP
EOSINOPHIL # BLD AUTO: 0.18 K/UL — SIGNIFICANT CHANGE UP (ref 0–0.5)
EOSINOPHIL NFR BLD AUTO: 1.5 % — SIGNIFICANT CHANGE UP (ref 0–6)
GLUCOSE BLDC GLUCOMTR-MCNC: 178 MG/DL — HIGH (ref 70–99)
GLUCOSE BLDC GLUCOMTR-MCNC: 206 MG/DL — HIGH (ref 70–99)
GLUCOSE BLDC GLUCOMTR-MCNC: 210 MG/DL — HIGH (ref 70–99)
GLUCOSE BLDC GLUCOMTR-MCNC: 229 MG/DL — HIGH (ref 70–99)
GLUCOSE BLDC GLUCOMTR-MCNC: 249 MG/DL — HIGH (ref 70–99)
GLUCOSE BLDC GLUCOMTR-MCNC: 377 MG/DL — HIGH (ref 70–99)
GLUCOSE SERPL-MCNC: 200 MG/DL — HIGH (ref 70–99)
HCT VFR BLD CALC: 45.6 % — HIGH (ref 34.5–45)
HGB BLD-MCNC: 14.1 G/DL — SIGNIFICANT CHANGE UP (ref 11.5–15.5)
IMM GRANULOCYTES # BLD AUTO: 0.17 K/UL — HIGH (ref 0–0.07)
IMM GRANULOCYTES NFR BLD AUTO: 1.4 % — HIGH (ref 0–0.9)
LYMPHOCYTES # BLD AUTO: 2.61 K/UL — SIGNIFICANT CHANGE UP (ref 1–3.3)
LYMPHOCYTES NFR BLD AUTO: 21.3 % — SIGNIFICANT CHANGE UP (ref 13–44)
MAGNESIUM SERPL-MCNC: 1.9 MG/DL — SIGNIFICANT CHANGE UP (ref 1.6–2.6)
MCHC RBC-ENTMCNC: 27.8 PG — SIGNIFICANT CHANGE UP (ref 27–34)
MCHC RBC-ENTMCNC: 30.9 G/DL — LOW (ref 32–36)
MCV RBC AUTO: 89.9 FL — SIGNIFICANT CHANGE UP (ref 80–100)
MONOCYTES # BLD AUTO: 0.88 K/UL — SIGNIFICANT CHANGE UP (ref 0–0.9)
MONOCYTES NFR BLD AUTO: 7.2 % — SIGNIFICANT CHANGE UP (ref 2–14)
NEUTROPHILS # BLD AUTO: 8.36 K/UL — HIGH (ref 1.8–7.4)
NEUTROPHILS NFR BLD AUTO: 68.4 % — SIGNIFICANT CHANGE UP (ref 43–77)
NRBC # BLD AUTO: 0 K/UL — SIGNIFICANT CHANGE UP (ref 0–0)
NRBC # FLD: 0 K/UL — SIGNIFICANT CHANGE UP (ref 0–0)
NRBC BLD AUTO-RTO: 0 /100 WBCS — SIGNIFICANT CHANGE UP (ref 0–0)
PLATELET # BLD AUTO: 238 K/UL — SIGNIFICANT CHANGE UP (ref 150–400)
PMV BLD: 10.2 FL — SIGNIFICANT CHANGE UP (ref 7–13)
POTASSIUM SERPL-MCNC: 4.8 MMOL/L — SIGNIFICANT CHANGE UP (ref 3.5–5.3)
POTASSIUM SERPL-SCNC: 4.8 MMOL/L — SIGNIFICANT CHANGE UP (ref 3.5–5.3)
PROT SERPL-MCNC: 6.1 G/DL — LOW (ref 6.6–8.7)
RBC # BLD: 5.07 M/UL — SIGNIFICANT CHANGE UP (ref 3.8–5.2)
RBC # FLD: 15.9 % — HIGH (ref 10.3–14.5)
SODIUM SERPL-SCNC: 138 MMOL/L — SIGNIFICANT CHANGE UP (ref 135–145)
WBC # BLD: 12.23 K/UL — HIGH (ref 3.8–10.5)
WBC # FLD AUTO: 12.23 K/UL — HIGH (ref 3.8–10.5)

## 2025-02-17 PROCEDURE — 99232 SBSQ HOSP IP/OBS MODERATE 35: CPT

## 2025-02-17 PROCEDURE — 93971 EXTREMITY STUDY: CPT | Mod: 26,LT

## 2025-02-17 RX ADMIN — CARVEDILOL 6.25 MILLIGRAM(S): 3.12 TABLET, FILM COATED ORAL at 17:55

## 2025-02-17 RX ADMIN — Medication 1000 MILLIGRAM(S): at 17:57

## 2025-02-17 RX ADMIN — SACUBITRIL AND VALSARTAN 1 TABLET(S): 49; 51 TABLET, FILM COATED ORAL at 06:49

## 2025-02-17 RX ADMIN — IPRATROPIUM BROMIDE AND ALBUTEROL SULFATE 3 MILLILITER(S): .5; 2.5 SOLUTION RESPIRATORY (INHALATION) at 04:41

## 2025-02-17 RX ADMIN — ATORVASTATIN CALCIUM 40 MILLIGRAM(S): 80 TABLET, FILM COATED ORAL at 22:01

## 2025-02-17 RX ADMIN — SODIUM HYPOCHLORITE 1 APPLICATION(S): 0.12 SOLUTION TOPICAL at 11:20

## 2025-02-17 RX ADMIN — Medication 25 MILLIGRAM(S): at 06:50

## 2025-02-17 RX ADMIN — CLOPIDOGREL BISULFATE 75 MILLIGRAM(S): 75 TABLET, FILM COATED ORAL at 11:19

## 2025-02-17 RX ADMIN — INSULIN LISPRO 4: 100 INJECTION, SOLUTION INTRAVENOUS; SUBCUTANEOUS at 17:55

## 2025-02-17 RX ADMIN — INSULIN LISPRO 2: 100 INJECTION, SOLUTION INTRAVENOUS; SUBCUTANEOUS at 08:06

## 2025-02-17 RX ADMIN — INSULIN LISPRO 10 UNIT(S): 100 INJECTION, SOLUTION INTRAVENOUS; SUBCUTANEOUS at 17:56

## 2025-02-17 RX ADMIN — GABAPENTIN 100 MILLIGRAM(S): 400 CAPSULE ORAL at 11:20

## 2025-02-17 RX ADMIN — GABAPENTIN 100 MILLIGRAM(S): 400 CAPSULE ORAL at 18:00

## 2025-02-17 RX ADMIN — FUROSEMIDE 40 MILLIGRAM(S): 10 INJECTION INTRAMUSCULAR; INTRAVENOUS at 06:49

## 2025-02-17 RX ADMIN — METHOCARBAMOL 500 MILLIGRAM(S): 500 TABLET, FILM COATED ORAL at 08:06

## 2025-02-17 RX ADMIN — INSULIN GLARGINE-YFGN 40 UNIT(S): 100 INJECTION, SOLUTION SUBCUTANEOUS at 23:29

## 2025-02-17 RX ADMIN — METHOCARBAMOL 500 MILLIGRAM(S): 500 TABLET, FILM COATED ORAL at 23:30

## 2025-02-17 RX ADMIN — GABAPENTIN 100 MILLIGRAM(S): 400 CAPSULE ORAL at 23:30

## 2025-02-17 RX ADMIN — DEXAMETHASONE 6 MILLIGRAM(S): 0.5 TABLET ORAL at 06:48

## 2025-02-17 RX ADMIN — Medication 1000 MILLIGRAM(S): at 06:22

## 2025-02-17 RX ADMIN — Medication 81 MILLIGRAM(S): at 11:20

## 2025-02-17 RX ADMIN — METHOCARBAMOL 500 MILLIGRAM(S): 500 TABLET, FILM COATED ORAL at 17:55

## 2025-02-17 RX ADMIN — SACUBITRIL AND VALSARTAN 1 TABLET(S): 49; 51 TABLET, FILM COATED ORAL at 17:55

## 2025-02-17 RX ADMIN — HEPARIN SODIUM 7500 UNIT(S): 1000 INJECTION INTRAVENOUS; SUBCUTANEOUS at 17:57

## 2025-02-17 RX ADMIN — HEPARIN SODIUM 7500 UNIT(S): 1000 INJECTION INTRAVENOUS; SUBCUTANEOUS at 06:23

## 2025-02-17 RX ADMIN — INSULIN LISPRO 10: 100 INJECTION, SOLUTION INTRAVENOUS; SUBCUTANEOUS at 11:18

## 2025-02-17 RX ADMIN — Medication 1 APPLICATION(S): at 06:54

## 2025-02-17 RX ADMIN — INSULIN LISPRO 10 UNIT(S): 100 INJECTION, SOLUTION INTRAVENOUS; SUBCUTANEOUS at 11:19

## 2025-02-17 NOTE — PROGRESS NOTE ADULT - REASON FOR ADMISSION
covid
Acute hypoxemic respiratory failure, COVID, shock state
covid
sepsis with AHRF 2/2 COVID-19 PNA and cellulitis of LLE, CODY
AHRF 2/2 COVID-19 PNA, sepsis 2/2 cellulitis of LLE, CODY
covid and cellulitis

## 2025-02-17 NOTE — PROGRESS NOTE ADULT - SUBJECTIVE AND OBJECTIVE BOX
ADIEL CURRAN    [id number]    54y      Female    INTERVAL HPI/OVERNIGHT EVENTS:  patient being seen for covid and cellulitis.  patient is on 2L NC and states feeling better    REVIEW OF SYSTEMS:    CONSTITUTIONAL: No fever, weight loss, or fatigue  RESPIRATORY: No cough, wheezing, hemoptysis; No shortness of breath  CARDIOVASCULAR: No chest pain, palpitations  GASTROINTESTINAL: No abdominal or epigastric pain. No nausea, vomiting  NEUROLOGICAL: No headaches, memory loss, loss of strength.  MISCELLANEOUS:      Vital Signs Last 24 Hrs  T(C): 36.4 (17 Feb 2025 14:25), Max: 36.6 (16 Feb 2025 22:00)  T(F): 97.6 (17 Feb 2025 14:25), Max: 97.8 (16 Feb 2025 22:00)  HR: 84 (17 Feb 2025 14:25) (64 - 84)  BP: 128/75 (17 Feb 2025 14:25) (89/51 - 149/75)  BP(mean): 94 (17 Feb 2025 14:00) (83 - 96)  RR: 19 (17 Feb 2025 14:25) (18 - 22)  SpO2: 92% (17 Feb 2025 14:25) (90% - 98%)    Parameters below as of 17 Feb 2025 14:25  Patient On (Oxygen Delivery Method): room air        PHYSICAL EXAM:    General: Middle aged female , obese, speaking in full sentences  HEENT: EOMI. Clear conjunctivae. Moist mucus membrane  Neck: Supple.   Chest: Good air entry. Scattered wheezing and rhonchi.   Heart: Normal S1 & S2. RRR.   Abdomen: Obese. Soft. Non-tender. + BS  LLE: Dressing in place. RLE: Lymphedema with chronic skin changes.   Neuro: Awake and alert. No focal deficit. Speech clear.   Psychiatry: Normal mood and affect      LABS:                        14.1   12.23 )-----------( 238      ( 17 Feb 2025 04:37 )             45.6     02-17    138  |  96  |  29.0[H]  ----------------------------<  200[H]  4.8   |  33.0[H]  |  0.59    Ca    8.4      17 Feb 2025 04:37  Mg     1.9     02-17    TPro  6.1[L]  /  Alb  2.9[L]  /  TBili  0.4  /  DBili  x   /  AST  12  /  ALT  11  /  AlkPhos  76  02-17      Urinalysis Basic - ( 17 Feb 2025 04:37 )    Color: x / Appearance: x / SG: x / pH: x  Gluc: 200 mg/dL / Ketone: x  / Bili: x / Urobili: x   Blood: x / Protein: x / Nitrite: x   Leuk Esterase: x / RBC: x / WBC x   Sq Epi: x / Non Sq Epi: x / Bacteria: x          MEDICATIONS  (STANDING):  albuterol/ipratropium for Nebulization 3 milliLiter(s) Nebulizer every 6 hours  aspirin  chewable 81 milliGRAM(s) Oral daily  atorvastatin 40 milliGRAM(s) Oral at bedtime  carvedilol 6.25 milliGRAM(s) Oral every 12 hours  ceFAZolin  Injectable. 1000 milliGRAM(s) IV Push every 12 hours  chlorhexidine 2% Cloths 1 Application(s) Topical <User Schedule>  clopidogrel Tablet 75 milliGRAM(s) Oral daily  Dakins Solution - 1/4 Strength 1 Application(s) Topical daily  dextrose 5%. 1000 milliLiter(s) (100 mL/Hr) IV Continuous <Continuous>  dextrose 5%. 1000 milliLiter(s) (50 mL/Hr) IV Continuous <Continuous>  dextrose 50% Injectable 25 Gram(s) IV Push once  dextrose 50% Injectable 12.5 Gram(s) IV Push once  dextrose 50% Injectable 25 Gram(s) IV Push once  furosemide    Tablet 40 milliGRAM(s) Oral daily  gabapentin 100 milliGRAM(s) Oral three times a day  glucagon  Injectable 1 milliGRAM(s) IntraMuscular once  heparin   Injectable 7500 Unit(s) SubCutaneous every 12 hours  influenza   Vaccine 0.5 milliLiter(s) IntraMuscular once  insulin glargine Injectable (LANTUS) 40 Unit(s) SubCutaneous at bedtime  insulin lispro (ADMELOG) corrective regimen sliding scale   SubCutaneous three times a day before meals  insulin lispro Injectable (ADMELOG) 10 Unit(s) SubCutaneous three times a day before meals  methocarbamol 500 milliGRAM(s) Oral three times a day  sacubitril 24 mG/valsartan 26 mG 1 Tablet(s) Oral two times a day  spironolactone 25 milliGRAM(s) Oral daily    MEDICATIONS  (PRN):  acetaminophen     Tablet .. 650 milliGRAM(s) Oral every 6 hours PRN Temp greater or equal to 38C (100.4F), Mild Pain (1 - 3)  dextrose Oral Gel 15 Gram(s) Oral once PRN Blood Glucose LESS THAN 70 milliGRAM(s)/deciliter  ondansetron Injectable 4 milliGRAM(s) IV Push every 6 hours PRN Nausea and/or Vomiting      RADIOLOGY & ADDITIONAL TESTS:

## 2025-02-17 NOTE — PROGRESS NOTE ADULT - ASSESSMENT
53yo F hx HFrEF w/ LV recovery, PAD s/p bilateral LE Stents, HTN, GERHARD not on CPAP admitted to MICU for acute hypoxemic respiratory failure requiring NIV in setting of COVID and also shock state in setting of LLE cellulitis    Acute hypoxemic respiratory failure due to COVID  -CT chest reviewed   - s/p remdesivir  -dc remdesivcir  -on NC taper as tolerated, may need at home     Septic shock due to LLE cellulitis  -s/p vasopressors, s/p midodrine  -MSRA swab negative  -Continue Cefazolin today is day #7 of 7  -Wound Care Consult for LE Wounds appreciated   le duplex ordered to rule out duplex    HFrEF w/ LV recovery  - PO Lasix at 40 mg daily    - Aldactone   - Entresto and coreg     CODY  -monitor    DM 2  A1c 8.5  -Jardiance, metformin on hold  - lantus and ssi     Hx PAD  -ASA, plavix, statin    Hx HTN  -spironolactone   - entresto     DVT Prophylaxis -- Heparin SQ    Dispo: home with home care [place] in 1-2 days  refusing [facility]

## 2025-02-18 LAB
ALBUMIN SERPL ELPH-MCNC: 2.9 G/DL — LOW (ref 3.3–5.2)
ALP SERPL-CCNC: 85 U/L — SIGNIFICANT CHANGE UP (ref 40–120)
ALT FLD-CCNC: 10 U/L — SIGNIFICANT CHANGE UP
ANION GAP SERPL CALC-SCNC: 8 MMOL/L — SIGNIFICANT CHANGE UP (ref 5–17)
AST SERPL-CCNC: 11 U/L — SIGNIFICANT CHANGE UP
BASOPHILS # BLD AUTO: 0.02 K/UL — SIGNIFICANT CHANGE UP (ref 0–0.2)
BASOPHILS NFR BLD AUTO: 0.2 % — SIGNIFICANT CHANGE UP (ref 0–2)
BILIRUB SERPL-MCNC: 0.3 MG/DL — LOW (ref 0.4–2)
BUN SERPL-MCNC: 27.9 MG/DL — HIGH (ref 8–20)
CALCIUM SERPL-MCNC: 8.8 MG/DL — SIGNIFICANT CHANGE UP (ref 8.4–10.5)
CHLORIDE SERPL-SCNC: 95 MMOL/L — LOW (ref 96–108)
CO2 SERPL-SCNC: 32 MMOL/L — HIGH (ref 22–29)
CREAT SERPL-MCNC: 0.62 MG/DL — SIGNIFICANT CHANGE UP (ref 0.5–1.3)
EGFR: 106 ML/MIN/1.73M2 — SIGNIFICANT CHANGE UP
EOSINOPHIL # BLD AUTO: 0.14 K/UL — SIGNIFICANT CHANGE UP (ref 0–0.5)
EOSINOPHIL NFR BLD AUTO: 1.1 % — SIGNIFICANT CHANGE UP (ref 0–6)
GLUCOSE BLDC GLUCOMTR-MCNC: 171 MG/DL — HIGH (ref 70–99)
GLUCOSE BLDC GLUCOMTR-MCNC: 238 MG/DL — HIGH (ref 70–99)
GLUCOSE BLDC GLUCOMTR-MCNC: 243 MG/DL — HIGH (ref 70–99)
GLUCOSE BLDC GLUCOMTR-MCNC: 291 MG/DL — HIGH (ref 70–99)
GLUCOSE SERPL-MCNC: 210 MG/DL — HIGH (ref 70–99)
HCT VFR BLD CALC: 46.6 % — HIGH (ref 34.5–45)
HGB BLD-MCNC: 14.1 G/DL — SIGNIFICANT CHANGE UP (ref 11.5–15.5)
IMM GRANULOCYTES # BLD AUTO: 0.13 K/UL — HIGH (ref 0–0.07)
IMM GRANULOCYTES NFR BLD AUTO: 1.1 % — HIGH (ref 0–0.9)
LYMPHOCYTES # BLD AUTO: 2.94 K/UL — SIGNIFICANT CHANGE UP (ref 1–3.3)
LYMPHOCYTES NFR BLD AUTO: 23.8 % — SIGNIFICANT CHANGE UP (ref 13–44)
MAGNESIUM SERPL-MCNC: 1.9 MG/DL — SIGNIFICANT CHANGE UP (ref 1.6–2.6)
MCHC RBC-ENTMCNC: 27.1 PG — SIGNIFICANT CHANGE UP (ref 27–34)
MCHC RBC-ENTMCNC: 30.3 G/DL — LOW (ref 32–36)
MCV RBC AUTO: 89.4 FL — SIGNIFICANT CHANGE UP (ref 80–100)
MONOCYTES # BLD AUTO: 0.92 K/UL — HIGH (ref 0–0.9)
MONOCYTES NFR BLD AUTO: 7.4 % — SIGNIFICANT CHANGE UP (ref 2–14)
NEUTROPHILS # BLD AUTO: 8.21 K/UL — HIGH (ref 1.8–7.4)
NEUTROPHILS NFR BLD AUTO: 66.4 % — SIGNIFICANT CHANGE UP (ref 43–77)
NRBC # BLD AUTO: 0 K/UL — SIGNIFICANT CHANGE UP (ref 0–0)
NRBC # FLD: 0 K/UL — SIGNIFICANT CHANGE UP (ref 0–0)
NRBC BLD AUTO-RTO: 0 /100 WBCS — SIGNIFICANT CHANGE UP (ref 0–0)
PLATELET # BLD AUTO: 253 K/UL — SIGNIFICANT CHANGE UP (ref 150–400)
PMV BLD: 10.1 FL — SIGNIFICANT CHANGE UP (ref 7–13)
POTASSIUM SERPL-MCNC: 4.7 MMOL/L — SIGNIFICANT CHANGE UP (ref 3.5–5.3)
POTASSIUM SERPL-SCNC: 4.7 MMOL/L — SIGNIFICANT CHANGE UP (ref 3.5–5.3)
PROT SERPL-MCNC: 6.5 G/DL — LOW (ref 6.6–8.7)
RBC # BLD: 5.21 M/UL — HIGH (ref 3.8–5.2)
RBC # FLD: 15.9 % — HIGH (ref 10.3–14.5)
SODIUM SERPL-SCNC: 135 MMOL/L — SIGNIFICANT CHANGE UP (ref 135–145)
WBC # BLD: 12.36 K/UL — HIGH (ref 3.8–10.5)
WBC # FLD AUTO: 12.36 K/UL — HIGH (ref 3.8–10.5)

## 2025-02-18 PROCEDURE — 99232 SBSQ HOSP IP/OBS MODERATE 35: CPT

## 2025-02-18 RX ADMIN — GABAPENTIN 100 MILLIGRAM(S): 400 CAPSULE ORAL at 09:25

## 2025-02-18 RX ADMIN — INSULIN GLARGINE-YFGN 40 UNIT(S): 100 INJECTION, SOLUTION SUBCUTANEOUS at 21:33

## 2025-02-18 RX ADMIN — Medication 25 MILLIGRAM(S): at 06:03

## 2025-02-18 RX ADMIN — INSULIN LISPRO 2: 100 INJECTION, SOLUTION INTRAVENOUS; SUBCUTANEOUS at 09:27

## 2025-02-18 RX ADMIN — Medication 1 APPLICATION(S): at 06:03

## 2025-02-18 RX ADMIN — HEPARIN SODIUM 7500 UNIT(S): 1000 INJECTION INTRAVENOUS; SUBCUTANEOUS at 16:53

## 2025-02-18 RX ADMIN — SACUBITRIL AND VALSARTAN 1 TABLET(S): 49; 51 TABLET, FILM COATED ORAL at 06:02

## 2025-02-18 RX ADMIN — CARVEDILOL 6.25 MILLIGRAM(S): 3.12 TABLET, FILM COATED ORAL at 16:54

## 2025-02-18 RX ADMIN — INSULIN LISPRO 4: 100 INJECTION, SOLUTION INTRAVENOUS; SUBCUTANEOUS at 12:36

## 2025-02-18 RX ADMIN — SODIUM HYPOCHLORITE 1 APPLICATION(S): 0.12 SOLUTION TOPICAL at 12:43

## 2025-02-18 RX ADMIN — Medication 1000 MILLIGRAM(S): at 06:01

## 2025-02-18 RX ADMIN — INSULIN LISPRO 10 UNIT(S): 100 INJECTION, SOLUTION INTRAVENOUS; SUBCUTANEOUS at 16:56

## 2025-02-18 RX ADMIN — Medication 650 MILLIGRAM(S): at 18:38

## 2025-02-18 RX ADMIN — IPRATROPIUM BROMIDE AND ALBUTEROL SULFATE 3 MILLILITER(S): .5; 2.5 SOLUTION RESPIRATORY (INHALATION) at 15:25

## 2025-02-18 RX ADMIN — CARVEDILOL 6.25 MILLIGRAM(S): 3.12 TABLET, FILM COATED ORAL at 06:02

## 2025-02-18 RX ADMIN — SACUBITRIL AND VALSARTAN 1 TABLET(S): 49; 51 TABLET, FILM COATED ORAL at 16:54

## 2025-02-18 RX ADMIN — Medication 81 MILLIGRAM(S): at 09:26

## 2025-02-18 RX ADMIN — FUROSEMIDE 40 MILLIGRAM(S): 10 INJECTION INTRAMUSCULAR; INTRAVENOUS at 06:03

## 2025-02-18 RX ADMIN — GABAPENTIN 100 MILLIGRAM(S): 400 CAPSULE ORAL at 16:48

## 2025-02-18 RX ADMIN — IPRATROPIUM BROMIDE AND ALBUTEROL SULFATE 3 MILLILITER(S): .5; 2.5 SOLUTION RESPIRATORY (INHALATION) at 09:36

## 2025-02-18 RX ADMIN — Medication 1000 MILLIGRAM(S): at 16:54

## 2025-02-18 RX ADMIN — METHOCARBAMOL 500 MILLIGRAM(S): 500 TABLET, FILM COATED ORAL at 09:25

## 2025-02-18 RX ADMIN — METHOCARBAMOL 500 MILLIGRAM(S): 500 TABLET, FILM COATED ORAL at 16:49

## 2025-02-18 RX ADMIN — INSULIN LISPRO 10 UNIT(S): 100 INJECTION, SOLUTION INTRAVENOUS; SUBCUTANEOUS at 12:37

## 2025-02-18 RX ADMIN — INSULIN LISPRO 10 UNIT(S): 100 INJECTION, SOLUTION INTRAVENOUS; SUBCUTANEOUS at 10:00

## 2025-02-18 RX ADMIN — Medication 650 MILLIGRAM(S): at 17:30

## 2025-02-18 RX ADMIN — IPRATROPIUM BROMIDE AND ALBUTEROL SULFATE 3 MILLILITER(S): .5; 2.5 SOLUTION RESPIRATORY (INHALATION) at 21:49

## 2025-02-18 RX ADMIN — INSULIN LISPRO 6: 100 INJECTION, SOLUTION INTRAVENOUS; SUBCUTANEOUS at 16:56

## 2025-02-18 RX ADMIN — Medication 650 MILLIGRAM(S): at 16:49

## 2025-02-18 RX ADMIN — HEPARIN SODIUM 7500 UNIT(S): 1000 INJECTION INTRAVENOUS; SUBCUTANEOUS at 06:03

## 2025-02-18 RX ADMIN — ATORVASTATIN CALCIUM 40 MILLIGRAM(S): 80 TABLET, FILM COATED ORAL at 21:33

## 2025-02-18 RX ADMIN — CLOPIDOGREL BISULFATE 75 MILLIGRAM(S): 75 TABLET, FILM COATED ORAL at 09:27

## 2025-02-18 NOTE — PROGRESS NOTE ADULT - SUBJECTIVE AND OBJECTIVE BOX
Patient is a 54y old  Female with covid and cellulitis      Chart reviewed         ALLERGIES:  No Known Allergies    MEDICATIONS  (STANDING):  albuterol/ipratropium for Nebulization 3 milliLiter(s) Nebulizer every 6 hours  aspirin  chewable 81 milliGRAM(s) Oral daily  atorvastatin 40 milliGRAM(s) Oral at bedtime  carvedilol 6.25 milliGRAM(s) Oral every 12 hours  ceFAZolin  Injectable. 1000 milliGRAM(s) IV Push every 12 hours  chlorhexidine 2% Cloths 1 Application(s) Topical <User Schedule>  clopidogrel Tablet 75 milliGRAM(s) Oral daily  Dakins Solution - 1/4 Strength 1 Application(s) Topical daily  dextrose 5%. 1000 milliLiter(s) (100 mL/Hr) IV Continuous <Continuous>  dextrose 5%. 1000 milliLiter(s) (50 mL/Hr) IV Continuous <Continuous>  dextrose 50% Injectable 25 Gram(s) IV Push once  dextrose 50% Injectable 12.5 Gram(s) IV Push once  dextrose 50% Injectable 25 Gram(s) IV Push once  furosemide    Tablet 40 milliGRAM(s) Oral daily  gabapentin 100 milliGRAM(s) Oral three times a day  glucagon  Injectable 1 milliGRAM(s) IntraMuscular once  heparin   Injectable 7500 Unit(s) SubCutaneous every 12 hours  influenza   Vaccine 0.5 milliLiter(s) IntraMuscular once  insulin glargine Injectable (LANTUS) 40 Unit(s) SubCutaneous at bedtime  insulin lispro (ADMELOG) corrective regimen sliding scale   SubCutaneous three times a day before meals  insulin lispro Injectable (ADMELOG) 10 Unit(s) SubCutaneous three times a day before meals  methocarbamol 500 milliGRAM(s) Oral three times a day  sacubitril 24 mG/valsartan 26 mG 1 Tablet(s) Oral two times a day  spironolactone 25 milliGRAM(s) Oral daily    MEDICATIONS  (PRN):  acetaminophen     Tablet .. 650 milliGRAM(s) Oral every 6 hours PRN Temp greater or equal to 38C (100.4F), Mild Pain (1 - 3)  dextrose Oral Gel 15 Gram(s) Oral once PRN Blood Glucose LESS THAN 70 milliGRAM(s)/deciliter  ondansetron Injectable 4 milliGRAM(s) IV Push every 6 hours PRN Nausea and/or Vomiting    Vital Signs Last 24 Hrs  T(F): 97.5 (18 Feb 2025 05:52), Max: 98 (17 Feb 2025 21:21)  HR: 65 (18 Feb 2025 05:52) (64 - 84)  BP: 119/73 (18 Feb 2025 05:52) (103/73 - 134/82)  RR: 18 (18 Feb 2025 05:52) (18 - 20)  SpO2: 96% (18 Feb 2025 05:52) (92% - 98%)  I&O's Summary    17 Feb 2025 07:01  -  18 Feb 2025 07:00  --------------------------------------------------------  IN: 1090 mL / OUT: 3475 mL / NET: -2385 mL        PHYSICAL EXAM:  General:   ENT:   Neck:   Lungs:   Cardio: RRR, S1/S2, No murmur  Abdomen: Soft, Nontender, Nondistended; Bowel sounds present  Extremities: No calf tenderness, No pitting edema    LABS:                        14.1   12.36 )-----------( 253      ( 18 Feb 2025 06:15 )             46.6     02-18    135  |  95  |  27.9  ----------------------------<  210  4.7   |  32.0  |  0.62    Ca    8.8      18 Feb 2025 06:15  Mg     1.9     02-18    TPro  6.5  /  Alb  2.9  /  TBili  0.3  /  DBili  x   /  AST  11  /  ALT  10  /  AlkPhos  85  02-18                                  POCT Blood Glucose.: 229 mg/dL (17 Feb 2025 23:28)  POCT Blood Glucose.: 210 mg/dL (17 Feb 2025 21:20)  POCT Blood Glucose.: 206 mg/dL (17 Feb 2025 17:17)  POCT Blood Glucose.: 377 mg/dL (17 Feb 2025 11:17)  POCT Blood Glucose.: 178 mg/dL (17 Feb 2025 08:05)      Urinalysis Basic - ( 18 Feb 2025 06:15 )    Color: x / Appearance: x / SG: x / pH: x  Gluc: 210 mg/dL / Ketone: x  / Bili: x / Urobili: x   Blood: x / Protein: x / Nitrite: x   Leuk Esterase: x / RBC: x / WBC x   Sq Epi: x / Non Sq Epi: x / Bacteria: x          RADIOLOGY & ADDITIONAL TESTS:       Patient is a 54y old  Female with covid and cellulitis      Chart reviewed   pt is seen this am , on room air   no complaints feeling better           ALLERGIES:  No Known Allergies    MEDICATIONS  (STANDING):  albuterol/ipratropium for Nebulization 3 milliLiter(s) Nebulizer every 6 hours  aspirin  chewable 81 milliGRAM(s) Oral daily  atorvastatin 40 milliGRAM(s) Oral at bedtime  carvedilol 6.25 milliGRAM(s) Oral every 12 hours  ceFAZolin  Injectable. 1000 milliGRAM(s) IV Push every 12 hours  chlorhexidine 2% Cloths 1 Application(s) Topical <User Schedule>  clopidogrel Tablet 75 milliGRAM(s) Oral daily  Dakins Solution - 1/4 Strength 1 Application(s) Topical daily  dextrose 5%. 1000 milliLiter(s) (100 mL/Hr) IV Continuous <Continuous>  dextrose 5%. 1000 milliLiter(s) (50 mL/Hr) IV Continuous <Continuous>  dextrose 50% Injectable 25 Gram(s) IV Push once  dextrose 50% Injectable 12.5 Gram(s) IV Push once  dextrose 50% Injectable 25 Gram(s) IV Push once  furosemide    Tablet 40 milliGRAM(s) Oral daily  gabapentin 100 milliGRAM(s) Oral three times a day  glucagon  Injectable 1 milliGRAM(s) IntraMuscular once  heparin   Injectable 7500 Unit(s) SubCutaneous every 12 hours  influenza   Vaccine 0.5 milliLiter(s) IntraMuscular once  insulin glargine Injectable (LANTUS) 40 Unit(s) SubCutaneous at bedtime  insulin lispro (ADMELOG) corrective regimen sliding scale   SubCutaneous three times a day before meals  insulin lispro Injectable (ADMELOG) 10 Unit(s) SubCutaneous three times a day before meals  methocarbamol 500 milliGRAM(s) Oral three times a day  sacubitril 24 mG/valsartan 26 mG 1 Tablet(s) Oral two times a day  spironolactone 25 milliGRAM(s) Oral daily    MEDICATIONS  (PRN):  acetaminophen     Tablet .. 650 milliGRAM(s) Oral every 6 hours PRN Temp greater or equal to 38C (100.4F), Mild Pain (1 - 3)  dextrose Oral Gel 15 Gram(s) Oral once PRN Blood Glucose LESS THAN 70 milliGRAM(s)/deciliter  ondansetron Injectable 4 milliGRAM(s) IV Push every 6 hours PRN Nausea and/or Vomiting    Vital Signs Last 24 Hrs  T(F): 97.5 (18 Feb 2025 05:52), Max: 98 (17 Feb 2025 21:21)  HR: 65 (18 Feb 2025 05:52) (64 - 84)  BP: 119/73 (18 Feb 2025 05:52) (103/73 - 134/82)  RR: 18 (18 Feb 2025 05:52) (18 - 20)  SpO2: 96% (18 Feb 2025 05:52) (92% - 98%)  I&O's Summary    17 Feb 2025 07:01  -  18 Feb 2025 07:00  --------------------------------------------------------  IN: 1090 mL / OUT: 3475 mL / NET: -2385 mL        PHYSICAL EXAM:  General: awake alert   Neck: supple   Lungs: CTA no wheezing   Cardio: RRR, S1/S2, No murmur  Abdomen: Soft, Nontender, Nondistended; Bowel sounds present  Extremities: No calf tenderness, LLE wound dressing in place         LABS:                        14.1   12.36 )-----------( 253      ( 18 Feb 2025 06:15 )             46.6     02-18    135  |  95  |  27.9  ----------------------------<  210  4.7   |  32.0  |  0.62    Ca    8.8      18 Feb 2025 06:15  Mg     1.9     02-18    TPro  6.5  /  Alb  2.9  /  TBili  0.3  /  DBili  x   /  AST  11  /  ALT  10  /  AlkPhos  85  02-18                                  POCT Blood Glucose.: 229 mg/dL (17 Feb 2025 23:28)  POCT Blood Glucose.: 210 mg/dL (17 Feb 2025 21:20)  POCT Blood Glucose.: 206 mg/dL (17 Feb 2025 17:17)  POCT Blood Glucose.: 377 mg/dL (17 Feb 2025 11:17)  POCT Blood Glucose.: 178 mg/dL (17 Feb 2025 08:05)      Urinalysis Basic - ( 18 Feb 2025 06:15 )    Color: x / Appearance: x / SG: x / pH: x  Gluc: 210 mg/dL / Ketone: x  / Bili: x / Urobili: x   Blood: x / Protein: x / Nitrite: x   Leuk Esterase: x / RBC: x / WBC x   Sq Epi: x / Non Sq Epi: x / Bacteria: x          RADIOLOGY & ADDITIONAL TESTS:

## 2025-02-18 NOTE — PROGRESS NOTE ADULT - PROVIDER SPECIALTY LIST ADULT
Hospitalist
Internal Medicine
MICU
Hospitalist
Internal Medicine
MICU
Hospitalist
Hospitalist
Internal Medicine
Hospitalist

## 2025-02-18 NOTE — PROGRESS NOTE ADULT - ASSESSMENT
53yo F hx HFrEF w/ LV recovery, PAD s/p bilateral LE Stents, HTN, GERHARD not on CPAP admitted to MICU for acute hypoxemic respiratory failure requiring NIV in setting of COVID and also shock state in setting of LLE cellulitis    Acute hypoxemic respiratory failure due to COVID  -CT chest reviewed   - s/p remdesivir  -dc remdesivcir  -on NC taper as tolerated, may need at home     Septic shock due to LLE cellulitis  -s/p vasopressors, s/p midodrine  -MSRA swab negative  -Continue Cefazolin today is day #7 of 7  -Wound Care Consult for LE Wounds appreciated   le duplex ordered to rule out duplex    HFrEF w/ LV recovery  - PO Lasix at 40 mg daily    - Aldactone   - Entresto and coreg     CODY  -monitor    DM 2  A1c 8.5  -Jardiance, metformin on hold  - lantus and ssi     Hx PAD  -ASA, plavix, statin    Hx HTN  -spironolactone   - entresto     DVT Prophylaxis -- Heparin SQ    Dispo: home with home care santo in 1-2 days  refusing RODRICK   53yo F hx HFrEF w/ LV recovery, PAD s/p bilateral LE Stents, HTN, GERHARD not on CPAP admitted to MICU for acute hypoxemic respiratory failure requiring NIV in setting of COVID and also shock state in setting of LLE cellulitis    1-Acute hypoxemic respiratory failure due to COVID  - s/p remdesivir  on room air   pulse ox on RA and ambulation     2-Septic shock due to LLE cellulitis and COVID   -s/p vasopressors, s/p midodrine  -completed cefazolin total 7 days   -Wound Care Consult for LE Wounds appreciated   LLE doppler neg DVT     3-HFrEF w/ LV recovery  - PO Lasix at 40 mg daily    - Aldactone   - Entresto and coreg     4-CODY  -monitor  improved     5-DM 2  A1c 8.5  -Jardiance, metformin on hold  cont lantus and iSS       6-Hx PAD  -ASA, plavix, statin    7-Hx HTN  -spironolactone   - entresto     DVT Prophylaxis -- Heparin SQ    Dispo: home with home care PT in 1-2 days   refusing RODRICK

## 2025-02-18 NOTE — CHART NOTE - NSCHARTNOTEFT_GEN_A_CORE
3 in 1 COMMODE: Patient is confined to one floor or room that does not have an accessible bathroom       ROLLING WALKER:   Patient requires rolling walker due to  for safe ambulation at discharge. -3 in 1 COMMODE: Patient is confined to one floor or room that does not have an accessible bathroom. Patient has heart failure.      -ROLLING WALKER:   Patient requires rolling walker due to heart failure to help complete MRADLs.

## 2025-02-19 ENCOUNTER — TRANSCRIPTION ENCOUNTER (OUTPATIENT)
Age: 55
End: 2025-02-19

## 2025-02-19 VITALS
DIASTOLIC BLOOD PRESSURE: 65 MMHG | TEMPERATURE: 98 F | RESPIRATION RATE: 19 BRPM | HEART RATE: 80 BPM | SYSTOLIC BLOOD PRESSURE: 101 MMHG | OXYGEN SATURATION: 98 %

## 2025-02-19 LAB
GLUCOSE BLDC GLUCOMTR-MCNC: 171 MG/DL — HIGH (ref 70–99)
GLUCOSE BLDC GLUCOMTR-MCNC: 216 MG/DL — HIGH (ref 70–99)

## 2025-02-19 PROCEDURE — 82803 BLOOD GASES ANY COMBINATION: CPT

## 2025-02-19 PROCEDURE — 36600 WITHDRAWAL OF ARTERIAL BLOOD: CPT

## 2025-02-19 PROCEDURE — 80202 ASSAY OF VANCOMYCIN: CPT

## 2025-02-19 PROCEDURE — 94660 CPAP INITIATION&MGMT: CPT

## 2025-02-19 PROCEDURE — 73700 CT LOWER EXTREMITY W/O DYE: CPT | Mod: MC

## 2025-02-19 PROCEDURE — 84100 ASSAY OF PHOSPHORUS: CPT

## 2025-02-19 PROCEDURE — 83036 HEMOGLOBIN GLYCOSYLATED A1C: CPT

## 2025-02-19 PROCEDURE — 99239 HOSP IP/OBS DSCHRG MGMT >30: CPT

## 2025-02-19 PROCEDURE — 85730 THROMBOPLASTIN TIME PARTIAL: CPT

## 2025-02-19 PROCEDURE — 70450 CT HEAD/BRAIN W/O DYE: CPT | Mod: MC

## 2025-02-19 PROCEDURE — 84540 ASSAY OF URINE/UREA-N: CPT

## 2025-02-19 PROCEDURE — 83735 ASSAY OF MAGNESIUM: CPT

## 2025-02-19 PROCEDURE — 71045 X-RAY EXAM CHEST 1 VIEW: CPT

## 2025-02-19 PROCEDURE — 84132 ASSAY OF SERUM POTASSIUM: CPT

## 2025-02-19 PROCEDURE — 85018 HEMOGLOBIN: CPT

## 2025-02-19 PROCEDURE — 94760 N-INVAS EAR/PLS OXIMETRY 1: CPT

## 2025-02-19 PROCEDURE — 93971 EXTREMITY STUDY: CPT

## 2025-02-19 PROCEDURE — 82947 ASSAY GLUCOSE BLOOD QUANT: CPT

## 2025-02-19 PROCEDURE — 82570 ASSAY OF URINE CREATININE: CPT

## 2025-02-19 PROCEDURE — 80307 DRUG TEST PRSMV CHEM ANLYZR: CPT

## 2025-02-19 PROCEDURE — 84300 ASSAY OF URINE SODIUM: CPT

## 2025-02-19 PROCEDURE — 71250 CT THORAX DX C-: CPT | Mod: MC

## 2025-02-19 PROCEDURE — 99285 EMERGENCY DEPT VISIT HI MDM: CPT

## 2025-02-19 PROCEDURE — 94640 AIRWAY INHALATION TREATMENT: CPT

## 2025-02-19 PROCEDURE — 87899 AGENT NOS ASSAY W/OPTIC: CPT

## 2025-02-19 PROCEDURE — 36415 COLL VENOUS BLD VENIPUNCTURE: CPT

## 2025-02-19 PROCEDURE — 85014 HEMATOCRIT: CPT

## 2025-02-19 PROCEDURE — 87637 SARSCOV2&INF A&B&RSV AMP PRB: CPT

## 2025-02-19 PROCEDURE — 73590 X-RAY EXAM OF LOWER LEG: CPT

## 2025-02-19 PROCEDURE — 96365 THER/PROPH/DIAG IV INF INIT: CPT

## 2025-02-19 PROCEDURE — 85610 PROTHROMBIN TIME: CPT

## 2025-02-19 PROCEDURE — 82962 GLUCOSE BLOOD TEST: CPT

## 2025-02-19 PROCEDURE — C8929: CPT

## 2025-02-19 PROCEDURE — 81001 URINALYSIS AUTO W/SCOPE: CPT

## 2025-02-19 PROCEDURE — 87040 BLOOD CULTURE FOR BACTERIA: CPT

## 2025-02-19 PROCEDURE — 97163 PT EVAL HIGH COMPLEX 45 MIN: CPT

## 2025-02-19 PROCEDURE — 84480 ASSAY TRIIODOTHYRONINE (T3): CPT

## 2025-02-19 PROCEDURE — 84436 ASSAY OF TOTAL THYROXINE: CPT

## 2025-02-19 PROCEDURE — 84439 ASSAY OF FREE THYROXINE: CPT

## 2025-02-19 PROCEDURE — 87641 MR-STAPH DNA AMP PROBE: CPT

## 2025-02-19 PROCEDURE — 93005 ELECTROCARDIOGRAM TRACING: CPT

## 2025-02-19 PROCEDURE — 84443 ASSAY THYROID STIM HORMONE: CPT

## 2025-02-19 PROCEDURE — 84295 ASSAY OF SERUM SODIUM: CPT

## 2025-02-19 PROCEDURE — 82435 ASSAY OF BLOOD CHLORIDE: CPT

## 2025-02-19 PROCEDURE — 96375 TX/PRO/DX INJ NEW DRUG ADDON: CPT

## 2025-02-19 PROCEDURE — 83690 ASSAY OF LIPASE: CPT

## 2025-02-19 PROCEDURE — 84156 ASSAY OF PROTEIN URINE: CPT

## 2025-02-19 PROCEDURE — 83880 ASSAY OF NATRIURETIC PEPTIDE: CPT

## 2025-02-19 PROCEDURE — 94668 MNPJ CHEST WALL SBSQ: CPT

## 2025-02-19 PROCEDURE — 97530 THERAPEUTIC ACTIVITIES: CPT

## 2025-02-19 PROCEDURE — 84145 PROCALCITONIN (PCT): CPT

## 2025-02-19 PROCEDURE — 82330 ASSAY OF CALCIUM: CPT

## 2025-02-19 PROCEDURE — 84484 ASSAY OF TROPONIN QUANT: CPT

## 2025-02-19 PROCEDURE — 87449 NOS EACH ORGANISM AG IA: CPT

## 2025-02-19 PROCEDURE — 85025 COMPLETE CBC W/AUTO DIFF WBC: CPT

## 2025-02-19 PROCEDURE — 83935 ASSAY OF URINE OSMOLALITY: CPT

## 2025-02-19 PROCEDURE — 80053 COMPREHEN METABOLIC PANEL: CPT

## 2025-02-19 PROCEDURE — 87640 STAPH A DNA AMP PROBE: CPT

## 2025-02-19 PROCEDURE — 83605 ASSAY OF LACTIC ACID: CPT

## 2025-02-19 PROCEDURE — 97116 GAIT TRAINING THERAPY: CPT

## 2025-02-19 RX ORDER — SACUBITRIL AND VALSARTAN 49; 51 MG/1; MG/1
1 TABLET, FILM COATED ORAL
Qty: 60 | Refills: 0
Start: 2025-02-19 | End: 2025-03-20

## 2025-02-19 RX ORDER — SODIUM HYPOCHLORITE 0.12 MG/ML
1 SOLUTION TOPICAL
Qty: 20 | Refills: 0
Start: 2025-02-19

## 2025-02-19 RX ORDER — IPRATROPIUM BROMIDE AND ALBUTEROL SULFATE .5; 2.5 MG/3ML; MG/3ML
3 SOLUTION RESPIRATORY (INHALATION)
Qty: 100 | Refills: 0
Start: 2025-02-19

## 2025-02-19 RX ORDER — CARVEDILOL 3.12 MG/1
0.5 TABLET, FILM COATED ORAL
Qty: 0 | Refills: 0 | DISCHARGE
Start: 2025-02-19

## 2025-02-19 RX ORDER — CARVEDILOL 3.12 MG/1
3.12 TABLET, FILM COATED ORAL EVERY 12 HOURS
Refills: 0 | Status: DISCONTINUED | OUTPATIENT
Start: 2025-02-19 | End: 2025-02-19

## 2025-02-19 RX ADMIN — INSULIN LISPRO 10 UNIT(S): 100 INJECTION, SOLUTION INTRAVENOUS; SUBCUTANEOUS at 08:24

## 2025-02-19 RX ADMIN — Medication 25 MILLIGRAM(S): at 05:31

## 2025-02-19 RX ADMIN — Medication 650 MILLIGRAM(S): at 06:40

## 2025-02-19 RX ADMIN — FUROSEMIDE 40 MILLIGRAM(S): 10 INJECTION INTRAMUSCULAR; INTRAVENOUS at 05:30

## 2025-02-19 RX ADMIN — Medication 1 APPLICATION(S): at 05:31

## 2025-02-19 RX ADMIN — Medication 81 MILLIGRAM(S): at 08:23

## 2025-02-19 RX ADMIN — METHOCARBAMOL 500 MILLIGRAM(S): 500 TABLET, FILM COATED ORAL at 03:04

## 2025-02-19 RX ADMIN — METHOCARBAMOL 500 MILLIGRAM(S): 500 TABLET, FILM COATED ORAL at 08:23

## 2025-02-19 RX ADMIN — INSULIN LISPRO 2: 100 INJECTION, SOLUTION INTRAVENOUS; SUBCUTANEOUS at 12:18

## 2025-02-19 RX ADMIN — INSULIN LISPRO 4: 100 INJECTION, SOLUTION INTRAVENOUS; SUBCUTANEOUS at 08:24

## 2025-02-19 RX ADMIN — CARVEDILOL 6.25 MILLIGRAM(S): 3.12 TABLET, FILM COATED ORAL at 05:31

## 2025-02-19 RX ADMIN — INSULIN LISPRO 10 UNIT(S): 100 INJECTION, SOLUTION INTRAVENOUS; SUBCUTANEOUS at 12:18

## 2025-02-19 RX ADMIN — SODIUM HYPOCHLORITE 1 APPLICATION(S): 0.12 SOLUTION TOPICAL at 11:17

## 2025-02-19 RX ADMIN — Medication 250 MILLILITER(S): at 08:20

## 2025-02-19 RX ADMIN — CLOPIDOGREL BISULFATE 75 MILLIGRAM(S): 75 TABLET, FILM COATED ORAL at 08:23

## 2025-02-19 RX ADMIN — SACUBITRIL AND VALSARTAN 1 TABLET(S): 49; 51 TABLET, FILM COATED ORAL at 05:31

## 2025-02-19 RX ADMIN — GABAPENTIN 100 MILLIGRAM(S): 400 CAPSULE ORAL at 03:04

## 2025-02-19 RX ADMIN — Medication 1000 MILLIGRAM(S): at 05:30

## 2025-02-19 RX ADMIN — Medication 250 MILLILITER(S): at 11:55

## 2025-02-19 NOTE — DISCHARGE NOTE NURSING/CASE MANAGEMENT/SOCIAL WORK - PATIENT PORTAL LINK FT
You can access the FollowMyHealth Patient Portal offered by [facility] by registering at the following website: [email or web address]. By joining [facility]’s FollowMyHealth portal, you will also be able to view your health information using other applications (apps) compatible with our system.

## 2025-02-19 NOTE — DISCHARGE NOTE PROVIDER - NSDCFUSCHEDAPPT_GEN_ALL_CORE_FT
Sai Younger  [facility] Physician [facility]  CARDIOLOGY [address]  Scheduled Appointment: 03/10/2025

## 2025-02-19 NOTE — DISCHARGE NOTE PROVIDER - NSDCFUADDAPPT_GEN_ALL_CORE_FT
APPTS ARE READY TO BE MADE: [x] YES    Best Family or Patient Contact (if needed):    Additional Information about above appointments (if needed):    1: red needed with Dr Shane pritchett for sleep studies   2:   3:     Other comments or requests:    APPTS ARE READY TO BE MADE: [x] YES    Best Family or Patient Contact (if needed):    Additional Information about above appointments (if needed):    1: red needed with Dr Shane pritchett for sleep studies   2:   3:     Other comments or requests:       Patient informed us they already have secured a follow up appointment which is not visible on [facility]. PCP on 2/25    Provided patient with provider referral information, however patient prefers to schedule the appointments on their own.  Dr. Lynn

## 2025-02-19 NOTE — DISCHARGE NOTE NURSING/CASE MANAGEMENT/SOCIAL WORK - NSDCFUADDAPPT_GEN_ALL_CORE_FT
APPTS ARE READY TO BE MADE: [x] YES    Best Family or Patient Contact (if needed):    Additional Information about above appointments (if needed):    1: red needed with Dr Shane pritchett for sleep studies   2:   3:     Other comments or requests:

## 2025-02-19 NOTE — PROVIDER CONTACT NOTE (OTHER) - SITUATION
patient bp post 250cc bolus was 99/69. MD Younger notified. Patient asymptomatic second 250cc bolus ordered and administered, will continue plan of care.

## 2025-02-19 NOTE — DIETITIAN INITIAL EVALUATION ADULT - NS FNS DIET ORDER
Diet, Consistent Carbohydrate/No Snacks:   DASH/TLC {Sodium & Cholesterol Restricted} (DASH)  1500mL Fluid Restriction ([id number]) (02-12-25 @ 07:58)

## 2025-02-19 NOTE — DIETITIAN INITIAL EVALUATION ADULT - PERTINENT MEDS FT
MEDICATIONS  (STANDING):  carvedilol 6.25 milliGRAM(s) Oral every 12 hours  ceFAZolin  Injectable. 1000 milliGRAM(s) IV Push every 12 hours  clopidogrel Tablet 75 milliGRAM(s) Oral daily  dextrose 5%. 1000 milliLiter(s) (50 mL/Hr) IV Continuous <Continuous>  dextrose 50% Injectable 25 Gram(s) IV Push once  furosemide    Tablet 40 milliGRAM(s) Oral daily  glucagon  Injectable 1 milliGRAM(s) IntraMuscular once  insulin glargine Injectable (LANTUS) 40 Unit(s) SubCutaneous at bedtime  insulin lispro (ADMELOG) corrective regimen sliding scale   SubCutaneous three times a day before meals  methocarbamol 500 milliGRAM(s) Oral three times a day  sacubitril 24 mG/valsartan 26 mG 1 Tablet(s) Oral two times a day  sodium chloride 0.9% Bolus 250 milliLiter(s) IV Bolus once  spironolactone 25 milliGRAM(s) Oral daily    MEDICATIONS  (PRN):  dextrose Oral Gel 15 Gram(s) Oral once PRN Blood Glucose LESS THAN 70 milliGRAM(s)/deciliter  ondansetron Injectable 4 milliGRAM(s) IV Push every 6 hours PRN Nausea and/or Vomiting

## 2025-02-19 NOTE — DISCHARGE NOTE NURSING/CASE MANAGEMENT/SOCIAL WORK - NSDCPEFALRISK_GEN_ALL_CORE
For information on Fall & Injury Prevention, visit: [email or web address] OR  [email or web address] OR  https://www.cdc.gov/steadi/patient.html

## 2025-02-19 NOTE — DISCHARGE NOTE PROVIDER - HOSPITAL COURSE
53yo F hx HFrEF w/ LV recovery, PAD s/p bilateral LE Stents, HTN, GERHARD not on CPAP admitted to MICU for acute hypoxemic respiratory failure requiring NIV in setting of COVID and also shock state in setting of LLE cellulitis.  Pt did not require intubation and was weaned from NIV to HFNC. Pt transiently was  on vasopressors, weaned off on and placed on midodrine.  She had CODY lactic acidosis which improved Pt was transferred to medicine 2/11 . BP meds diuretucs resumed   Pt had traeted with remdesevir decadron for COVID , Iv cefazolin for cellulitis , blood cx are negative   Pt is weaned of oxygen, she will benefit from outpatient sleep studies referal to pulmonary given for appoinment        53yo F hx HFrEF w/ LV recovery, PAD s/p bilateral LE Stents, HTN, GERHARD not on CPAP admitted to MICU for acute hypoxemic respiratory failure requiring NIV in setting of COVID and also shock state in setting of LLE cellulitis.  Pt did not require intubation and was weaned from NIV to HFNC. Pt transiently was  on vasopressors, weaned off on and placed on midodrine.  She had CODY lactic acidosis which improved Pt was transferred to medicine 2/11 . BP meds diuretics resumed   Pt had treated with remdesevir decadron for COVID , Iv cefazolin for cellulitis , blood cx are negative   Pt is weaned of oxygen, she will benefit from outpatient sleep studies referal to pulmonary given for appoinment

## 2025-02-19 NOTE — DIETITIAN INITIAL EVALUATION ADULT - ORAL INTAKE PTA/DIET HISTORY
Patient tolerating current diet well with good appetite/PO intake. Pt reports eating 100% of all meals since admission. Pt states PTA her appetite was poor for about 2 days, but has greatly improved since admission. States previously at home she was eating 2-3 meals/day. Reports following a no added salt diet and avoids sugar sweetened beverages. Pt provided with additional education on Heart healthy, consistent carbohydrate diet. Pt with fair understanding and question addressed. Pt provided with printed education as well. Pt with no c/o N/V/C/D at this time, reports her last BM was today. States her UBW is about 262 lbs, current weight 259.6 lbs. Moderate edema noted which may influence weights. RD contact information provided for further nutrition-related questions or concerns. Will continue to monitor and follow up as needed. RD remains available.

## 2025-02-19 NOTE — DISCHARGE NOTE PROVIDER - NSDCMRMEDTOKEN_GEN_ALL_CORE_FT
Admelog 100 units/mL injectable solution: 20 unit(s) injectable 3 times a day  aspirin 81 mg oral tablet, chewable: 1 tab(s) orally once a day  atorvastatin 40 mg oral tablet: 1 tab(s) orally once a day (at bedtime)  Basaglar KwikPen 100 units/mL subcutaneous solution: 30 unit(s) subcutaneous once a day (at bedtime)  clopidogrel 75 mg oral tablet: 1 tab(s) orally once a day   Coreg 6.25 mg oral tablet: 1 tab(s) orally 2 times a day  furosemide 40 mg oral tablet: 1 tab(s) orally 2 times a day  Jardiance 10 mg oral tablet: 1 tab(s) orally once a day (in the morning)  metFORMIN 1000 mg oral tablet: 1 tab(s) orally 2 times a day  sacubitril-valsartan 49 mg-51 mg oral tablet: 1 tab(s) orally 2 times a day  spironolactone 25 mg oral tablet: 1 tab(s) orally once a day   Admelog 100 units/mL injectable solution: 20 unit(s) injectable 3 times a day  aspirin 81 mg oral tablet, chewable: 1 tab(s) orally once a day  atorvastatin 40 mg oral tablet: 1 tab(s) orally once a day (at bedtime)  Basaglar KwikPen 100 units/mL subcutaneous solution: 30 unit(s) subcutaneous once a day (at bedtime)  carvedilol 6.25 mg oral tablet: 0.5 tab(s) orally every 12 hours  clopidogrel 75 mg oral tablet: 1 tab(s) orally once a day   furosemide 40 mg oral tablet: 1 tab(s) orally 2 times a day  ipratropium-albuterol 0.5 mg-2.5 mg/3 mL inhalation solution: 3 milliliter(s) inhaled 3 times a day as needed for  shortness of breath and/or wheezing  Jardiance 10 mg oral tablet: 1 tab(s) orally once a day (in the morning)  metFORMIN 1000 mg oral tablet: 1 tab(s) orally 2 times a day  sacubitril-valsartan 24 mg-26 mg oral tablet: 1 tab(s) orally 2 times a day start on Friday am   check your blood pressure if low less than 100 hold it  sodium hypochlorite 0.125% topical solution: Apply topically to affected area once a day 1 Apply topically to affected area once a day leg wound s  spironolactone 25 mg oral tablet: 1 tab(s) orally once a day

## 2025-02-19 NOTE — DIETITIAN INITIAL EVALUATION ADULT - PERTINENT LABORATORY DATA
02-18    135  |  95[L]  |  27.9[H]  ----------------------------<  210[H]  4.7   |  32.0[H]  |  0.62    Ca    8.8      18 Feb 2025 06:15  Mg     1.9     02-18    TPro  6.5[L]  /  Alb  2.9[L]  /  TBili  0.3[L]  /  DBili  x   /  AST  11  /  ALT  10  /  AlkPhos  85  02-18  POCT Blood Glucose.: 216 mg/dL (02-19-25 @ 08:07)  A1C with Estimated Average Glucose Result: 8.5 % (02-10-25 @ 09:33)

## 2025-02-19 NOTE — DISCHARGE NOTE PROVIDER - NSDCCPCAREPLAN_GEN_ALL_CORE_FT
PRINCIPAL DISCHARGE DIAGNOSIS  Diagnosis: Sepsis with acute hypoxic respiratory failure  Assessment and Plan of Treatment: resolved      SECONDARY DISCHARGE DIAGNOSES  Diagnosis: Cellulitis  Assessment and Plan of Treatment: gris cpnmt wound care and see specialist    Diagnosis: Septic shock  Assessment and Plan of Treatment: resolved    Diagnosis: COVID  Assessment and Plan of Treatment: improved    Diagnosis: CODY (acute kidney injury)  Assessment and Plan of Treatment: resolved    Diagnosis: Type 2 diabetes mellitus  Assessment and Plan of Treatment:      PRINCIPAL DISCHARGE DIAGNOSIS  Diagnosis: Sepsis with acute hypoxic respiratory failure  Assessment and Plan of Treatment: resolved      SECONDARY DISCHARGE DIAGNOSES  Diagnosis: Cellulitis  Assessment and Plan of Treatment: gris cpnmt wound care and see specialist    Diagnosis: Septic shock  Assessment and Plan of Treatment: resolved    Diagnosis: COVID  Assessment and Plan of Treatment: improved    Diagnosis: CODY (acute kidney injury)  Assessment and Plan of Treatment: resolved    Diagnosis: Type 2 diabetes mellitus  Assessment and Plan of Treatment:     Diagnosis: History of chronic CHF  Assessment and Plan of Treatment:

## 2025-02-19 NOTE — DISCHARGE NOTE NURSING/CASE MANAGEMENT/SOCIAL WORK - FINANCIAL ASSISTANCE
[facility] provides services at a reduced cost to those who are determined to be eligible through [facility]’s financial assistance program. Information regarding [facility]’s financial assistance program can be found by going to [email or web address] or by calling [phone].

## 2025-02-19 NOTE — DIETITIAN INITIAL EVALUATION ADULT - OTHER INFO
Per chart "55yo F hx HFrEF w/ LV recovery, PAD s/p bilateral LE Stents, HTN, GERHARD not on CPAP admitted to MICU for acute hypoxemic respiratory failure requiring NIV in setting of COVID and also shock state in setting of LLE cellulitis."

## 2025-02-22 ENCOUNTER — INPATIENT (INPATIENT)
Facility: HOSPITAL | Age: 55
LOS: 5 days | Discharge: HOME | DRG: 603 | End: 2025-02-28
Attending: INTERNAL MEDICINE | Admitting: STUDENT IN AN ORGANIZED HEALTH CARE EDUCATION/TRAINING PROGRAM
Payer: COMMERCIAL

## 2025-02-22 VITALS
HEART RATE: 74 BPM | TEMPERATURE: 97 F | HEIGHT: 59 IN | SYSTOLIC BLOOD PRESSURE: 90 MMHG | OXYGEN SATURATION: 95 % | DIASTOLIC BLOOD PRESSURE: 56 MMHG | RESPIRATION RATE: 18 BRPM

## 2025-02-22 DIAGNOSIS — L03.90 CELLULITIS, UNSPECIFIED: ICD-10-CM

## 2025-02-22 DIAGNOSIS — Z98.62 PERIPHERAL VASCULAR ANGIOPLASTY STATUS: Chronic | ICD-10-CM

## 2025-02-22 LAB
ALBUMIN SERPL ELPH-MCNC: 2.8 G/DL — LOW (ref 3.3–5.2)
ALP SERPL-CCNC: 83 U/L — SIGNIFICANT CHANGE UP (ref 40–120)
ALT FLD-CCNC: 17 U/L — SIGNIFICANT CHANGE UP
ANION GAP SERPL CALC-SCNC: 10 MMOL/L — SIGNIFICANT CHANGE UP (ref 5–17)
APTT BLD: 39 SEC — HIGH (ref 24.5–35.6)
AST SERPL-CCNC: 21 U/L — SIGNIFICANT CHANGE UP
BASOPHILS # BLD AUTO: 0.02 K/UL — SIGNIFICANT CHANGE UP (ref 0–0.2)
BASOPHILS NFR BLD AUTO: 0.2 % — SIGNIFICANT CHANGE UP (ref 0–2)
BILIRUB SERPL-MCNC: 0.4 MG/DL — SIGNIFICANT CHANGE UP (ref 0.4–2)
BUN SERPL-MCNC: 12.6 MG/DL — SIGNIFICANT CHANGE UP (ref 8–20)
CALCIUM SERPL-MCNC: 8.7 MG/DL — SIGNIFICANT CHANGE UP (ref 8.4–10.5)
CHLORIDE SERPL-SCNC: 95 MMOL/L — LOW (ref 96–108)
CO2 SERPL-SCNC: 29 MMOL/L — SIGNIFICANT CHANGE UP (ref 22–29)
CREAT SERPL-MCNC: 0.62 MG/DL — SIGNIFICANT CHANGE UP (ref 0.5–1.3)
EGFR: 106 ML/MIN/1.73M2 — SIGNIFICANT CHANGE UP
EOSINOPHIL # BLD AUTO: 0.1 K/UL — SIGNIFICANT CHANGE UP (ref 0–0.5)
EOSINOPHIL NFR BLD AUTO: 0.8 % — SIGNIFICANT CHANGE UP (ref 0–6)
GLUCOSE SERPL-MCNC: 218 MG/DL — HIGH (ref 70–99)
HCT VFR BLD CALC: 41 % — SIGNIFICANT CHANGE UP (ref 34.5–45)
HGB BLD-MCNC: 13.3 G/DL — SIGNIFICANT CHANGE UP (ref 11.5–15.5)
IMM GRANULOCYTES # BLD AUTO: 0.05 K/UL — SIGNIFICANT CHANGE UP (ref 0–0.07)
IMM GRANULOCYTES NFR BLD AUTO: 0.4 % — SIGNIFICANT CHANGE UP (ref 0–0.9)
INR BLD: 1.09 RATIO — SIGNIFICANT CHANGE UP (ref 0.85–1.16)
LACTATE SERPL-SCNC: 1.3 MMOL/L — SIGNIFICANT CHANGE UP (ref 0.5–2)
LYMPHOCYTES # BLD AUTO: 1.52 K/UL — SIGNIFICANT CHANGE UP (ref 1–3.3)
LYMPHOCYTES NFR BLD AUTO: 12.4 % — LOW (ref 13–44)
MCHC RBC-ENTMCNC: 28.7 PG — SIGNIFICANT CHANGE UP (ref 27–34)
MCHC RBC-ENTMCNC: 32.4 G/DL — SIGNIFICANT CHANGE UP (ref 32–36)
MCV RBC AUTO: 88.4 FL — SIGNIFICANT CHANGE UP (ref 80–100)
MONOCYTES # BLD AUTO: 0.93 K/UL — HIGH (ref 0–0.9)
MONOCYTES NFR BLD AUTO: 7.6 % — SIGNIFICANT CHANGE UP (ref 2–14)
NEUTROPHILS # BLD AUTO: 9.65 K/UL — HIGH (ref 1.8–7.4)
NEUTROPHILS NFR BLD AUTO: 78.6 % — HIGH (ref 43–77)
NRBC # BLD AUTO: 0 K/UL — SIGNIFICANT CHANGE UP (ref 0–0)
NRBC # FLD: 0 K/UL — SIGNIFICANT CHANGE UP (ref 0–0)
NRBC BLD AUTO-RTO: 0 /100 WBCS — SIGNIFICANT CHANGE UP (ref 0–0)
PLATELET # BLD AUTO: 293 K/UL — SIGNIFICANT CHANGE UP (ref 150–400)
PMV BLD: 10.1 FL — SIGNIFICANT CHANGE UP (ref 7–13)
POTASSIUM SERPL-MCNC: 4.8 MMOL/L — SIGNIFICANT CHANGE UP (ref 3.5–5.3)
POTASSIUM SERPL-SCNC: 4.8 MMOL/L — SIGNIFICANT CHANGE UP (ref 3.5–5.3)
PROT SERPL-MCNC: 6.7 G/DL — SIGNIFICANT CHANGE UP (ref 6.6–8.7)
PROTHROM AB SERPL-ACNC: 12.3 SEC — SIGNIFICANT CHANGE UP (ref 9.9–13.4)
RBC # BLD: 4.64 M/UL — SIGNIFICANT CHANGE UP (ref 3.8–5.2)
RBC # FLD: 16 % — HIGH (ref 10.3–14.5)
SODIUM SERPL-SCNC: 134 MMOL/L — LOW (ref 135–145)
WBC # BLD: 12.27 K/UL — HIGH (ref 3.8–10.5)
WBC # FLD AUTO: 12.27 K/UL — HIGH (ref 3.8–10.5)

## 2025-02-22 PROCEDURE — 93971 EXTREMITY STUDY: CPT | Mod: 26,LT

## 2025-02-22 PROCEDURE — 73610 X-RAY EXAM OF ANKLE: CPT | Mod: 26,LT

## 2025-02-22 PROCEDURE — 99285 EMERGENCY DEPT VISIT HI MDM: CPT

## 2025-02-22 RX ORDER — PIPERACILLIN-TAZO-DEXTROSE,ISO 3.375G/5
3.38 IV SOLUTION, PIGGYBACK PREMIX FROZEN(ML) INTRAVENOUS ONCE
Refills: 0 | Status: COMPLETED | OUTPATIENT
Start: 2025-02-22 | End: 2025-02-22

## 2025-02-22 RX ORDER — CLINDAMYCIN PHOSPHATE 150 MG/ML
900 VIAL (ML) INJECTION ONCE
Refills: 0 | Status: COMPLETED | OUTPATIENT
Start: 2025-02-22 | End: 2025-02-22

## 2025-02-22 RX ORDER — VANCOMYCIN HCL IN 5 % DEXTROSE 1.5G/250ML
1000 PLASTIC BAG, INJECTION (ML) INTRAVENOUS ONCE
Refills: 0 | Status: COMPLETED | OUTPATIENT
Start: 2025-02-22 | End: 2025-02-22

## 2025-02-22 RX ADMIN — Medication 100 MILLIGRAM(S): at 19:11

## 2025-02-22 RX ADMIN — Medication 200 GRAM(S): at 23:18

## 2025-02-22 NOTE — ED ADULT TRIAGE NOTE - CHIEF COMPLAINT QUOTE
BIBEMS for wound check. Pt states that she has reoccurring cellulitis in her lower legs, left worse than right. C/o increased redness, swelling and pain to the left leg. Legs wrapped PTA by visiting nurse.
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
## 2025-02-22 NOTE — ED ADULT NURSE NOTE - OBJECTIVE STATEMENT
Pt arrives c/o Bilateral leg redness. "I was recently admitted for over a week for sepsis from my legs but I think they were giving me the wrong abx". Denies fevers. Denies chest pain. Denies sob. Pt is type 2 diabetic.

## 2025-02-22 NOTE — ED PROVIDER NOTE - OBJECTIVE STATEMENT
53yo female with PMH congestive heart failure, HTN, DM2, hyperlipidemia, HTN, GERHARD, PAD presenting with 55yo female with PMH congestive heart failure, HTN, DM2, hyperlipidemia, HTN, GERHARD, PAD presenting with LLE redness, swelling, and pain. patient with recent admission for acute hypoxic respiratory failure 2/2 COVID, also found to have shock 2/2 LLE cellulitis. Patient states she was not discharged home with antibiotics. Over the last 2-3 days the redness and swelling to her LLE has gotten significantly worse. No fevers/chills. No chest pain, palpitations, or shortness of breath. Wound care nurse has been visiting patient and wrapping leg.

## 2025-02-22 NOTE — ED PROVIDER NOTE - PHYSICAL EXAMINATION
Gen: NAD, AOx3  Head: NCAT  HEENT: oral mucosa moist, normal conjunctiva, oropharynx clear without exudate or erythema  Lung: CTAB, no respiratory distress, no wheezing, rales, rhonchi  CV: normal s1/s2, rrr, no murmurs, Normal perfusion, pulses 2+ throughout  Abd: soft, NTND  MSK: 2+ pitting edema b/l LE, LLE red, hot, swollen, with multiple areas of skin breakdown  Neuro: CN II-XII grossly intact, No focal neurologic deficits  Skin: No rash   Psych: normal affect

## 2025-02-22 NOTE — ED PROVIDER NOTE - CLINICAL SUMMARY MEDICAL DECISION MAKING FREE TEXT BOX
Patient with History of congestive heart failure, PAD, hypertension, diabetes, presenting with left lower extremity redness, swelling, warmth.  Likely secondary to cellulitis.  Patient given clindamycin, will check ultrasound to rule out DVT, reassess.

## 2025-02-23 LAB
GLUCOSE BLDC GLUCOMTR-MCNC: 195 MG/DL — HIGH (ref 70–99)
GLUCOSE BLDC GLUCOMTR-MCNC: 195 MG/DL — HIGH (ref 70–99)
GLUCOSE BLDC GLUCOMTR-MCNC: 232 MG/DL — HIGH (ref 70–99)
GLUCOSE BLDC GLUCOMTR-MCNC: 234 MG/DL — HIGH (ref 70–99)
GLUCOSE BLDC GLUCOMTR-MCNC: 245 MG/DL — HIGH (ref 70–99)
MRSA PCR RESULT.: SIGNIFICANT CHANGE UP
S AUREUS DNA NOSE QL NAA+PROBE: SIGNIFICANT CHANGE UP

## 2025-02-23 PROCEDURE — G0545: CPT

## 2025-02-23 PROCEDURE — 73701 CT LOWER EXTREMITY W/DYE: CPT | Mod: 26,LT

## 2025-02-23 PROCEDURE — 99223 1ST HOSP IP/OBS HIGH 75: CPT

## 2025-02-23 RX ORDER — MELATONIN 5 MG
3 TABLET ORAL AT BEDTIME
Refills: 0 | Status: DISCONTINUED | OUTPATIENT
Start: 2025-02-23 | End: 2025-02-28

## 2025-02-23 RX ORDER — VANCOMYCIN HCL IN 5 % DEXTROSE 1.5G/250ML
1000 PLASTIC BAG, INJECTION (ML) INTRAVENOUS EVERY 12 HOURS
Refills: 0 | Status: DISCONTINUED | OUTPATIENT
Start: 2025-02-23 | End: 2025-02-24

## 2025-02-23 RX ORDER — DEXTROSE 50 % IN WATER 50 %
15 SYRINGE (ML) INTRAVENOUS ONCE
Refills: 0 | Status: DISCONTINUED | OUTPATIENT
Start: 2025-02-23 | End: 2025-02-28

## 2025-02-23 RX ORDER — CLINDAMYCIN PHOSPHATE 150 MG/ML
900 VIAL (ML) INJECTION EVERY 8 HOURS
Refills: 0 | Status: DISCONTINUED | OUTPATIENT
Start: 2025-02-23 | End: 2025-02-24

## 2025-02-23 RX ORDER — SPIRONOLACTONE 25 MG
25 TABLET ORAL DAILY
Refills: 0 | Status: DISCONTINUED | OUTPATIENT
Start: 2025-02-23 | End: 2025-02-28

## 2025-02-23 RX ORDER — HEPARIN SODIUM 1000 [USP'U]/ML
5000 INJECTION INTRAVENOUS; SUBCUTANEOUS EVERY 12 HOURS
Refills: 0 | Status: DISCONTINUED | OUTPATIENT
Start: 2025-02-23 | End: 2025-02-28

## 2025-02-23 RX ORDER — DEXTROSE 50 % IN WATER 50 %
12.5 SYRINGE (ML) INTRAVENOUS ONCE
Refills: 0 | Status: DISCONTINUED | OUTPATIENT
Start: 2025-02-23 | End: 2025-02-28

## 2025-02-23 RX ORDER — SODIUM CHLORIDE 9 G/1000ML
1000 INJECTION, SOLUTION INTRAVENOUS
Refills: 0 | Status: DISCONTINUED | OUTPATIENT
Start: 2025-02-23 | End: 2025-02-28

## 2025-02-23 RX ORDER — ONDANSETRON HCL/PF 4 MG/2 ML
4 VIAL (ML) INJECTION EVERY 8 HOURS
Refills: 0 | Status: DISCONTINUED | OUTPATIENT
Start: 2025-02-23 | End: 2025-02-28

## 2025-02-23 RX ORDER — SACUBITRIL AND VALSARTAN 49; 51 MG/1; MG/1
1 TABLET, FILM COATED ORAL
Refills: 0 | Status: DISCONTINUED | OUTPATIENT
Start: 2025-02-23 | End: 2025-02-28

## 2025-02-23 RX ORDER — CLOPIDOGREL BISULFATE 75 MG/1
75 TABLET, FILM COATED ORAL DAILY
Refills: 0 | Status: DISCONTINUED | OUTPATIENT
Start: 2025-02-23 | End: 2025-02-28

## 2025-02-23 RX ORDER — CARVEDILOL 3.12 MG/1
3.12 TABLET, FILM COATED ORAL EVERY 12 HOURS
Refills: 0 | Status: DISCONTINUED | OUTPATIENT
Start: 2025-02-23 | End: 2025-02-28

## 2025-02-23 RX ORDER — INSULIN LISPRO 100 U/ML
8 INJECTION, SOLUTION INTRAVENOUS; SUBCUTANEOUS
Refills: 0 | Status: DISCONTINUED | OUTPATIENT
Start: 2025-02-23 | End: 2025-02-25

## 2025-02-23 RX ORDER — INFLUENZA A VIRUS A/IDAHO/07/2018 (H1N1) ANTIGEN (MDCK CELL DERIVED, PROPIOLACTONE INACTIVATED, INFLUENZA A VIRUS A/INDIANA/08/2018 (H3N2) ANTIGEN (MDCK CELL DERIVED, PROPIOLACTONE INACTIVATED), INFLUENZA B VIRUS B/SINGAPORE/INFTT-16-0610/2016 ANTIGEN (MDCK CELL DERIVED, PROPIOLACTONE INACTIVATED), INFLUENZA B VIRUS B/IOWA/06/2017 ANTIGEN (MDCK CELL DERIVED, PROPIOLACTONE INACTIVATED) 15; 15; 15; 15 UG/.5ML; UG/.5ML; UG/.5ML; UG/.5ML
0.5 INJECTION, SUSPENSION INTRAMUSCULAR ONCE
Refills: 0 | Status: DISCONTINUED | OUTPATIENT
Start: 2025-02-23 | End: 2025-02-28

## 2025-02-23 RX ORDER — MAGNESIUM, ALUMINUM HYDROXIDE 200-200 MG
30 TABLET,CHEWABLE ORAL EVERY 4 HOURS
Refills: 0 | Status: DISCONTINUED | OUTPATIENT
Start: 2025-02-23 | End: 2025-02-28

## 2025-02-23 RX ORDER — FUROSEMIDE 10 MG/ML
40 INJECTION INTRAMUSCULAR; INTRAVENOUS
Refills: 0 | Status: DISCONTINUED | OUTPATIENT
Start: 2025-02-24 | End: 2025-02-28

## 2025-02-23 RX ORDER — HEPARIN SODIUM 1000 [USP'U]/ML
5000 INJECTION INTRAVENOUS; SUBCUTANEOUS EVERY 8 HOURS
Refills: 0 | Status: DISCONTINUED | OUTPATIENT
Start: 2025-02-23 | End: 2025-02-23

## 2025-02-23 RX ORDER — ASPIRIN 325 MG
81 TABLET ORAL DAILY
Refills: 0 | Status: DISCONTINUED | OUTPATIENT
Start: 2025-02-23 | End: 2025-02-28

## 2025-02-23 RX ORDER — GLUCAGON 3 MG/1
1 POWDER NASAL ONCE
Refills: 0 | Status: DISCONTINUED | OUTPATIENT
Start: 2025-02-23 | End: 2025-02-28

## 2025-02-23 RX ORDER — DEXTROSE 50 % IN WATER 50 %
25 SYRINGE (ML) INTRAVENOUS ONCE
Refills: 0 | Status: DISCONTINUED | OUTPATIENT
Start: 2025-02-23 | End: 2025-02-28

## 2025-02-23 RX ORDER — INSULIN LISPRO 100 U/ML
INJECTION, SOLUTION INTRAVENOUS; SUBCUTANEOUS
Refills: 0 | Status: DISCONTINUED | OUTPATIENT
Start: 2025-02-23 | End: 2025-02-28

## 2025-02-23 RX ORDER — PIPERACILLIN-TAZO-DEXTROSE,ISO 3.375G/5
3.38 IV SOLUTION, PIGGYBACK PREMIX FROZEN(ML) INTRAVENOUS EVERY 8 HOURS
Refills: 0 | Status: DISCONTINUED | OUTPATIENT
Start: 2025-02-23 | End: 2025-02-24

## 2025-02-23 RX ORDER — INSULIN GLARGINE-YFGN 100 [IU]/ML
40 INJECTION, SOLUTION SUBCUTANEOUS AT BEDTIME
Refills: 0 | Status: DISCONTINUED | OUTPATIENT
Start: 2025-02-23 | End: 2025-02-28

## 2025-02-23 RX ORDER — ATORVASTATIN CALCIUM 80 MG/1
40 TABLET, FILM COATED ORAL AT BEDTIME
Refills: 0 | Status: DISCONTINUED | OUTPATIENT
Start: 2025-02-23 | End: 2025-02-28

## 2025-02-23 RX ORDER — ACETAMINOPHEN 500 MG/5ML
650 LIQUID (ML) ORAL EVERY 6 HOURS
Refills: 0 | Status: DISCONTINUED | OUTPATIENT
Start: 2025-02-23 | End: 2025-02-28

## 2025-02-23 RX ADMIN — INSULIN LISPRO 8 UNIT(S): 100 INJECTION, SOLUTION INTRAVENOUS; SUBCUTANEOUS at 09:30

## 2025-02-23 RX ADMIN — Medication 81 MILLIGRAM(S): at 11:52

## 2025-02-23 RX ADMIN — Medication 25 GRAM(S): at 14:49

## 2025-02-23 RX ADMIN — Medication 100 MILLIGRAM(S): at 18:16

## 2025-02-23 RX ADMIN — Medication 250 MILLIGRAM(S): at 12:29

## 2025-02-23 RX ADMIN — Medication 250 MILLIGRAM(S): at 00:00

## 2025-02-23 RX ADMIN — Medication 25 GRAM(S): at 21:12

## 2025-02-23 RX ADMIN — INSULIN LISPRO 2: 100 INJECTION, SOLUTION INTRAVENOUS; SUBCUTANEOUS at 11:52

## 2025-02-23 RX ADMIN — CARVEDILOL 3.12 MILLIGRAM(S): 3.12 TABLET, FILM COATED ORAL at 17:37

## 2025-02-23 RX ADMIN — CARVEDILOL 3.12 MILLIGRAM(S): 3.12 TABLET, FILM COATED ORAL at 05:50

## 2025-02-23 RX ADMIN — SACUBITRIL AND VALSARTAN 1 TABLET(S): 49; 51 TABLET, FILM COATED ORAL at 05:50

## 2025-02-23 RX ADMIN — SACUBITRIL AND VALSARTAN 1 TABLET(S): 49; 51 TABLET, FILM COATED ORAL at 17:37

## 2025-02-23 RX ADMIN — Medication 25 MILLIGRAM(S): at 05:50

## 2025-02-23 RX ADMIN — INSULIN LISPRO 2: 100 INJECTION, SOLUTION INTRAVENOUS; SUBCUTANEOUS at 09:03

## 2025-02-23 RX ADMIN — Medication 20 MILLIGRAM(S): at 11:52

## 2025-02-23 RX ADMIN — Medication 100 MILLIGRAM(S): at 03:52

## 2025-02-23 RX ADMIN — CLOPIDOGREL BISULFATE 75 MILLIGRAM(S): 75 TABLET, FILM COATED ORAL at 12:02

## 2025-02-23 RX ADMIN — INSULIN LISPRO 8 UNIT(S): 100 INJECTION, SOLUTION INTRAVENOUS; SUBCUTANEOUS at 11:53

## 2025-02-23 RX ADMIN — INSULIN LISPRO 8 UNIT(S): 100 INJECTION, SOLUTION INTRAVENOUS; SUBCUTANEOUS at 17:38

## 2025-02-23 RX ADMIN — ATORVASTATIN CALCIUM 40 MILLIGRAM(S): 80 TABLET, FILM COATED ORAL at 21:13

## 2025-02-23 RX ADMIN — Medication 100 MILLIGRAM(S): at 11:51

## 2025-02-23 RX ADMIN — Medication 25 GRAM(S): at 05:50

## 2025-02-23 RX ADMIN — INSULIN LISPRO 1: 100 INJECTION, SOLUTION INTRAVENOUS; SUBCUTANEOUS at 17:38

## 2025-02-23 RX ADMIN — INSULIN GLARGINE-YFGN 40 UNIT(S): 100 INJECTION, SOLUTION SUBCUTANEOUS at 21:12

## 2025-02-23 NOTE — H&P ADULT - NSHPPHYSICALEXAM_GEN_ALL_CORE
GENERAL: pt examined bedside, appears uncomfortable but NAD  HEENT: NC/AT, moist oral mucosa, clear conjunctiva, sclera nonicteric  RESPIRATORY: Normal respiratory effort, no wheezing, rhonchi, rales  CARDIOVASCULAR: RRR, normal S1 and S2, no murmur/rub/gallop  ABDOMEN: soft, NT/ND, normoactive bowel sounds, no rebound/guarding  MSK: no joint deformities  EXTREMITIES: No cynaosis, no clubbing, LLE edema   PSYCH: affect appropriate and cooperative  NEUROLOGY: A+O to person, place, and time, no focal neurologic deficits appreciated  SKIN: LLE erythema, very warm to touch, pain w/ minimal palpation and unable to assess crepitus, blistering along shin, erythema extending beyond prior marking from 1 week ago below knee to now above knee and w/ streaking along medial thigh, no palpable lymphnodes appreciated, open wounds on LLE w/ no obvious drainage GENERAL: pt examined bedside, appears uncomfortable but NAD  HEENT: NC/AT, moist oral mucosa, clear conjunctiva, sclera nonicteric  RESPIRATORY: Normal respiratory effort, no wheezing, rhonchi, rales  CARDIOVASCULAR: RRR, normal S1 and S2, no murmur/rub/gallop  ABDOMEN: soft, NT/ND, normoactive bowel sounds, no rebound/guarding  MSK: no joint deformities  EXTREMITIES: No cynaosis, no clubbing, LLE edema, pulses palpable   PSYCH: affect appropriate and cooperative  NEUROLOGY: A+O to person, place, and time, no focal neurologic deficits appreciated  SKIN: LLE erythema, very warm to touch, pain w/ minimal palpation and unable to assess crepitus, blistering along shin, erythema extending beyond prior marking from 1 week ago below knee to now above knee and w/ streaking along medial thigh, no palpable lymphnodes appreciated, open wounds on LLE w/ no obvious drainage

## 2025-02-23 NOTE — PATIENT PROFILE ADULT - FALL HARM RISK - HARM RISK INTERVENTIONS
Assistance with ambulation/Assistance OOB with selected safe patient handling equipment/Communicate Risk of Fall with Harm to all staff/Discuss with provider need for PT consult/Monitor gait and stability/Reinforce activity limits and safety measures with patient and family/Tailored Fall Risk Interventions/Use of alarms - bed, chair and/or voice tab/Visual Cue: Yellow wristband and red socks/Bed in lowest position, wheels locked, appropriate side rails in place/Call bell, personal items and telephone in reach/Instruct patient to call for assistance before getting out of bed or chair/Non-slip footwear when patient is out of bed/[place] to call system/Physically safe environment - no spills, clutter or unnecessary equipment/Purposeful Proactive Rounding/Room/bathroom lighting operational, light cord in reach

## 2025-02-23 NOTE — H&P ADULT - NSICDXPASTSURGICALHX_GEN_ALL_CORE_FT
PAST SURGICAL HISTORY:  Ankle fracture, left ORIF - 2003    H/O hand surgery LEFT - 1981    S/P peripheral artery angioplasty     
No

## 2025-02-23 NOTE — H&P ADULT - ASSESSMENT
53 y/o F, lives in shelter, w/ PMH HFimprovedEF (25%-->55% 2/10/25), HTN, HLD, GERHARD (not on CPAP), chronic LE edema, PAD s/p b/l stents, L-ankle hardware, s/p recent admission 2/10/25-2/19/25 for AMS, acute hypoxic respiratory failure requiring NIV and septic shock 2/2 LLE cellulitis and PNA, completted course of cefepime presented today for worsening LLE erythema and pain.  VS Soft SBP in 90's but remainder of VS WNL.  Clinically w/ severe LLE cellulitis extending beyond prior marking from 1 week ago below knee to now above knee and w/ streaking along medial thigh, open wounds on LLE w/ no obvious drainage, very warm to touch and painful to minimal palpation therefore unable to appreciate crepitus.  Initial w/u significant for WBC 12.27 w/ L-shift but LA 1.3.  LLE doppler negative.  s/p clinda, vanco, zosyn  in ED.  Will admit for severe LLE cellulitis.      Severe LLE cellulitis   - No signs of sepsis other than WBC 12.27 w/ L-shift but lactate normal   - Was recently admitted for septic shock due to LLE cellulitis and completed course of cefepime   - LLE cellulitis extending beyond prior marking from 1 week ago below knee to now above knee and w/ streaking along medial thigh, open wounds on LLE w/ no obvious drainage, very warm to touch and painful to minimal palpation therefore unable to appreciate crepitus    - LLE doppler negative for DVT   - Xray L-ankle w/ evidence of hardware   - Will order CT of LLE to assess for gas  - s/p vanc, zosyn and clinda in ED and will continue for now pending ID eval   - Will consult wound care   - Will give short course of gentle IVFs   - Trend CBC and monitor temperature   - ID consulted       Mild hyponatremia hypochloremia   - Could be from LLE pain   - Clinically euvolemic   - Will hold off on further w/u unless drops further   - Monitor I/O's, lytes and renal function      Chronic HFimprovedEF  - Clinically euvolemic at this time   - TTE was 25% and now 55% on TTE from 2/10/25  - c/w entresto, aldactone, carvedilol  - Hold lasix for 1 day to allow for fluid resuscitation then resume   - Resume Jardiance on d/c   - Monitor daily weight, I/O's  - Monitor on telemetry       HTN / HLD  - c/w home medications       IDDM2   - f/u A1c  - Hold metformin while inpt  - Resume basal/bolus insulin regimen   - ISS and hypoglycemic protocol in place       PAD  - c/w ASA and plavix         VTE ppx:  heparin sq    Dispo: Acute.  Anticipate home once medically stable.  Will consult PT to assess for home needs.

## 2025-02-23 NOTE — H&P ADULT - HISTORY OF PRESENT ILLNESS
55 y/o F, lives in shelter, w/ PMH HFimprovedEF (25%-->55% 2/10/25), HTN, HLD, GERHARD (not on CPAP), chronic LE edema, PAD s/p b/l stents, L-ankle hardware, s/p recent admission 2/10/25-2/19/25 for AMS, acute hypoxic respiratory failure requiring NIV and septic shock 2/2 LLE cellulitis and PNA, completted course of cefepime presented today for worsening LLE erythema and pain. Pt states that since d/c her LLE has worsened but she denies fevers or chills.  She also notes open wounds on her LLE but denies any drainage. Pt also denies trauma to the leg, paresthesias, sob, cough, abd pain, N/V, diarrhea.    55 y/o F, lives in shelter, w/ PMH HFimprovedEF (25%-->55% 2/10/25), HTN, HLD, GERHARD (not on CPAP), chronic LE edema, PAD s/p b/l stents, L-ankle hardware, s/p recent admission 2/10/25-2/19/25 for AMS, acute hypoxic respiratory failure requiring NIV and septic shock 2/2 LLE cellulitis and PNA, completed course of cefepime presented today for worsening LLE erythema and pain. Pt states that since d/c her LLE has worsened but she denies fevers or chills.  She also notes open wounds on her LLE but denies any drainage. Pt also denies trauma to the leg, paresthesias, sob, cough, abd pain, N/V, diarrhea.

## 2025-02-23 NOTE — PROGRESS NOTE ADULT - ASSESSMENT
55 y/o F, lives in shelter, w/ PMH HFimprovedEF (25%-->55% 2/10/25), HTN, HLD, GERHARD (not on CPAP), chronic LE edema, PAD s/p b/l stents, L-ankle hardware, s/p recent admission 2/10/25-2/19/25 for AMS, acute hypoxic respiratory failure requiring NIV and septic shock 2/2 LLE cellulitis and PNA, completted course of cefepime presented today for worsening LLE erythema and pain.  VS Soft SBP in 90's but remainder of VS WNL.  Clinically w/ severe LLE cellulitis extending beyond prior marking from 1 week ago below knee to now above knee and w/ streaking along medial thigh, open wounds on LLE w/ no obvious drainage, very warm to touch and painful to minimal palpation therefore unable to appreciate crepitus.  Initial w/u significant for WBC 12.27 w/ L-shift but LA 1.3.  LLE doppler negative.  s/p clindajamarcus zosyn  in ED.  Will admit for severe LLE cellulitis.      Severe LLE cellulitis   -Superimposed on venous stasis  -Elv wbc, CT LE w some subq edema, no gas or abcess  - Was recently admitted for septic shock due to LLE cellulitis and completed course of cefepime   - LLE doppler negative for DVT   c/w clinda rosasyn ,appreciate ID recs  -wound care consulted  -short course of gentle IVFs   - Trend CBC and monitor temperature     Mild hyponatremia hypochloremia   -pseudo, sugars elv  - Could be from LLE pain   - Clinically euvolemic   - Will hold off on further w/u unless drops further   - Monitor I/O's, lytes and renal function      Chronic HFimprovedEF  - Clinically euvolemic at this time   - TTE was 25% and now 55% on TTE from 2/10/25  - c/w entresto, aldactone, carvedilol  - Hold lasix for 1 day to allow for fluid resuscitation then resume   - Resume Jardiance on d/c   - Monitor daily weight, I/O's  - Monitor on telemetry       HTN / HLD  - c/w home medications       IDDM2   - f/u A1c  - Hold metformin while inpt  - Resume basal/bolus insulin regimen   - ISS and hypoglycemic protocol in place       PAD  - c/w ASA and plavix         VTE ppx:  heparin sq    Dispo: Acute.

## 2025-02-23 NOTE — H&P ADULT - NSHPLABSRESULTS_GEN_ALL_CORE
13.3   12.27 )-----------( 293      ( 22 Feb 2025 19:16 )             41.0         02-22    134[L]  |  95[L]  |  12.6  ----------------------------<  218[H]  4.8   |  29.0  |  0.62    Ca    8.7      22 Feb 2025 19:16    TPro  6.7  /  Alb  2.8[L]  /  TBili  0.4  /  DBili  x   /  AST  21  /  ALT  17  /  AlkPhos  83  02-22        < from: US Duplex Venous Lower Ext Ltd, Left (02.22.25 @ 22:23) >    IMPRESSION:  No evidence of left lower extremity deep venous thrombosis. Limited   visualization of the left calf veins.    < end of copied text >    < from: TTE W or WO Ultrasound Enhancing Agent (02.10.25 @ 11:25) >    CONCLUSIONS:     1. Technically difficult image quality.   2. Left ventricular systolic function is normal with an ejection fraction visually estimated at 55 to 60 %.   3. Enlarged right ventricular cavity size and moderately reduced right ventricular systolic function.   4. Mild to moderate tricuspid regurgitation.   5. Estimated pulmonary artery systolic pressure is 52 mmHg, consistent with echocardiographic evidence of pulmonary hyptertension.   6. No pericardial effusion seen.   7. No prior echocardiogram is available for comparison.    < end of copied text >

## 2025-02-23 NOTE — PROGRESS NOTE ADULT - SUBJECTIVE AND OBJECTIVE BOX
ADIEL CURRAN  54y  Female      Patient is a 54y old  Female who presents with a chief complaint of LLE cellulitis (23 Feb 2025 07:44)    Admitted w LE cellulitis, on iv abx  From shelter        T(C): 36.7 (02-23-25 @ 07:43), Max: 37.3 (02-22-25 @ 19:00)  HR: 81 (02-23-25 @ 07:43) (74 - 81)  BP: 116/71 (02-23-25 @ 07:43) (90/56 - 116/71)  RR: 20 (02-23-25 @ 07:43) (18 - 20)  SpO2: 92% (02-23-25 @ 07:43) (92% - 95%)  Wt(kg): --Vital Signs Last 24 Hrs  T(C): 36.7 (23 Feb 2025 07:43), Max: 37.3 (22 Feb 2025 19:00)  T(F): 98.1 (23 Feb 2025 07:43), Max: 99.1 (22 Feb 2025 19:00)  HR: 81 (23 Feb 2025 07:43) (74 - 81)  BP: 116/71 (23 Feb 2025 07:43) (90/56 - 116/71)  BP(mean): 81 (23 Feb 2025 05:13) (81 - 81)  RR: 20 (23 Feb 2025 07:43) (18 - 20)  SpO2: 92% (23 Feb 2025 07:43) (92% - 95%)    Parameters below as of 23 Feb 2025 07:43  Patient On (Oxygen Delivery Method): room air         Consultant(s) Notes Reviewed:  [x ] YES  [ ] NO  Care Discussed with Consultants/Other Providers [ x] YES  [ ] NO    LABS:                        13.3   12.27 )-----------( 293      ( 22 Feb 2025 19:16 )             41.0     02-22    134[L]  |  95[L]  |  12.6  ----------------------------<  218[H]  4.8   |  29.0  |  0.62    Ca    8.7      22 Feb 2025 19:16    TPro  6.7  /  Alb  2.8[L]  /  TBili  0.4  /  DBili  x   /  AST  21  /  ALT  17  /  AlkPhos  83  02-22    PT/INR - ( 22 Feb 2025 19:16 )   PT: 12.3 sec;   INR: 1.09 ratio         PTT - ( 22 Feb 2025 19:16 )  PTT:39.0 sec  Urinalysis Basic - ( 22 Feb 2025 19:16 )    Color: x / Appearance: x / SG: x / pH: x  Gluc: 218 mg/dL / Ketone: x  / Bili: x / Urobili: x   Blood: x / Protein: x / Nitrite: x   Leuk Esterase: x / RBC: x / WBC x   Sq Epi: x / Non Sq Epi: x / Bacteria: x      CAPILLARY BLOOD GLUCOSE      POCT Blood Glucose.: 245 mg/dL (23 Feb 2025 09:29)  POCT Blood Glucose.: 234 mg/dL (23 Feb 2025 08:23)        Urinalysis Basic - ( 22 Feb 2025 19:16 )    Color: x / Appearance: x / SG: x / pH: x  Gluc: 218 mg/dL / Ketone: x  / Bili: x / Urobili: x   Blood: x / Protein: x / Nitrite: x   Leuk Esterase: x / RBC: x / WBC x   Sq Epi: x / Non Sq Epi: x / Bacteria: x        RADIOLOGY & ADDITIONAL TESTS:    Imaging Personally Reviewed:  [ ] YES  [ ] NO    HEALTH ISSUES - PROBLEM Dx:

## 2025-02-24 LAB
ANION GAP SERPL CALC-SCNC: 12 MMOL/L — SIGNIFICANT CHANGE UP (ref 5–17)
BUN SERPL-MCNC: 9 MG/DL — SIGNIFICANT CHANGE UP (ref 8–20)
CALCIUM SERPL-MCNC: 8.9 MG/DL — SIGNIFICANT CHANGE UP (ref 8.4–10.5)
CHLORIDE SERPL-SCNC: 96 MMOL/L — SIGNIFICANT CHANGE UP (ref 96–108)
CO2 SERPL-SCNC: 28 MMOL/L — SIGNIFICANT CHANGE UP (ref 22–29)
CREAT SERPL-MCNC: 0.67 MG/DL — SIGNIFICANT CHANGE UP (ref 0.5–1.3)
EGFR: 104 ML/MIN/1.73M2 — SIGNIFICANT CHANGE UP
GLUCOSE BLDC GLUCOMTR-MCNC: 198 MG/DL — HIGH (ref 70–99)
GLUCOSE BLDC GLUCOMTR-MCNC: 236 MG/DL — HIGH (ref 70–99)
GLUCOSE BLDC GLUCOMTR-MCNC: 286 MG/DL — HIGH (ref 70–99)
GLUCOSE BLDC GLUCOMTR-MCNC: 329 MG/DL — HIGH (ref 70–99)
GLUCOSE SERPL-MCNC: 282 MG/DL — HIGH (ref 70–99)
HCT VFR BLD CALC: 41.4 % — SIGNIFICANT CHANGE UP (ref 34.5–45)
HGB BLD-MCNC: 12.5 G/DL — SIGNIFICANT CHANGE UP (ref 11.5–15.5)
MCHC RBC-ENTMCNC: 27.2 PG — SIGNIFICANT CHANGE UP (ref 27–34)
MCHC RBC-ENTMCNC: 30.2 G/DL — LOW (ref 32–36)
MCV RBC AUTO: 90 FL — SIGNIFICANT CHANGE UP (ref 80–100)
NRBC # BLD AUTO: 0 K/UL — SIGNIFICANT CHANGE UP (ref 0–0)
NRBC # FLD: 0 K/UL — SIGNIFICANT CHANGE UP (ref 0–0)
NRBC BLD AUTO-RTO: 0 /100 WBCS — SIGNIFICANT CHANGE UP (ref 0–0)
PLATELET # BLD AUTO: 300 K/UL — SIGNIFICANT CHANGE UP (ref 150–400)
PMV BLD: 9.9 FL — SIGNIFICANT CHANGE UP (ref 7–13)
POTASSIUM SERPL-MCNC: 4.2 MMOL/L — SIGNIFICANT CHANGE UP (ref 3.5–5.3)
POTASSIUM SERPL-SCNC: 4.2 MMOL/L — SIGNIFICANT CHANGE UP (ref 3.5–5.3)
RBC # BLD: 4.6 M/UL — SIGNIFICANT CHANGE UP (ref 3.8–5.2)
RBC # FLD: 15.9 % — HIGH (ref 10.3–14.5)
SODIUM SERPL-SCNC: 136 MMOL/L — SIGNIFICANT CHANGE UP (ref 135–145)
VANCOMYCIN TROUGH SERPL-MCNC: 12.7 UG/ML — SIGNIFICANT CHANGE UP (ref 10–20)
WBC # BLD: 10.71 K/UL — HIGH (ref 3.8–10.5)
WBC # FLD AUTO: 10.71 K/UL — HIGH (ref 3.8–10.5)

## 2025-02-24 PROCEDURE — 99233 SBSQ HOSP IP/OBS HIGH 50: CPT | Mod: GC

## 2025-02-24 RX ORDER — ACETAMINOPHEN 500 MG/5ML
1000 LIQUID (ML) ORAL ONCE
Refills: 0 | Status: DISCONTINUED | OUTPATIENT
Start: 2025-02-24 | End: 2025-02-28

## 2025-02-24 RX ORDER — CLINDAMYCIN PHOSPHATE 150 MG/ML
900 VIAL (ML) INJECTION EVERY 8 HOURS
Refills: 0 | Status: DISCONTINUED | OUTPATIENT
Start: 2025-02-24 | End: 2025-02-25

## 2025-02-24 RX ORDER — WHITE PETROLATUM 1 G/G
1 OINTMENT TOPICAL
Refills: 0 | Status: DISCONTINUED | OUTPATIENT
Start: 2025-02-24 | End: 2025-02-28

## 2025-02-24 RX ORDER — SODIUM HYPOCHLORITE 0.12 MG/ML
1 SOLUTION TOPICAL DAILY
Refills: 0 | Status: DISCONTINUED | OUTPATIENT
Start: 2025-02-24 | End: 2025-02-28

## 2025-02-24 RX ORDER — PIPERACILLIN-TAZO-DEXTROSE,ISO 3.375G/5
3.38 IV SOLUTION, PIGGYBACK PREMIX FROZEN(ML) INTRAVENOUS EVERY 8 HOURS
Refills: 0 | Status: DISCONTINUED | OUTPATIENT
Start: 2025-02-24 | End: 2025-02-26

## 2025-02-24 RX ADMIN — Medication 650 MILLIGRAM(S): at 03:52

## 2025-02-24 RX ADMIN — FUROSEMIDE 40 MILLIGRAM(S): 10 INJECTION INTRAMUSCULAR; INTRAVENOUS at 16:05

## 2025-02-24 RX ADMIN — CLOPIDOGREL BISULFATE 75 MILLIGRAM(S): 75 TABLET, FILM COATED ORAL at 12:50

## 2025-02-24 RX ADMIN — Medication 650 MILLIGRAM(S): at 12:50

## 2025-02-24 RX ADMIN — INSULIN LISPRO 8 UNIT(S): 100 INJECTION, SOLUTION INTRAVENOUS; SUBCUTANEOUS at 12:49

## 2025-02-24 RX ADMIN — INSULIN LISPRO 1: 100 INJECTION, SOLUTION INTRAVENOUS; SUBCUTANEOUS at 16:38

## 2025-02-24 RX ADMIN — FUROSEMIDE 40 MILLIGRAM(S): 10 INJECTION INTRAMUSCULAR; INTRAVENOUS at 05:11

## 2025-02-24 RX ADMIN — INSULIN LISPRO 3: 100 INJECTION, SOLUTION INTRAVENOUS; SUBCUTANEOUS at 12:49

## 2025-02-24 RX ADMIN — WHITE PETROLATUM 1 APPLICATION(S): 1 OINTMENT TOPICAL at 18:06

## 2025-02-24 RX ADMIN — Medication 20 MILLIGRAM(S): at 12:50

## 2025-02-24 RX ADMIN — CARVEDILOL 3.12 MILLIGRAM(S): 3.12 TABLET, FILM COATED ORAL at 05:10

## 2025-02-24 RX ADMIN — Medication 25 MILLIGRAM(S): at 05:10

## 2025-02-24 RX ADMIN — INSULIN GLARGINE-YFGN 40 UNIT(S): 100 INJECTION, SOLUTION SUBCUTANEOUS at 21:43

## 2025-02-24 RX ADMIN — Medication 1 APPLICATION(S): at 18:06

## 2025-02-24 RX ADMIN — CARVEDILOL 3.12 MILLIGRAM(S): 3.12 TABLET, FILM COATED ORAL at 18:08

## 2025-02-24 RX ADMIN — INSULIN LISPRO 8 UNIT(S): 100 INJECTION, SOLUTION INTRAVENOUS; SUBCUTANEOUS at 08:38

## 2025-02-24 RX ADMIN — ATORVASTATIN CALCIUM 40 MILLIGRAM(S): 80 TABLET, FILM COATED ORAL at 21:43

## 2025-02-24 RX ADMIN — Medication 25 GRAM(S): at 22:58

## 2025-02-24 RX ADMIN — SACUBITRIL AND VALSARTAN 1 TABLET(S): 49; 51 TABLET, FILM COATED ORAL at 18:08

## 2025-02-24 RX ADMIN — Medication 25 GRAM(S): at 05:22

## 2025-02-24 RX ADMIN — Medication 81 MILLIGRAM(S): at 12:50

## 2025-02-24 RX ADMIN — INSULIN LISPRO 4: 100 INJECTION, SOLUTION INTRAVENOUS; SUBCUTANEOUS at 08:37

## 2025-02-24 RX ADMIN — INSULIN LISPRO 8 UNIT(S): 100 INJECTION, SOLUTION INTRAVENOUS; SUBCUTANEOUS at 16:38

## 2025-02-24 RX ADMIN — Medication 25 GRAM(S): at 16:20

## 2025-02-24 RX ADMIN — Medication 250 MILLIGRAM(S): at 00:57

## 2025-02-24 RX ADMIN — Medication 100 MILLIGRAM(S): at 21:44

## 2025-02-24 RX ADMIN — Medication 100 MILLIGRAM(S): at 03:33

## 2025-02-24 RX ADMIN — SACUBITRIL AND VALSARTAN 1 TABLET(S): 49; 51 TABLET, FILM COATED ORAL at 05:10

## 2025-02-24 RX ADMIN — SODIUM HYPOCHLORITE 1 APPLICATION(S): 0.12 SOLUTION TOPICAL at 16:13

## 2025-02-24 RX ADMIN — Medication 100 MILLIGRAM(S): at 16:52

## 2025-02-24 NOTE — PHYSICAL THERAPY INITIAL EVALUATION ADULT - ADDITIONAL COMMENTS
pt lives with daughter in apartment, 4 [address]. pt was using RW PTA, pt was recently discharged from [facility] with device.

## 2025-02-24 NOTE — CHART NOTE - NSCHARTNOTEFT_GEN_A_CORE
Vascular eval  Patient admitted to medicine with multiple comorbidities, including history of CHF, BLE edema, and BL SFA stents.  Patient now with non-healing cellulitis and weakly palpable pedal pulses.  Vascular surgery called to evaluate for inflow. No work-up done at the time.  CT of the lower extremities with apparently open SFA stents.  Patient pending TONI/PVR.    Will see patient and follow results.

## 2025-02-24 NOTE — PROGRESS NOTE ADULT - ASSESSMENT
53 y/o F, lives in shelter, w/ PMH HFimprovedEF (25%-->55% 2/10/25), HTN, HLD, GERHARD (not on CPAP), chronic LE edema, PAD s/p b/l stents, L-ankle hardware, s/p recent admission 2/10/25-2/19/25 for AMS, acute hypoxic respiratory failure requiring NIV and septic shock 2/2 LLE cellulitis and PNA, completted course of cefepime presented today for worsening LLE erythema and pain.  VS Soft SBP in 90's but remainder of VS WNL.  Clinically w/ severe LLE cellulitis extending beyond prior marking from 1 week ago below knee to now above knee and w/ streaking along medial thigh, open wounds on LLE w/ no obvious drainage, very warm to touch and painful to minimal palpation therefore unable to appreciate crepitus.  Initial w/u significant for WBC 12.27 w/ L-shift but LA 1.3.  LLE doppler negative.  s/p clindajamarcus zosyn  in ED.  Will admit for severe LLE cellulitis.      Severe LLE cellulitis   -Superimposed on venous stasis  -Elv wbc, CT LE w some subq edema, no gas or abcess  - Was recently admitted for septic shock due to LLE cellulitis and completed course of cefepime   - LLE doppler negative for DVT   c/w clinda rosasyn ,appreciate ID recs  -wound care consulted  -short course of gentle IVFs   - Trend CBC and monitor temperature     Mild hyponatremia hypochloremia   -pseudo, sugars elv  - Could be from LLE pain   - Clinically euvolemic   - Will hold off on further w/u unless drops further   - Monitor I/O's, lytes and renal function      Chronic HFimprovedEF  - Clinically euvolemic at this time   - TTE was 25% and now 55% on TTE from 2/10/25  - c/w entresto, aldactone, carvedilol  - Hold lasix for 1 day to allow for fluid resuscitation then resume   - Resume Jardiance on d/c   - Monitor daily weight, I/O's  - Monitor on telemetry       HTN / HLD  - c/w home medications       IDDM2   - f/u A1c  - Hold metformin while inpt  - Resume basal/bolus insulin regimen   - ISS and hypoglycemic protocol in place       PAD  - c/w ASA and plavix         VTE ppx:  heparin sq    Dispo: Acute.     53 y/o F, lives in shelter, w/ PMH HFimprovedEF (25%-->55% 2/10/25), HTN, HLD, GERHARD (not on CPAP), chronic LE edema, PAD s/p b/l stents, L-ankle hardware, s/p recent admission 2/10/25-2/19/25 for AMS, acute hypoxic respiratory failure requiring NIV and septic shock 2/2 LLE cellulitis and PNA, completted course of cefepime presented today for worsening LLE erythema and pain.  VS Soft SBP in 90's but remainder of VS WNL.  Clinically w/ severe LLE cellulitis extending beyond prior marking from 1 week ago below knee to now above knee and w/ streaking along medial thigh, open wounds on LLE w/ no obvious drainage, very warm to touch and painful to minimal palpation therefore unable to appreciate crepitus.  Initial w/u significant for WBC 12.27 w/ L-shift but LA 1.3.  LLE doppler negative.  s/p clindajamarcus zosyn  in ED.  Will admit for severe LLE cellulitis.      Severe LLE cellulitis   -Superimposed on venous stasis  -Elv wbc, CT LE w some subq edema, no gas or abcess  - Was recently admitted for septic shock due to LLE cellulitis and completed course of cefepime   - LLE doppler negative for DVT   c/w clinda rosasyn ,appreciate ID recs  - wound care consulted  - short course of gentle IVFs   - Trend CBC and monitor temperature     Mild hyponatremia hypochloremia   -pseudo, sugars elv  - Could be from LLE pain   - Clinically euvolemic   - Will hold off on further w/u unless drops further   - Monitor I/O's, lytes and renal function  - Resolved    Chronic HFimprovedEF  - Clinically euvolemic at this time   - TTE was 25% and now 55% on TTE from 2/10/25  - c/w entresto, aldactone, carvedilol  - Hold lasix for 1 day to allow for fluid resuscitation then resume   - Resume Jardiance on d/c   - Monitor daily weight, I/O's  - Monitor on telemetry       HTN / HLD  - c/w home medications       IDDM2   - f/u A1c  - Hold metformin while inpt  - Resume basal/bolus insulin regimen   - ISS and hypoglycemic protocol in place       PAD  - c/w ASA and plavix         VTE ppx:  heparin sq    Dispo: Acute.     55 y/o F, lives in shelter, w/ PMH HFimprovedEF (25%-->55% 2/10/25), HTN, HLD, GERHARD (not on CPAP), chronic LE edema, PAD s/p b/l stents, L-ankle hardware, s/p recent admission 2/10/25-2/19/25 for AMS, acute hypoxic respiratory failure requiring NIV and septic shock 2/2 LLE cellulitis and PNA, completted course of cefepime presented today for worsening LLE erythema and pain.  VS Soft SBP in 90's but remainder of VS WNL.  Clinically w/ severe LLE cellulitis extending beyond prior marking from 1 week ago below knee to now above knee and w/ streaking along medial thigh, open wounds on LLE w/ no obvious drainage, very warm to touch and painful to minimal palpation therefore unable to appreciate crepitus.  Initial w/u significant for WBC 12.27 w/ L-shift but LA 1.3.  LLE doppler negative.  s/p clindajamarcus zosyn  in ED.  Will admit for severe LLE cellulitis.      Severe LLE cellulitis   -Superimposed on venous stasis  -Elv wbc, CT LE w some subq edema, no gas or abcess  - Was recently admitted for septic shock due to LLE cellulitis and completed course of cefepime   - LLE doppler negative for DVT   c/w clinda rosasyn ,appreciate ID recs  - wound care consulted  - short course of gentle IVFs   - Trend CBC and monitor temperature     Mild hyponatremia hypochloremia   -pseudo, sugars elv  - Could be from LLE pain   - Clinically euvolemic   - Will hold off on further w/u unless drops further   - Monitor I/O's, lytes and renal function  - Resolved    Chronic HFimprovedEF  - Clinically euvolemic at this time   - TTE was 25% and now 55% on TTE from 2/10/25  - c/w entresto, aldactone, carvedilol  - Hold lasix for 1 day to allow for fluid resuscitation then resume   - Resume Jardiance on d/c   - Monitor daily weight, I/O's  - Monitor on telemetry       HTN / HLD  - c/w home medications       IDDM2   - A1c 8.5  - Hold metformin while inpt  - Resume basal/bolus insulin regimen   - ISS and hypoglycemic protocol in place       PAD  - c/w ASA and plavix         VTE ppx:  heparin sq    Dispo: Acute.     53 y/o F, lives in shelter, w/ PMH HFimprovedEF (25%-->55% 2/10/25), HTN, HLD, GERHARD (not on CPAP), chronic LE edema, PAD s/p b/l stents, L-ankle hardware, s/p recent admission 2/10/25-2/19/25 for AMS, acute hypoxic respiratory failure requiring NIV and septic shock 2/2 LLE cellulitis and PNA, completted course of cefepime presented today for worsening LLE erythema and pain.  VS Soft SBP in 90's but remainder of VS WNL.  Clinically w/ severe LLE cellulitis extending beyond prior marking from 1 week ago below knee to now above knee and w/ streaking along medial thigh, open wounds on LLE w/ no obvious drainage, very warm to touch and painful to minimal palpation therefore unable to appreciate crepitus.  Initial w/u significant for WBC 12.27 w/ L-shift but LA 1.3.  LLE doppler negative.  s/p clindajamarcus zosyn  in ED.      Severe LLE cellulitis   -Superimposed on venous stasis  -Elv wbc, CT LE w some subq edema, no gas or abcess  - Was recently admitted for septic shock due to LLE cellulitis and completed course of cefepime   - LLE doppler negative for DVT   c/w clinda gabyn ,appreciate ID recs, possibly begin Lidex ointment BID for dermatitis  - wound care consulted  - short course of gentle IVFs   - Trend CBC and monitor temperature   - Consult vascular for complicated venous stasis    Mild hyponatremia hypochloremia   - pseudo, sugars elv  - Could be from LLE pain   - Clinically euvolemic   - Will hold off on further w/u unless drops further   - Monitor I/O's, lytes and renal function  - Resolved    Chronic HFimprovedEF  - Clinically euvolemic at this time   - TTE was 25% and now 55% on TTE from 2/10/25  - c/w entresto, aldactone, carvedilol  - Hold lasix for 1 day to allow for fluid resuscitation then resume   - Resume Jardiance on d/c   - Monitor daily weight, I/O's  - Monitor on telemetry       HTN / HLD  - c/w home medications       IDDM2   - A1c 8.5  - Hold metformin while inpt  - Resume basal/bolus insulin regimen   - ISS and hypoglycemic protocol in place       PAD  - c/w ASA and plavix         VTE ppx:  heparin sq    Dispo: Acute.      53 y/o F w/ pmhx of HFrEF w/ EF recovery (55%, 02/10/25), HTN, HLD, GERHARD (not on CPAP), chronic LE edema, PAD s/p b/l stents, L-ankle hardware, and a recent admission (02/10-02/19) for AMS where patient was found to have Septic shock and AHRF 2/2 LLE cellulitis and PNA admitted to [facility] for severe nonhealing LLE cellulitis w/ superimposed venous stasis    #Severe LLE cellulitis   #Superimposed on venous stasis  -Elv wbc, CT LE w some subq edema, no gas or abcess  - Was recently admitted for septic shock due to LLE cellulitis and completed course of cefazolin  - LLE doppler negative for DVT   -c/w clinda zosyn ,appreciate ID recs, possibly begin Lidex ointment BID for dermatitis  - wound care consulted, recs appreciated    - Trend CBC and monitor temperature   - Consult vascular for complicated venous stasis    #Mild hyponatremia hypochloremia (resolved)  - pseudo, sugars elv  - Could be from LLE pain   - Clinically euvolemic   - Will hold off on further w/u unless drops further   - Monitor I/O's, lytes and renal function  - Resolved    #Chronic HFimprovedEF  - Clinically euvolemic at this time   - TTE was 25% and now 55% on TTE from 2/10/25  - c/w entresto, aldactone, carvedilol, lasix  - Resume Jardiance on d/c   - Monitor daily weight, I/O's  - Monitor on telemetry     #HTN / HLD  - c/w home medications     #IDDM2   - A1c 8.5  - Hold metformin while inpt  - Resume basal/bolus insulin regimen   - ISS and hypoglycemic protocol in place     #PAD  - c/w ASA and plavix       VTE ppx:  heparin sq    Dispo: Acute.

## 2025-02-24 NOTE — PROGRESS NOTE ADULT - SUBJECTIVE AND OBJECTIVE BOX
SUBJECTIVE    BRIEF HOSPITAL COURSE SUMMARY: Patient is a 53 y/o F w/ pmhx of HFrEF w/ EF recovery (55%, 02/10/25), HTN, HLD, GERHARD (not on CPAP), chronic LE edema, PAD s/p b/l stents, L-ankle hardware, and a recent admission (02/10-02/19) for AMS where patient was found to have Septic shock and AHRF 2/2 LLE cellulitis and PNA requring NIV and MICU level care presented from custodial to [facility] complaining of worsening LLE erythema and pain. Patient reports that her cellulitis worsened since her prior admission, completed cefepime course for treatment. ED investigations revealed leukocytosis, CT of the LLE revealed subQ edema c/w cellulitus, but negative for gas or abscess. Doppler study of LLE was negative for ID. ID consulted.      LAST 24 HOURS:  TODAY:    OBJECTIVE  PHYSICAL EXAM:    Vital Signs Last 24 Hrs  T(C): 36.6 (24 Feb 2025 04:54), Max: 37.2 (23 Feb 2025 22:00)  T(F): 97.8 (24 Feb 2025 04:54), Max: 99 (23 Feb 2025 22:00)  HR: 78 (24 Feb 2025 04:54) (74 - 86)  BP: 113/62 (24 Feb 2025 04:54) (102/68 - 139/74)  BP(mean): 79 (23 Feb 2025 22:00) (79 - 79)  RR: 18 (24 Feb 2025 04:54) (18 - 20)  SpO2: 92% (24 Feb 2025 04:54) (90% - 94%)    Parameters below as of 24 Feb 2025 04:54  Patient On (Oxygen Delivery Method): room air            MEDICATIONS  (STANDING):  aspirin  chewable 81 milliGRAM(s) Oral daily  atorvastatin 40 milliGRAM(s) Oral at bedtime  carvedilol 3.125 milliGRAM(s) Oral every 12 hours  clindamycin IVPB 900 milliGRAM(s) IV Intermittent every 8 hours  clopidogrel Tablet 75 milliGRAM(s) Oral daily  dextrose 5%. 1000 milliLiter(s) (100 mL/Hr) IV Continuous <Continuous>  dextrose 5%. 1000 milliLiter(s) (50 mL/Hr) IV Continuous <Continuous>  dextrose 50% Injectable 25 Gram(s) IV Push once  dextrose 50% Injectable 12.5 Gram(s) IV Push once  dextrose 50% Injectable 25 Gram(s) IV Push once  famotidine    Tablet 20 milliGRAM(s) Oral daily  furosemide    Tablet 40 milliGRAM(s) Oral two times a day  glucagon  Injectable 1 milliGRAM(s) IntraMuscular once  heparin   Injectable 5000 Unit(s) SubCutaneous every 12 hours  influenza   Vaccine 0.5 milliLiter(s) IntraMuscular once  insulin glargine Injectable (LANTUS) 40 Unit(s) SubCutaneous at bedtime  insulin lispro (ADMELOG) corrective regimen sliding scale   SubCutaneous three times a day before meals  insulin lispro Injectable (ADMELOG) 8 Unit(s) SubCutaneous three times a day before meals  piperacillin/tazobactam IVPB.. 3.375 Gram(s) IV Intermittent every 8 hours  sacubitril 24 mG/valsartan 26 mG 1 Tablet(s) Oral two times a day  sodium chloride 0.9%. 1000 milliLiter(s) (85 mL/Hr) IV Continuous <Continuous>  spironolactone 25 milliGRAM(s) Oral daily  vancomycin  IVPB 1000 milliGRAM(s) IV Intermittent every 12 hours    MEDICATIONS  (PRN):  acetaminophen     Tablet .. 650 milliGRAM(s) Oral every 6 hours PRN Temp greater or equal to 38C (100.4F), Mild Pain (1 - 3)  aluminum hydroxide/magnesium hydroxide/simethicone Suspension 30 milliLiter(s) Oral every 4 hours PRN Dyspepsia  dextrose Oral Gel 15 Gram(s) Oral once PRN Blood Glucose LESS THAN 70 milliGRAM(s)/deciliter  melatonin 3 milliGRAM(s) Oral at bedtime PRN Insomnia  ondansetron Injectable 4 milliGRAM(s) IV Push every 8 hours PRN Nausea and/or Vomiting    Allergies    No Known Allergies    Intolerances        LABS:                        13.3   12.27 )-----------( 293      ( 22 Feb 2025 19:16 )             41.0     02-22    134[L]  |  95[L]  |  12.6  ----------------------------<  218[H]  4.8   |  29.0  |  0.62    Ca    8.7      22 Feb 2025 19:16    TPro  6.7  /  Alb  2.8[L]  /  TBili  0.4  /  DBili  x   /  AST  21  /  ALT  17  /  AlkPhos  83  02-22    PT/INR - ( 22 Feb 2025 19:16 )   PT: 12.3 sec;   INR: 1.09 ratio         PTT - ( 22 Feb 2025 19:16 )  PTT:39.0 sec  Urinalysis Basic - ( 22 Feb 2025 19:16 )    Color: x / Appearance: x / SG: x / pH: x  Gluc: 218 mg/dL / Ketone: x  / Bili: x / Urobili: x   Blood: x / Protein: x / Nitrite: x   Leuk Esterase: x / RBC: x / WBC x   Sq Epi: x / Non Sq Epi: x / Bacteria: x      CAPILLARY BLOOD GLUCOSE      POCT Blood Glucose.: 195 mg/dL (23 Feb 2025 20:56)  POCT Blood Glucose.: 195 mg/dL (23 Feb 2025 17:36)  POCT Blood Glucose.: 232 mg/dL (23 Feb 2025 11:34)  POCT Blood Glucose.: 245 mg/dL (23 Feb 2025 09:29)  POCT Blood Glucose.: 234 mg/dL (23 Feb 2025 08:23)      CULTURE DATA:    Culture - Blood (collected 02-22-25 @ 19:10)  Source: .Blood Blood  Preliminary Report (02-24-25 @ 02:02):    No growth at 24 hours        RADIOLOGY & ADDITIONAL TESTS: SUBJECTIVE    BRIEF HOSPITAL COURSE SUMMARY: Patient is a 55 y/o F w/ pmhx of HFrEF w/ EF recovery (55%, 02/10/25), HTN, HLD, GERHARD (not on CPAP), chronic LE edema, PAD s/p b/l stents, L-ankle hardware, and a recent admission (02/10-02/19) for AMS where patient was found to have Septic shock and AHRF 2/2 LLE cellulitis and PNA requring NIV and MICU level care presented from detention to [facility] complaining of worsening LLE erythema and pain. Patient reports that her cellulitis worsened since her prior admission, completed cefepime course for treatment. ED investigations revealed leukocytosis, CT of the LLE revealed subQ edema c/w cellulitus, but negative for gas or abscess. Doppler study of LLE was negative for ID. ID consulted.      LAST 24 HOURS:   TODAY: patient reports that the lower left leg has improved, with regard to swelling and ability to move. Pain is unchanged from admission, rating it 8/10 at this time. Patient denies fever, chills, headache, chest pain, palpitations, SOB, abdominal pain, diarrhea, dysuria, hematuria. Pain confirms pain in left lower extremity, swelling, erythema, purulent discharge.     OBJECTIVE  PHYSICAL EXAM:  Neuro: AxOx3, EOMI  Cardio: s1, s2 present, regular rate and rhythm  Pulmonary: Diminished breath sounds, coarse sounds on auscultation bilaterally  Abdominal: bowel sounds present, nontender to palpation in all 4 quadrants  Extremities:  RLE: generalized swelling, no erythema, scattered ecchymosis present, nontender to palpation. 2+ pitting edema throughout  LLE: generalized swelling, erythema present from knee to ankle, no ecchymosis appreciated, purulent discharge from 2 locations on leg and external skin layer peeling. Tender to palpation from knee to ankle. Near incompressible edema from ankle to knee.       Vital Signs Last 24 Hrs  T(C): 36.6 (24 Feb 2025 08:54), Max: 37.2 (23 Feb 2025 22:00)  T(F): 97.8 (24 Feb 2025 08:54), Max: 99 (23 Feb 2025 22:00)  HR: 75 (24 Feb 2025 08:54) (74 - 86)  BP: 99/66 (24 Feb 2025 08:54) (99/66 - 139/74)  BP(mean): 79 (23 Feb 2025 22:00) (79 - 79)  RR: 18 (24 Feb 2025 08:54) (18 - 18)  SpO2: 95% (24 Feb 2025 08:54) (90% - 95%)    Parameters below as of 24 Feb 2025 08:54  Patient On (Oxygen Delivery Method): room air        MEDICATIONS  (STANDING):  aspirin  chewable 81 milliGRAM(s) Oral daily  atorvastatin 40 milliGRAM(s) Oral at bedtime  carvedilol 3.125 milliGRAM(s) Oral every 12 hours  clindamycin IVPB 900 milliGRAM(s) IV Intermittent every 8 hours  clopidogrel Tablet 75 milliGRAM(s) Oral daily  dextrose 5%. 1000 milliLiter(s) (100 mL/Hr) IV Continuous <Continuous>  dextrose 5%. 1000 milliLiter(s) (50 mL/Hr) IV Continuous <Continuous>  dextrose 50% Injectable 25 Gram(s) IV Push once  dextrose 50% Injectable 12.5 Gram(s) IV Push once  dextrose 50% Injectable 25 Gram(s) IV Push once  famotidine    Tablet 20 milliGRAM(s) Oral daily  furosemide    Tablet 40 milliGRAM(s) Oral two times a day  glucagon  Injectable 1 milliGRAM(s) IntraMuscular once  heparin   Injectable 5000 Unit(s) SubCutaneous every 12 hours  influenza   Vaccine 0.5 milliLiter(s) IntraMuscular once  insulin glargine Injectable (LANTUS) 40 Unit(s) SubCutaneous at bedtime  insulin lispro (ADMELOG) corrective regimen sliding scale   SubCutaneous three times a day before meals  insulin lispro Injectable (ADMELOG) 8 Unit(s) SubCutaneous three times a day before meals  piperacillin/tazobactam IVPB.. 3.375 Gram(s) IV Intermittent every 8 hours  sacubitril 24 mG/valsartan 26 mG 1 Tablet(s) Oral two times a day  sodium chloride 0.9%. 1000 milliLiter(s) (85 mL/Hr) IV Continuous <Continuous>  spironolactone 25 milliGRAM(s) Oral daily    MEDICATIONS  (PRN):  acetaminophen     Tablet .. 650 milliGRAM(s) Oral every 6 hours PRN Temp greater or equal to 38C (100.4F), Mild Pain (1 - 3)  aluminum hydroxide/magnesium hydroxide/simethicone Suspension 30 milliLiter(s) Oral every 4 hours PRN Dyspepsia  dextrose Oral Gel 15 Gram(s) Oral once PRN Blood Glucose LESS THAN 70 milliGRAM(s)/deciliter  melatonin 3 milliGRAM(s) Oral at bedtime PRN Insomnia  ondansetron Injectable 4 milliGRAM(s) IV Push every 8 hours PRN Nausea and/or Vomiting    Allergies    No Known Allergies    Intolerances        LABS:                        13.3   12.27 )-----------( 293      ( 22 Feb 2025 19:16 )             41.0     02-24    136  |  96  |  9.0  ----------------------------<  282[H]  4.2   |  28.0  |  0.67    Ca    8.9      24 Feb 2025 06:00    TPro  6.7  /  Alb  2.8[L]  /  TBili  0.4  /  DBili  x   /  AST  21  /  ALT  17  /  AlkPhos  83  02-22    PT/INR - ( 22 Feb 2025 19:16 )   PT: 12.3 sec;   INR: 1.09 ratio         PTT - ( 22 Feb 2025 19:16 )  PTT:39.0 sec  Urinalysis Basic - ( 24 Feb 2025 06:00 )    Color: x / Appearance: x / SG: x / pH: x  Gluc: 282 mg/dL / Ketone: x  / Bili: x / Urobili: x   Blood: x / Protein: x / Nitrite: x   Leuk Esterase: x / RBC: x / WBC x   Sq Epi: x / Non Sq Epi: x / Bacteria: x      CAPILLARY BLOOD GLUCOSE      POCT Blood Glucose.: 329 mg/dL (24 Feb 2025 08:35)  POCT Blood Glucose.: 195 mg/dL (23 Feb 2025 20:56)  POCT Blood Glucose.: 195 mg/dL (23 Feb 2025 17:36)  POCT Blood Glucose.: 232 mg/dL (23 Feb 2025 11:34)  POCT Blood Glucose.: 245 mg/dL (23 Feb 2025 09:29)      CULTURE DATA:    Culture - Blood (collected 02-22-25 @ 19:10)  Source: .Blood Blood  Preliminary Report (02-24-25 @ 02:02):    No growth at 24 hours        RADIOLOGY & ADDITIONAL TESTS: SUBJECTIVE    BRIEF HOSPITAL COURSE SUMMARY: Patient is a 55 y/o F w/ pmhx of HFrEF w/ EF recovery (55%, 02/10/25), HTN, HLD, GERHARD (not on CPAP), chronic LE edema, PAD s/p b/l stents, L-ankle hardware, and a recent admission (02/10-02/19) for AMS where patient was found to have Septic shock and AHRF 2/2 LLE cellulitis and PNA requring NIV and MICU level care presented from [facility] to [facility] complaining of worsening LLE erythema and pain. Patient reports that her cellulitis worsened since her prior admission, completed cefepime course for treatment. ED investigations revealed leukocytosis, CT of the LLE revealed subQ edema c/w cellulitus, but negative for gas or abscess. Doppler study of LLE was negative for ID. ID consulted.      LAST 24 HOURS: no reported events overnight  TODAY: patient reports that the lower left leg has improved, with regard to swelling and ability to move. Pain is unchanged from admission, rating it 8/10 at this time. Patient denies fever, chills, headache, chest pain, palpitations, SOB, abdominal pain, diarrhea, dysuria, hematuria. Pain confirms pain in left lower extremity, swelling, erythema, purulent discharge.     OBJECTIVE  PHYSICAL EXAM:  Neuro: AxOx3, EOMI  Cardio: s1, s2 present, regular rate and rhythm  Pulmonary: Diminished breath sounds, coarse sounds on auscultation bilaterally  Abdominal: bowel sounds present, nontender to palpation in all 4 quadrants  Extremities:  RLE: generalized swelling, no erythema, scattered ecchymosis present, nontender to palpation. 3+ pitting edema throughout, unable to palpated dorsalis pedis or posterior tibial arteries, popliteal weakly palpated 1+  LLE: generalized swelling, erythema present from knee joint space to ankle, no ecchymosis appreciated, purulent discharge from 2 locations on lateral portion of leg and external skin layer peeling. Tender to palpation from knee to ankle, warm to touch. Near incompressible edema from ankle to knee. Unable to palpated dorsalis pedis, posterior tibial, popliteal arteries at this time    Vital Signs Last 24 Hrs  T(C): 36.6 (24 Feb 2025 08:54), Max: 37.2 (23 Feb 2025 22:00)  T(F): 97.8 (24 Feb 2025 08:54), Max: 99 (23 Feb 2025 22:00)  HR: 75 (24 Feb 2025 08:54) (74 - 86)  BP: 99/66 (24 Feb 2025 08:54) (99/66 - 139/74)  BP(mean): 79 (23 Feb 2025 22:00) (79 - 79)  RR: 18 (24 Feb 2025 08:54) (18 - 18)  SpO2: 95% (24 Feb 2025 08:54) (90% - 95%)    Parameters below as of 24 Feb 2025 08:54  Patient On (Oxygen Delivery Method): room air      MEDICATIONS  (STANDING):  aspirin  chewable 81 milliGRAM(s) Oral daily  atorvastatin 40 milliGRAM(s) Oral at bedtime  carvedilol 3.125 milliGRAM(s) Oral every 12 hours  clindamycin IVPB 900 milliGRAM(s) IV Intermittent every 8 hours  clopidogrel Tablet 75 milliGRAM(s) Oral daily  dextrose 5%. 1000 milliLiter(s) (100 mL/Hr) IV Continuous <Continuous>  dextrose 5%. 1000 milliLiter(s) (50 mL/Hr) IV Continuous <Continuous>  dextrose 50% Injectable 25 Gram(s) IV Push once  dextrose 50% Injectable 12.5 Gram(s) IV Push once  dextrose 50% Injectable 25 Gram(s) IV Push once  famotidine    Tablet 20 milliGRAM(s) Oral daily  fluocinonide 0.05% Solution 1 Application(s) Topical two times a day  furosemide    Tablet 40 milliGRAM(s) Oral two times a day  glucagon  Injectable 1 milliGRAM(s) IntraMuscular once  heparin   Injectable 5000 Unit(s) SubCutaneous every 12 hours  influenza   Vaccine 0.5 milliLiter(s) IntraMuscular once  insulin glargine Injectable (LANTUS) 40 Unit(s) SubCutaneous at bedtime  insulin lispro (ADMELOG) corrective regimen sliding scale   SubCutaneous three times a day before meals  insulin lispro Injectable (ADMELOG) 8 Unit(s) SubCutaneous three times a day before meals  piperacillin/tazobactam IVPB.. 3.375 Gram(s) IV Intermittent every 8 hours  sacubitril 24 mG/valsartan 26 mG 1 Tablet(s) Oral two times a day  sodium chloride 0.9%. 1000 milliLiter(s) (85 mL/Hr) IV Continuous <Continuous>  spironolactone 25 milliGRAM(s) Oral daily    MEDICATIONS  (PRN):  acetaminophen     Tablet .. 650 milliGRAM(s) Oral every 6 hours PRN Temp greater or equal to 38C (100.4F), Mild Pain (1 - 3)  aluminum hydroxide/magnesium hydroxide/simethicone Suspension 30 milliLiter(s) Oral every 4 hours PRN Dyspepsia  dextrose Oral Gel 15 Gram(s) Oral once PRN Blood Glucose LESS THAN 70 milliGRAM(s)/deciliter  melatonin 3 milliGRAM(s) Oral at bedtime PRN Insomnia  ondansetron Injectable 4 milliGRAM(s) IV Push every 8 hours PRN Nausea and/or Vomiting    Allergies    No Known Allergies    Intolerances        LABS:                        12.5   10.71 )-----------( 300      ( 24 Feb 2025 09:05 )             41.4     02-24    136  |  96  |  9.0  ----------------------------<  282[H]  4.2   |  28.0  |  0.67    Ca    8.9      24 Feb 2025 06:00    TPro  6.7  /  Alb  2.8[L]  /  TBili  0.4  /  DBili  x   /  AST  21  /  ALT  17  /  AlkPhos  83  02-22    PT/INR - ( 22 Feb 2025 19:16 )   PT: 12.3 sec;   INR: 1.09 ratio         PTT - ( 22 Feb 2025 19:16 )  PTT:39.0 sec  Urinalysis Basic - ( 24 Feb 2025 06:00 )    Color: x / Appearance: x / SG: x / pH: x  Gluc: 282 mg/dL / Ketone: x  / Bili: x / Urobili: x   Blood: x / Protein: x / Nitrite: x   Leuk Esterase: x / RBC: x / WBC x   Sq Epi: x / Non Sq Epi: x / Bacteria: x      CAPILLARY BLOOD GLUCOSE      POCT Blood Glucose.: 329 mg/dL (24 Feb 2025 08:35)  POCT Blood Glucose.: 195 mg/dL (23 Feb 2025 20:56)  POCT Blood Glucose.: 195 mg/dL (23 Feb 2025 17:36)  POCT Blood Glucose.: 232 mg/dL (23 Feb 2025 11:34)      CULTURE DATA:    Culture - Blood (collected 02-22-25 @ 19:10)  Source: .Blood Blood  Preliminary Report (02-24-25 @ 02:02):    No growth at 24 hours        RADIOLOGY & ADDITIONAL TESTS:

## 2025-02-24 NOTE — PHYSICAL THERAPY INITIAL EVALUATION ADULT - GAIT TRAINING, PT EVAL
pt will ambulate 150 ft modified independent with RW, pt will negotiate 4 stairs close supervision with HR step to technique.

## 2025-02-24 NOTE — PHYSICAL THERAPY INITIAL EVALUATION ADULT - PERTINENT HX OF CURRENT PROBLEM, REHAB EVAL
Patient is a 53 y/o F w/ pmhx of HFrEF w/ EF recovery (55%, 02/10/25), HTN, HLD, GERHARD (not on CPAP), chronic LE edema, PAD s/p b/l stents, L-ankle hardware, and a recent admission (02/10-02/19) for AMS where patient was found to have Septic shock and AHRF 2/2 LLE cellulitis and PNA requring NIV and MICU level care presented from residential to [facility] complaining of worsening LLE erythema and pain. Patient reports that her cellulitis worsened since her prior admission, completed cefepime course for treatment. ED investigations revealed leukocytosis, CT of the LLE revealed subQ edema c/w cellulitus, but negative for gas or abscess. Doppler study of LLE was negative for ID. ID consulted.

## 2025-02-25 LAB
ALBUMIN SERPL ELPH-MCNC: 2.8 G/DL — LOW (ref 3.3–5.2)
ALP SERPL-CCNC: 75 U/L — SIGNIFICANT CHANGE UP (ref 40–120)
ALT FLD-CCNC: 15 U/L — SIGNIFICANT CHANGE UP
ANION GAP SERPL CALC-SCNC: 13 MMOL/L — SIGNIFICANT CHANGE UP (ref 5–17)
AST SERPL-CCNC: 11 U/L — SIGNIFICANT CHANGE UP
BILIRUB SERPL-MCNC: 0.4 MG/DL — SIGNIFICANT CHANGE UP (ref 0.4–2)
BUN SERPL-MCNC: 9.9 MG/DL — SIGNIFICANT CHANGE UP (ref 8–20)
CALCIUM SERPL-MCNC: 8.7 MG/DL — SIGNIFICANT CHANGE UP (ref 8.4–10.5)
CHLORIDE SERPL-SCNC: 96 MMOL/L — SIGNIFICANT CHANGE UP (ref 96–108)
CO2 SERPL-SCNC: 27 MMOL/L — SIGNIFICANT CHANGE UP (ref 22–29)
CREAT SERPL-MCNC: 0.67 MG/DL — SIGNIFICANT CHANGE UP (ref 0.5–1.3)
EGFR: 104 ML/MIN/1.73M2 — SIGNIFICANT CHANGE UP
GLUCOSE BLDC GLUCOMTR-MCNC: 172 MG/DL — HIGH (ref 70–99)
GLUCOSE BLDC GLUCOMTR-MCNC: 204 MG/DL — HIGH (ref 70–99)
GLUCOSE BLDC GLUCOMTR-MCNC: 242 MG/DL — HIGH (ref 70–99)
GLUCOSE BLDC GLUCOMTR-MCNC: 275 MG/DL — HIGH (ref 70–99)
GLUCOSE SERPL-MCNC: 190 MG/DL — HIGH (ref 70–99)
HCT VFR BLD CALC: 41 % — SIGNIFICANT CHANGE UP (ref 34.5–45)
HGB BLD-MCNC: 12.9 G/DL — SIGNIFICANT CHANGE UP (ref 11.5–15.5)
MAGNESIUM SERPL-MCNC: 1.8 MG/DL — SIGNIFICANT CHANGE UP (ref 1.6–2.6)
MCHC RBC-ENTMCNC: 28 PG — SIGNIFICANT CHANGE UP (ref 27–34)
MCHC RBC-ENTMCNC: 31.5 G/DL — LOW (ref 32–36)
MCV RBC AUTO: 89.1 FL — SIGNIFICANT CHANGE UP (ref 80–100)
NRBC # BLD AUTO: 0 K/UL — SIGNIFICANT CHANGE UP (ref 0–0)
NRBC # FLD: 0 K/UL — SIGNIFICANT CHANGE UP (ref 0–0)
NRBC BLD AUTO-RTO: 0 /100 WBCS — SIGNIFICANT CHANGE UP (ref 0–0)
PLATELET # BLD AUTO: 276 K/UL — SIGNIFICANT CHANGE UP (ref 150–400)
PMV BLD: 10.3 FL — SIGNIFICANT CHANGE UP (ref 7–13)
POTASSIUM SERPL-MCNC: 4.5 MMOL/L — SIGNIFICANT CHANGE UP (ref 3.5–5.3)
POTASSIUM SERPL-SCNC: 4.5 MMOL/L — SIGNIFICANT CHANGE UP (ref 3.5–5.3)
PROT SERPL-MCNC: 7 G/DL — SIGNIFICANT CHANGE UP (ref 6.6–8.7)
RBC # BLD: 4.6 M/UL — SIGNIFICANT CHANGE UP (ref 3.8–5.2)
RBC # FLD: 16.3 % — HIGH (ref 10.3–14.5)
SODIUM SERPL-SCNC: 136 MMOL/L — SIGNIFICANT CHANGE UP (ref 135–145)
WBC # BLD: 10.01 K/UL — SIGNIFICANT CHANGE UP (ref 3.8–10.5)
WBC # FLD AUTO: 10.01 K/UL — SIGNIFICANT CHANGE UP (ref 3.8–10.5)

## 2025-02-25 PROCEDURE — G0545: CPT

## 2025-02-25 PROCEDURE — 99233 SBSQ HOSP IP/OBS HIGH 50: CPT | Mod: GC

## 2025-02-25 PROCEDURE — 99233 SBSQ HOSP IP/OBS HIGH 50: CPT

## 2025-02-25 RX ORDER — INSULIN LISPRO 100 U/ML
10 INJECTION, SOLUTION INTRAVENOUS; SUBCUTANEOUS
Refills: 0 | Status: DISCONTINUED | OUTPATIENT
Start: 2025-02-25 | End: 2025-02-28

## 2025-02-25 RX ADMIN — SACUBITRIL AND VALSARTAN 1 TABLET(S): 49; 51 TABLET, FILM COATED ORAL at 18:24

## 2025-02-25 RX ADMIN — Medication 1 APPLICATION(S): at 18:23

## 2025-02-25 RX ADMIN — FUROSEMIDE 40 MILLIGRAM(S): 10 INJECTION INTRAMUSCULAR; INTRAVENOUS at 05:15

## 2025-02-25 RX ADMIN — INSULIN GLARGINE-YFGN 40 UNIT(S): 100 INJECTION, SOLUTION SUBCUTANEOUS at 22:19

## 2025-02-25 RX ADMIN — Medication 1 APPLICATION(S): at 05:16

## 2025-02-25 RX ADMIN — CARVEDILOL 3.12 MILLIGRAM(S): 3.12 TABLET, FILM COATED ORAL at 18:24

## 2025-02-25 RX ADMIN — FUROSEMIDE 40 MILLIGRAM(S): 10 INJECTION INTRAMUSCULAR; INTRAVENOUS at 14:01

## 2025-02-25 RX ADMIN — WHITE PETROLATUM 1 APPLICATION(S): 1 OINTMENT TOPICAL at 18:22

## 2025-02-25 RX ADMIN — ATORVASTATIN CALCIUM 40 MILLIGRAM(S): 80 TABLET, FILM COATED ORAL at 22:19

## 2025-02-25 RX ADMIN — Medication 650 MILLIGRAM(S): at 07:31

## 2025-02-25 RX ADMIN — INSULIN LISPRO 10 UNIT(S): 100 INJECTION, SOLUTION INTRAVENOUS; SUBCUTANEOUS at 18:23

## 2025-02-25 RX ADMIN — INSULIN LISPRO 1: 100 INJECTION, SOLUTION INTRAVENOUS; SUBCUTANEOUS at 18:23

## 2025-02-25 RX ADMIN — Medication 100 MILLIGRAM(S): at 14:58

## 2025-02-25 RX ADMIN — WHITE PETROLATUM 1 APPLICATION(S): 1 OINTMENT TOPICAL at 05:16

## 2025-02-25 RX ADMIN — INSULIN LISPRO 2: 100 INJECTION, SOLUTION INTRAVENOUS; SUBCUTANEOUS at 08:51

## 2025-02-25 RX ADMIN — SODIUM HYPOCHLORITE 1 APPLICATION(S): 0.12 SOLUTION TOPICAL at 14:01

## 2025-02-25 RX ADMIN — Medication 100 MILLIGRAM(S): at 05:15

## 2025-02-25 RX ADMIN — Medication 25 MILLIGRAM(S): at 05:15

## 2025-02-25 RX ADMIN — INSULIN LISPRO 2: 100 INJECTION, SOLUTION INTRAVENOUS; SUBCUTANEOUS at 14:02

## 2025-02-25 RX ADMIN — Medication 25 GRAM(S): at 15:38

## 2025-02-25 RX ADMIN — Medication 20 MILLIGRAM(S): at 14:00

## 2025-02-25 RX ADMIN — Medication 650 MILLIGRAM(S): at 06:31

## 2025-02-25 RX ADMIN — Medication 81 MILLIGRAM(S): at 14:00

## 2025-02-25 RX ADMIN — CLOPIDOGREL BISULFATE 75 MILLIGRAM(S): 75 TABLET, FILM COATED ORAL at 14:00

## 2025-02-25 RX ADMIN — SACUBITRIL AND VALSARTAN 1 TABLET(S): 49; 51 TABLET, FILM COATED ORAL at 05:15

## 2025-02-25 RX ADMIN — Medication 25 GRAM(S): at 05:15

## 2025-02-25 RX ADMIN — CARVEDILOL 3.12 MILLIGRAM(S): 3.12 TABLET, FILM COATED ORAL at 05:15

## 2025-02-25 RX ADMIN — INSULIN LISPRO 10 UNIT(S): 100 INJECTION, SOLUTION INTRAVENOUS; SUBCUTANEOUS at 14:02

## 2025-02-25 RX ADMIN — Medication 25 GRAM(S): at 22:19

## 2025-02-25 NOTE — CONSULT NOTE ADULT - ASSESSMENT
55 yo F with non-healing venostasis ulcers on the L, failed unna boots as outpatient and now with cellulitis.   Prior SFA stents by interventional cardiology appear open on CT, however, unable to assess distal perfusion. Patient refused TONI and needs TONI >0.8 for Unna boot. TONI 2023 prior intervention were 0.6  No evidence of acute limb ischemia at this moment requiring emergent intervention, however, pulse is R>L.    Plan:  No emergent vascular intervention  Recommend re-ordering the TONI/PVR with toe pressure, if unable to tolerate can have only toe pressures.  Recommend smoking cessation  Recommend xeroform, Kerlix, Ace wrap for wound care of venostasis ulcers and elevation.    55 yo F with non-healing venostasis ulcers on the L, failed unna boots as outpatient and now with cellulitis.   Prior SFA stents by interventional cardiology appear open on CT, however, unable to assess distal perfusion. Patient refused TONI and needs TONI >0.8 for Unna boot. TONI 2023 prior intervention were 0.6  No evidence of acute limb ischemia at this moment requiring emergent intervention, however, pulse is R>L.    Plan:  No emergent vascular intervention  Recommend smoking cessation  Recommend xeroform, Kerlix, Ace wrap for wound care of venostasis ulcers and elevation.   Can follow-up with Dr. Resendez as outpatient

## 2025-02-25 NOTE — ADVANCED PRACTICE NURSE CONSULT - RECOMMEDATIONS
pt normally managed by oupt unna boot with pods, last TONI 0.66. seen by vascular at bedside today no plans for surgical itnervention. pt refusing TONI in house d/t pain to b/l LE, last noted TONI 2023 at 0.66 and was reprotedly recieving unna boot at that time. agree with vascular surgery obtain repeat TONI, ff/ui outpt for further management and tx options. Haydenalkerry recommended xeroform kerlix/ace/elevation for wound care.     Alternative reccs as noted below, would ultimately defer to overseeing team.     1. b/l LE lymhpedema   2. b/l LE venous stasis ulcerations with superoimposed cellulitis     WOUNDS:  =======  b/l LE  cleanse with dakins 1/4 strength  tow ound beds  apply aquaphor to dry craked skin, NOT to ulcers   apply medihoney in nickel thick layer to wound beds   cover with non woven gauze   secure with kerlix and ACE x1 wrapped in spiral fashion.   change QD     GENERAL GUIDELINES, AND RECOMMENDATIONS  ==============================  -Carefully check that no tubes are entangled with the bed linens or have contact to patient’s skin. Keep bed sheets smooth and wrinkle free to prevent injury to the skin   - Assure to position pt feet at 20 degrees, then HOB at a 30 degree angle  to limit sliding/friction/shearing, especially in bed bound populations.       Apply waffle/sacral cushion for sacral offloading, both in bed and in chair. if limited mobility, maintain full fowlers chair seated position for <2 hours at a time.   Keep the pt’s heels protected and elevated off the surface of the bed. Place what the patient can tolerate: pillows, moldable pillows, offloading heel boots      This was a ***   minute visit, with greater than 50% counseling. My findings and suggestions that may benefit the pt were disscussed with the family/caregiver if present at bedside and with pt permission,  shift RN at bedside, in addition to overseeing team/provider via chat and or telephone.     Wound Care will continue to follow the patient? : no    Continue to monitor skin integrity as per policy,  and call or re-consult Wound Care with any acute changes or lack of progression of current recommendations. Wound care rceommendations are generally for shift RN dressing changes, unless otherwise specified. Wound Care IS NOT continuing to follow the patient unless otherwise specified as noted above.     yKree OVALLE, RN, CWCN  Wound Ostomy Nurse Educator   [facility]  Please call/message over Teams and/or Email for clarification/contact.  (E): [email or web address]  pt normally managed by oupt unna boot with pods, last TONI 0.66. seen by vascular at bedside today no plans for surgical itnervention. pt refusing TONI in house d/t pain to b/l LE, last noted TONI 2023 at 0.66 and was reprotedly recieving unna boot at that time. agree with vascular surgery obtain repeat TONI, ff/ui outpt for further management and tx options. Haydenalkerry recommended xeroform kerlix/ace/elevation for wound care.     Alternative reccs as noted below, would ultimately defer to overseeing team.     1. b/l LE lymhpedema   2. b/l LE venous stasis ulcerations with superoimposed cellulitis     WOUNDS:  =======  b/l LE  cleanse with dakins 1/4 strength  tow ound beds  apply aquaphor to dry craked skin, NOT to ulcers   apply medihoney in nickel thick layer to wound beds   cover with non woven gauze   secure with kerlix and ACE x1 wrapped in spiral fashion.   change QD     GENERAL GUIDELINES, AND RECOMMENDATIONS  ==============================  -Carefully check that no tubes are entangled with the bed linens or have contact to patient’s skin. Keep bed sheets smooth and wrinkle free to prevent injury to the skin   - Assure to position pt feet at 20 degrees, then HOB at a 30 degree angle  to limit sliding/friction/shearing, especially in bed bound populations.       Apply waffle/sacral cushion for sacral offloading, both in bed and in chair. if limited mobility, maintain full fowlers chair seated position for <2 hours at a time.   Keep the pt’s heels protected and elevated off the surface of the bed. Place what the patient can tolerate: pillows, moldable pillows, offloading heel boots      This was a 20  minute visit, with greater than 50% counseling. My findings and suggestions that may benefit the pt were disscussed with the family/caregiver if present at bedside and with pt permission,  shift RN at bedside, in addition to overseeing team/provider via chat and or telephone.     Wound Care will continue to follow the patient? : no    Continue to monitor skin integrity as per policy,  and call or re-consult Wound Care with any acute changes or lack of progression of current recommendations. Wound care rceommendations are generally for shift RN dressing changes, unless otherwise specified. Wound Care IS NOT continuing to follow the patient unless otherwise specified as noted above.     Kyree OVALLE, RN, CWCN  Wound Ostomy Nurse Educator   [facility]  Please call/message over Teams and/or Email for clarification/contact.  (E): [email or web address]

## 2025-02-25 NOTE — CONSULT NOTE ADULT - SUBJECTIVE AND OBJECTIVE BOX
VASCULAR SURGERY CONSULT  HPI reviewed as below  55 yo F with multiple comorbidities, past BL SFA stents by interventional cardiology with TONI of 0.66 on 2023.  Patient now reports non-healing cellulitis mostly on the LLE. Has had some chronic wounds managed by podiatry office with unna boot.  TONI requested yesterday, however, patient refused due to pain on the legs.   Still smokes 1/2 pack per day.    ==============================================================================================================  HPI: 54y Female  HPI:  55 y/o F, lives in shelter, w/ PMH HFimprovedEF (25%-->55% 2/10/25), HTN, HLD, GERHARD (not on CPAP), chronic LE edema, PAD s/p b/l stents, L-ankle hardware, s/p recent admission 2/10/25-2/19/25 for AMS, acute hypoxic respiratory failure requiring NIV and septic shock 2/2 LLE cellulitis and PNA, completed course of cefepime presented today for worsening LLE erythema and pain. Pt states that since d/c her LLE has worsened but she denies fevers or chills.  She also notes open wounds on her LLE but denies any drainage. Pt also denies trauma to the leg, paresthesias, sob, cough, abd pain, N/V, diarrhea.    (23 Feb 2025 01:39)      PAST MEDICAL & SURGICAL HISTORY:  Obstructive sleep apnea      Borderline systolic HTN      Acute systolic congestive heart failure      HTN (hypertension)      HLD (hyperlipidemia)      DM2 (diabetes mellitus, type 2)      PAD (peripheral artery disease)      Ankle fracture, left  ORIF - 2003      H/O hand surgery  LEFT - 1981      S/P peripheral artery angioplasty        Home Meds: Home Medications:  Admelog 100 units/mL injectable solution: 10 unit(s) injectable 3 times a day (23 Feb 2025 01:57)  Basaglar KwikPen 100 units/mL subcutaneous solution: 40 unit(s) subcutaneous once a day (at bedtime) (23 Feb 2025 01:57)  carvedilol 6.25 mg oral tablet: 0.5 tab(s) orally every 12 hours (23 Feb 2025 02:01)  furosemide 40 mg oral tablet: 1 tab(s) orally 2 times a day (23 Feb 2025 02:01)  Jardiance 10 mg oral tablet: 1 tab(s) orally once a day (in the morning) (23 Feb 2025 02:01)  metFORMIN 1000 mg oral tablet: 1 tab(s) orally 2 times a day (23 Feb 2025 02:01)  spironolactone 25 mg oral tablet: 1 tab(s) orally once a day (23 Feb 2025 02:01)    Allergies: Allergies    No Known Allergies    Intolerances      Soc:   Advanced Directives: Presumed Full Code     CURRENT MEDICATIONS:   --------------------------------------------------------------------------------------  Neurologic Medications  acetaminophen     Tablet .. 650 milliGRAM(s) Oral every 6 hours PRN Temp greater or equal to 38C (100.4F), Mild Pain (1 - 3)  acetaminophen   IVPB .. 1000 milliGRAM(s) IV Intermittent once  melatonin 3 milliGRAM(s) Oral at bedtime PRN Insomnia  ondansetron Injectable 4 milliGRAM(s) IV Push every 8 hours PRN Nausea and/or Vomiting    Respiratory Medications    Cardiovascular Medications  carvedilol 3.125 milliGRAM(s) Oral every 12 hours  furosemide    Tablet 40 milliGRAM(s) Oral two times a day  sacubitril 24 mG/valsartan 26 mG 1 Tablet(s) Oral two times a day  spironolactone 25 milliGRAM(s) Oral daily    Gastrointestinal Medications  aluminum hydroxide/magnesium hydroxide/simethicone Suspension 30 milliLiter(s) Oral every 4 hours PRN Dyspepsia  dextrose 5%. 1000 milliLiter(s) IV Continuous <Continuous>  dextrose 5%. 1000 milliLiter(s) IV Continuous <Continuous>  famotidine    Tablet 20 milliGRAM(s) Oral daily  sodium chloride 0.9%. 1000 milliLiter(s) IV Continuous <Continuous>    Genitourinary Medications    Hematologic/Oncologic Medications  aspirin  chewable 81 milliGRAM(s) Oral daily  clopidogrel Tablet 75 milliGRAM(s) Oral daily  heparin   Injectable 5000 Unit(s) SubCutaneous every 12 hours  influenza   Vaccine 0.5 milliLiter(s) IntraMuscular once    Antimicrobial/Immunologic Medications  clindamycin IVPB 900 milliGRAM(s) IV Intermittent every 8 hours  piperacillin/tazobactam IVPB.. 3.375 Gram(s) IV Intermittent every 8 hours    Endocrine/Metabolic Medications  atorvastatin 40 milliGRAM(s) Oral at bedtime  dextrose 50% Injectable 25 Gram(s) IV Push once  dextrose 50% Injectable 12.5 Gram(s) IV Push once  dextrose 50% Injectable 25 Gram(s) IV Push once  dextrose Oral Gel 15 Gram(s) Oral once PRN Blood Glucose LESS THAN 70 milliGRAM(s)/deciliter  glucagon  Injectable 1 milliGRAM(s) IntraMuscular once  insulin glargine Injectable (LANTUS) 40 Unit(s) SubCutaneous at bedtime  insulin lispro (ADMELOG) corrective regimen sliding scale   SubCutaneous three times a day before meals  insulin lispro Injectable (ADMELOG) 8 Unit(s) SubCutaneous three times a day before meals    Topical/Other Medications  AQUAPHOR (petrolatum Ointment) 1 Application(s) Topical two times a day  Dakins Solution - 1/4 Strength 1 Application(s) Topical daily  fluocinonide 0.05% Solution 1 Application(s) Topical two times a day    --------------------------------------------------------------------------------------    VITAL SIGNS, INS/OUTS (last 24 hours):  --------------------------------------------------------------------------------------  ICU Vital Signs Last 24 Hrs  T(C): 36.4 (24 Feb 2025 21:40), Max: 36.6 (24 Feb 2025 08:54)  T(F): 97.5 (24 Feb 2025 21:40), Max: 97.8 (24 Feb 2025 08:54)  HR: 73 (24 Feb 2025 21:40) (73 - 75)  BP: 120/74 (24 Feb 2025 21:40) (99/66 - 120/74)  BP(mean): --  ABP: --  ABP(mean): --  RR: 18 (24 Feb 2025 21:40) (18 - 18)  SpO2: 92% (24 Feb 2025 21:40) (92% - 99%)    O2 Parameters below as of 24 Feb 2025 21:40  Patient On (Oxygen Delivery Method): room air          I&O's Summary    23 Feb 2025 07:01  -  24 Feb 2025 07:00  --------------------------------------------------------  IN: 0 mL / OUT: 900 mL / NET: -900 mL    24 Feb 2025 07:01  -  25 Feb 2025 06:17  --------------------------------------------------------  IN: 0 mL / OUT: 550 mL / NET: -550 mL      --------------------------------------------------------------------------------------    PHYSICAL EXAM:  GENERAL: NAD  NECK: Supple, No JVD  CHEST/LUNG: non-labored breathing on room air.   VASCULAR:   RLE: Minimal erythema, swelling and dry skin. Bounding DP pulse, unable to palpate PT  LLE: Cellulitis of the leg not extending to thigh or foot, weakly palpable DP, non-palpable PT.       LABS  --------------------------------------------------------------------------------------  Labs:  CAPILLARY BLOOD GLUCOSE      POCT Blood Glucose.: 236 mg/dL (24 Feb 2025 21:39)  POCT Blood Glucose.: 198 mg/dL (24 Feb 2025 16:36)  POCT Blood Glucose.: 286 mg/dL (24 Feb 2025 12:46)  POCT Blood Glucose.: 329 mg/dL (24 Feb 2025 08:35)                          12.5   10.71 )-----------( 300      ( 24 Feb 2025 09:05 )             41.4         02-24    136  |  96  |  9.0  ----------------------------<  282[H]  4.2   |  28.0  |  0.67          LFTs:     Lactate, Blood: 1.3 mmol/L (02-22-25 @ 19:16)      Coags:            Urinalysis Basic - ( 24 Feb 2025 06:00 )    Color: x / Appearance: x / SG: x / pH: x  Gluc: 282 mg/dL / Ketone: x  / Bili: x / Urobili: x   Blood: x / Protein: x / Nitrite: x   Leuk Esterase: x / RBC: x / WBC x   Sq Epi: x / Non Sq Epi: x / Bacteria: x        Culture - Blood (collected 22 Feb 2025 19:10)  Source: .Blood Blood  Preliminary Report (25 Feb 2025 02:02):    No growth at 48 Hours        --------------------------------------------------------------------------------------  
INFECTIOUS DISEASES AND INTERNAL MEDICINE at [facility]  =======================================================  Khang Salter MD  Diplomates [employer] Board of Internal Medicine and Infectious Diseases  Telephone [phone]  Fax            [phone]  =======================================================    ADIEL [id number]      HPI:  53 y/o F, lives in shelter, w/ PMH HFimprovedEF (25%-->55% 2/10/25), HTN, HLD, GERHARD (not on CPAP), chronic LE edema, PAD s/p b/l stents, L-ankle hardware, s/p recent admission 2/10/25-2/19/25 for AMS, acute hypoxic respiratory failure requiring NIV and septic shock 2/2 LLE cellulitis and PNA, completed course of CEFAZOLIN presented today for worsening LLE erythema and pain. Pt states that since d/c her LLE has worsened but she denies fevers or chills.  She also notes open wounds on her LLE but denies any drainage. Pt also denies trauma to the leg, paresthesias, sob, cough, abd pain, N/V, diarrhea.       AS ABOVE RECENT HOSP STAY 2/10-2/19 WITH CELLULITIS SHOCK  ICU STAY NOW WITH REPORTED INCREASE SWELLING REDNESS OF LEFT LEG   ASKED TO EVALUATE FROM ID STANDPOINT       PAST MEDICAL & SURGICAL HISTORY:  Obstructive sleep apnea      Borderline systolic HTN      Acute systolic congestive heart failure      HTN (hypertension)      HLD (hyperlipidemia)      DM2 (diabetes mellitus, type 2)      PAD (peripheral artery disease)      Ankle fracture, left  ORIF - 2003      H/O hand surgery  LEFT - 1981      S/P peripheral artery angioplasty          ANTIBIOTICS  clindamycin IVPB 900 milliGRAM(s) IV Intermittent every 8 hours  piperacillin/tazobactam IVPB.. 3.375 Gram(s) IV Intermittent every 8 hours  vancomycin  IVPB 1000 milliGRAM(s) IV Intermittent every 12 hours      Allergies    No Known Allergies    Intolerances        SOCIAL HISTORY:     FAMILY HX   FAMILY HISTORY:  FH: leukemia (Aunt)        Vital Signs Last 24 Hrs  T(C): 36.8 (23 Feb 2025 05:13), Max: 37.3 (22 Feb 2025 19:00)  T(F): 98.2 (23 Feb 2025 05:13), Max: 99.1 (22 Feb 2025 19:00)  HR: 76 (23 Feb 2025 05:13) (74 - 81)  BP: 105/69 (23 Feb 2025 05:13) (90/56 - 106/78)  BP(mean): 81 (23 Feb 2025 05:13) (81 - 81)  RR: 18 (23 Feb 2025 05:13) (18 - 18)  SpO2: 93% (23 Feb 2025 05:13) (93% - 95%)    Parameters below as of 23 Feb 2025 05:13  Patient On (Oxygen Delivery Method): room air      Drug Dosing Weight  Height (cm): 149.9 (22 Feb 2025 16:42)  Weight (kg): 120.8 (10 Feb 2025 08:30)  BMI (kg/m2): 53.8 (22 Feb 2025 16:42)  BSA (m2): 2.08 (22 Feb 2025 16:42)      REVIEW OF SYSTEMS:    CONSTITUTIONAL:  As per HPI.    HEENT:  Eyes:  No diplopia or blurred vision. ENT:  No earache, sore throat or runny nose.    CARDIOVASCULAR:  No pressure, squeezing, strangling, tightness, heaviness or aching about the chest, neck, axilla or epigastrium.    RESPIRATORY:  No cough, shortness of breath, PND or orthopnea.    GASTROINTESTINAL:  No nausea, vomiting or diarrhea.    GENITOURINARY:  No dysuria, frequency or urgency.    MUSCULOSKELETAL:  As per HPI.    SKIN: AS PER HPI    NEUROLOGIC:  No paresthesias, fasciculations, seizures or weakness.                  PHYSICAL EXAMINATION:    GENERAL: The patient is a _____in no apparent distress. ___     VITAL SIGNS: T(C): 36.8 (02-23-25 @ 05:13), Max: 37.3 (02-22-25 @ 19:00)  HR: 76 (02-23-25 @ 05:13) (74 - 81)  BP: 105/69 (02-23-25 @ 05:13) (90/56 - 106/78)  RR: 18 (02-23-25 @ 05:13) (18 - 18)  SpO2: 93% (02-23-25 @ 05:13) (93% - 95%)  Wt(kg): --    HEENT: Head is normocephalic and atraumatic.  ANICTERIC  NECK: Supple. No carotid bruits.  No lymphadenopathy or thyromegaly.    LUNGS:COARSE BREATH SOUNDS    HEART: Regular rate and rhythm without murmur.    ABDOMEN: Soft, nontender, and nondistended.  Positive bowel sounds.  No hepatosplenomegaly was noted. NO REBOUND NO GUARDING    EXTREMITIES:LEFT LOWER EXT EDEMA ERYTHEMA WARMTH TENDERNESS NO DRAINAGE  NO CREPITUS RIGHT LEG WITH EDEMA SMALL ULCER POSTERIOR TIBIAL NO DRAINAGE     NEUROLOGIC: NON FOCAL             BLOOD CULTURES       URINE CX          LABS:                        13.3   12.27 )-----------( 293      ( 22 Feb 2025 19:16 )             41.0     02-22    134[L]  |  95[L]  |  12.6  ----------------------------<  218[H]  4.8   |  29.0  |  0.62    Ca    8.7      22 Feb 2025 19:16    TPro  6.7  /  Alb  2.8[L]  /  TBili  0.4  /  DBili  x   /  AST  21  /  ALT  17  /  AlkPhos  83  02-22    PT/INR - ( 22 Feb 2025 19:16 )   PT: 12.3 sec;   INR: 1.09 ratio         PTT - ( 22 Feb 2025 19:16 )  PTT:39.0 sec  Urinalysis Basic - ( 22 Feb 2025 19:16 )    Color: x / Appearance: x / SG: x / pH: x  Gluc: 218 mg/dL / Ketone: x  / Bili: x / Urobili: x   Blood: x / Protein: x / Nitrite: x   Leuk Esterase: x / RBC: x / WBC x   Sq Epi: x / Non Sq Epi: x / Bacteria: x        RADIOLOGY & ADDITIONAL STUDIES:      ASSESSMENT/PLAN  53 y/o F, lives in shelter, w/ PMH HFimprovedEF (25%-->55% 2/10/25), HTN, HLD, GERHARD (not on CPAP), chronic LE edema, PAD s/p b/l stents, L-ankle hardware, s/p recent admission 2/10/25-2/19/25 for AMS, acute hypoxic respiratory failure requiring NIV and septic shock 2/2 LLE cellulitis and PNA, completed course of CEFAZOLIN presented today for worsening LLE erythema and pain. Pt states that since d/c her LLE has worsened but she denies fevers or chills.  She also notes open wounds on her LLE but denies any drainage. Pt also denies trauma to the leg, paresthesias, sob, cough, abd pain, N/V, diarrhea.       AS ABOVE RECENT HOSP STAY 2/10-2/19 WITH CELLULITIS SHOCK  ICU STAY NOW WITH REPORTED INCREASE SWELLING REDNESS OF LEFT LEG  PHYSICAL EXAM SIGIFICANT FOR LEFT LOWER EXT EDEMA ERYTHEMA WARMTH TENDERNESS NO DRAINAGE  NO CREPITUS RIGHT LEG WITH EDEMA SMALL ULCER POSTERIOR TIBIAL NO DRAINAGE   WILL FOLLOWUP BLOOD CX   MRSA SWAB LAST VISIT WAS NEGATIVE BUT WOULD REPEAT    BUT CAN D/C VANCOMYCIN   CAN CONTINUE CLINDA FOR 24 HOURS FOR POSSIBLE ANTITOXIN EFFECT FOR GROUP A STREP ALTHOUGH NO CX ARE AVIALABLE  CAN CONTINUE ZOSYN BUT LIKELY CAN CHANGE TO CEFAZOLIN  PT WITH CELLULITIS ON SUPERIMPOSED  STASIS DERMATITIS   SUGGEST LIDEX OINTMENT BID  FOR DERMATITIS  WILL D/W HOSPITALIST   WILL FOLLOWP WITH FURTHER RECOMMENDATIONS                JOSE LOFTON MD

## 2025-02-25 NOTE — PROGRESS NOTE ADULT - ASSESSMENT
55 y/o F, lives in shelter, w/ PMH HFimprovedEF (25%-->55% 2/10/25), HTN, HLD, GERHARD (not on CPAP), chronic LE edema, PAD s/p b/l stents, L-ankle hardware, s/p recent admission 2/10/25-2/19/25 for AMS, acute hypoxic respiratory failure requiring NIV and septic shock 2/2 LLE cellulitis and PNA, completed course of CEFAZOLIN presented today for worsening LLE erythema and pain. Pt states that since d/c her LLE has worsened but she denies fevers or chills.  She also notes open wounds on her LLE but denies any drainage. Pt also denies trauma to the leg, paresthesias, sob, cough, abd pain, N/V, diarrhea.       AS ABOVE RECENT HOSP STAY 2/10-2/19 WITH CELLULITIS SHOCK  ICU STAY NOW WITH REPORTED INCREASE SWELLING REDNESS OF LEFT LEG  PHYSICAL EXAM SIGIFICANT FOR LEFT LOWER EXT EDEMA ERYTHEMA WARMTH TENDERNESS NO DRAINAGE  NO CREPITUS RIGHT LEG WITH EDEMA SMALL ULCER POSTERIOR TIBIAL NO DRAINAGE   WILL FOLLOWUP BLOOD CX   MRSA SWAB LAST VISIT WAS NEGATIVE REPEAT NEG     D/C  CLINDA   CAN CONTINUE ZOSYN BUT LIKELY CAN CHANGE TO CEFAZOLIN  PT WITH CELLULITIS ON SUPERIMPOSED  STASIS DERMATITIS   SUGGEST LIDEX OINTMENT BID  FOR DERMATITIS  WILL D/W HOSPITALIST   WILL FOLLOWP WITH FURTHER RECOMMENDATIONS

## 2025-02-25 NOTE — PROGRESS NOTE ADULT - ASSESSMENT
53 y/o F w/ pmhx of HFrEF w/ EF recovery (55%, 02/10/25), HTN, HLD, GERHARD (not on CPAP), chronic LE edema, PAD s/p b/l stents, L-ankle hardware, and a recent admission (02/10-02/19) for AMS where patient was found to have Septic shock and AHRF 2/2 LLE cellulitis and PNA admitted to [facility] for severe nonhealing LLE cellulitis w/ superimposed venous stasis    #Severe LLE cellulitis   #Superimposed on venous stasis  -Elv wbc, CT LE w some subq edema, no gas or abcess  - Was recently admitted for septic shock due to LLE cellulitis and completed course of cefazolin  - LLE doppler negative for DVT   -c/w clinda zosyn ,appreciate ID recs, possibly begin Lidex ointment BID for dermatitis  - wound care consulted, recs appreciated    - Trend CBC and monitor temperature   - Consult vascular for complicated venous stasis    #Mild hyponatremia hypochloremia (resolved)  - pseudo, sugars elv  - Could be from LLE pain   - Clinically euvolemic   - Will hold off on further w/u unless drops further   - Monitor I/O's, lytes and renal function  - Resolved    #Chronic HFimprovedEF  - Clinically euvolemic at this time   - TTE was 25% and now 55% on TTE from 2/10/25  - c/w entresto, aldactone, carvedilol, lasix  - Resume Jardiance on d/c   - Monitor daily weight, I/O's  - Monitor on telemetry     #HTN / HLD  - c/w home medications     #IDDM2   - A1c 8.5  - Hold metformin while inpt  - Resume basal/bolus insulin regimen   - ISS and hypoglycemic protocol in place     #PAD  - c/w ASA and plavix       VTE ppx:  heparin sq    Dispo: Acute.        55 y/o F w/ pmhx of HFrEF w/ EF recovery (55%, 02/10/25), HTN, HLD, GERHARD (not on CPAP), chronic LE edema, PAD s/p b/l stents, L-ankle hardware, and a recent admission (02/10-02/19) for AMS where patient was found to have Septic shock and AHRF 2/2 LLE cellulitis and PNA admitted to [facility] for severe nonhealing LLE cellulitis w/ superimposed venous stasis    #Severe LLE cellulitis   #Superimposed on venous stasis  -Elv wbc, CT LE w some subq edema, no gas or abcess  - Was recently admitted for septic shock due to LLE cellulitis and completed course of cefazolin  - LLE doppler negative for DVT   -c/w clinda zosyn ,appreciate ID recs, possibly begin Lidex ointment BID for dermatitis  - wound care consulted, recs appreciated    - Trend CBC and monitor temperature   - Vascular consulted for complicated venous stasis - no acute vascular intervention at this time, recommending  smoking cessation, xeroform, kerlix, and ACE wraps  - Pt refused TONI, TONI >0.8 required for unna boot, last TONI in 2023 was 0.6    #RUQ pain  - Ultrasound of RUQ to be performed    #Mild hyponatremia hypochloremia (resolved)  - pseudo, sugars elv  - Could be from LLE pain   - Clinically euvolemic   - Will hold off on further w/u unless drops further   - Monitor I/O's, lytes and renal function  - Resolved    #Chronic HFimprovedEF  - Clinically euvolemic at this time   - TTE was 25% and now 55% on TTE from 2/10/25  - c/w entresto, aldactone, carvedilol, lasix  - Resume Jardiance on d/c   - Monitor daily weight, I/O's  - Monitor on telemetry     #HTN / HLD  - c/w home medications     #IDDM2   - A1c 8.5  - Hold metformin while inpt  - Resume basal/bolus insulin regimen   - ISS and hypoglycemic protocol in place     #PAD  - c/w ASA and plavix       VTE ppx:  heparin sq    Dispo: Acute.

## 2025-02-25 NOTE — ADVANCED PRACTICE NURSE CONSULT - REASON FOR CONSULT
Wound Care was consulted for evaluation of the following:  -b/l EL venous ulcers     HPI: per chart review  -53 y/o F, lives in shelter, w/ PMH HFimprovedEF (25%-->55% 2/10/25), HTN, HLD, GERHARD (not on CPAP), chronic LE edema, PAD s/p b/l stents, L-ankle hardware, s/p recent admission 2/10/25-2/19/25 for AMS, acute hypoxic respiratory failure requiring NIV and septic shock 2/2 LLE cellulitis and PNA, completed course of cefepime presented today for worsening LLE erythema and pain. Pt states that since d/c her LLE has worsened but she denies fevers or chills.  She also notes open wounds on her LLE but denies any drainage. Pt also denies trauma to the leg,      Pt seen at bedside today:  - that she has difficulty adjusting herself which exacerbated pain to the LLE. had folowed outpt with vasc but lost to follow up. no other acute complaints at this time.

## 2025-02-25 NOTE — ADVANCED PRACTICE NURSE CONSULT - ASSESSMENT
MARY SCORE  Last mary score is : ---18  19+: pt is NOT at risk for developing pressure injuries  15-18: pt is AT RISK for developing pressure injuries   13-14: pt is AT MODERATE RISK for developing pressure injuries   10-12: pt is AT HIGH RISK for developing pressure injuries   6-9: pt is AT VERY HIGH RISK for developing pressure injuries     PHYSICAL EXAM  Is pt AOx?: x4 in nad, resting comfortably in bed with appropriate affect   Bed bound?: walks occasionally via rolling walker   Incontinent?: at times d/t body habitus and b/l LE pain  Mattress/ bed active?: low airloss   Can pt assist with turn or dependent?: yes   Active pressure injury prevention measures? : low airloss surface      SKIN CHECK  -obese body habitus   -b/l LE lymhpedema, L>R, R pedal pulses diminished, difficult to palpate over L.  -LLE with erythematous macular rash extending superioly, marked off with surgical marker. over the anterior shin are 2 seperate fullt hickness wound ebds with wet loose yellow slough, each measuring 2x2.5cm, and 7x2cm. edges appear somewhat macerated but otherwise open.   -over the RLE posterior is dired crusting fullt hickness wound bed measuring 0.5x3x-.25cm, no exudate no slough. no tednerness to palpation.

## 2025-02-25 NOTE — PROGRESS NOTE ADULT - SUBJECTIVE AND OBJECTIVE BOX
SUBJECTIVE  BRIEF HOSPITAL COURSE SUMMARY: Patient is a 55 y/o F w/ pmhx of HFrEF w/ EF recovery (55%, 02/10/25), HTN, HLD, GERHARD (not on CPAP), chronic LE edema, PAD s/p b/l stents, L-ankle hardware, and a recent admission (02/10-02/19) for AMS where patient was found to have Septic shock and AHRF 2/2 LLE cellulitis and PNA requring NIV and MICU level care presented from [facility] to [facility] complaining of worsening LLE erythema and pain. Patient reports that her cellulitis worsened since her prior admission, completed cefepime course for treatment. ED investigations revealed leukocytosis, CT of the LLE revealed subQ edema c/w cellulitus, but negative for gas or abscess. Doppler study of LLE was negative for ID. ID consulted.      LAST 24 HOURS:  - Patient refused TONI testing    OBJECTIVE  PHYSICAL EXAM:    Vital Signs Last 24 Hrs  T(C): 36.4 (24 Feb 2025 21:40), Max: 36.6 (24 Feb 2025 08:54)  T(F): 97.5 (24 Feb 2025 21:40), Max: 97.8 (24 Feb 2025 08:54)  HR: 73 (24 Feb 2025 21:40) (73 - 75)  BP: 120/74 (24 Feb 2025 21:40) (99/66 - 120/74)  BP(mean): --  RR: 18 (24 Feb 2025 21:40) (18 - 18)  SpO2: 92% (24 Feb 2025 21:40) (92% - 99%)    Parameters below as of 24 Feb 2025 21:40  Patient On (Oxygen Delivery Method): room air            MEDICATIONS  (STANDING):  acetaminophen   IVPB .. 1000 milliGRAM(s) IV Intermittent once  AQUAPHOR (petrolatum Ointment) 1 Application(s) Topical two times a day  aspirin  chewable 81 milliGRAM(s) Oral daily  atorvastatin 40 milliGRAM(s) Oral at bedtime  carvedilol 3.125 milliGRAM(s) Oral every 12 hours  clindamycin IVPB 900 milliGRAM(s) IV Intermittent every 8 hours  clopidogrel Tablet 75 milliGRAM(s) Oral daily  Dakins Solution - 1/4 Strength 1 Application(s) Topical daily  dextrose 5%. 1000 milliLiter(s) (100 mL/Hr) IV Continuous <Continuous>  dextrose 5%. 1000 milliLiter(s) (50 mL/Hr) IV Continuous <Continuous>  dextrose 50% Injectable 25 Gram(s) IV Push once  dextrose 50% Injectable 12.5 Gram(s) IV Push once  dextrose 50% Injectable 25 Gram(s) IV Push once  famotidine    Tablet 20 milliGRAM(s) Oral daily  fluocinonide 0.05% Solution 1 Application(s) Topical two times a day  furosemide    Tablet 40 milliGRAM(s) Oral two times a day  glucagon  Injectable 1 milliGRAM(s) IntraMuscular once  heparin   Injectable 5000 Unit(s) SubCutaneous every 12 hours  influenza   Vaccine 0.5 milliLiter(s) IntraMuscular once  insulin glargine Injectable (LANTUS) 40 Unit(s) SubCutaneous at bedtime  insulin lispro (ADMELOG) corrective regimen sliding scale   SubCutaneous three times a day before meals  insulin lispro Injectable (ADMELOG) 8 Unit(s) SubCutaneous three times a day before meals  piperacillin/tazobactam IVPB.. 3.375 Gram(s) IV Intermittent every 8 hours  sacubitril 24 mG/valsartan 26 mG 1 Tablet(s) Oral two times a day  sodium chloride 0.9%. 1000 milliLiter(s) (85 mL/Hr) IV Continuous <Continuous>  spironolactone 25 milliGRAM(s) Oral daily    MEDICATIONS  (PRN):  acetaminophen     Tablet .. 650 milliGRAM(s) Oral every 6 hours PRN Temp greater or equal to 38C (100.4F), Mild Pain (1 - 3)  aluminum hydroxide/magnesium hydroxide/simethicone Suspension 30 milliLiter(s) Oral every 4 hours PRN Dyspepsia  dextrose Oral Gel 15 Gram(s) Oral once PRN Blood Glucose LESS THAN 70 milliGRAM(s)/deciliter  melatonin 3 milliGRAM(s) Oral at bedtime PRN Insomnia  ondansetron Injectable 4 milliGRAM(s) IV Push every 8 hours PRN Nausea and/or Vomiting    Allergies    No Known Allergies    Intolerances        LABS:                        12.9   10.01 )-----------( 276      ( 25 Feb 2025 06:57 )             41.0     02-24    136  |  96  |  9.0  ----------------------------<  282[H]  4.2   |  28.0  |  0.67    Ca    8.9      24 Feb 2025 06:00        Urinalysis Basic - ( 24 Feb 2025 06:00 )    Color: x / Appearance: x / SG: x / pH: x  Gluc: 282 mg/dL / Ketone: x  / Bili: x / Urobili: x   Blood: x / Protein: x / Nitrite: x   Leuk Esterase: x / RBC: x / WBC x   Sq Epi: x / Non Sq Epi: x / Bacteria: x      CAPILLARY BLOOD GLUCOSE      POCT Blood Glucose.: 236 mg/dL (24 Feb 2025 21:39)  POCT Blood Glucose.: 198 mg/dL (24 Feb 2025 16:36)  POCT Blood Glucose.: 286 mg/dL (24 Feb 2025 12:46)  POCT Blood Glucose.: 329 mg/dL (24 Feb 2025 08:35)      CULTURE DATA:    Culture - Blood (collected 02-22-25 @ 19:10)  Source: .Blood Blood  Preliminary Report (02-25-25 @ 02:02):    No growth at 48 Hours        RADIOLOGY & ADDITIONAL TESTS:    < from: CT Lower Extremity w/ IV Cont, Left (02.23.25 @ 09:54) >  IMPRESSION:    There is mild infiltration of the subcutaneous fat extending from the   distal thigh through the calf and foot, possibly reflecting cellulitis   and/or hydrostatic edema. No evidence for associated abscess, soft tissue   gas or osteomyelitis.    < end of copied text > SUBJECTIVE  BRIEF HOSPITAL COURSE SUMMARY: Patient is a 53 y/o F w/ pmhx of HFrEF w/ EF recovery (55%, 02/10/25), HTN, HLD, GERHARD (not on CPAP), chronic LE edema, PAD s/p b/l stents, L-ankle hardware, and a recent admission (02/10-02/19) for AMS where patient was found to have Septic shock and AHRF 2/2 LLE cellulitis and PNA requring NIV and MICU level care presented from [facility] to [facility] complaining of worsening LLE erythema and pain. Patient reports that her cellulitis worsened since her prior admission, completed cefepime course for treatment. ED investigations revealed leukocytosis, CT of the LLE revealed subQ edema c/w cellulitus, but negative for gas or abscess. Doppler study of LLE was negative for ID. ID consulted.      LAST 24 HOURS:  - Patient refused TONI testing  This morning: patient denies any acute complaints at this time, pain in unimproved in LLE from admission, but less painful when laying with leg flat in comparison to leg on the ground when sitting. Patient denies: fever, chills, headache, nausea, vomiting, fatigue, chest pain, palpitation, SOB at this time. Patient confirms left leg pain at this time.     OBJECTIVE  PHYSICAL EXAM:  General appearance: appears stated age, well nourished, not in acute distress,   Neuro: AxOx3, EOMI  Cardio: diminished heart sounds secondary to body habitus, s1,s2 weakly auscultated  Pulmonary: Diminished breath sounds bilaterally due to body habitus, coarse breath sounds appreciated bilaterally  Abdominal: distended, bowel sounds present in all 4 quadrants, noted pain to palpation in RUQ, nontender to palpation in LUQ, LLQ, RLQ  Extremities: RLE: generalized edema, no erythema, nontender to palpation, 3+ pitting edema present, weakly palpated popliteal artery.  LLE: lower leg wrap and bandaged from ankle to below knee, erythema present above the knee, nonpitting edema present in lower leg, tender to palpation ankle to knee, capillary refill of great hallux intact, could not palpate popliteal artery at this time    Vital Signs Last 24 Hrs  T(C): 36.4 (24 Feb 2025 21:40), Max: 36.6 (24 Feb 2025 08:54)  T(F): 97.5 (24 Feb 2025 21:40), Max: 97.8 (24 Feb 2025 08:54)  HR: 73 (24 Feb 2025 21:40) (73 - 75)  BP: 120/74 (24 Feb 2025 21:40) (99/66 - 120/74)  BP(mean): --  RR: 18 (24 Feb 2025 21:40) (18 - 18)  SpO2: 92% (24 Feb 2025 21:40) (92% - 99%)    Parameters below as of 24 Feb 2025 21:40  Patient On (Oxygen Delivery Method): room air            MEDICATIONS  (STANDING):  acetaminophen   IVPB .. 1000 milliGRAM(s) IV Intermittent once  AQUAPHOR (petrolatum Ointment) 1 Application(s) Topical two times a day  aspirin  chewable 81 milliGRAM(s) Oral daily  atorvastatin 40 milliGRAM(s) Oral at bedtime  carvedilol 3.125 milliGRAM(s) Oral every 12 hours  clindamycin IVPB 900 milliGRAM(s) IV Intermittent every 8 hours  clopidogrel Tablet 75 milliGRAM(s) Oral daily  Dakins Solution - 1/4 Strength 1 Application(s) Topical daily  dextrose 5%. 1000 milliLiter(s) (100 mL/Hr) IV Continuous <Continuous>  dextrose 5%. 1000 milliLiter(s) (50 mL/Hr) IV Continuous <Continuous>  dextrose 50% Injectable 25 Gram(s) IV Push once  dextrose 50% Injectable 12.5 Gram(s) IV Push once  dextrose 50% Injectable 25 Gram(s) IV Push once  famotidine    Tablet 20 milliGRAM(s) Oral daily  fluocinonide 0.05% Solution 1 Application(s) Topical two times a day  furosemide    Tablet 40 milliGRAM(s) Oral two times a day  glucagon  Injectable 1 milliGRAM(s) IntraMuscular once  heparin   Injectable 5000 Unit(s) SubCutaneous every 12 hours  influenza   Vaccine 0.5 milliLiter(s) IntraMuscular once  insulin glargine Injectable (LANTUS) 40 Unit(s) SubCutaneous at bedtime  insulin lispro (ADMELOG) corrective regimen sliding scale   SubCutaneous three times a day before meals  insulin lispro Injectable (ADMELOG) 8 Unit(s) SubCutaneous three times a day before meals  piperacillin/tazobactam IVPB.. 3.375 Gram(s) IV Intermittent every 8 hours  sacubitril 24 mG/valsartan 26 mG 1 Tablet(s) Oral two times a day  sodium chloride 0.9%. 1000 milliLiter(s) (85 mL/Hr) IV Continuous <Continuous>  spironolactone 25 milliGRAM(s) Oral daily    MEDICATIONS  (PRN):  acetaminophen     Tablet .. 650 milliGRAM(s) Oral every 6 hours PRN Temp greater or equal to 38C (100.4F), Mild Pain (1 - 3)  aluminum hydroxide/magnesium hydroxide/simethicone Suspension 30 milliLiter(s) Oral every 4 hours PRN Dyspepsia  dextrose Oral Gel 15 Gram(s) Oral once PRN Blood Glucose LESS THAN 70 milliGRAM(s)/deciliter  melatonin 3 milliGRAM(s) Oral at bedtime PRN Insomnia  ondansetron Injectable 4 milliGRAM(s) IV Push every 8 hours PRN Nausea and/or Vomiting    Allergies    No Known Allergies    Intolerances        LABS:                        12.9   10.01 )-----------( 276      ( 25 Feb 2025 06:57 )             41.0     02-24    136  |  96  |  9.0  ----------------------------<  282[H]  4.2   |  28.0  |  0.67    Ca    8.9      24 Feb 2025 06:00        Urinalysis Basic - ( 24 Feb 2025 06:00 )    Color: x / Appearance: x / SG: x / pH: x  Gluc: 282 mg/dL / Ketone: x  / Bili: x / Urobili: x   Blood: x / Protein: x / Nitrite: x   Leuk Esterase: x / RBC: x / WBC x   Sq Epi: x / Non Sq Epi: x / Bacteria: x      CAPILLARY BLOOD GLUCOSE      POCT Blood Glucose.: 236 mg/dL (24 Feb 2025 21:39)  POCT Blood Glucose.: 198 mg/dL (24 Feb 2025 16:36)  POCT Blood Glucose.: 286 mg/dL (24 Feb 2025 12:46)  POCT Blood Glucose.: 329 mg/dL (24 Feb 2025 08:35)      CULTURE DATA:    Culture - Blood (collected 02-22-25 @ 19:10)  Source: .Blood Blood  Preliminary Report (02-25-25 @ 02:02):    No growth at 48 Hours        RADIOLOGY & ADDITIONAL TESTS:    < from: CT Lower Extremity w/ IV Cont, Left (02.23.25 @ 09:54) >  IMPRESSION:    There is mild infiltration of the subcutaneous fat extending from the   distal thigh through the calf and foot, possibly reflecting cellulitis   and/or hydrostatic edema. No evidence for associated abscess, soft tissue   gas or osteomyelitis.    < end of copied text > SUBJECTIVE  BRIEF HOSPITAL COURSE SUMMARY: Patient is a 53 y/o F w/ pmhx of HFrEF w/ EF recovery (55%, 02/10/25), HTN, HLD, GERHARD (not on CPAP), chronic LE edema, PAD s/p b/l stents, L-ankle hardware, and a recent admission (02/10-02/19) for AMS where patient was found to have Septic shock and AHRF 2/2 LLE cellulitis and PNA requring NIV and MICU level care presented [facility] to [facility] complaining of worsening LLE erythema and pain. Patient reports that her cellulitis worsened since her prior admission, completed cefepime course for treatment. ED investigations revealed leukocytosis, CT of the LLE revealed subQ edema c/w cellulitus, but negative for gas or abscess. Doppler study of LLE was negative for ID. ID consulted.      LAST 24 HOURS:  - Patient refused TONI testing    This morning: patient denies any acute complaints at this time, pain in unimproved in LLE from admission, but less painful when laying with leg flat in comparison to leg on the ground when sitting. Patient denies: fever, chills, headache, nausea, vomiting, fatigue, chest pain, palpitation, SOB at this time. Patient confirms left leg pain at this time.     OBJECTIVE  PHYSICAL EXAM:  General appearance: appears stated age, well nourished, not in acute distress,   Neuro: AxOx3, EOMI  Cardio: diminished heart sounds secondary to body habitus, s1,s2 weakly auscultated  Pulmonary: Diminished breath sounds bilaterally due to body habitus, coarse breath sounds appreciated bilaterally  Abdominal: distended, bowel sounds present in all 4 quadrants, noted pain to palpation in RUQ, nontender to palpation in LUQ, LLQ, RLQ  Extremities: RLE: generalized edema, no erythema, nontender to palpation, 3+ pitting edema present, weakly palpated popliteal artery.  LLE: lower leg wrap and bandaged from ankle to below knee, erythema present above the knee, nonpitting edema present in lower leg, tender to palpation ankle to knee, capillary refill of great hallux intact, could not palpate popliteal artery at this time    Vital Signs Last 24 Hrs  T(C): 36.4 (24 Feb 2025 21:40), Max: 36.6 (24 Feb 2025 08:54)  T(F): 97.5 (24 Feb 2025 21:40), Max: 97.8 (24 Feb 2025 08:54)  HR: 73 (24 Feb 2025 21:40) (73 - 75)  BP: 120/74 (24 Feb 2025 21:40) (99/66 - 120/74)  BP(mean): --  RR: 18 (24 Feb 2025 21:40) (18 - 18)  SpO2: 92% (24 Feb 2025 21:40) (92% - 99%)    Parameters below as of 24 Feb 2025 21:40  Patient On (Oxygen Delivery Method): room air            MEDICATIONS  (STANDING):  acetaminophen   IVPB .. 1000 milliGRAM(s) IV Intermittent once  AQUAPHOR (petrolatum Ointment) 1 Application(s) Topical two times a day  aspirin  chewable 81 milliGRAM(s) Oral daily  atorvastatin 40 milliGRAM(s) Oral at bedtime  carvedilol 3.125 milliGRAM(s) Oral every 12 hours  clindamycin IVPB 900 milliGRAM(s) IV Intermittent every 8 hours  clopidogrel Tablet 75 milliGRAM(s) Oral daily  Dakins Solution - 1/4 Strength 1 Application(s) Topical daily  dextrose 5%. 1000 milliLiter(s) (100 mL/Hr) IV Continuous <Continuous>  dextrose 5%. 1000 milliLiter(s) (50 mL/Hr) IV Continuous <Continuous>  dextrose 50% Injectable 25 Gram(s) IV Push once  dextrose 50% Injectable 12.5 Gram(s) IV Push once  dextrose 50% Injectable 25 Gram(s) IV Push once  famotidine    Tablet 20 milliGRAM(s) Oral daily  fluocinonide 0.05% Solution 1 Application(s) Topical two times a day  furosemide    Tablet 40 milliGRAM(s) Oral two times a day  glucagon  Injectable 1 milliGRAM(s) IntraMuscular once  heparin   Injectable 5000 Unit(s) SubCutaneous every 12 hours  influenza   Vaccine 0.5 milliLiter(s) IntraMuscular once  insulin glargine Injectable (LANTUS) 40 Unit(s) SubCutaneous at bedtime  insulin lispro (ADMELOG) corrective regimen sliding scale   SubCutaneous three times a day before meals  insulin lispro Injectable (ADMELOG) 8 Unit(s) SubCutaneous three times a day before meals  piperacillin/tazobactam IVPB.. 3.375 Gram(s) IV Intermittent every 8 hours  sacubitril 24 mG/valsartan 26 mG 1 Tablet(s) Oral two times a day  sodium chloride 0.9%. 1000 milliLiter(s) (85 mL/Hr) IV Continuous <Continuous>  spironolactone 25 milliGRAM(s) Oral daily    MEDICATIONS  (PRN):  acetaminophen     Tablet .. 650 milliGRAM(s) Oral every 6 hours PRN Temp greater or equal to 38C (100.4F), Mild Pain (1 - 3)  aluminum hydroxide/magnesium hydroxide/simethicone Suspension 30 milliLiter(s) Oral every 4 hours PRN Dyspepsia  dextrose Oral Gel 15 Gram(s) Oral once PRN Blood Glucose LESS THAN 70 milliGRAM(s)/deciliter  melatonin 3 milliGRAM(s) Oral at bedtime PRN Insomnia  ondansetron Injectable 4 milliGRAM(s) IV Push every 8 hours PRN Nausea and/or Vomiting    Allergies    No Known Allergies    Intolerances        LABS:                        12.9   10.01 )-----------( 276      ( 25 Feb 2025 06:57 )             41.0     02-24    136  |  96  |  9.0  ----------------------------<  282[H]  4.2   |  28.0  |  0.67    Ca    8.9      24 Feb 2025 06:00        Urinalysis Basic - ( 24 Feb 2025 06:00 )    Color: x / Appearance: x / SG: x / pH: x  Gluc: 282 mg/dL / Ketone: x  / Bili: x / Urobili: x   Blood: x / Protein: x / Nitrite: x   Leuk Esterase: x / RBC: x / WBC x   Sq Epi: x / Non Sq Epi: x / Bacteria: x      CAPILLARY BLOOD GLUCOSE      POCT Blood Glucose.: 236 mg/dL (24 Feb 2025 21:39)  POCT Blood Glucose.: 198 mg/dL (24 Feb 2025 16:36)  POCT Blood Glucose.: 286 mg/dL (24 Feb 2025 12:46)  POCT Blood Glucose.: 329 mg/dL (24 Feb 2025 08:35)      CULTURE DATA:    Culture - Blood (collected 02-22-25 @ 19:10)  Source: .Blood Blood  Preliminary Report (02-25-25 @ 02:02):    No growth at 48 Hours        RADIOLOGY & ADDITIONAL TESTS:    < from: CT Lower Extremity w/ IV Cont, Left (02.23.25 @ 09:54) >  IMPRESSION:    There is mild infiltration of the subcutaneous fat extending from the   distal thigh through the calf and foot, possibly reflecting cellulitis   and/or hydrostatic edema. No evidence for associated abscess, soft tissue   gas or osteomyelitis.    < end of copied text >

## 2025-02-25 NOTE — PROGRESS NOTE ADULT - SUBJECTIVE AND OBJECTIVE BOX
INFECTIOUS DISEASES AND INTERNAL MEDICINE at [place]  =======================================================  Khang Salter MD  Diplomates American Board of Internal Medicine and Infectious Diseases  Telephone [phone]  Fax            [phone]  =======================================================    NEDA CURRANEEN [id number]    Follow up: LEFT  LEG CELLULTIS     Allergies:  No Known Allergies      Medications:  acetaminophen     Tablet .. 650 milliGRAM(s) Oral every 6 hours PRN  acetaminophen   IVPB .. 1000 milliGRAM(s) IV Intermittent once  aluminum hydroxide/magnesium hydroxide/simethicone Suspension 30 milliLiter(s) Oral every 4 hours PRN  AQUAPHOR (petrolatum Ointment) 1 Application(s) Topical two times a day  aspirin  chewable 81 milliGRAM(s) Oral daily  atorvastatin 40 milliGRAM(s) Oral at bedtime  carvedilol 3.125 milliGRAM(s) Oral every 12 hours  clindamycin IVPB 900 milliGRAM(s) IV Intermittent every 8 hours  clopidogrel Tablet 75 milliGRAM(s) Oral daily  Dakins Solution - 1/4 Strength 1 Application(s) Topical daily  dextrose 5%. 1000 milliLiter(s) IV Continuous <Continuous>  dextrose 5%. 1000 milliLiter(s) IV Continuous <Continuous>  dextrose 50% Injectable 25 Gram(s) IV Push once  dextrose 50% Injectable 12.5 Gram(s) IV Push once  dextrose 50% Injectable 25 Gram(s) IV Push once  dextrose Oral Gel 15 Gram(s) Oral once PRN  famotidine    Tablet 20 milliGRAM(s) Oral daily  fluocinonide 0.05% Solution 1 Application(s) Topical two times a day  furosemide    Tablet 40 milliGRAM(s) Oral two times a day  glucagon  Injectable 1 milliGRAM(s) IntraMuscular once  heparin   Injectable 5000 Unit(s) SubCutaneous every 12 hours  influenza   Vaccine 0.5 milliLiter(s) IntraMuscular once  insulin glargine Injectable (LANTUS) 40 Unit(s) SubCutaneous at bedtime  insulin lispro (ADMELOG) corrective regimen sliding scale   SubCutaneous three times a day before meals  insulin lispro Injectable (ADMELOG) 10 Unit(s) SubCutaneous three times a day before meals  melatonin 3 milliGRAM(s) Oral at bedtime PRN  ondansetron Injectable 4 milliGRAM(s) IV Push every 8 hours PRN  piperacillin/tazobactam IVPB.. 3.375 Gram(s) IV Intermittent every 8 hours  sacubitril 24 mG/valsartan 26 mG 1 Tablet(s) Oral two times a day  sodium chloride 0.9%. 1000 milliLiter(s) IV Continuous <Continuous>  spironolactone 25 milliGRAM(s) Oral daily    SOCIAL       FAMILY   FAMILY HISTORY:  FH: leukemia (Aunt)      REVIEW OF SYSTEMS:  CONSTITUTIONAL:  No Fever or chills  HEENT:   No diplopia or blurred vision.  No earache, sore throat or runny nose.  CARDIOVASCULAR:  No pressure, squeezing, strangling, tightness, heaviness or aching about the chest, neck, axilla or epigastrium.  RESPIRATORY:  No cough, shortness of breath, PND or orthopnea.  GASTROINTESTINAL:  No nausea, vomiting or diarrhea.  GENITOURINARY:  No dysuria, frequency or urgency. No Blood in urine  MUSCULOSKELETAL:   moves all joints  SKIN:  ENRIQUE PER HPI   NEUROLOGIC:  No paresthesias, fasciculations, seizures or weakness.  PSYCHIATRIC:  No disorder of thought or mood.  ENDOCRINE:  No heat or cold intolerance, polyuria or polydipsia.  HEMATOLOGICAL:  No easy bruising or bleeding.            Physical Exam:  ICU Vital Signs Last 24 Hrs  T(C): 36.1 (25 Feb 2025 08:30), Max: 36.4 (24 Feb 2025 21:40)  T(F): 97 (25 Feb 2025 08:30), Max: 97.5 (24 Feb 2025 21:40)  HR: 86 (25 Feb 2025 08:30) (73 - 86)  BP: 103/69 (25 Feb 2025 08:30) (103/69 - 120/74)  BP(mean): --  ABP: --  ABP(mean): --  RR: 18 (24 Feb 2025 21:40) (18 - 18)  SpO2: 94% (25 Feb 2025 08:30) (92% - 94%)    O2 Parameters below as of 24 Feb 2025 21:40  Patient On (Oxygen Delivery Method): room air          GEN: NAD,   HEENT: normocephalic and atraumatic. EOMI. CANDIDA.    NECK: Supple. No carotid bruits.  No lymphadenopathy or thyromegaly.  LUNGS: Clear to auscultation.  HEART: Regular rate and rhythm without murmur.  ABDOMEN: Soft, nontender, and nondistended.  Positive bowel sounds.    : No CVA tenderness  EXTREMITIES: LEFT LEG WRAPPED ACE BANDAGES OME ERYTHEMA AND EDEMA   MSK: no joint swelling  NEUROLOGIC: Cranial nerves II through XII are grossly intact.  PSYCHIATRIC: Appropriate affect .  SKIN: No ulceration or induration present.        Labs:  Vitals:  ============  T(F): 97 (25 Feb 2025 08:30), Max: 97.5 (24 Feb 2025 21:40)  HR: 86 (25 Feb 2025 08:30)  BP: 103/69 (25 Feb 2025 08:30)  RR: 18 (24 Feb 2025 21:40)  SpO2: 94% (25 Feb 2025 08:30) (92% - 94%)  temp max in last 48H T(F): , Max: 99 (02-23-25 @ 22:00)    =======================================================  Current Antibiotics:  clindamycin IVPB 900 milliGRAM(s) IV Intermittent every 8 hours  piperacillin/tazobactam IVPB.. 3.375 Gram(s) IV Intermittent every 8 hours    Other medications:  acetaminophen   IVPB .. 1000 milliGRAM(s) IV Intermittent once  AQUAPHOR (petrolatum Ointment) 1 Application(s) Topical two times a day  aspirin  chewable 81 milliGRAM(s) Oral daily  atorvastatin 40 milliGRAM(s) Oral at bedtime  carvedilol 3.125 milliGRAM(s) Oral every 12 hours  clopidogrel Tablet 75 milliGRAM(s) Oral daily  Dakins Solution - 1/4 Strength 1 Application(s) Topical daily  dextrose 5%. 1000 milliLiter(s) IV Continuous <Continuous>  dextrose 5%. 1000 milliLiter(s) IV Continuous <Continuous>  dextrose 50% Injectable 25 Gram(s) IV Push once  dextrose 50% Injectable 12.5 Gram(s) IV Push once  dextrose 50% Injectable 25 Gram(s) IV Push once  famotidine    Tablet 20 milliGRAM(s) Oral daily  fluocinonide 0.05% Solution 1 Application(s) Topical two times a day  furosemide    Tablet 40 milliGRAM(s) Oral two times a day  glucagon  Injectable 1 milliGRAM(s) IntraMuscular once  heparin   Injectable 5000 Unit(s) SubCutaneous every 12 hours  influenza   Vaccine 0.5 milliLiter(s) IntraMuscular once  insulin glargine Injectable (LANTUS) 40 Unit(s) SubCutaneous at bedtime  insulin lispro (ADMELOG) corrective regimen sliding scale   SubCutaneous three times a day before meals  insulin lispro Injectable (ADMELOG) 10 Unit(s) SubCutaneous three times a day before meals  sacubitril 24 mG/valsartan 26 mG 1 Tablet(s) Oral two times a day  sodium chloride 0.9%. 1000 milliLiter(s) IV Continuous <Continuous>  spironolactone 25 milliGRAM(s) Oral daily      =======================================================  Labs:                        12.9   10.01 )-----------( 276      ( 25 Feb 2025 06:57 )             41.0     02-25    136  |  96  |  9.9  ----------------------------<  190[H]  4.5   |  27.0  |  0.67    Ca    8.7      25 Feb 2025 06:57  Mg     1.8     02-25    TPro  7.0  /  Alb  2.8[L]  /  TBili  0.4  /  DBili  x   /  AST  11  /  ALT  15  /  AlkPhos  75  02-25      Culture - Blood (collected 02-22-25 @ 19:10)  Source: .Blood Blood  Preliminary Report (02-25-25 @ 02:02):    No growth at 48 Hours    Culture - Blood (collected 02-10-25 @ 03:20)  Source: .Blood BLOOD  Final Report (02-15-25 @ 09:00):    No growth at 5 days    Culture - Blood (collected 02-10-25 @ 03:10)  Source: .Blood BLOOD  Final Report (02-15-25 @ 09:00):    No growth at 5 days    Culture - Blood (collected 12-29-24 @ 17:15)  Source: .Blood BLOOD  Final Report (01-04-25 @ 02:00):    No growth at 5 days    Culture - Blood (collected 12-29-24 @ 17:10)  Source: .Blood BLOOD  Final Report (01-04-25 @ 02:00):    No growth at 5 days    Culture - Urine (collected 10-23-24 @ 18:20)  Source: Clean Catch Clean Catch (Midstream)  Final Report (10-27-24 @ 09:25):    >100,000 CFU/ml Klebsiella pneumoniae  Organism: Klebsiella pneumoniae (10-27-24 @ 09:25)  Organism: Klebsiella pneumoniae (10-27-24 @ 09:25)    Sensitivities:      -  Trimethoprim/Sulfamethoxazole: S <=0.5/9.5      -  Tobramycin: S <=2      -  Piperacillin/Tazobactam: S <=8      -  Nitrofurantoin: S <=32 Should not be used to treat pyelonephritis      -  Meropenem: S <=1      -  Levofloxacin: S <=0.5      -  Imipenem: S <=1      -  Gentamicin: S <=2      -  Ertapenem: S <=0.5      -  Ciprofloxacin: S <=0.25      -  Cefuroxime: S <=4      -  Ceftriaxone: S <=1      -  Cefoxitin: S <=8      -  Cefepime: S <=2      -  Cefazolin: S <=2 For uncomplicated UTI with K. pneumoniae, E. coli, or P. mirablis: PJ <=16 is sensitive and PJ >=32 is resistant. This also predicts results for oral agents cefaclor, cefdinir, cefpodoxime, cefprozil, cefuroxime axetil, cephalexin and locarbef for uncomplicated UTI. Note that some isolates may be susceptible to these agents while testing resistant to cefazolin.      -  Aztreonam: S <=4      -  Ampicillin/Sulbactam: S <=4/2      -  Ampicillin: R >16 These ampicillin results predict results for amoxicillin      -  Amoxicillin/Clavulanic Acid: S <=8/4      Method Type: PJ      Creatinine: 0.67 mg/dL (02-25-25 @ 06:57)  Creatinine: 0.67 mg/dL (02-24-25 @ 06:00)  Creatinine: 0.62 mg/dL (02-22-25 @ 19:16)            WBC Count: 10.01 K/uL (02-25-25 @ 06:57)  WBC Count: 10.71 K/uL (02-24-25 @ 09:05)  WBC Count: 12.27 K/uL (02-22-25 @ 19:16)    SARS-CoV-2 Result: Detected (02-10-25 @ 03:50)      Alkaline Phosphatase: 75 U/L (02-25-25 @ 06:57)  Alkaline Phosphatase: 83 U/L (02-22-25 @ 19:16)  Alanine Aminotransferase (ALT/SGPT): 15 U/L (02-25-25 @ 06:57)  Alanine Aminotransferase (ALT/SGPT): 17 U/L (02-22-25 @ 19:16)  Aspartate Aminotransferase (AST/SGOT): 11 U/L (02-25-25 @ 06:57)  Aspartate Aminotransferase (AST/SGOT): 21 U/L (02-22-25 @ 19:16)  Bilirubin Total: 0.4 mg/dL (02-25-25 @ 06:57)  Bilirubin Total: 0.4 mg/dL (02-22-25 @ 19:16)

## 2025-02-25 NOTE — CONSULT NOTE ADULT - ATTENDING COMMENTS
Patient with ascending cellulitis of the left lower extremity has a history of SFA stents done for claudication several years ago. I have personally reviewed her CT scan which demonstrates patent SFA stent. No history of DVT. Recommend kerlix and ace wrap and outpatient follow up in the office for venous reflux studies

## 2025-02-26 LAB
ALBUMIN SERPL ELPH-MCNC: 3 G/DL — LOW (ref 3.3–5.2)
ALP SERPL-CCNC: 70 U/L — SIGNIFICANT CHANGE UP (ref 40–120)
ALT FLD-CCNC: 15 U/L — SIGNIFICANT CHANGE UP
ANION GAP SERPL CALC-SCNC: 12 MMOL/L — SIGNIFICANT CHANGE UP (ref 5–17)
AST SERPL-CCNC: 14 U/L — SIGNIFICANT CHANGE UP
BILIRUB SERPL-MCNC: 0.3 MG/DL — LOW (ref 0.4–2)
BUN SERPL-MCNC: 11 MG/DL — SIGNIFICANT CHANGE UP (ref 8–20)
CALCIUM SERPL-MCNC: 8.8 MG/DL — SIGNIFICANT CHANGE UP (ref 8.4–10.5)
CHLORIDE SERPL-SCNC: 97 MMOL/L — SIGNIFICANT CHANGE UP (ref 96–108)
CO2 SERPL-SCNC: 28 MMOL/L — SIGNIFICANT CHANGE UP (ref 22–29)
CREAT SERPL-MCNC: 0.67 MG/DL — SIGNIFICANT CHANGE UP (ref 0.5–1.3)
EGFR: 104 ML/MIN/1.73M2 — SIGNIFICANT CHANGE UP
GLUCOSE BLDC GLUCOMTR-MCNC: 152 MG/DL — HIGH (ref 70–99)
GLUCOSE BLDC GLUCOMTR-MCNC: 183 MG/DL — HIGH (ref 70–99)
GLUCOSE BLDC GLUCOMTR-MCNC: 185 MG/DL — HIGH (ref 70–99)
GLUCOSE BLDC GLUCOMTR-MCNC: 204 MG/DL — HIGH (ref 70–99)
GLUCOSE SERPL-MCNC: 167 MG/DL — HIGH (ref 70–99)
HCT VFR BLD CALC: 40.7 % — SIGNIFICANT CHANGE UP (ref 34.5–45)
HGB BLD-MCNC: 12.7 G/DL — SIGNIFICANT CHANGE UP (ref 11.5–15.5)
MAGNESIUM SERPL-MCNC: 2.2 MG/DL — SIGNIFICANT CHANGE UP (ref 1.6–2.6)
MCHC RBC-ENTMCNC: 27.9 PG — SIGNIFICANT CHANGE UP (ref 27–34)
MCHC RBC-ENTMCNC: 31.2 G/DL — LOW (ref 32–36)
MCV RBC AUTO: 89.3 FL — SIGNIFICANT CHANGE UP (ref 80–100)
NRBC # BLD AUTO: 0 K/UL — SIGNIFICANT CHANGE UP (ref 0–0)
NRBC # FLD: 0 K/UL — SIGNIFICANT CHANGE UP (ref 0–0)
NRBC BLD AUTO-RTO: 0 /100 WBCS — SIGNIFICANT CHANGE UP (ref 0–0)
PLATELET # BLD AUTO: 269 K/UL — SIGNIFICANT CHANGE UP (ref 150–400)
PMV BLD: 9.8 FL — SIGNIFICANT CHANGE UP (ref 7–13)
POTASSIUM SERPL-MCNC: 4.3 MMOL/L — SIGNIFICANT CHANGE UP (ref 3.5–5.3)
POTASSIUM SERPL-SCNC: 4.3 MMOL/L — SIGNIFICANT CHANGE UP (ref 3.5–5.3)
PROT SERPL-MCNC: 6.8 G/DL — SIGNIFICANT CHANGE UP (ref 6.6–8.7)
RBC # BLD: 4.56 M/UL — SIGNIFICANT CHANGE UP (ref 3.8–5.2)
RBC # FLD: 16.1 % — HIGH (ref 10.3–14.5)
SODIUM SERPL-SCNC: 137 MMOL/L — SIGNIFICANT CHANGE UP (ref 135–145)
WBC # BLD: 10.11 K/UL — SIGNIFICANT CHANGE UP (ref 3.8–10.5)
WBC # FLD AUTO: 10.11 K/UL — SIGNIFICANT CHANGE UP (ref 3.8–10.5)

## 2025-02-26 PROCEDURE — 99233 SBSQ HOSP IP/OBS HIGH 50: CPT | Mod: GC

## 2025-02-26 RX ORDER — CEFAZOLIN SODIUM IN 0.9 % NACL 3 G/100 ML
1000 INTRAVENOUS SOLUTION, PIGGYBACK (ML) INTRAVENOUS EVERY 8 HOURS
Refills: 0 | Status: DISCONTINUED | OUTPATIENT
Start: 2025-02-26 | End: 2025-02-26

## 2025-02-26 RX ORDER — CEFAZOLIN SODIUM IN 0.9 % NACL 3 G/100 ML
1000 INTRAVENOUS SOLUTION, PIGGYBACK (ML) INTRAVENOUS EVERY 8 HOURS
Refills: 0 | Status: DISCONTINUED | OUTPATIENT
Start: 2025-02-26 | End: 2025-02-28

## 2025-02-26 RX ADMIN — CARVEDILOL 3.12 MILLIGRAM(S): 3.12 TABLET, FILM COATED ORAL at 05:33

## 2025-02-26 RX ADMIN — SACUBITRIL AND VALSARTAN 1 TABLET(S): 49; 51 TABLET, FILM COATED ORAL at 05:33

## 2025-02-26 RX ADMIN — Medication 25 GRAM(S): at 05:34

## 2025-02-26 RX ADMIN — Medication 1000 MILLIGRAM(S): at 13:41

## 2025-02-26 RX ADMIN — Medication 1000 MILLIGRAM(S): at 21:33

## 2025-02-26 RX ADMIN — Medication 25 MILLIGRAM(S): at 05:33

## 2025-02-26 RX ADMIN — INSULIN LISPRO 10 UNIT(S): 100 INJECTION, SOLUTION INTRAVENOUS; SUBCUTANEOUS at 13:40

## 2025-02-26 RX ADMIN — INSULIN LISPRO 1: 100 INJECTION, SOLUTION INTRAVENOUS; SUBCUTANEOUS at 13:40

## 2025-02-26 RX ADMIN — Medication 1 APPLICATION(S): at 17:18

## 2025-02-26 RX ADMIN — FUROSEMIDE 40 MILLIGRAM(S): 10 INJECTION INTRAMUSCULAR; INTRAVENOUS at 05:33

## 2025-02-26 RX ADMIN — INSULIN GLARGINE-YFGN 40 UNIT(S): 100 INJECTION, SOLUTION SUBCUTANEOUS at 21:33

## 2025-02-26 RX ADMIN — INSULIN LISPRO 1: 100 INJECTION, SOLUTION INTRAVENOUS; SUBCUTANEOUS at 08:45

## 2025-02-26 RX ADMIN — INSULIN LISPRO 1: 100 INJECTION, SOLUTION INTRAVENOUS; SUBCUTANEOUS at 17:19

## 2025-02-26 RX ADMIN — CLOPIDOGREL BISULFATE 75 MILLIGRAM(S): 75 TABLET, FILM COATED ORAL at 13:41

## 2025-02-26 RX ADMIN — WHITE PETROLATUM 1 APPLICATION(S): 1 OINTMENT TOPICAL at 17:18

## 2025-02-26 RX ADMIN — Medication 20 MILLIGRAM(S): at 13:41

## 2025-02-26 RX ADMIN — INSULIN LISPRO 10 UNIT(S): 100 INJECTION, SOLUTION INTRAVENOUS; SUBCUTANEOUS at 08:45

## 2025-02-26 RX ADMIN — Medication 1 APPLICATION(S): at 05:34

## 2025-02-26 RX ADMIN — SODIUM HYPOCHLORITE 1 APPLICATION(S): 0.12 SOLUTION TOPICAL at 13:38

## 2025-02-26 RX ADMIN — FUROSEMIDE 40 MILLIGRAM(S): 10 INJECTION INTRAMUSCULAR; INTRAVENOUS at 13:42

## 2025-02-26 RX ADMIN — WHITE PETROLATUM 1 APPLICATION(S): 1 OINTMENT TOPICAL at 05:34

## 2025-02-26 RX ADMIN — ATORVASTATIN CALCIUM 40 MILLIGRAM(S): 80 TABLET, FILM COATED ORAL at 21:33

## 2025-02-26 RX ADMIN — Medication 81 MILLIGRAM(S): at 13:41

## 2025-02-26 RX ADMIN — INSULIN LISPRO 10 UNIT(S): 100 INJECTION, SOLUTION INTRAVENOUS; SUBCUTANEOUS at 17:19

## 2025-02-26 NOTE — PROGRESS NOTE ADULT - ASSESSMENT
55 y/o F w/ pmhx of HFrEF w/ EF recovery (55%, 02/10/25), HTN, HLD, GERHARD (not on CPAP), chronic LE edema, PAD s/p b/l stents, L-ankle hardware, and a recent admission (02/10-02/19) for AMS where patient was found to have Septic shock and AHRF 2/2 LLE cellulitis and PNA admitted to [facility] for severe nonhealing LLE cellulitis w/ superimposed venous stasis    #Severe LLE cellulitis   #Superimposed on venous stasis  -Elv wbc, CT LE w some subq edema, no gas or abcess  - Was recently admitted for septic shock due to LLE cellulitis and completed course of cefazolin  - LLE doppler negative for DVT   -c/w clinda zosyn ,appreciate ID recs, possibly begin Lidex ointment BID for dermatitis  - wound care consulted, recs appreciated    - Trend CBC and monitor temperature   - Vascular consulted for complicated venous stasis - no acute vascular intervention at this time, recommending  smoking cessation, xeroform, kerlix, and ACE wraps  - Pt refused TONI, TONI >0.8 required for unna boot, last TONI in 2023 was 0.6    #RUQ pain  - Ultrasound of RUQ to be performed    #Mild hyponatremia hypochloremia (resolved)  - pseudo, sugars elv  - Could be from LLE pain   - Clinically euvolemic   - Will hold off on further w/u unless drops further   - Monitor I/O's, lytes and renal function  - Resolved    #Chronic HFimprovedEF  - Clinically euvolemic at this time   - TTE was 25% and now 55% on TTE from 2/10/25  - c/w entresto, aldactone, carvedilol, lasix  - Resume Jardiance on d/c   - Monitor daily weight, I/O's  - Monitor on telemetry     #HTN / HLD  - c/w home medications     #IDDM2   - A1c 8.5  - Hold metformin while inpt  - Resume basal/bolus insulin regimen   - ISS and hypoglycemic protocol in place     #PAD  - c/w ASA and plavix       VTE ppx:  heparin sq    Dispo: Acute.       53 y/o F w/ pmhx of HFrEF w/ EF recovery (55%, 02/10/25), HTN, HLD, GERHARD (not on CPAP), chronic LE edema, PAD s/p b/l stents, L-ankle hardware, and a recent admission (02/10-02/19) for AMS where patient was found to have Septic shock and AHRF 2/2 LLE cellulitis and PNA admitted to [facility] for severe nonhealing LLE cellulitis w/ superimposed venous stasis    #Severe LLE cellulitis   #Superimposed on venous stasis  - Elv wbc, CT LE w some subq edema, no gas or abcess  - Was recently admitted for septic shock due to LLE cellulitis and completed course of cefazolin  - LLE doppler negative for DVT   - Repeat LLE U/S duplex  - Change from zosyn to cefazolin, discontinue clindamycin, appreciate ID recs, begin Lidex ointment BID for dermatitis  - wound care consulted, recs appreciated    - Trend CBC and monitor temperature   - Vascular consulted for complicated venous stasis - no acute vascular intervention at this time, recommending smoking cessation, xeroform, kerlix, and ACE wraps  - Pt refused TONI, TONI >0.8 required for unna boot, last TONI in 2023 was 0.6    #RUQ pain  - Ultrasound of RUQ to be performed    #Mild hyponatremia hypochloremia (resolved)  - pseudo, sugars elv  - Could be from LLE pain   - Clinically euvolemic   - Will hold off on further w/u unless drops further   - Monitor I/O's, lytes and renal function  - Resolved    #Chronic HFimprovedEF  - Clinically euvolemic at this time   - TTE was 25% and now 55% on TTE from 2/10/25  - c/w entresto, aldactone, carvedilol, lasix  - Resume Jardiance on d/c   - Monitor daily weight, I/O's  - Monitor on telemetry     #HTN / HLD  - c/w home medications     #IDDM2   - A1c 8.5  - Hold metformin while inpt  - Resume basal/bolus insulin regimen   - ISS and hypoglycemic protocol in place     #PAD  - c/w ASA and plavix       VTE ppx:  heparin sq    Dispo: Acute.       55 y/o F w/ pmhx of HFrEF w/ EF recovery (55%, 02/10/25), HTN, HLD, GERHARD (not on CPAP), chronic LE edema, PAD s/p b/l stents, L-ankle hardware, and a recent admission (02/10-02/19) for AMS where patient was found to have Septic shock and AHRF 2/2 LLE cellulitis and PNA admitted to [facility] for severe nonhealing LLE cellulitis w/ superimposed venous stasis    #Severe LLE cellulitis   #Superimposed on venous stasis  - Elv wbc, CT LE w some subq edema, no gas or abcess  - Was recently admitted for septic shock due to LLE cellulitis and completed course of cefazolin  - LLE doppler negative for DVT   - Repeat LLE U/S duplex  - Change from zosyn to cefazolin, discontinue clindamycin, appreciate ID recs, cw Lidex ointment BID for dermatitis  - wound care consulted, recs appreciated    - Trend CBC and monitor temperature   - Vascular consulted for complicated venous stasis - no acute vascular intervention at this time, recommending smoking cessation, xeroform, kerlix, and ACE wraps  - Pt refused TONI, TONI >0.8 required for unna boot, last TONI in 2023 was 0.6    #RUQ pain  - Ultrasound of RUQ pending    #Mild hyponatremia hypochloremia (resolved)  - pseudo, sugars elv  - Could be from LLE pain   - Clinically euvolemic   - Will hold off on further w/u unless drops further   - Monitor I/O's, lytes and renal function  - Resolved    #Chronic HFimprovedEF  - Clinically euvolemic at this time   - TTE was 25% and now 55% on TTE from 2/10/25  - c/w entresto, aldactone, carvedilol, lasix  - Resume Jardiance on d/c   - Monitor daily weight, I/O's  - Monitor on telemetry     #HTN / HLD  - c/w home medications     #IDDM2   - A1c 8.5  - Hold metformin while inpt  - Resume basal/bolus insulin regimen   - ISS and hypoglycemic protocol in place     #PAD  - c/w ASA and plavix       VTE ppx:  heparin sq    Dispo: Acute.

## 2025-02-26 NOTE — PROGRESS NOTE ADULT - SUBJECTIVE AND OBJECTIVE BOX
SUBJECTIVE    BRIEF HOSPITAL COURSE SUMMARY: Patient is a 55 y/o F w/ pmhx of HFrEF w/ EF recovery (55%, 02/10/25), HTN, HLD, GERHARD (not on CPAP), chronic LE edema, PAD s/p b/l stents, L-ankle hardware, and a recent admission (02/10-02/19) for AMS where patient was found to have Septic shock and AHRF 2/2 LLE cellulitis and PNA requring NIV and MICU level care presented from senior living to [facility] complaining of worsening LLE erythema and pain. Patient reports that her cellulitis worsened since her prior admission, completed cefepime course for treatment. ED investigations revealed leukocytosis, CT of the LLE revealed subQ edema c/w cellulitus, but negative for gas or abscess. Doppler study of LLE was negative for ID. ID consulted.      LAST 24 HOURS:  - Patient assessed this morning at bedside      OBJECTIVE    PHYSICAL EXAM:  General appearance: appears stated age, well nourished, not in acute distress,   Neuro: AxOx3, EOMI  Cardio: diminished heart sounds secondary to body habitus, s1,s2 weakly auscultated  Pulmonary: Diminished breath sounds bilaterally due to body habitus, coarse breath sounds appreciated bilaterally  Abdominal: distended, bowel sounds present in all 4 quadrants, noted pain to palpation in RUQ, nontender to palpation in LUQ, LLQ, RLQ  Extremities: RLE: generalized edema, no erythema, nontender to palpation, 3+ pitting edema present, weakly palpated popliteal artery.  LLE: lower leg wrap and bandaged from ankle to below knee, erythema present above the knee, nonpitting edema present in lower leg, tender to palpation ankle to knee, capillary refill of great hallux intact, could not palpate popliteal artery at this time        Vital Signs Last 24 Hrs  T(C): 36.8 (26 Feb 2025 04:20), Max: 36.8 (26 Feb 2025 04:20)  T(F): 98.3 (26 Feb 2025 04:20), Max: 98.3 (26 Feb 2025 04:20)  HR: 88 (26 Feb 2025 04:20) (76 - 88)  BP: 100/61 (26 Feb 2025 04:20) (100/61 - 110/73)  BP(mean): --  RR: 18 (26 Feb 2025 04:20) (18 - 18)  SpO2: 92% (26 Feb 2025 04:20) (91% - 95%)    Parameters below as of 26 Feb 2025 04:20  Patient On (Oxygen Delivery Method): room air            MEDICATIONS  (STANDING):  acetaminophen   IVPB .. 1000 milliGRAM(s) IV Intermittent once  AQUAPHOR (petrolatum Ointment) 1 Application(s) Topical two times a day  aspirin  chewable 81 milliGRAM(s) Oral daily  atorvastatin 40 milliGRAM(s) Oral at bedtime  carvedilol 3.125 milliGRAM(s) Oral every 12 hours  clopidogrel Tablet 75 milliGRAM(s) Oral daily  Dakins Solution - 1/4 Strength 1 Application(s) Topical daily  dextrose 5%. 1000 milliLiter(s) (100 mL/Hr) IV Continuous <Continuous>  dextrose 5%. 1000 milliLiter(s) (50 mL/Hr) IV Continuous <Continuous>  dextrose 50% Injectable 25 Gram(s) IV Push once  dextrose 50% Injectable 12.5 Gram(s) IV Push once  dextrose 50% Injectable 25 Gram(s) IV Push once  famotidine    Tablet 20 milliGRAM(s) Oral daily  fluocinonide 0.05% Solution 1 Application(s) Topical two times a day  furosemide    Tablet 40 milliGRAM(s) Oral two times a day  glucagon  Injectable 1 milliGRAM(s) IntraMuscular once  heparin   Injectable 5000 Unit(s) SubCutaneous every 12 hours  influenza   Vaccine 0.5 milliLiter(s) IntraMuscular once  insulin glargine Injectable (LANTUS) 40 Unit(s) SubCutaneous at bedtime  insulin lispro (ADMELOG) corrective regimen sliding scale   SubCutaneous three times a day before meals  insulin lispro Injectable (ADMELOG) 10 Unit(s) SubCutaneous three times a day before meals  piperacillin/tazobactam IVPB.. 3.375 Gram(s) IV Intermittent every 8 hours  sacubitril 24 mG/valsartan 26 mG 1 Tablet(s) Oral two times a day  sodium chloride 0.9%. 1000 milliLiter(s) (85 mL/Hr) IV Continuous <Continuous>  spironolactone 25 milliGRAM(s) Oral daily    MEDICATIONS  (PRN):  acetaminophen     Tablet .. 650 milliGRAM(s) Oral every 6 hours PRN Temp greater or equal to 38C (100.4F), Mild Pain (1 - 3)  aluminum hydroxide/magnesium hydroxide/simethicone Suspension 30 milliLiter(s) Oral every 4 hours PRN Dyspepsia  dextrose Oral Gel 15 Gram(s) Oral once PRN Blood Glucose LESS THAN 70 milliGRAM(s)/deciliter  melatonin 3 milliGRAM(s) Oral at bedtime PRN Insomnia  ondansetron Injectable 4 milliGRAM(s) IV Push every 8 hours PRN Nausea and/or Vomiting    Allergies    No Known Allergies    Intolerances        LABS:                        12.9   10.01 )-----------( 276      ( 25 Feb 2025 06:57 )             41.0     02-25    136  |  96  |  9.9  ----------------------------<  190[H]  4.5   |  27.0  |  0.67    Ca    8.7      25 Feb 2025 06:57  Mg     1.8     02-25    TPro  7.0  /  Alb  2.8[L]  /  TBili  0.4  /  DBili  x   /  AST  11  /  ALT  15  /  AlkPhos  75  02-25      Urinalysis Basic - ( 25 Feb 2025 06:57 )    Color: x / Appearance: x / SG: x / pH: x  Gluc: 190 mg/dL / Ketone: x  / Bili: x / Urobili: x   Blood: x / Protein: x / Nitrite: x   Leuk Esterase: x / RBC: x / WBC x   Sq Epi: x / Non Sq Epi: x / Bacteria: x      CAPILLARY BLOOD GLUCOSE      POCT Blood Glucose.: 275 mg/dL (25 Feb 2025 22:18)  POCT Blood Glucose.: 172 mg/dL (25 Feb 2025 18:21)  POCT Blood Glucose.: 242 mg/dL (25 Feb 2025 13:58)  POCT Blood Glucose.: 204 mg/dL (25 Feb 2025 08:47)      CULTURE DATA:    Culture - Blood (collected 02-22-25 @ 19:10)  Source: .Blood Blood  Preliminary Report (02-26-25 @ 02:01):    No growth at 72 Hours        RADIOLOGY & ADDITIONAL TESTS:    < from: CT Lower Extremity w/ IV Cont, Left (02.23.25 @ 09:54) >  IMPRESSION:    There is mild infiltration of the subcutaneous fat extending from the   distal thigh through the calf and foot, possibly reflecting cellulitis   and/or hydrostatic edema. No evidence for associated abscess, soft tissue   gas or osteomyelitis.    < end of copied text >   SUBJECTIVE    BRIEF HOSPITAL COURSE SUMMARY: Patient is a 55 y/o F w/ pmhx of HFrEF w/ EF recovery (55%, 02/10/25), HTN, HLD, GERHARD (not on CPAP), chronic LE edema, PAD s/p b/l stents, L-ankle hardware, and a recent admission (02/10-02/19) for AMS where patient was found to have Septic shock and AHRF 2/2 LLE cellulitis and PNA requring NIV and MICU level care presented from [facility] to [facility] complaining of worsening LLE erythema and pain. Patient reports that her cellulitis worsened since her prior admission, completed cefepime course for treatment. ED investigations revealed leukocytosis, CT of the LLE revealed subQ edema c/w cellulitus, but negative for gas or abscess. Doppler study of LLE was negative for ID. ID consulted.      LAST 24 HOURS:  - Patient assessed this morning at bedside, presents with no new acute complaints, continued aching pain and swelling of the LLE. Rates the pain 8/10 this morning, but pain improves when laying down with leg elevated. Patient denies fever, chills, nausea, vomiting, chest pain, palpitation, SOB, abdominal pain, pain with eating, pain with bowel movements or change in consistency, dysuria. Patient confirms LLE pain, itch, swelling      OBJECTIVE    PHYSICAL EXAM:  General appearance: appears stated age, well nourished, not in acute distress,   Neuro: AxOx3, EOMI  Cardio: diminished heart sounds secondary to body habitus, s1,s2 weakly auscultated  Pulmonary: Diminished breath sounds bilaterally due to body habitus, coarse breath sounds appreciated bilaterally  Abdominal: distended, bowel sounds present in all 4 quadrants, noted pain to palpation in RUQ, LUQ and epigastric region, nontender to palpation in LLQ, RLQ  Extremities: RLE: generalized edema, no erythema, bandage on posterior ankle/calf covering old healing wound,, nontender to palpation, 3+ pitting edema present, weakly palpated popliteal artery.  LLE: lower leg wrap and bandaged from ankle to below knee, erythema present above the knee, nonpitting edema present in lower leg, tender to palpation ankle to knee, capillary refill of great hallux intact, could not palpate popliteal artery at this time        Vital Signs Last 24 Hrs  T(C): 36.8 (26 Feb 2025 04:20), Max: 36.8 (26 Feb 2025 04:20)  T(F): 98.3 (26 Feb 2025 04:20), Max: 98.3 (26 Feb 2025 04:20)  HR: 88 (26 Feb 2025 04:20) (76 - 88)  BP: 100/61 (26 Feb 2025 04:20) (100/61 - 110/73)  BP(mean): --  RR: 18 (26 Feb 2025 04:20) (18 - 18)  SpO2: 92% (26 Feb 2025 04:20) (91% - 95%)    Parameters below as of 26 Feb 2025 04:20  Patient On (Oxygen Delivery Method): room air            MEDICATIONS  (STANDING):  acetaminophen   IVPB .. 1000 milliGRAM(s) IV Intermittent once  AQUAPHOR (petrolatum Ointment) 1 Application(s) Topical two times a day  aspirin  chewable 81 milliGRAM(s) Oral daily  atorvastatin 40 milliGRAM(s) Oral at bedtime  carvedilol 3.125 milliGRAM(s) Oral every 12 hours  clopidogrel Tablet 75 milliGRAM(s) Oral daily  Dakins Solution - 1/4 Strength 1 Application(s) Topical daily  dextrose 5%. 1000 milliLiter(s) (100 mL/Hr) IV Continuous <Continuous>  dextrose 5%. 1000 milliLiter(s) (50 mL/Hr) IV Continuous <Continuous>  dextrose 50% Injectable 25 Gram(s) IV Push once  dextrose 50% Injectable 12.5 Gram(s) IV Push once  dextrose 50% Injectable 25 Gram(s) IV Push once  famotidine    Tablet 20 milliGRAM(s) Oral daily  fluocinonide 0.05% Solution 1 Application(s) Topical two times a day  furosemide    Tablet 40 milliGRAM(s) Oral two times a day  glucagon  Injectable 1 milliGRAM(s) IntraMuscular once  heparin   Injectable 5000 Unit(s) SubCutaneous every 12 hours  influenza   Vaccine 0.5 milliLiter(s) IntraMuscular once  insulin glargine Injectable (LANTUS) 40 Unit(s) SubCutaneous at bedtime  insulin lispro (ADMELOG) corrective regimen sliding scale   SubCutaneous three times a day before meals  insulin lispro Injectable (ADMELOG) 10 Unit(s) SubCutaneous three times a day before meals  piperacillin/tazobactam IVPB.. 3.375 Gram(s) IV Intermittent every 8 hours  sacubitril 24 mG/valsartan 26 mG 1 Tablet(s) Oral two times a day  sodium chloride 0.9%. 1000 milliLiter(s) (85 mL/Hr) IV Continuous <Continuous>  spironolactone 25 milliGRAM(s) Oral daily    MEDICATIONS  (PRN):  acetaminophen     Tablet .. 650 milliGRAM(s) Oral every 6 hours PRN Temp greater or equal to 38C (100.4F), Mild Pain (1 - 3)  aluminum hydroxide/magnesium hydroxide/simethicone Suspension 30 milliLiter(s) Oral every 4 hours PRN Dyspepsia  dextrose Oral Gel 15 Gram(s) Oral once PRN Blood Glucose LESS THAN 70 milliGRAM(s)/deciliter  melatonin 3 milliGRAM(s) Oral at bedtime PRN Insomnia  ondansetron Injectable 4 milliGRAM(s) IV Push every 8 hours PRN Nausea and/or Vomiting    Allergies    No Known Allergies    Intolerances        LABS:                        12.9   10.01 )-----------( 276      ( 25 Feb 2025 06:57 )             41.0     02-25    136  |  96  |  9.9  ----------------------------<  190[H]  4.5   |  27.0  |  0.67    Ca    8.7      25 Feb 2025 06:57  Mg     1.8     02-25    TPro  7.0  /  Alb  2.8[L]  /  TBili  0.4  /  DBili  x   /  AST  11  /  ALT  15  /  AlkPhos  75  02-25      Urinalysis Basic - ( 25 Feb 2025 06:57 )    Color: x / Appearance: x / SG: x / pH: x  Gluc: 190 mg/dL / Ketone: x  / Bili: x / Urobili: x   Blood: x / Protein: x / Nitrite: x   Leuk Esterase: x / RBC: x / WBC x   Sq Epi: x / Non Sq Epi: x / Bacteria: x      CAPILLARY BLOOD GLUCOSE      POCT Blood Glucose.: 275 mg/dL (25 Feb 2025 22:18)  POCT Blood Glucose.: 172 mg/dL (25 Feb 2025 18:21)  POCT Blood Glucose.: 242 mg/dL (25 Feb 2025 13:58)  POCT Blood Glucose.: 204 mg/dL (25 Feb 2025 08:47)      CULTURE DATA:    Culture - Blood (collected 02-22-25 @ 19:10)  Source: .Blood Blood  Preliminary Report (02-26-25 @ 02:01):    No growth at 72 Hours        RADIOLOGY & ADDITIONAL TESTS:    < from: CT Lower Extremity w/ IV Cont, Left (02.23.25 @ 09:54) >  IMPRESSION:    There is mild infiltration of the subcutaneous fat extending from the   distal thigh through the calf and foot, possibly reflecting cellulitis   and/or hydrostatic edema. No evidence for associated abscess, soft tissue   gas or osteomyelitis.    < end of copied text >   SUBJECTIVE    BRIEF HOSPITAL COURSE SUMMARY: Patient is a 55 y/o F w/ pmhx of HFrEF w/ EF recovery (55%, 02/10/25), HTN, HLD, GERHARD (not on CPAP), chronic LE edema, PAD s/p b/l stents, L-ankle hardware, and a recent admission (02/10-02/19) for AMS where patient was found to have Septic shock and AHRF 2/2 LLE cellulitis and PNA requring NIV and MICU level care presented from residential to [facility] complaining of worsening LLE erythema and pain. Patient reports that her cellulitis worsened since her prior admission, completed cefepime course for treatment. ED investigations revealed leukocytosis, CT of the LLE revealed subQ edema c/w cellulitus, but negative for gas or abscess. Doppler study of LLE was negative for ID. ID consulted.      LAST 24 HOURS:  - Patient assessed this morning at bedside, presents with no new acute complaints, continued aching pain and swelling of the LLE. Rates the pain 8/10 this morning, but pain improves when laying down with leg elevated. Patient denies fever, chills, nausea, vomiting, chest pain, palpitation, SOB, abdominal pain, pain with eating, pain with bowel movements or change in consistency, dysuria. Patient confirms LLE pain, itch, swelling      OBJECTIVE  PHYSICAL EXAM:  General appearance: appears stated age, well nourished, not in acute distress,   Neuro: AxOx3, EOMI  Cardio: diminished heart sounds secondary to body habitus, s1,s2 weakly auscultated  Pulmonary: Diminished breath sounds bilaterally due to body habitus, coarse breath sounds appreciated bilaterally  Abdominal: distended, bowel sounds present in all 4 quadrants, noted pain to palpation in RUQ, LUQ and epigastric region, nontender to palpation in LLQ, RLQ  Extremities: RLE: generalized edema, no erythema, bandage on posterior ankle/calf covering old healing wound,, nontender to palpation, 3+ pitting edema present, weakly palpated popliteal artery.  LLE: lower leg wrap and bandaged from ankle to below knee, erythema present above the knee, nonpitting edema present in lower leg, tender to palpation ankle to knee, capillary refill of great hallux intact, could not palpate popliteal artery at this time    Vital Signs Last 24 Hrs  T(C): 36.6 (26 Feb 2025 08:50), Max: 36.8 (26 Feb 2025 04:20)  T(F): 97.8 (26 Feb 2025 08:50), Max: 98.3 (26 Feb 2025 04:20)  HR: 86 (26 Feb 2025 08:50) (76 - 88)  BP: 97/67 (26 Feb 2025 08:50) (97/67 - 110/73)  BP(mean): --  RR: 17 (26 Feb 2025 08:50) (17 - 18)  SpO2: 94% (26 Feb 2025 08:50) (91% - 95%)    Parameters below as of 26 Feb 2025 08:50  Patient On (Oxygen Delivery Method): room air        MEDICATIONS  (STANDING):  acetaminophen   IVPB .. 1000 milliGRAM(s) IV Intermittent once  AQUAPHOR (petrolatum Ointment) 1 Application(s) Topical two times a day  aspirin  chewable 81 milliGRAM(s) Oral daily  atorvastatin 40 milliGRAM(s) Oral at bedtime  carvedilol 3.125 milliGRAM(s) Oral every 12 hours  ceFAZolin  Injectable. 1000 milliGRAM(s) IV Push every 8 hours  clopidogrel Tablet 75 milliGRAM(s) Oral daily  Dakins Solution - 1/4 Strength 1 Application(s) Topical daily  dextrose 5%. 1000 milliLiter(s) (100 mL/Hr) IV Continuous <Continuous>  dextrose 5%. 1000 milliLiter(s) (50 mL/Hr) IV Continuous <Continuous>  dextrose 50% Injectable 25 Gram(s) IV Push once  dextrose 50% Injectable 12.5 Gram(s) IV Push once  dextrose 50% Injectable 25 Gram(s) IV Push once  famotidine    Tablet 20 milliGRAM(s) Oral daily  fluocinonide 0.05% Solution 1 Application(s) Topical two times a day  furosemide    Tablet 40 milliGRAM(s) Oral two times a day  glucagon  Injectable 1 milliGRAM(s) IntraMuscular once  heparin   Injectable 5000 Unit(s) SubCutaneous every 12 hours  influenza   Vaccine 0.5 milliLiter(s) IntraMuscular once  insulin glargine Injectable (LANTUS) 40 Unit(s) SubCutaneous at bedtime  insulin lispro (ADMELOG) corrective regimen sliding scale   SubCutaneous three times a day before meals  insulin lispro Injectable (ADMELOG) 10 Unit(s) SubCutaneous three times a day before meals  sacubitril 24 mG/valsartan 26 mG 1 Tablet(s) Oral two times a day  sodium chloride 0.9%. 1000 milliLiter(s) (85 mL/Hr) IV Continuous <Continuous>  spironolactone 25 milliGRAM(s) Oral daily    MEDICATIONS  (PRN):  acetaminophen     Tablet .. 650 milliGRAM(s) Oral every 6 hours PRN Temp greater or equal to 38C (100.4F), Mild Pain (1 - 3)  aluminum hydroxide/magnesium hydroxide/simethicone Suspension 30 milliLiter(s) Oral every 4 hours PRN Dyspepsia  dextrose Oral Gel 15 Gram(s) Oral once PRN Blood Glucose LESS THAN 70 milliGRAM(s)/deciliter  melatonin 3 milliGRAM(s) Oral at bedtime PRN Insomnia  ondansetron Injectable 4 milliGRAM(s) IV Push every 8 hours PRN Nausea and/or Vomiting    Allergies    No Known Allergies    Intolerances        LABS:                        12.7   10.11 )-----------( 269      ( 26 Feb 2025 06:41 )             40.7     02-26    137  |  97  |  11.0  ----------------------------<  167[H]  4.3   |  28.0  |  0.67    Ca    8.8      26 Feb 2025 06:41  Mg     2.2     02-26    TPro  6.8  /  Alb  3.0[L]  /  TBili  0.3[L]  /  DBili  x   /  AST  14  /  ALT  15  /  AlkPhos  70  02-26      Urinalysis Basic - ( 26 Feb 2025 06:41 )    Color: x / Appearance: x / SG: x / pH: x  Gluc: 167 mg/dL / Ketone: x  / Bili: x / Urobili: x   Blood: x / Protein: x / Nitrite: x   Leuk Esterase: x / RBC: x / WBC x   Sq Epi: x / Non Sq Epi: x / Bacteria: x      CAPILLARY BLOOD GLUCOSE      POCT Blood Glucose.: 185 mg/dL (26 Feb 2025 08:43)  POCT Blood Glucose.: 275 mg/dL (25 Feb 2025 22:18)  POCT Blood Glucose.: 172 mg/dL (25 Feb 2025 18:21)  POCT Blood Glucose.: 242 mg/dL (25 Feb 2025 13:58)      CULTURE DATA:    Culture - Blood (collected 02-22-25 @ 19:10)  Source: .Blood Blood  Preliminary Report (02-26-25 @ 02:01):    No growth at 72 Hours        RADIOLOGY & ADDITIONAL TESTS:

## 2025-02-27 LAB
ALBUMIN SERPL ELPH-MCNC: 2.9 G/DL — LOW (ref 3.3–5.2)
ALP SERPL-CCNC: 74 U/L — SIGNIFICANT CHANGE UP (ref 40–120)
ALT FLD-CCNC: 16 U/L — SIGNIFICANT CHANGE UP
ANION GAP SERPL CALC-SCNC: 12 MMOL/L — SIGNIFICANT CHANGE UP (ref 5–17)
AST SERPL-CCNC: 12 U/L — SIGNIFICANT CHANGE UP
BILIRUB SERPL-MCNC: 0.2 MG/DL — LOW (ref 0.4–2)
BUN SERPL-MCNC: 17 MG/DL — SIGNIFICANT CHANGE UP (ref 8–20)
CALCIUM SERPL-MCNC: 8.7 MG/DL — SIGNIFICANT CHANGE UP (ref 8.4–10.5)
CHLORIDE SERPL-SCNC: 96 MMOL/L — SIGNIFICANT CHANGE UP (ref 96–108)
CO2 SERPL-SCNC: 28 MMOL/L — SIGNIFICANT CHANGE UP (ref 22–29)
CREAT SERPL-MCNC: 0.8 MG/DL — SIGNIFICANT CHANGE UP (ref 0.5–1.3)
EGFR: 88 ML/MIN/1.73M2 — SIGNIFICANT CHANGE UP
GLUCOSE BLDC GLUCOMTR-MCNC: 163 MG/DL — HIGH (ref 70–99)
GLUCOSE BLDC GLUCOMTR-MCNC: 188 MG/DL — HIGH (ref 70–99)
GLUCOSE BLDC GLUCOMTR-MCNC: 197 MG/DL — HIGH (ref 70–99)
GLUCOSE BLDC GLUCOMTR-MCNC: 206 MG/DL — HIGH (ref 70–99)
GLUCOSE SERPL-MCNC: 184 MG/DL — HIGH (ref 70–99)
HCT VFR BLD CALC: 40.5 % — SIGNIFICANT CHANGE UP (ref 34.5–45)
HGB BLD-MCNC: 12.7 G/DL — SIGNIFICANT CHANGE UP (ref 11.5–15.5)
MAGNESIUM SERPL-MCNC: 2.2 MG/DL — SIGNIFICANT CHANGE UP (ref 1.6–2.6)
MCHC RBC-ENTMCNC: 28 PG — SIGNIFICANT CHANGE UP (ref 27–34)
MCHC RBC-ENTMCNC: 31.4 G/DL — LOW (ref 32–36)
MCV RBC AUTO: 89.4 FL — SIGNIFICANT CHANGE UP (ref 80–100)
NRBC # BLD AUTO: 0 K/UL — SIGNIFICANT CHANGE UP (ref 0–0)
NRBC # FLD: 0 K/UL — SIGNIFICANT CHANGE UP (ref 0–0)
NRBC BLD AUTO-RTO: 0 /100 WBCS — SIGNIFICANT CHANGE UP (ref 0–0)
PLATELET # BLD AUTO: 256 K/UL — SIGNIFICANT CHANGE UP (ref 150–400)
PMV BLD: 9.5 FL — SIGNIFICANT CHANGE UP (ref 7–13)
POTASSIUM SERPL-MCNC: 4.1 MMOL/L — SIGNIFICANT CHANGE UP (ref 3.5–5.3)
POTASSIUM SERPL-SCNC: 4.1 MMOL/L — SIGNIFICANT CHANGE UP (ref 3.5–5.3)
PROT SERPL-MCNC: 6.9 G/DL — SIGNIFICANT CHANGE UP (ref 6.6–8.7)
RBC # BLD: 4.53 M/UL — SIGNIFICANT CHANGE UP (ref 3.8–5.2)
RBC # FLD: 16 % — HIGH (ref 10.3–14.5)
SODIUM SERPL-SCNC: 136 MMOL/L — SIGNIFICANT CHANGE UP (ref 135–145)
WBC # BLD: 10.12 K/UL — SIGNIFICANT CHANGE UP (ref 3.8–10.5)
WBC # FLD AUTO: 10.12 K/UL — SIGNIFICANT CHANGE UP (ref 3.8–10.5)

## 2025-02-27 PROCEDURE — 76705 ECHO EXAM OF ABDOMEN: CPT | Mod: 26

## 2025-02-27 PROCEDURE — 99233 SBSQ HOSP IP/OBS HIGH 50: CPT | Mod: GC

## 2025-02-27 PROCEDURE — 93971 EXTREMITY STUDY: CPT | Mod: 26,LT

## 2025-02-27 RX ADMIN — ATORVASTATIN CALCIUM 40 MILLIGRAM(S): 80 TABLET, FILM COATED ORAL at 22:09

## 2025-02-27 RX ADMIN — Medication 1000 MILLIGRAM(S): at 22:08

## 2025-02-27 RX ADMIN — SACUBITRIL AND VALSARTAN 1 TABLET(S): 49; 51 TABLET, FILM COATED ORAL at 18:25

## 2025-02-27 RX ADMIN — INSULIN LISPRO 1: 100 INJECTION, SOLUTION INTRAVENOUS; SUBCUTANEOUS at 10:40

## 2025-02-27 RX ADMIN — Medication 81 MILLIGRAM(S): at 11:51

## 2025-02-27 RX ADMIN — Medication 25 MILLIGRAM(S): at 05:42

## 2025-02-27 RX ADMIN — INSULIN LISPRO 10 UNIT(S): 100 INJECTION, SOLUTION INTRAVENOUS; SUBCUTANEOUS at 10:42

## 2025-02-27 RX ADMIN — Medication 1 APPLICATION(S): at 05:43

## 2025-02-27 RX ADMIN — SACUBITRIL AND VALSARTAN 1 TABLET(S): 49; 51 TABLET, FILM COATED ORAL at 05:42

## 2025-02-27 RX ADMIN — CARVEDILOL 3.12 MILLIGRAM(S): 3.12 TABLET, FILM COATED ORAL at 05:42

## 2025-02-27 RX ADMIN — FUROSEMIDE 40 MILLIGRAM(S): 10 INJECTION INTRAMUSCULAR; INTRAVENOUS at 05:42

## 2025-02-27 RX ADMIN — Medication 20 MILLIGRAM(S): at 11:51

## 2025-02-27 RX ADMIN — WHITE PETROLATUM 1 APPLICATION(S): 1 OINTMENT TOPICAL at 05:43

## 2025-02-27 RX ADMIN — CLOPIDOGREL BISULFATE 75 MILLIGRAM(S): 75 TABLET, FILM COATED ORAL at 11:51

## 2025-02-27 RX ADMIN — Medication 1 APPLICATION(S): at 22:09

## 2025-02-27 RX ADMIN — INSULIN LISPRO 1: 100 INJECTION, SOLUTION INTRAVENOUS; SUBCUTANEOUS at 13:26

## 2025-02-27 RX ADMIN — INSULIN LISPRO 10 UNIT(S): 100 INJECTION, SOLUTION INTRAVENOUS; SUBCUTANEOUS at 13:27

## 2025-02-27 RX ADMIN — Medication 1000 MILLIGRAM(S): at 13:27

## 2025-02-27 RX ADMIN — INSULIN GLARGINE-YFGN 40 UNIT(S): 100 INJECTION, SOLUTION SUBCUTANEOUS at 22:08

## 2025-02-27 RX ADMIN — Medication 1000 MILLIGRAM(S): at 05:42

## 2025-02-27 RX ADMIN — SODIUM HYPOCHLORITE 1 APPLICATION(S): 0.12 SOLUTION TOPICAL at 22:09

## 2025-02-27 RX ADMIN — INSULIN LISPRO 1: 100 INJECTION, SOLUTION INTRAVENOUS; SUBCUTANEOUS at 18:26

## 2025-02-27 RX ADMIN — INSULIN LISPRO 10 UNIT(S): 100 INJECTION, SOLUTION INTRAVENOUS; SUBCUTANEOUS at 18:26

## 2025-02-27 RX ADMIN — CARVEDILOL 3.12 MILLIGRAM(S): 3.12 TABLET, FILM COATED ORAL at 18:26

## 2025-02-27 RX ADMIN — WHITE PETROLATUM 1 APPLICATION(S): 1 OINTMENT TOPICAL at 22:09

## 2025-02-27 RX ADMIN — FUROSEMIDE 40 MILLIGRAM(S): 10 INJECTION INTRAMUSCULAR; INTRAVENOUS at 13:27

## 2025-02-27 NOTE — PROGRESS NOTE ADULT - SUBJECTIVE AND OBJECTIVE BOX
SUBJECTIVE  BRIEF HOSPITAL COURSE SUMMARY: Patient is a 53 y/o F w/ pmhx of HFrEF w/ EF recovery (55%, 02/10/25), HTN, HLD, GERHARD (not on CPAP), chronic LE edema, PAD s/p b/l stents, L-ankle hardware, and a recent admission (02/10-02/19) for AMS where patient was found to have Septic shock and AHRF 2/2 LLE cellulitis and PNA requring NIV and MICU level care presented from [facility] to [facility] complaining of worsening LLE erythema and pain. Patient reports that her cellulitis worsened since her prior admission, completed cefepime course for treatment. ED investigations revealed leukocytosis, CT of the LLE revealed subQ edema c/w cellulitus, but negative for gas or abscess. Doppler study of LLE was negative for ID. ID consulted.    LAST 24 HOURS:  - Patient assessed at bedside this morning    OBJECTIVE  PHYSICAL EXAM:  General appearance: appears stated age, well nourished, not in acute distress,   Neuro: AxOx3, EOMI  Cardio: diminished heart sounds secondary to body habitus, s1,s2 weakly auscultated  Pulmonary: Diminished breath sounds bilaterally due to body habitus, coarse breath sounds appreciated bilaterally  Abdominal: distended, bowel sounds present in all 4 quadrants, noted pain to palpation in RUQ, LUQ and epigastric region, nontender to palpation in LLQ, RLQ  Extremities: RLE: generalized edema, no erythema, bandage on posterior ankle/calf covering old healing wound,, nontender to palpation, 3+ pitting edema present, weakly palpated popliteal artery.  LLE: lower leg wrap and bandaged from ankle to below knee, erythema present above the knee, nonpitting edema present in lower leg, tender to palpation ankle to knee, capillary refill of great hallux intact, could not palpate popliteal artery at this time      Vital Signs Last 24 Hrs  T(C): 36.9 (27 Feb 2025 05:00), Max: 36.9 (27 Feb 2025 05:00)  T(F): 98.4 (27 Feb 2025 05:00), Max: 98.4 (27 Feb 2025 05:00)  HR: 92 (27 Feb 2025 05:00) (75 - 92)  BP: 101/67 (27 Feb 2025 05:00) (97/67 - 127/77)  BP(mean): --  RR: 18 (27 Feb 2025 05:00) (17 - 18)  SpO2: 92% (27 Feb 2025 05:00) (92% - 97%)    Parameters below as of 27 Feb 2025 05:00  Patient On (Oxygen Delivery Method): room air            MEDICATIONS  (STANDING):  acetaminophen   IVPB .. 1000 milliGRAM(s) IV Intermittent once  AQUAPHOR (petrolatum Ointment) 1 Application(s) Topical two times a day  aspirin  chewable 81 milliGRAM(s) Oral daily  atorvastatin 40 milliGRAM(s) Oral at bedtime  carvedilol 3.125 milliGRAM(s) Oral every 12 hours  ceFAZolin  Injectable. 1000 milliGRAM(s) IV Push every 8 hours  clopidogrel Tablet 75 milliGRAM(s) Oral daily  Dakins Solution - 1/4 Strength 1 Application(s) Topical daily  dextrose 5%. 1000 milliLiter(s) (100 mL/Hr) IV Continuous <Continuous>  dextrose 5%. 1000 milliLiter(s) (50 mL/Hr) IV Continuous <Continuous>  dextrose 50% Injectable 25 Gram(s) IV Push once  dextrose 50% Injectable 12.5 Gram(s) IV Push once  dextrose 50% Injectable 25 Gram(s) IV Push once  famotidine    Tablet 20 milliGRAM(s) Oral daily  fluocinonide 0.05% Solution 1 Application(s) Topical two times a day  furosemide    Tablet 40 milliGRAM(s) Oral two times a day  glucagon  Injectable 1 milliGRAM(s) IntraMuscular once  heparin   Injectable 5000 Unit(s) SubCutaneous every 12 hours  influenza   Vaccine 0.5 milliLiter(s) IntraMuscular once  insulin glargine Injectable (LANTUS) 40 Unit(s) SubCutaneous at bedtime  insulin lispro (ADMELOG) corrective regimen sliding scale   SubCutaneous three times a day before meals  insulin lispro Injectable (ADMELOG) 10 Unit(s) SubCutaneous three times a day before meals  sacubitril 24 mG/valsartan 26 mG 1 Tablet(s) Oral two times a day  sodium chloride 0.9%. 1000 milliLiter(s) (85 mL/Hr) IV Continuous <Continuous>  spironolactone 25 milliGRAM(s) Oral daily    MEDICATIONS  (PRN):  acetaminophen     Tablet .. 650 milliGRAM(s) Oral every 6 hours PRN Temp greater or equal to 38C (100.4F), Mild Pain (1 - 3)  aluminum hydroxide/magnesium hydroxide/simethicone Suspension 30 milliLiter(s) Oral every 4 hours PRN Dyspepsia  dextrose Oral Gel 15 Gram(s) Oral once PRN Blood Glucose LESS THAN 70 milliGRAM(s)/deciliter  melatonin 3 milliGRAM(s) Oral at bedtime PRN Insomnia  ondansetron Injectable 4 milliGRAM(s) IV Push every 8 hours PRN Nausea and/or Vomiting    Allergies    No Known Allergies    Intolerances        LABS:                        12.7   10.11 )-----------( 269      ( 26 Feb 2025 06:41 )             40.7     02-26    137  |  97  |  11.0  ----------------------------<  167[H]  4.3   |  28.0  |  0.67    Ca    8.8      26 Feb 2025 06:41  Mg     2.2     02-26    TPro  6.8  /  Alb  3.0[L]  /  TBili  0.3[L]  /  DBili  x   /  AST  14  /  ALT  15  /  AlkPhos  70  02-26      Urinalysis Basic - ( 26 Feb 2025 06:41 )    Color: x / Appearance: x / SG: x / pH: x  Gluc: 167 mg/dL / Ketone: x  / Bili: x / Urobili: x   Blood: x / Protein: x / Nitrite: x   Leuk Esterase: x / RBC: x / WBC x   Sq Epi: x / Non Sq Epi: x / Bacteria: x      CAPILLARY BLOOD GLUCOSE      POCT Blood Glucose.: 204 mg/dL (26 Feb 2025 21:32)  POCT Blood Glucose.: 152 mg/dL (26 Feb 2025 17:18)  POCT Blood Glucose.: 183 mg/dL (26 Feb 2025 13:39)  POCT Blood Glucose.: 185 mg/dL (26 Feb 2025 08:43)      CULTURE DATA:    Culture - Blood (collected 02-22-25 @ 19:10)  Source: .Blood Blood  Preliminary Report (02-27-25 @ 02:01):    No growth at 4 days        RADIOLOGY & ADDITIONAL TESTS:    < from: CT Lower Extremity w/ IV Cont, Left (02.23.25 @ 09:54) >  FINDINGS:    There is mild infiltration of the subcutaneous fat extending from the   distal thigh through the calf and foot, possibly reflecting cellulitis   and/or hydrostatic edema. There is no rim-enhancing fluid collection to   suggest associated abscess. Infiltrative changes are largely limited to   the subcutaneous compartment, without extension into the musculature to   suggest myositis. No visualized soft tissue gas. No evidence for acute   osteomyelitis.    There is ankle hardware without visualized complication.      IMPRESSION:    There is mild infiltration of the subcutaneous fat extending from the   distal thigh through the calf and foot, possibly reflecting cellulitis   and/or hydrostatic edema. No evidence for associated abscess, soft tissue   gas or osteomyelitis.    --- End of Report ---      < end of copied text >     SUBJECTIVE  BRIEF HOSPITAL COURSE SUMMARY: Patient is a 53 y/o F w/ pmhx of HFrEF w/ EF recovery (55%, 02/10/25), HTN, HLD, GERHARD (not on CPAP), chronic LE edema, PAD s/p b/l stents, L-ankle hardware, and a recent admission (02/10-02/19) for AMS where patient was found to have Septic shock and AHRF 2/2 LLE cellulitis and PNA requring NIV and MICU level care presented from [facility] to [facility] complaining of worsening LLE erythema and pain. Patient reports that her cellulitis worsened since her prior admission, completed cefepime course for treatment. ED investigations revealed leukocytosis, CT of the LLE revealed subQ edema c/w cellulitus, but negative for gas or abscess. Doppler study of LLE was negative for ID. ID consulted.    LAST 24 HOURS:  - Patient assessed at bedside this morning  - reporting improvement in LLE pain  - RUQ US and LLE doppler performed  - ROS otherwise neg    OBJECTIVE  PHYSICAL EXAM:  General appearance: appears stated age, well nourished, not in acute distress,   Neuro: AxOx3, EOMI  Cardio: diminished heart sounds secondary to body habitus, s1,s2 weakly auscultated  Pulmonary: Diminished breath sounds bilaterally due to body habitus, coarse breath sounds appreciated bilaterally  Abdominal: distended, bowel sounds present in all 4 quadrants, noted pain to palpation in RUQ, LUQ and epigastric region, nontender to palpation in LLQ, RLQ  Extremities: RLE: generalized edema, no erythema, bandage on posterior ankle/calf covering old healing wound,, nontender to palpation, 3+ pitting edema present, weakly palpated popliteal artery.  LLE: lower leg wrap and bandaged from ankle to below knee, erythema present above the knee, nonpitting edema present in lower leg, tender to palpation ankle to knee, capillary refill of great hallux intact, could not palpate popliteal artery at this time      Vital Signs Last 24 Hrs  T(C): 36.9 (27 Feb 2025 05:00), Max: 36.9 (27 Feb 2025 05:00)  T(F): 98.4 (27 Feb 2025 05:00), Max: 98.4 (27 Feb 2025 05:00)  HR: 92 (27 Feb 2025 05:00) (75 - 92)  BP: 101/67 (27 Feb 2025 05:00) (97/67 - 127/77)  BP(mean): --  RR: 18 (27 Feb 2025 05:00) (17 - 18)  SpO2: 92% (27 Feb 2025 05:00) (92% - 97%)    Parameters below as of 27 Feb 2025 05:00  Patient On (Oxygen Delivery Method): room air            MEDICATIONS  (STANDING):  acetaminophen   IVPB .. 1000 milliGRAM(s) IV Intermittent once  AQUAPHOR (petrolatum Ointment) 1 Application(s) Topical two times a day  aspirin  chewable 81 milliGRAM(s) Oral daily  atorvastatin 40 milliGRAM(s) Oral at bedtime  carvedilol 3.125 milliGRAM(s) Oral every 12 hours  ceFAZolin  Injectable. 1000 milliGRAM(s) IV Push every 8 hours  clopidogrel Tablet 75 milliGRAM(s) Oral daily  Dakins Solution - 1/4 Strength 1 Application(s) Topical daily  dextrose 5%. 1000 milliLiter(s) (100 mL/Hr) IV Continuous <Continuous>  dextrose 5%. 1000 milliLiter(s) (50 mL/Hr) IV Continuous <Continuous>  dextrose 50% Injectable 25 Gram(s) IV Push once  dextrose 50% Injectable 12.5 Gram(s) IV Push once  dextrose 50% Injectable 25 Gram(s) IV Push once  famotidine    Tablet 20 milliGRAM(s) Oral daily  fluocinonide 0.05% Solution 1 Application(s) Topical two times a day  furosemide    Tablet 40 milliGRAM(s) Oral two times a day  glucagon  Injectable 1 milliGRAM(s) IntraMuscular once  heparin   Injectable 5000 Unit(s) SubCutaneous every 12 hours  influenza   Vaccine 0.5 milliLiter(s) IntraMuscular once  insulin glargine Injectable (LANTUS) 40 Unit(s) SubCutaneous at bedtime  insulin lispro (ADMELOG) corrective regimen sliding scale   SubCutaneous three times a day before meals  insulin lispro Injectable (ADMELOG) 10 Unit(s) SubCutaneous three times a day before meals  sacubitril 24 mG/valsartan 26 mG 1 Tablet(s) Oral two times a day  sodium chloride 0.9%. 1000 milliLiter(s) (85 mL/Hr) IV Continuous <Continuous>  spironolactone 25 milliGRAM(s) Oral daily    MEDICATIONS  (PRN):  acetaminophen     Tablet .. 650 milliGRAM(s) Oral every 6 hours PRN Temp greater or equal to 38C (100.4F), Mild Pain (1 - 3)  aluminum hydroxide/magnesium hydroxide/simethicone Suspension 30 milliLiter(s) Oral every 4 hours PRN Dyspepsia  dextrose Oral Gel 15 Gram(s) Oral once PRN Blood Glucose LESS THAN 70 milliGRAM(s)/deciliter  melatonin 3 milliGRAM(s) Oral at bedtime PRN Insomnia  ondansetron Injectable 4 milliGRAM(s) IV Push every 8 hours PRN Nausea and/or Vomiting    Allergies    No Known Allergies    Intolerances        LABS:                        12.7   10.11 )-----------( 269      ( 26 Feb 2025 06:41 )             40.7     02-26    137  |  97  |  11.0  ----------------------------<  167[H]  4.3   |  28.0  |  0.67    Ca    8.8      26 Feb 2025 06:41  Mg     2.2     02-26    TPro  6.8  /  Alb  3.0[L]  /  TBili  0.3[L]  /  DBili  x   /  AST  14  /  ALT  15  /  AlkPhos  70  02-26      Urinalysis Basic - ( 26 Feb 2025 06:41 )    Color: x / Appearance: x / SG: x / pH: x  Gluc: 167 mg/dL / Ketone: x  / Bili: x / Urobili: x   Blood: x / Protein: x / Nitrite: x   Leuk Esterase: x / RBC: x / WBC x   Sq Epi: x / Non Sq Epi: x / Bacteria: x      CAPILLARY BLOOD GLUCOSE      POCT Blood Glucose.: 204 mg/dL (26 Feb 2025 21:32)  POCT Blood Glucose.: 152 mg/dL (26 Feb 2025 17:18)  POCT Blood Glucose.: 183 mg/dL (26 Feb 2025 13:39)  POCT Blood Glucose.: 185 mg/dL (26 Feb 2025 08:43)      CULTURE DATA:    Culture - Blood (collected 02-22-25 @ 19:10)  Source: .Blood Blood  Preliminary Report (02-27-25 @ 02:01):    No growth at 4 days        RADIOLOGY & ADDITIONAL TESTS:    < from: CT Lower Extremity w/ IV Cont, Left (02.23.25 @ 09:54) >  FINDINGS:    There is mild infiltration of the subcutaneous fat extending from the   distal thigh through the calf and foot, possibly reflecting cellulitis   and/or hydrostatic edema. There is no rim-enhancing fluid collection to   suggest associated abscess. Infiltrative changes are largely limited to   the subcutaneous compartment, without extension into the musculature to   suggest myositis. No visualized soft tissue gas. No evidence for acute   osteomyelitis.    There is ankle hardware without visualized complication.      IMPRESSION:    There is mild infiltration of the subcutaneous fat extending from the   distal thigh through the calf and foot, possibly reflecting cellulitis   and/or hydrostatic edema. No evidence for associated abscess, soft tissue   gas or osteomyelitis.    --- End of Report ---      < end of copied text >

## 2025-02-27 NOTE — PROGRESS NOTE ADULT - ASSESSMENT
53 y/o F w/ pmhx of HFrEF w/ EF recovery (55%, 02/10/25), HTN, HLD, GERHARD (not on CPAP), chronic LE edema, PAD s/p b/l stents, L-ankle hardware, and a recent admission (02/10-02/19) for AMS where patient was found to have Septic shock and AHRF 2/2 LLE cellulitis and PNA admitted to [facility] for severe nonhealing LLE cellulitis w/ superimposed venous stasis    #Severe LLE cellulitis   #Superimposed on venous stasis  - Elv wbc, CT LE w some subq edema, no gas or abcess  - Was recently admitted for septic shock due to LLE cellulitis and completed course of cefazolin  - LLE doppler negative for DVT   - Repeat LLE U/S duplex  - Change from zosyn to cefazolin, discontinue clindamycin, appreciate ID recs, cw Lidex ointment BID for dermatitis  - wound care consulted, recs appreciated    - Trend CBC and monitor temperature   - Vascular consulted for complicated venous stasis - no acute vascular intervention at this time, recommending smoking cessation, xeroform, kerlix, and ACE wraps  - Pt refused TONI, TONI >0.8 required for unna boot, last TONI in 2023 was 0.6    #RUQ pain  - Ultrasound of RUQ pending    #Mild hyponatremia hypochloremia (resolved)  - pseudo, sugars elv  - Could be from LLE pain   - Clinically euvolemic   - Will hold off on further w/u unless drops further   - Monitor I/O's, lytes and renal function  - Resolved    #Chronic HFimprovedEF  - Clinically euvolemic at this time   - TTE was 25% and now 55% on TTE from 2/10/25  - c/w entresto, aldactone, carvedilol, lasix  - Resume Jardiance on d/c   - Monitor daily weight, I/O's  - Monitor on telemetry     #HTN / HLD  - c/w home medications     #IDDM2   - A1c 8.5  - Hold metformin while inpt  - Resume basal/bolus insulin regimen   - ISS and hypoglycemic protocol in place     #PAD  - c/w ASA and plavix       VTE ppx:  heparin sq    Dispo: Acute.     55 y/o F w/ pmhx of HFrEF w/ EF recovery (55%, 02/10/25), HTN, HLD, GERHARD (not on CPAP), chronic LE edema, PAD s/p b/l stents, L-ankle hardware, and a recent admission (02/10-02/19) for AMS where patient was found to have Septic shock and AHRF 2/2 LLE cellulitis and PNA admitted to [facility] for severe nonhealing LLE cellulitis w/ superimposed venous stasis    #Severe LLE cellulitis   #Superimposed on venous stasis  - Elv wbc, CT LE w some subq edema, no gas or abcess  - Was recently admitted for septic shock due to LLE cellulitis and completed course of cefazolin  - Repeat LLE negative for DVT  - Change from zosyn to cefazolin,  clindamycin dc'd, appreciate ID recs,   cw Lidex ointment BID for dermatitis  - wound care consulted, recs appreciated    - Trend CBC and monitor temperature   - Vascular consulted for complicated venous stasis - no acute vascular intervention at this time, recommending smoking cessation, xeroform, kerlix, and ACE wraps  - Pt refused TONI, TONI >0.8 required for unna boot, last TONI in 2023 was 0.6    #RUQ pain  - Ultrasound of RUQ revealing diffuse hepatic steatosis and hepatomegaly  - Outpatient f/u    #Mild hyponatremia hypochloremia (resolved)  - pseudo, sugars elv  - Could be from LLE pain   - Clinically euvolemic   - Will hold off on further w/u unless drops further   - Monitor I/O's, lytes and renal function  - Resolved    #Chronic HFimprovedEF  - Clinically euvolemic at this time   - TTE was 25% and now 55% on TTE from 2/10/25  - c/w entresto, aldactone, carvedilol, lasix  - Resume Jardiance on d/c   - Monitor daily weight, I/O's  - Monitor on telemetry     #HTN / HLD  - c/w home medications     #IDDM2   - A1c 8.5  - Hold metformin while inpt  - Resume basal/bolus insulin regimen   - ISS and hypoglycemic protocol in place     #PAD  - c/w ASA and plavix       VTE ppx:  heparin sq    Dispo: Acute.

## 2025-02-27 NOTE — PROGRESS NOTE ADULT - ATTENDING COMMENTS
patient with left lower ext cellulitis , upto knee , warm , open blisters noted   c/w iv abxs   ct  neg for abscess / collection/ gas   duplex neg for dvt   vascular/ wound care consulted   patient to follow up out patient  lymphoedema clinic after dc
left leg duplex neg for dvt   c/w iv abxs as per id   c/w wound care   plan for eventual dc home with hc
patient with left lower ext cellulitis , upto knee , warm , open blisters noted   c/w iv abxs   ct  neg for abscess / collection/ gas   duplex neg for dvt   vascular/ wound care consulted   will benefit from op lymphoedema clinic
noted with worsening left leg  medial side erythema , more indurated   states was itching that side overnight   denies any worsening pain   check repeat duplex   c/w abxs   c/w wound care

## 2025-02-27 NOTE — PROGRESS NOTE ADULT - TIME BILLING
patient care , labs ,meds , chart review , documentation
patient care , labs ,meds, chart review
patient care , labs ,meds, chart review , documentation
patient care , labs ,meds, chart review , documentation

## 2025-02-28 ENCOUNTER — TRANSCRIPTION ENCOUNTER (OUTPATIENT)
Age: 55
End: 2025-02-28

## 2025-02-28 VITALS
TEMPERATURE: 98 F | SYSTOLIC BLOOD PRESSURE: 107 MMHG | DIASTOLIC BLOOD PRESSURE: 73 MMHG | HEART RATE: 85 BPM | RESPIRATION RATE: 17 BRPM | OXYGEN SATURATION: 95 %

## 2025-02-28 LAB
ALBUMIN SERPL ELPH-MCNC: 2.8 G/DL — LOW (ref 3.3–5.2)
ALP SERPL-CCNC: 72 U/L — SIGNIFICANT CHANGE UP (ref 40–120)
ALT FLD-CCNC: 14 U/L — SIGNIFICANT CHANGE UP
ANION GAP SERPL CALC-SCNC: 14 MMOL/L — SIGNIFICANT CHANGE UP (ref 5–17)
AST SERPL-CCNC: 11 U/L — SIGNIFICANT CHANGE UP
BILIRUB SERPL-MCNC: 0.3 MG/DL — LOW (ref 0.4–2)
BUN SERPL-MCNC: 14.9 MG/DL — SIGNIFICANT CHANGE UP (ref 8–20)
CALCIUM SERPL-MCNC: 8.7 MG/DL — SIGNIFICANT CHANGE UP (ref 8.4–10.5)
CHLORIDE SERPL-SCNC: 97 MMOL/L — SIGNIFICANT CHANGE UP (ref 96–108)
CO2 SERPL-SCNC: 27 MMOL/L — SIGNIFICANT CHANGE UP (ref 22–29)
CREAT SERPL-MCNC: 0.57 MG/DL — SIGNIFICANT CHANGE UP (ref 0.5–1.3)
CULTURE RESULTS: SIGNIFICANT CHANGE UP
EGFR: 108 ML/MIN/1.73M2 — SIGNIFICANT CHANGE UP
GLUCOSE BLDC GLUCOMTR-MCNC: 193 MG/DL — HIGH (ref 70–99)
GLUCOSE BLDC GLUCOMTR-MCNC: 300 MG/DL — HIGH (ref 70–99)
GLUCOSE SERPL-MCNC: 195 MG/DL — HIGH (ref 70–99)
HCT VFR BLD CALC: 38.6 % — SIGNIFICANT CHANGE UP (ref 34.5–45)
HGB BLD-MCNC: 12 G/DL — SIGNIFICANT CHANGE UP (ref 11.5–15.5)
MAGNESIUM SERPL-MCNC: 2.1 MG/DL — SIGNIFICANT CHANGE UP (ref 1.6–2.6)
MCHC RBC-ENTMCNC: 27.9 PG — SIGNIFICANT CHANGE UP (ref 27–34)
MCHC RBC-ENTMCNC: 31.1 G/DL — LOW (ref 32–36)
MCV RBC AUTO: 89.8 FL — SIGNIFICANT CHANGE UP (ref 80–100)
NRBC # BLD AUTO: 0 K/UL — SIGNIFICANT CHANGE UP (ref 0–0)
NRBC # FLD: 0 K/UL — SIGNIFICANT CHANGE UP (ref 0–0)
NRBC BLD AUTO-RTO: 0 /100 WBCS — SIGNIFICANT CHANGE UP (ref 0–0)
PLATELET # BLD AUTO: 232 K/UL — SIGNIFICANT CHANGE UP (ref 150–400)
PMV BLD: 10.1 FL — SIGNIFICANT CHANGE UP (ref 7–13)
POTASSIUM SERPL-MCNC: 4.2 MMOL/L — SIGNIFICANT CHANGE UP (ref 3.5–5.3)
POTASSIUM SERPL-SCNC: 4.2 MMOL/L — SIGNIFICANT CHANGE UP (ref 3.5–5.3)
PROT SERPL-MCNC: 6.7 G/DL — SIGNIFICANT CHANGE UP (ref 6.6–8.7)
RBC # BLD: 4.3 M/UL — SIGNIFICANT CHANGE UP (ref 3.8–5.2)
RBC # FLD: 15.9 % — HIGH (ref 10.3–14.5)
SODIUM SERPL-SCNC: 137 MMOL/L — SIGNIFICANT CHANGE UP (ref 135–145)
SPECIMEN SOURCE: SIGNIFICANT CHANGE UP
WBC # BLD: 9.66 K/UL — SIGNIFICANT CHANGE UP (ref 3.8–10.5)
WBC # FLD AUTO: 9.66 K/UL — SIGNIFICANT CHANGE UP (ref 3.8–10.5)

## 2025-02-28 PROCEDURE — 87641 MR-STAPH DNA AMP PROBE: CPT

## 2025-02-28 PROCEDURE — 73610 X-RAY EXAM OF ANKLE: CPT

## 2025-02-28 PROCEDURE — 80202 ASSAY OF VANCOMYCIN: CPT

## 2025-02-28 PROCEDURE — 80053 COMPREHEN METABOLIC PANEL: CPT

## 2025-02-28 PROCEDURE — 85027 COMPLETE CBC AUTOMATED: CPT

## 2025-02-28 PROCEDURE — 99239 HOSP IP/OBS DSCHRG MGMT >30: CPT | Mod: GC

## 2025-02-28 PROCEDURE — 93971 EXTREMITY STUDY: CPT

## 2025-02-28 PROCEDURE — 36415 COLL VENOUS BLD VENIPUNCTURE: CPT

## 2025-02-28 PROCEDURE — 87640 STAPH A DNA AMP PROBE: CPT

## 2025-02-28 PROCEDURE — 73701 CT LOWER EXTREMITY W/DYE: CPT | Mod: MC

## 2025-02-28 PROCEDURE — 83735 ASSAY OF MAGNESIUM: CPT

## 2025-02-28 PROCEDURE — 87040 BLOOD CULTURE FOR BACTERIA: CPT

## 2025-02-28 PROCEDURE — 85025 COMPLETE CBC W/AUTO DIFF WBC: CPT

## 2025-02-28 PROCEDURE — 99285 EMERGENCY DEPT VISIT HI MDM: CPT

## 2025-02-28 PROCEDURE — G0545: CPT

## 2025-02-28 PROCEDURE — 97163 PT EVAL HIGH COMPLEX 45 MIN: CPT

## 2025-02-28 PROCEDURE — 99232 SBSQ HOSP IP/OBS MODERATE 35: CPT

## 2025-02-28 PROCEDURE — 82962 GLUCOSE BLOOD TEST: CPT

## 2025-02-28 PROCEDURE — 85730 THROMBOPLASTIN TIME PARTIAL: CPT

## 2025-02-28 PROCEDURE — 96374 THER/PROPH/DIAG INJ IV PUSH: CPT

## 2025-02-28 PROCEDURE — 83605 ASSAY OF LACTIC ACID: CPT

## 2025-02-28 PROCEDURE — 80048 BASIC METABOLIC PNL TOTAL CA: CPT

## 2025-02-28 PROCEDURE — 76705 ECHO EXAM OF ABDOMEN: CPT

## 2025-02-28 PROCEDURE — 85610 PROTHROMBIN TIME: CPT

## 2025-02-28 RX ORDER — WHITE PETROLATUM 1 G/G
1 OINTMENT TOPICAL
Qty: 1 | Refills: 0
Start: 2025-02-28 | End: 2025-03-13

## 2025-02-28 RX ADMIN — Medication 1 APPLICATION(S): at 06:55

## 2025-02-28 RX ADMIN — Medication 1000 MILLIGRAM(S): at 06:54

## 2025-02-28 RX ADMIN — FUROSEMIDE 40 MILLIGRAM(S): 10 INJECTION INTRAMUSCULAR; INTRAVENOUS at 06:55

## 2025-02-28 RX ADMIN — WHITE PETROLATUM 1 APPLICATION(S): 1 OINTMENT TOPICAL at 06:54

## 2025-02-28 RX ADMIN — CARVEDILOL 3.12 MILLIGRAM(S): 3.12 TABLET, FILM COATED ORAL at 06:54

## 2025-02-28 RX ADMIN — HEPARIN SODIUM 5000 UNIT(S): 1000 INJECTION INTRAVENOUS; SUBCUTANEOUS at 06:54

## 2025-02-28 RX ADMIN — Medication 25 MILLIGRAM(S): at 06:55

## 2025-02-28 RX ADMIN — INSULIN LISPRO 1: 100 INJECTION, SOLUTION INTRAVENOUS; SUBCUTANEOUS at 08:51

## 2025-02-28 RX ADMIN — SACUBITRIL AND VALSARTAN 1 TABLET(S): 49; 51 TABLET, FILM COATED ORAL at 06:55

## 2025-02-28 RX ADMIN — Medication 1000 MILLIGRAM(S): at 15:02

## 2025-02-28 RX ADMIN — Medication 81 MILLIGRAM(S): at 15:03

## 2025-02-28 RX ADMIN — Medication 650 MILLIGRAM(S): at 12:24

## 2025-02-28 RX ADMIN — CLOPIDOGREL BISULFATE 75 MILLIGRAM(S): 75 TABLET, FILM COATED ORAL at 15:03

## 2025-02-28 RX ADMIN — INSULIN LISPRO 3: 100 INJECTION, SOLUTION INTRAVENOUS; SUBCUTANEOUS at 12:25

## 2025-02-28 RX ADMIN — FUROSEMIDE 40 MILLIGRAM(S): 10 INJECTION INTRAMUSCULAR; INTRAVENOUS at 15:03

## 2025-02-28 RX ADMIN — INSULIN LISPRO 10 UNIT(S): 100 INJECTION, SOLUTION INTRAVENOUS; SUBCUTANEOUS at 12:25

## 2025-02-28 RX ADMIN — INSULIN LISPRO 10 UNIT(S): 100 INJECTION, SOLUTION INTRAVENOUS; SUBCUTANEOUS at 08:51

## 2025-02-28 RX ADMIN — Medication 20 MILLIGRAM(S): at 15:03

## 2025-02-28 RX ADMIN — Medication 650 MILLIGRAM(S): at 13:24

## 2025-02-28 RX ADMIN — SODIUM HYPOCHLORITE 1 APPLICATION(S): 0.12 SOLUTION TOPICAL at 15:03

## 2025-02-28 NOTE — PROGRESS NOTE ADULT - PROVIDER SPECIALTY LIST ADULT
Internal Medicine
Hospitalist
Internal Medicine
Infectious Disease
Internal Medicine
Internal Medicine
Infectious Disease

## 2025-02-28 NOTE — DISCHARGE NOTE PROVIDER - CARE PROVIDER_API CALL
Yesika Resenedz  Vascular Surgery  [address]  [place] [address]  Phone: [phone]  Fax: [phone]  Follow Up Time: 2 weeks    Orlando Elizalde  Infectious Disease  [address], [address]  [place] [address]  Phone: [phone]  Fax: [phone]  Follow Up Time: 1 month    Lupe Jasmine  Internal Medicine  [address] [place] [address]  Phone: [phone]  Fax: [phone]  Follow Up Time: 2 weeks

## 2025-02-28 NOTE — DISCHARGE NOTE PROVIDER - NSDCCPCAREPLAN_GEN_ALL_CORE_FT
PRINCIPAL DISCHARGE DIAGNOSIS  Diagnosis: Cellulitis  Assessment and Plan of Treatment: You were admitted due to a superficial infection of your lower left leg. We have treated you with antibiotics, and wound care for your leg. You will be discharged on antibiotics with wound care instructions listed below. Follow up with your PCP and infectious disease outpatient for further management      SECONDARY DISCHARGE DIAGNOSES  Diagnosis: Wound pain  Assessment and Plan of Treatment: You have a few wounds and cracked skin on your extremities, Follow the instructions below, in addition to using the lidex cream as seen in your medicine reconcilliation.   WOUNDS:  =======  b/l LE  cleanse with dakins 1/4 strength  tow ound beds  apply aquaphor to dry craked skin, NOT to ulcers   apply medihoney in nickel thick layer to wound beds   cover with non woven gauze   secure with kerlix and ACE x1 wrapped in spiral fashion.   change QD       Diagnosis: Hepatic steatosis  Assessment and Plan of Treatment: Ultrasound imaging of your liver was concerning for steatosis (fatty liver). You should follow up with your PCP to discuss measures to prevent progression.    Diagnosis: Venous stasis dermatitis  Assessment and Plan of Treatment: The function of the veins in your legs is compromised and likely contributing to increased rates of infection and difficulty healing. Follow up with vascular surgery outpatient for further management.    Diagnosis: HTN (hypertension)  Assessment and Plan of Treatment: Resume your home medication regimen and follow up with your PCP.    Diagnosis: HLD (hyperlipidemia)  Assessment and Plan of Treatment: Resume your home medication regimen and follow up with your PCP.    Diagnosis: Diabetes  Assessment and Plan of Treatment: Resume your home medication regimen and follow up with your PCP.    Diagnosis: PAD (peripheral artery disease)  Assessment and Plan of Treatment: Resume your home medication regimen and follow up with your PCP.    Diagnosis: Chronic CHF  Assessment and Plan of Treatment: Follow up with cardiology outpatient as per your normal routine.

## 2025-02-28 NOTE — DISCHARGE NOTE PROVIDER - NSDCFUADDINST_GEN_ALL_CORE_FT
2000mL Fluid Restriction     WOUNDS:  =======  b/l LE  cleanse with dakins 1/4 strength  tow ound beds  apply aquaphor to dry craked skin, NOT to ulcers   apply medihoney in nickel thick layer to wound beds   cover with non woven gauze   secure with kerlix and ACE x1 wrapped in spiral fashion.   change QD

## 2025-02-28 NOTE — PROGRESS NOTE ADULT - SUBJECTIVE AND OBJECTIVE BOX
INFECTIOUS DISEASES AND INTERNAL MEDICINE at [place]  =======================================================  Khang Salter MD  Diplomates American Board of Internal Medicine and Infectious Diseases  Telephone [phone]  Fax            [phone]  =======================================================    NEDA CURRANEEN [id number]    Follow up: LEFT  LEG CELLULTIS     Allergies:  No Known Allergies      Medications:  acetaminophen     Tablet .. 650 milliGRAM(s) Oral every 6 hours PRN  acetaminophen   IVPB .. 1000 milliGRAM(s) IV Intermittent once  aluminum hydroxide/magnesium hydroxide/simethicone Suspension 30 milliLiter(s) Oral every 4 hours PRN  AQUAPHOR (petrolatum Ointment) 1 Application(s) Topical two times a day  aspirin  chewable 81 milliGRAM(s) Oral daily  atorvastatin 40 milliGRAM(s) Oral at bedtime  carvedilol 3.125 milliGRAM(s) Oral every 12 hours  clindamycin IVPB 900 milliGRAM(s) IV Intermittent every 8 hours  clopidogrel Tablet 75 milliGRAM(s) Oral daily  Dakins Solution - 1/4 Strength 1 Application(s) Topical daily  dextrose 5%. 1000 milliLiter(s) IV Continuous <Continuous>  dextrose 5%. 1000 milliLiter(s) IV Continuous <Continuous>  dextrose 50% Injectable 25 Gram(s) IV Push once  dextrose 50% Injectable 12.5 Gram(s) IV Push once  dextrose 50% Injectable 25 Gram(s) IV Push once  dextrose Oral Gel 15 Gram(s) Oral once PRN  famotidine    Tablet 20 milliGRAM(s) Oral daily  fluocinonide 0.05% Solution 1 Application(s) Topical two times a day  furosemide    Tablet 40 milliGRAM(s) Oral two times a day  glucagon  Injectable 1 milliGRAM(s) IntraMuscular once  heparin   Injectable 5000 Unit(s) SubCutaneous every 12 hours  influenza   Vaccine 0.5 milliLiter(s) IntraMuscular once  insulin glargine Injectable (LANTUS) 40 Unit(s) SubCutaneous at bedtime  insulin lispro (ADMELOG) corrective regimen sliding scale   SubCutaneous three times a day before meals  insulin lispro Injectable (ADMELOG) 10 Unit(s) SubCutaneous three times a day before meals  melatonin 3 milliGRAM(s) Oral at bedtime PRN  ondansetron Injectable 4 milliGRAM(s) IV Push every 8 hours PRN  piperacillin/tazobactam IVPB.. 3.375 Gram(s) IV Intermittent every 8 hours  sacubitril 24 mG/valsartan 26 mG 1 Tablet(s) Oral two times a day  sodium chloride 0.9%. 1000 milliLiter(s) IV Continuous <Continuous>  spironolactone 25 milliGRAM(s) Oral daily    SOCIAL       FAMILY   FAMILY HISTORY:  FH: leukemia (Aunt)      REVIEW OF SYSTEMS:  CONSTITUTIONAL:  No Fever or chills  HEENT:   No diplopia or blurred vision.  No earache, sore throat or runny nose.  CARDIOVASCULAR:  No pressure, squeezing, strangling, tightness, heaviness or aching about the chest, neck, axilla or epigastrium.  RESPIRATORY:  No cough, shortness of breath, PND or orthopnea.  GASTROINTESTINAL:  No nausea, vomiting or diarrhea.  GENITOURINARY:  No dysuria, frequency or urgency. No Blood in urine  MUSCULOSKELETAL:   moves all joints  SKIN:  ENRIQUE PER HPI   NEUROLOGIC:  No paresthesias, fasciculations, seizures or weakness.  PSYCHIATRIC:  No disorder of thought or mood.  ENDOCRINE:  No heat or cold intolerance, polyuria or polydipsia.  HEMATOLOGICAL:  No easy bruising or bleeding.            Physical Exam:   Vital Signs Last 24 Hrs  T(C): 36.4 (28 Feb 2025 07:35), Max: 36.7 (27 Feb 2025 16:38)  T(F): 97.5 (28 Feb 2025 07:35), Max: 98 (27 Feb 2025 16:38)  HR: 81 (28 Feb 2025 07:35) (73 - 99)  BP: 106/71 (28 Feb 2025 07:35) (106/71 - 135/73)  BP(mean): --  RR: 19 (28 Feb 2025 07:35) (18 - 19)  SpO2: 92% (28 Feb 2025 07:35) (92% - 95%)    Parameters below as of 28 Feb 2025 07:35  Patient On (Oxygen Delivery Method): room air          GEN: NAD,   HEENT: normocephalic and atraumatic. EOMI. CANDIDA.    NECK: Supple. No carotid bruits.  No lymphadenopathy or thyromegaly.  LUNGS: Clear to auscultation.  HEART: Regular rate and rhythm without murmur.  ABDOMEN: Soft, nontender, and nondistended.  Positive bowel sounds.    : No CVA tenderness  EXTREMITIES: LEFT LEG WRAPPED ACE BANDAGES OME ERYTHEMA AND EDEMA   MSK: no joint swelling  NEUROLOGIC: Cranial nerves II through XII are grossly intact.  PSYCHIATRIC: Appropriate affect .  SKIN: No ulceration or induration present.        Labs:  Vitals:  ============     =======================================================  Current Antibiotics:     piperacillin/tazobactam IVPB.. 3.375 Gram(s) IV Intermittent every 8 hours    Other medications:  acetaminophen   IVPB .. 1000 milliGRAM(s) IV Intermittent once  AQUAPHOR (petrolatum Ointment) 1 Application(s) Topical two times a day  aspirin  chewable 81 milliGRAM(s) Oral daily  atorvastatin 40 milliGRAM(s) Oral at bedtime  carvedilol 3.125 milliGRAM(s) Oral every 12 hours  clopidogrel Tablet 75 milliGRAM(s) Oral daily  Dakins Solution - 1/4 Strength 1 Application(s) Topical daily  dextrose 5%. 1000 milliLiter(s) IV Continuous <Continuous>  dextrose 5%. 1000 milliLiter(s) IV Continuous <Continuous>  dextrose 50% Injectable 25 Gram(s) IV Push once  dextrose 50% Injectable 12.5 Gram(s) IV Push once  dextrose 50% Injectable 25 Gram(s) IV Push once  famotidine    Tablet 20 milliGRAM(s) Oral daily  fluocinonide 0.05% Solution 1 Application(s) Topical two times a day  furosemide    Tablet 40 milliGRAM(s) Oral two times a day  glucagon  Injectable 1 milliGRAM(s) IntraMuscular once  heparin   Injectable 5000 Unit(s) SubCutaneous every 12 hours  influenza   Vaccine 0.5 milliLiter(s) IntraMuscular once  insulin glargine Injectable (LANTUS) 40 Unit(s) SubCutaneous at bedtime  insulin lispro (ADMELOG) corrective regimen sliding scale   SubCutaneous three times a day before meals  insulin lispro Injectable (ADMELOG) 10 Unit(s) SubCutaneous three times a day before meals  sacubitril 24 mG/valsartan 26 mG 1 Tablet(s) Oral two times a day  sodium chloride 0.9%. 1000 milliLiter(s) IV Continuous <Continuous>  spironolactone 25 milliGRAM(s) Oral daily      =======================================================  Labs:                             12.0   9.66  )-----------( 232      ( 28 Feb 2025 05:50 )             38.6   02-28    137  |  97  |  14.9  ----------------------------<  195[H]  4.2   |  27.0  |  0.57    Ca    8.7      28 Feb 2025 05:50  Mg     2.1     02-28    TPro  6.7  /  Alb  2.8[L]  /  TBili  0.3[L]  /  DBili  x   /  AST  11  /  ALT  14  /  AlkPhos  72  02-28      Culture - Blood (collected 02-22-25 @ 19:10)  Source: .Blood Blood  Preliminary Report (02-25-25 @ 02:02):    No growth at 48 Hours    Culture - Blood (collected 02-10-25 @ 03:20)  Source: .Blood BLOOD  Final Report (02-15-25 @ 09:00):    No growth at 5 days    Culture - Blood (collected 02-10-25 @ 03:10)  Source: .Blood BLOOD  Final Report (02-15-25 @ 09:00):    No growth at 5 days    Culture - Blood (collected 12-29-24 @ 17:15)  Source: .Blood BLOOD  Final Report (01-04-25 @ 02:00):    No growth at 5 days    Culture - Blood (collected 12-29-24 @ 17:10)  Source: .Blood BLOOD  Final Report (01-04-25 @ 02:00):    No growth at 5 days    Culture - Urine (collected 10-23-24 @ 18:20)  Source: Clean Catch Clean Catch (Midstream)  Final Report (10-27-24 @ 09:25):    >100,000 CFU/ml Klebsiella pneumoniae  Organism: Klebsiella pneumoniae (10-27-24 @ 09:25)  Organism: Klebsiella pneumoniae (10-27-24 @ 09:25)    Sensitivities:      -  Trimethoprim/Sulfamethoxazole: S <=0.5/9.5      -  Tobramycin: S <=2      -  Piperacillin/Tazobactam: S <=8      -  Nitrofurantoin: S <=32 Should not be used to treat pyelonephritis      -  Meropenem: S <=1      -  Levofloxacin: S <=0.5      -  Imipenem: S <=1      -  Gentamicin: S <=2      -  Ertapenem: S <=0.5      -  Ciprofloxacin: S <=0.25      -  Cefuroxime: S <=4      -  Ceftriaxone: S <=1      -  Cefoxitin: S <=8      -  Cefepime: S <=2      -  Cefazolin: S <=2 For uncomplicated UTI with K. pneumoniae, E. coli, or P. mirablis: PJ <=16 is sensitive and PJ >=32 is resistant. This also predicts results for oral agents cefaclor, cefdinir, cefpodoxime, cefprozil, cefuroxime axetil, cephalexin and locarbef for uncomplicated UTI. Note that some isolates may be susceptible to these agents while testing resistant to cefazolin.      -  Aztreonam: S <=4      -  Ampicillin/Sulbactam: S <=4/2      -  Ampicillin: R >16 These ampicillin results predict results for amoxicillin      -  Amoxicillin/Clavulanic Acid: S <=8/4      Method Type: PJ      Creatinine: 0.67 mg/dL (02-25-25 @ 06:57)  Creatinine: 0.67 mg/dL (02-24-25 @ 06:00)  Creatinine: 0.62 mg/dL (02-22-25 @ 19:16)            WBC Count: 10.01 K/uL (02-25-25 @ 06:57)  WBC Count: 10.71 K/uL (02-24-25 @ 09:05)  WBC Count: 12.27 K/uL (02-22-25 @ 19:16)    SARS-CoV-2 Result: Detected (02-10-25 @ 03:50)      Alkaline Phosphatase: 75 U/L (02-25-25 @ 06:57)  Alkaline Phosphatase: 83 U/L (02-22-25 @ 19:16)  Alanine Aminotransferase (ALT/SGPT): 15 U/L (02-25-25 @ 06:57)  Alanine Aminotransferase (ALT/SGPT): 17 U/L (02-22-25 @ 19:16)  Aspartate Aminotransferase (AST/SGOT): 11 U/L (02-25-25 @ 06:57)  Aspartate Aminotransferase (AST/SGOT): 21 U/L (02-22-25 @ 19:16)  Bilirubin Total: 0.4 mg/dL (02-25-25 @ 06:57)  Bilirubin Total: 0.4 mg/dL (02-22-25 @ 19:16)

## 2025-02-28 NOTE — PROGRESS NOTE ADULT - ASSESSMENT
55 y/o F, lives in shelter, w/ PMH HFimprovedEF (25%-->55% 2/10/25), HTN, HLD, GERHARD (not on CPAP), chronic LE edema, PAD s/p b/l stents, L-ankle hardware, s/p recent admission 2/10/25-2/19/25 for AMS, acute hypoxic respiratory failure requiring NIV and septic shock 2/2 LLE cellulitis and PNA, completed course of CEFAZOLIN presented today for worsening LLE erythema and pain. Pt states that since d/c her LLE has worsened but she denies fevers or chills.  She also notes open wounds on her LLE but denies any drainage. Pt also denies trauma to the leg, paresthesias, sob, cough, abd pain, N/V, diarrhea.       AS ABOVE RECENT HOSP STAY 2/10-2/19 WITH CELLULITIS SHOCK  ICU STAY NOW WITH REPORTED INCREASE SWELLING REDNESS OF LEFT LEG  PHYSICAL EXAM SIGIFICANT FOR LEFT LOWER EXT EDEMA ERYTHEMA WARMTH TENDERNESS NO DRAINAGE  NO CREPITUS RIGHT LEG WITH EDEMA SMALL ULCER POSTERIOR TIBIAL NO DRAINAGE   WILL FOLLOWUP BLOOD CX   MRSA SWAB LAST VISIT WAS NEGATIVE REPEAT NEG     D/C  CLINDA    ZOSYN  HAS COMLETED ONE WEEK   CAN TRANSITION TO ORAL AUGMENTIN 875 BID X 1 MORE WEEK AS AREA IS BIG THAT IS INVOLVED   PT WITH CELLULITIS ON SUPERIMPOSED  STASIS DERMATITIS      WILL D/W  MED RESIDENT   WILL FOLLOWP WITH FURTHER RECOMMENDATIONS  WILL FOLLOWUP AS NEEDED PLEASE CALL IF QUESTIONS

## 2025-02-28 NOTE — DISCHARGE NOTE NURSING/CASE MANAGEMENT/SOCIAL WORK - FINANCIAL ASSISTANCE
[facility] provides services at a reduced cost to those who are determined to be eligible through [facility]’s financial assistance program. Information regarding [facility]’s financial assistance program can be found by going to [email or web address] or by calling [phone].

## 2025-02-28 NOTE — DISCHARGE NOTE PROVIDER - NSDCFUSCHEDAPPT_GEN_ALL_CORE_FT
Sai Younger  [facility] Physician Formerly [facility]  CARDIOLOGY [address]  Scheduled Appointment: 03/10/2025

## 2025-02-28 NOTE — DISCHARGE NOTE PROVIDER - CARE PROVIDERS DIRECT ADDRESSES
[email or web address]

## 2025-02-28 NOTE — DISCHARGE NOTE PROVIDER - ATTENDING DISCHARGE PHYSICAL EXAMINATION:
General appearance: appears stated age, well nourished, not in acute distress,   Neuro: AxOx3, EOMI  Cardio: diminished heart sounds secondary to body habitus, s1,s2 weakly auscultated  Pulmonary: Diminished breath sounds bilaterally due to body habitus, coarse breath sounds appreciated bilaterally  Abdominal: distended, bowel sounds present in all 4 quadrants, noted pain to palpation in RUQ, LUQ and epigastric region, nontender to palpation in LLQ, RLQ  Extremities: RLE: generalized edema, no erythema, bandage on posterior ankle/calf covering old healing wound,, nontender to palpation, 3+ pitting edema present, weakly palpated popliteal artery.  LLE: lower leg wrap and bandaged from ankle to below knee, erythema present above the knee, nonpitting edema present in lower leg, tender to palpation ankle to knee, capillary refill of great hallux intact, could not palpate popliteal artery at this time

## 2025-02-28 NOTE — DISCHARGE NOTE NURSING/CASE MANAGEMENT/SOCIAL WORK - PATIENT PORTAL LINK FT
You can access the FollowMyHealth Patient Portal offered by [facility] by registering at the following website: [email or web address]. By joining [employer]’s FollowMyHealth portal, you will also be able to view your health information using other applications (apps) compatible with our system.

## 2025-02-28 NOTE — DISCHARGE NOTE PROVIDER - HOSPITAL COURSE
Admission HPI: 53 y/o F, lives in shelter, w/ PMH HFimprovedEF (25%-->55% 2/10/25), HTN, HLD, GERHARD (not on CPAP), chronic LE edema, PAD s/p b/l stents, L-ankle hardware, s/p recent admission 2/10/25-2/19/25 for AMS, acute hypoxic respiratory failure requiring NIV and septic shock 2/2 LLE cellulitis and PNA, completed course of cefepime presented today for worsening LLE erythema and pain. Pt states that since d/c her LLE has worsened but she denies fevers or chills.  She also notes open wounds on her LLE but denies any drainage. Pt also denies trauma to the leg, paresthesias, sob, cough, abd pain, N/V, diarrhea.         Hospital Course: Patient is a 53 y/o F w/ pmhx of HFrEF w/ EF recovery (55%, 02/10/25), HTN, HLD, GERHARD (not on CPAP), chronic LE edema, PAD s/p b/l stents, L-ankle hardware, and a recent admission (02/10-02/19) for AMS where patient was found to have Septic shock and AHRF 2/2 LLE cellulitis and PNA requring NIV and MICU level care presented from [facility] to [facility] complaining of worsening LLE erythema and pain. Patient reports that her cellulitis worsened since her prior admission, completed cefepime course for treatment. ED investigations revealed leukocytosis, CT of the LLE revealed subQ edema c/w cellulitus, but negative for gas or abscess. Doppler study of LLE was negative for ID. ID consulted.      Important Medication Changes and Reason:    Complete one week course of augmentin    Use lidex cream for one week    Follow all wound care instructions noted on DC    Follow up with vascular surgery to discuss venous stasis    Follow up with hepatology to discuss fatty liver    Continue all other medications as seen in medication reconciliation.    Active or Pending Issues Requiring Follow-up      Advanced Directives: Full code

## 2025-02-28 NOTE — DISCHARGE NOTE PROVIDER - PROVIDER TOKENS
PROVIDER:[TOKEN:[[id number]],FOLLOWUP:[2 weeks]],PROVIDER:[TOKEN:[[id number]],FOLLOWUP:[1 month]],PROVIDER:[TOKEN:[[id number]],FOLLOWUP:[2 weeks]]

## 2025-02-28 NOTE — DISCHARGE NOTE PROVIDER - NSDCMRMEDTOKEN_GEN_ALL_CORE_FT
Admelog 100 units/mL injectable solution: 10 unit(s) injectable 3 times a day  amoxicillin-clavulanate 875 mg-125 mg oral tablet: 875 milligram(s) orally 2 times a day  aspirin 81 mg oral tablet, chewable: 1 tab(s) orally once a day  atorvastatin 40 mg oral tablet: 1 tab(s) orally once a day (at bedtime)  Basaglar KwikPen 100 units/mL subcutaneous solution: 40 unit(s) subcutaneous once a day (at bedtime)  carvedilol 6.25 mg oral tablet: 0.5 tab(s) orally every 12 hours  clopidogrel 75 mg oral tablet: 1 tab(s) orally once a day   fluocinonide 0.05% topical solution: Apply topically to affected area 2 times a day 1 Apply topically to affected area 2 times a day  furosemide 40 mg oral tablet: 1 tab(s) orally 2 times a day  ipratropium-albuterol 0.5 mg-2.5 mg/3 mL inhalation solution: 3 milliliter(s) inhaled 3 times a day as needed for  shortness of breath and/or wheezing  Jardiance 10 mg oral tablet: 1 tab(s) orally once a day (in the morning)  metFORMIN 1000 mg oral tablet: 1 tab(s) orally 2 times a day  petrolatum topical ointment: Apply topically to affected area 2 times a day 1 Apply topically to affected area 2 times a day  sacubitril-valsartan 24 mg-26 mg oral tablet: 1 tab(s) orally 2 times a day start on Friday am   check your blood pressure if low less than 100 hold it  sodium hypochlorite 0.125% topical solution: Apply topically to affected area once a day 1 Apply topically to affected area once a day leg wound s  spironolactone 25 mg oral tablet: 1 tab(s) orally once a day

## 2025-03-01 RX ORDER — AMOXICILLIN AND CLAVULANATE POTASSIUM 500; 125 MG/1; MG/1
875 TABLET, FILM COATED ORAL
Qty: 14 | Refills: 0
Start: 2025-03-01 | End: 2025-03-07

## 2025-03-03 ENCOUNTER — TRANSCRIPTION ENCOUNTER (OUTPATIENT)
Age: 55
End: 2025-03-03

## 2025-03-08 ENCOUNTER — NON-APPOINTMENT (OUTPATIENT)
Age: 55
End: 2025-03-08

## 2025-03-09 NOTE — CDI QUERY NOTE - NSCDIOTHERTXTBX_GEN_ALL_CORE_HH
Clinical documentation and/or evidence of the patient’s presentation, evaluation, and medical management, as evidenced below, may support a cause and effect link that is not documented in the medical record.  In order to ensure accurate coding and accuracy of the clinical record, the documentation in this patient’s medical record requires additional clarification.      If you think the supporting documentation and/or clinical evidence supports a cause and effect link, please include more specific documentation associated with these findings in your progress note and/or discharge summary.      Please clarify if there is a relationship between the cellulitis LLE and diabetes, such as:  •	cellulitis LLE secondary to the diabetes  •	cellulitis LLE unrelated to (specify diagnosis or procedure)   •	Other (specify)      Supporting documentation and/or clinical evidence:   Admitted for worsening LLE cellulitis since last admission  PMH of diabetes and PAD  Attending states severe cellulitis superimposed on venous stasis    ED:   · HPI Objective Statement: 55yo female with PMH congestive heart failure, HTN, DM2, hyperlipidemia, HTN, GERHARD, PAD presenting with LLE redness, swelling, and pain.· HPI Objective Statement: 55yo female with PMH congestive heart failure, HTN, DM2, hyperlipidemia, HTN, GERHARD, PAD presenting with LLE redness, swelling, and pain.    ATTENDING 2/27 NOTE:  #Severe LLE cellulitis   #Superimposed on venous stasis  - Elv wbc, CT LE w some subq edema, no gas or abcess    DC:  recent admission (02/10-02/19) for AMS where patient was found to have Septic shock and AHRF 2/2 LLE cellulitis and PNA requring NIV and MICU level care presented from [facility] to [facility] complaining of worsening LLE erythema and pain. Patient reports that her cellulitis worsened since her prior admission, completed cefepime course for treatment. ED investigations revealed leukocytosis, CT of the LLE revealed subQ edema c/w cellulitus, but negative for gas or abscess.

## 2025-03-10 ENCOUNTER — NON-APPOINTMENT (OUTPATIENT)
Age: 55
End: 2025-03-10

## 2025-03-10 ENCOUNTER — APPOINTMENT (OUTPATIENT)
Dept: CARDIOLOGY | Facility: CLINIC | Age: 55
End: 2025-03-10
Payer: MEDICARE

## 2025-03-10 VITALS
SYSTOLIC BLOOD PRESSURE: 104 MMHG | OXYGEN SATURATION: 94 % | WEIGHT: 266 LBS | BODY MASS INDEX: 53.63 KG/M2 | HEART RATE: 91 BPM | DIASTOLIC BLOOD PRESSURE: 69 MMHG | HEIGHT: 59 IN

## 2025-03-10 DIAGNOSIS — I50.22 CHRONIC SYSTOLIC (CONGESTIVE) HEART FAILURE: ICD-10-CM

## 2025-03-10 DIAGNOSIS — I51.9 HEART DISEASE, UNSPECIFIED: ICD-10-CM

## 2025-03-10 DIAGNOSIS — Z09 ENCOUNTER FOR FOLLOW-UP EXAMINATION AFTER COMPLETED TREATMENT FOR CONDITIONS OTHER THAN MALIGNANT NEOPLASM: ICD-10-CM

## 2025-03-10 DIAGNOSIS — I27.20 PULMONARY HYPERTENSION, UNSPECIFIED: ICD-10-CM

## 2025-03-10 PROCEDURE — 99214 OFFICE O/P EST MOD 30 MIN: CPT

## 2025-03-10 PROCEDURE — 93000 ELECTROCARDIOGRAM COMPLETE: CPT

## 2025-03-10 RX ORDER — SACUBITRIL AND VALSARTAN 24; 26 MG/1; MG/1
24-26 TABLET, FILM COATED ORAL TWICE DAILY
Refills: 0 | Status: ACTIVE | COMMUNITY

## 2025-03-12 ENCOUNTER — APPOINTMENT (OUTPATIENT)
Dept: VASCULAR SURGERY | Facility: CLINIC | Age: 55
End: 2025-03-12
Payer: MEDICARE

## 2025-03-12 VITALS
HEART RATE: 87 BPM | WEIGHT: 268 LBS | BODY MASS INDEX: 54.03 KG/M2 | DIASTOLIC BLOOD PRESSURE: 62 MMHG | HEIGHT: 59 IN | OXYGEN SATURATION: 93 % | SYSTOLIC BLOOD PRESSURE: 96 MMHG | RESPIRATION RATE: 16 BRPM | TEMPERATURE: 97.3 F

## 2025-03-12 DIAGNOSIS — I73.9 PERIPHERAL VASCULAR DISEASE, UNSPECIFIED: ICD-10-CM

## 2025-03-12 PROCEDURE — 93925 LOWER EXTREMITY STUDY: CPT

## 2025-03-12 PROCEDURE — 99213 OFFICE O/P EST LOW 20 MIN: CPT

## 2025-04-04 ENCOUNTER — RX RENEWAL (OUTPATIENT)
Age: 55
End: 2025-04-04

## 2025-04-16 ENCOUNTER — APPOINTMENT (OUTPATIENT)
Dept: VASCULAR SURGERY | Facility: CLINIC | Age: 55
End: 2025-04-16
Payer: MEDICARE

## 2025-04-16 VITALS
RESPIRATION RATE: 16 BRPM | WEIGHT: 252 LBS | SYSTOLIC BLOOD PRESSURE: 101 MMHG | TEMPERATURE: 97.4 F | DIASTOLIC BLOOD PRESSURE: 67 MMHG | BODY MASS INDEX: 50.8 KG/M2 | HEART RATE: 82 BPM | OXYGEN SATURATION: 93 % | HEIGHT: 59 IN

## 2025-04-16 DIAGNOSIS — I73.9 PERIPHERAL VASCULAR DISEASE, UNSPECIFIED: ICD-10-CM

## 2025-04-16 DIAGNOSIS — I89.0 LYMPHEDEMA, NOT ELSEWHERE CLASSIFIED: ICD-10-CM

## 2025-04-16 PROCEDURE — 99214 OFFICE O/P EST MOD 30 MIN: CPT

## 2025-04-22 DIAGNOSIS — R42 DIZZINESS AND GIDDINESS: ICD-10-CM

## 2025-05-08 ENCOUNTER — APPOINTMENT (OUTPATIENT)
Dept: MRI IMAGING | Facility: CLINIC | Age: 55
End: 2025-05-08
Payer: MEDICARE

## 2025-05-08 ENCOUNTER — OUTPATIENT (OUTPATIENT)
Dept: OUTPATIENT SERVICES | Facility: HOSPITAL | Age: 55
LOS: 1 days | End: 2025-05-08

## 2025-05-08 DIAGNOSIS — Z00.8 ENCOUNTER FOR OTHER GENERAL EXAMINATION: ICD-10-CM

## 2025-05-08 DIAGNOSIS — Z98.62 PERIPHERAL VASCULAR ANGIOPLASTY STATUS: Chronic | ICD-10-CM

## 2025-05-08 PROCEDURE — 74183 MRI ABD W/O CNTR FLWD CNTR: CPT | Mod: 26

## 2025-06-13 ENCOUNTER — APPOINTMENT (OUTPATIENT)
Dept: MRI IMAGING | Facility: CLINIC | Age: 55
End: 2025-06-13

## 2025-06-16 ENCOUNTER — APPOINTMENT (OUTPATIENT)
Dept: CARDIOLOGY | Facility: CLINIC | Age: 55
End: 2025-06-16
Payer: MEDICARE

## 2025-06-16 VITALS
HEIGHT: 59 IN | OXYGEN SATURATION: 94 % | BODY MASS INDEX: 48.79 KG/M2 | HEART RATE: 85 BPM | DIASTOLIC BLOOD PRESSURE: 62 MMHG | WEIGHT: 242 LBS | SYSTOLIC BLOOD PRESSURE: 91 MMHG

## 2025-06-16 PROCEDURE — 93000 ELECTROCARDIOGRAM COMPLETE: CPT

## 2025-06-16 PROCEDURE — 99214 OFFICE O/P EST MOD 30 MIN: CPT

## 2025-07-01 ENCOUNTER — RX RENEWAL (OUTPATIENT)
Age: 55
End: 2025-07-01

## 2025-08-19 ENCOUNTER — APPOINTMENT (OUTPATIENT)
Dept: HEART FAILURE | Facility: CLINIC | Age: 55
End: 2025-08-19
Payer: MEDICARE

## 2025-08-19 VITALS
SYSTOLIC BLOOD PRESSURE: 88 MMHG | OXYGEN SATURATION: 94 % | WEIGHT: 229 LBS | HEART RATE: 90 BPM | DIASTOLIC BLOOD PRESSURE: 56 MMHG | HEIGHT: 59 IN | BODY MASS INDEX: 46.16 KG/M2

## 2025-08-19 DIAGNOSIS — E78.5 HYPERLIPIDEMIA, UNSPECIFIED: ICD-10-CM

## 2025-08-19 DIAGNOSIS — I51.9 HEART DISEASE, UNSPECIFIED: ICD-10-CM

## 2025-08-19 PROCEDURE — 99204 OFFICE O/P NEW MOD 45 MIN: CPT

## 2025-08-19 RX ORDER — TIRZEPATIDE 7.5 MG/.5ML
7.5 INJECTION, SOLUTION SUBCUTANEOUS
Refills: 0 | Status: ACTIVE | COMMUNITY

## 2025-08-20 ENCOUNTER — APPOINTMENT (OUTPATIENT)
Dept: PULMONOLOGY | Facility: CLINIC | Age: 55
End: 2025-08-20
Payer: MEDICARE

## 2025-08-20 VITALS
WEIGHT: 230 LBS | SYSTOLIC BLOOD PRESSURE: 104 MMHG | HEART RATE: 86 BPM | OXYGEN SATURATION: 97 % | HEIGHT: 59 IN | BODY MASS INDEX: 46.37 KG/M2 | RESPIRATION RATE: 16 BRPM | DIASTOLIC BLOOD PRESSURE: 70 MMHG

## 2025-08-20 DIAGNOSIS — E66.01 MORBID (SEVERE) OBESITY DUE TO EXCESS CALORIES: ICD-10-CM

## 2025-08-20 DIAGNOSIS — I27.20 PULMONARY HYPERTENSION, UNSPECIFIED: ICD-10-CM

## 2025-08-20 DIAGNOSIS — F17.200 NICOTINE DEPENDENCE, UNSPECIFIED, UNCOMPLICATED: ICD-10-CM

## 2025-08-20 DIAGNOSIS — I50.22 CHRONIC SYSTOLIC (CONGESTIVE) HEART FAILURE: ICD-10-CM

## 2025-08-20 DIAGNOSIS — J44.9 CHRONIC OBSTRUCTIVE PULMONARY DISEASE, UNSPECIFIED: ICD-10-CM

## 2025-08-20 DIAGNOSIS — G47.33 OBSTRUCTIVE SLEEP APNEA (ADULT) (PEDIATRIC): ICD-10-CM

## 2025-08-20 PROCEDURE — 99203 OFFICE O/P NEW LOW 30 MIN: CPT

## 2025-08-20 PROCEDURE — G0296 VISIT TO DETERM LDCT ELIG: CPT

## 2025-08-20 PROCEDURE — 99406 BEHAV CHNG SMOKING 3-10 MIN: CPT

## 2025-08-28 ENCOUNTER — NON-APPOINTMENT (OUTPATIENT)
Age: 55
End: 2025-08-28

## 2025-08-28 VITALS — HEIGHT: 59 IN | BODY MASS INDEX: 46.37 KG/M2 | WEIGHT: 230 LBS

## 2025-08-28 DIAGNOSIS — Z87.891 PERSONAL HISTORY OF NICOTINE DEPENDENCE: ICD-10-CM

## 2025-09-06 ENCOUNTER — APPOINTMENT (OUTPATIENT)
Dept: CT IMAGING | Facility: CLINIC | Age: 55
End: 2025-09-06

## 2025-09-06 ENCOUNTER — OUTPATIENT (OUTPATIENT)
Dept: OUTPATIENT SERVICES | Facility: HOSPITAL | Age: 55
LOS: 1 days | End: 2025-09-06

## 2025-09-06 DIAGNOSIS — Z87.891 PERSONAL HISTORY OF NICOTINE DEPENDENCE: ICD-10-CM

## 2025-09-06 DIAGNOSIS — Z98.62 PERIPHERAL VASCULAR ANGIOPLASTY STATUS: Chronic | ICD-10-CM

## 2025-09-06 PROCEDURE — 71271 CT THORAX LUNG CANCER SCR C-: CPT | Mod: 26
